# Patient Record
Sex: FEMALE | Race: WHITE | NOT HISPANIC OR LATINO | Employment: FULL TIME | URBAN - METROPOLITAN AREA
[De-identification: names, ages, dates, MRNs, and addresses within clinical notes are randomized per-mention and may not be internally consistent; named-entity substitution may affect disease eponyms.]

---

## 2017-01-09 DIAGNOSIS — M85.80 OSTEOPENIA: ICD-10-CM

## 2017-01-10 ENCOUNTER — OFFICE VISIT (OUTPATIENT)
Dept: ONCOLOGY | Facility: CLINIC | Age: 62
End: 2017-01-10

## 2017-01-10 ENCOUNTER — LAB (OUTPATIENT)
Dept: ONCOLOGY | Facility: HOSPITAL | Age: 62
End: 2017-01-10

## 2017-01-10 ENCOUNTER — INFUSION (OUTPATIENT)
Dept: ONCOLOGY | Facility: HOSPITAL | Age: 62
End: 2017-01-10

## 2017-01-10 VITALS
SYSTOLIC BLOOD PRESSURE: 138 MMHG | DIASTOLIC BLOOD PRESSURE: 79 MMHG | HEART RATE: 96 BPM | OXYGEN SATURATION: 97 % | TEMPERATURE: 97.4 F | HEIGHT: 67 IN | BODY MASS INDEX: 31.61 KG/M2 | RESPIRATION RATE: 16 BRPM | WEIGHT: 201.4 LBS

## 2017-01-10 DIAGNOSIS — C50.912 PRIMARY MALIGNANT NEOPLASM OF LEFT BREAST (HCC): Primary | ICD-10-CM

## 2017-01-10 DIAGNOSIS — D68.59 THROMBOPHILIA (HCC): ICD-10-CM

## 2017-01-10 DIAGNOSIS — D50.0 IRON DEFICIENCY ANEMIA DUE TO CHRONIC BLOOD LOSS: ICD-10-CM

## 2017-01-10 DIAGNOSIS — C79.51 BONE METASTASIS: ICD-10-CM

## 2017-01-10 DIAGNOSIS — M85.80 OSTEOPENIA: ICD-10-CM

## 2017-01-10 LAB
ALBUMIN SERPL-MCNC: 3.9 G/DL (ref 3.5–5.2)
ALBUMIN/GLOB SERPL: 1.3 G/DL
ALP SERPL-CCNC: 157 U/L (ref 39–117)
ALT SERPL W P-5'-P-CCNC: 18 U/L (ref 1–33)
ANION GAP SERPL CALCULATED.3IONS-SCNC: 10.8 MMOL/L
AST SERPL-CCNC: 23 U/L (ref 1–32)
BASOPHILS # BLD AUTO: 0.04 10*3/MM3 (ref 0–0.2)
BASOPHILS NFR BLD AUTO: 0.6 % (ref 0–1.5)
BILIRUB SERPL-MCNC: 0.2 MG/DL (ref 0.1–1.2)
BUN BLD-MCNC: 23 MG/DL (ref 8–23)
BUN/CREAT SERPL: 35.4 (ref 7–25)
CALCIUM SPEC-SCNC: 9.5 MG/DL (ref 8.6–10.5)
CHLORIDE SERPL-SCNC: 100 MMOL/L (ref 98–107)
CO2 SERPL-SCNC: 27.2 MMOL/L (ref 22–29)
CREAT BLD-MCNC: 0.65 MG/DL (ref 0.57–1)
DEPRECATED RDW RBC AUTO: 47 FL (ref 37–54)
EOSINOPHIL # BLD AUTO: 0.34 10*3/MM3 (ref 0–0.7)
EOSINOPHIL NFR BLD AUTO: 4.8 % (ref 0.3–6.2)
ERYTHROCYTE [DISTWIDTH] IN BLOOD BY AUTOMATED COUNT: 14.8 % (ref 11.7–13)
GFR SERPL CREATININE-BSD FRML MDRD: 93 ML/MIN/1.73
GLOBULIN UR ELPH-MCNC: 2.9 GM/DL
GLUCOSE BLD-MCNC: 126 MG/DL (ref 65–99)
HCT VFR BLD AUTO: 31.7 % (ref 35.6–45.5)
HGB BLD-MCNC: 10.9 G/DL (ref 11.9–15.5)
IMM GRANULOCYTES # BLD: 0.09 10*3/MM3 (ref 0–0.03)
IMM GRANULOCYTES NFR BLD: 1.3 % (ref 0–0.5)
LYMPHOCYTES # BLD AUTO: 1.2 10*3/MM3 (ref 0.9–4.8)
LYMPHOCYTES NFR BLD AUTO: 17.1 % (ref 19.6–45.3)
MCH RBC QN AUTO: 29.7 PG (ref 26.9–32)
MCHC RBC AUTO-ENTMCNC: 34.4 G/DL (ref 32.4–36.3)
MCV RBC AUTO: 86.4 FL (ref 80.5–98.2)
MONOCYTES # BLD AUTO: 0.62 10*3/MM3 (ref 0.2–1.2)
MONOCYTES NFR BLD AUTO: 8.8 % (ref 5–12)
NEUTROPHILS # BLD AUTO: 4.74 10*3/MM3 (ref 1.9–8.1)
NEUTROPHILS NFR BLD AUTO: 67.4 % (ref 42.7–76)
NRBC BLD MANUAL-RTO: 0 /100 WBC (ref 0–0)
PLATELET # BLD AUTO: 308 10*3/MM3 (ref 140–500)
PMV BLD AUTO: 10.7 FL (ref 6–12)
POTASSIUM BLD-SCNC: 4.2 MMOL/L (ref 3.5–5.2)
PROT SERPL-MCNC: 6.8 G/DL (ref 6–8.5)
RBC # BLD AUTO: 3.67 10*6/MM3 (ref 3.9–5.2)
SODIUM BLD-SCNC: 138 MMOL/L (ref 136–145)
WBC NRBC COR # BLD: 7.03 10*3/MM3 (ref 4.5–10.7)

## 2017-01-10 PROCEDURE — 85025 COMPLETE CBC W/AUTO DIFF WBC: CPT | Performed by: INTERNAL MEDICINE

## 2017-01-10 PROCEDURE — 99213 OFFICE O/P EST LOW 20 MIN: CPT | Performed by: INTERNAL MEDICINE

## 2017-01-10 PROCEDURE — 96375 TX/PRO/DX INJ NEW DRUG ADDON: CPT | Performed by: INTERNAL MEDICINE

## 2017-01-10 PROCEDURE — 25010000002 PACLITAXEL PROTEIN-BOUND PART PER 1 MG: Performed by: INTERNAL MEDICINE

## 2017-01-10 PROCEDURE — 36415 COLL VENOUS BLD VENIPUNCTURE: CPT

## 2017-01-10 PROCEDURE — 80053 COMPREHEN METABOLIC PANEL: CPT | Performed by: INTERNAL MEDICINE

## 2017-01-10 PROCEDURE — 25010000002 DEXAMETHASONE SODIUM PHOSPHATE 10 MG/ML SOLUTION 1 ML VIAL: Performed by: INTERNAL MEDICINE

## 2017-01-10 PROCEDURE — 96413 CHEMO IV INFUSION 1 HR: CPT | Performed by: INTERNAL MEDICINE

## 2017-01-10 RX ORDER — SODIUM CHLORIDE 9 MG/ML
250 INJECTION, SOLUTION INTRAVENOUS ONCE
Status: COMPLETED | OUTPATIENT
Start: 2017-01-10 | End: 2017-01-10

## 2017-01-10 RX ORDER — SODIUM CHLORIDE 9 MG/ML
250 INJECTION, SOLUTION INTRAVENOUS ONCE
Status: CANCELLED | OUTPATIENT
Start: 2017-01-10 | End: 2017-01-10

## 2017-01-10 RX ORDER — SODIUM CHLORIDE 9 MG/ML
250 INJECTION, SOLUTION INTRAVENOUS ONCE
Status: CANCELLED | OUTPATIENT
Start: 2017-01-24 | End: 2017-01-24

## 2017-01-10 RX ORDER — PACLITAXEL 100 MG/20ML
125 INJECTION, POWDER, LYOPHILIZED, FOR SUSPENSION INTRAVENOUS ONCE
Status: COMPLETED | OUTPATIENT
Start: 2017-01-10 | End: 2017-01-10

## 2017-01-10 RX ORDER — PACLITAXEL 100 MG/20ML
125 INJECTION, POWDER, LYOPHILIZED, FOR SUSPENSION INTRAVENOUS ONCE
Status: CANCELLED | OUTPATIENT
Start: 2017-01-24

## 2017-01-10 RX ORDER — PACLITAXEL 100 MG/20ML
125 INJECTION, POWDER, LYOPHILIZED, FOR SUSPENSION INTRAVENOUS ONCE
Status: CANCELLED | OUTPATIENT
Start: 2017-01-10

## 2017-01-10 RX ADMIN — SODIUM CHLORIDE 250 ML: 900 INJECTION, SOLUTION INTRAVENOUS at 08:48

## 2017-01-10 RX ADMIN — PACLITAXEL 250 MG: 100 INJECTION, POWDER, LYOPHILIZED, FOR SUSPENSION INTRAVENOUS at 09:18

## 2017-01-10 RX ADMIN — DEXAMETHASONE SODIUM PHOSPHATE 12 MG: 10 INJECTION, SOLUTION INTRAMUSCULAR; INTRAVENOUS at 09:02

## 2017-01-10 NOTE — PROGRESS NOTES
Subjective  REASONS FOR FOLLOWUP: 1. Metastatic breast cancer to multiple skeletal sites including cervical spine, sternum, lumbar spine and right femur, undergoing Abraxane every 4 weeks and Xgeva every month  2.Iron deficiency anemia du to GI blood loss, Xarelto stopped weeks ago, Iron initiated, Pepcid along with Aleve for gastric protection.           History of Present Illness    The patient returns today to the office for followup. She has had an excellent tolerance to the Abraxane that she is receiving every 4 weeks for the treatment of her metastatic breast cancer to the skeleton that has had recurrent progressive disease.  She also has continued her iron preparation away from calcium preparation for the treatment of iron deficiency anemia associated with gastrointestinal blood loss.  Her hemoglobin is improving already to 10.9 today.  So far, the patient has not experienced any obvious peripheral neuropathy.  Tolerance to the treatment is excellent and her ECOG performance status is 0.  She has minimal pain in the right knee area.  No limping.  No obvious bleeding at any level.  No cough, sputum production, shortness of breath.  Appetite and weight remain stable.  No fevers, no chills, no rash.           Past Medical History, Past Surgical History,    1. Hypertension.   2. Hyperlipidemia.   3. Reflux esophagitis.     GYN HISTORY: G0, P0. The patient went into menopause in 2000.     Review of Systems    General: no fever, chills, fatigue, weight changes, or lack of appetite.  Eyes: no epiphora, conjunctivitis, pain, glaucoma, blurred vision, blindness, secretion,  "photophobia.  Ears: no otorrhea, tinnitus, otorrhagia, deafness, pain, vertigo.  Nose: no rhinorrhea, epistaxis, alteration in perception of odors, sinuses pressure.  Mouth: no alteration in gums or teeth,  ulcers, no difficulty with mastication or deglut ion, no odynophagia.  Neck: no masses or pain, no thyroid alterations, no pain in muscles or arteries, no carotid odynia, no crepitation.  Lungs: no cough, sputum production, dyspnea, trepopnea, pleuritic pain, hemoptysis.  Heart: no syncope, irregularity, palpitations, angina, orthopnea, paroxysmal nocturnal dyspnea.  GI: no dysphagia, odynophagia, no regurgitation, no heartburn, indigestion, nausea, vomiting, hematemesis ,melena, jaundice, distention, obstipation, enterorrhagia, proctalgia, anal  Lesions, changes in bowel habits.  : no frequency, hesitancy, hematuria, discharge, pain.  Musculoskeletal: no muscle or tendon pain or inflammation, joint pain, edema, functional limitation, fasciculations, mass.  Neurologic: no headache, seizures, alterations on Craneal nerves, no motor or senssory deficit, normal coordination.  Skin: no rashes, pruritus or localized lesions.  Psychiatric: no anxiety, depression, agitation, delusions, proper insight.      Medications:  The current medication list was reviewed in the EMR    ALLERGIES:    Allergies   Allergen Reactions   • Codeine    • Docetaxel    • Paclitaxel        Objective      Vitals:    01/10/17 0812   BP: 138/79   Pulse: 96   Resp: 16   Temp: 97.4 °F (36.3 °C)   SpO2: 97%   Weight: 201 lb 6.4 oz (91.4 kg)   Height: 67.32\" (171 cm)   PainSc: 0-No pain     Current Status 1/10/2017   ECOG score 0   No cancer related pain    Physical Exam    GENERAL:  Well-developed, well-nourished  Patient  in no acute distress.   SKIN:  Warm, dry without rashes, purpura or petechiae.  HEENT:  Pupils were equal and reactive to light and accomodation, conjunctivas non injected, no pterigion, normal extraocular movements, normal " visual acuity.   Mouth mucosa was moist, no exudates in oropharynx, normal gum line, normal roof of the mouth and pillars, normal papillations of the tongue.  NECK:  Supple with good range of motion; no thyromegaly or masses, no JVD or bruits, no cervical adenopathies.  LYMPHATICS:  No cervical, supraclavicular, axillary or inguinal adenopathy.  CHEST:  Lungs clear to percussion and auscultation, normal breath sounds bilaterally, no wheezing, crackles or ronchi, no stridor.  CARDIAC:  Regular rate and rhythm with systolic g3/6 basal murmur, clean diastole.,no rubs or gallops.  ABDOMEN:  Soft, nontender with no organomegaly or masses, no ascites, no collateral circulation, no abdominal pain, no inguinal hernias, no abdominal bruits.  EXTREMITIES:  No clubbing, cyanosis or edema, no deformities or pain.  NEUROLOGICAL:  Patient was awake, alert, oriented to time, person and place    RECENT LABS:  Hematology WBC   Date Value Ref Range Status   01/10/2017 7.03 4.50 - 10.70 10*3/mm3 Final   02/23/2016 4.42 (L) 4.50 - 10.70 K/Cumm Final   02/23/2016 Comment /hpf Final     Comment:     None Seen  REFERENCE RANGE: None Seen,0-2       RBC   Date Value Ref Range Status   01/10/2017 3.67 (L) 3.90 - 5.20 10*6/mm3 Final   02/23/2016 3.85 (L) 3.90 - 5.20 Million Final     HEMOGLOBIN   Date Value Ref Range Status   01/10/2017 10.9 (L) 11.9 - 15.5 g/dL Final   02/23/2016 10.5 (L) 11.9 - 15.5 g/dL Final     HEMATOCRIT   Date Value Ref Range Status   01/10/2017 31.7 (L) 35.6 - 45.5 % Final   02/23/2016 33.1 (L) 35.6 - 45.5 % Final     PLATELETS   Date Value Ref Range Status   01/10/2017 308 140 - 500 10*3/mm3 Final   02/23/2016 268 140 - 500 K/Cumm Final      CBC Auto Differential   Order: [id number] - Part of Panel Order [id number]   Status:  Final result   Visible to patient:  No (Not Released) Dx:  Osteopenia      Ref Range & Units 7:58 AM     WBC 4.50 - 10.70 10*3/mm3 7.03   RBC 3.90 - 5.20 10*6/mm3 3.67 (L)   Hemoglobin 11.9 - 15.5  g/dL 10.9 (L)   Hematocrit 35.6 - 45.5 % 31.7 (L)   MCV 80.5 - 98.2 fL 86.4   MCH 26.9 - 32.0 pg 29.7   MCHC 32.4 - 36.3 g/dL 34.4   RDW 11.7 - 13.0 % 14.8 (H)   RDW-SD 37.0 - 54.0 fl 47.0   MPV 6.0 - 12.0 fL 10.7   Platelets 140 - 500 10*3/mm3 308   Neutrophil % 42.7 - 76.0 % 67.4   Lymphocyte % 19.6 - 45.3 % 17.1 (L)   Monocyte % 5.0 - 12.0 % 8.8   Eosinophil % 0.3 - 6.2 % 4.8   Basophil % 0.0 - 1.5 % 0.6   Immature Grans % 0.0 - 0.5 % 1.3 (H)   Neutrophils, Absolute 1.90 - 8.10 10*3/mm3 4.74   Lymphocytes, Absolute 0.90 - 4.80 10*3/mm3 1.20   Monocytes, Absolute 0.20 - 1.20 10*3/mm3 0.62   Eosinophils, Absolute 0.00 - 0.70 10*3/mm3 0.34   Basophils, Absolute 0.00 - 0.20 10*3/mm3 0.04   Immature Grans, Absolute 0.00 - 0.03 10*3/mm3 0.09 (H)   nRBC 0.0 - 0.0 /100 WBC 0.0   Resulting Agency  [facility]      Specimen Collected: 01/10/17  7:58 AM Last Resulted: 01/10/17  8:04 AM              Patient Information   Name MRN Description   Maggie Suero [id number] 61 y.o. Female   Interpretation Summary   · Left ventricular function is hyperdynamic (EF > 70%).  · Left ventricular diastolic dysfunction is noted (grade I) consistent with impaired relaxation  · Trace-to-mild aortic valve regurgitation is present  · Trace to mild tricuspid valve regurgitation is present.  · The calculated RVSP is 20 mm Hg (normal).  · There is no evidence of pericardial effusion.             Assessment/Plan    . Metastatic breast cancer to multiple skeletal sites including cervical spine, sternum, lumbar spine and right femur.  The patient is tolerating her treatment extremely well with no significant toxicity, including no leukopenia, no thrombocytopenia, no peripheral neuropathy and no allergic reactions.  She has no bone pain associated with her disease and she is not requiring any pain medicine at this time. From the point of view of her bone metastasis, she will remain on her Xgeva once every 3 months given new information from [facility] conference.  In regard to her iron deficiency anemia associated with gastrointestinal blood loss, the patient’s hemoglobin is now stable at 10.9.  The patient denies any GI symptomatology to explain the persistency of the anemia.  She is taking the iron correctly.  She is not taking any calcium that would modify the absorption , and I advised her to take also some vitamin C 500 mg p.o. along with the iron preparation to try to raise further absorption.  New hemoccults are negative.    Plan: 1.  Given the excellent benefit of Abraxane in regard to her cancer control I have advised the patient, as per today, to modify her Abraxane to receive the medicine only once a month. Therefore, the next treatment will be in 4 weeks and subsequent treatment in 8 weeks.   2.  Iron deficiency anemia will remain being treated with iron preparation by mouth. She will remain on this and again, we pointed out to her that she is now Hemoccult negative after stopping the Xarelto.   3.  She has a very significant loud murmur in the heart.   Echocardiogram has documented above showing minimal diastolic dysfunction associated with hypertension and trace of aortic regurgitation. This has no implications from the point of view of functionality and this will be watched in the absence of any other intervention. This was discussed with the patient in November 2016.              4.  In regard to Xgeva, the patient will remain on this medicine on a every 3 month basis; so far she has not had any difficulties. Recent information from [facility] conference clearly stated that this agents can be given every 3 months without affecting outcome.  5.  Will review her back in 7 weeks. We will measure her CA 15-3 then and we will continue monitoring her chemistry profile and paying attention to the alkaline phosphatase dropping. Bone scan in 7 weeks to compare with the once in June 16.                                                     1/10/2017      CC:

## 2017-01-29 DIAGNOSIS — C50.919: ICD-10-CM

## 2017-01-30 RX ORDER — FERROUS SULFATE 325(65) MG
TABLET ORAL
Qty: 30 TABLET | Refills: 2 | Status: SHIPPED | OUTPATIENT
Start: 2017-01-30 | End: 2017-04-24 | Stop reason: SDUPTHER

## 2017-02-03 NOTE — PROGRESS NOTES
Per Dr Saez dictation from 1/10/17 patient to receive Abraxane starting with Cycle 7 monthly instead of Day1 and Day 15.

## 2017-02-07 ENCOUNTER — INFUSION (OUTPATIENT)
Dept: ONCOLOGY | Facility: HOSPITAL | Age: 62
End: 2017-02-07

## 2017-02-07 ENCOUNTER — LAB (OUTPATIENT)
Dept: ONCOLOGY | Facility: HOSPITAL | Age: 62
End: 2017-02-07

## 2017-02-07 ENCOUNTER — OFFICE VISIT (OUTPATIENT)
Dept: ONCOLOGY | Facility: CLINIC | Age: 62
End: 2017-02-07

## 2017-02-07 VITALS
DIASTOLIC BLOOD PRESSURE: 82 MMHG | RESPIRATION RATE: 16 BRPM | HEIGHT: 67 IN | OXYGEN SATURATION: 97 % | SYSTOLIC BLOOD PRESSURE: 120 MMHG | BODY MASS INDEX: 31.23 KG/M2 | HEART RATE: 92 BPM | WEIGHT: 199 LBS | TEMPERATURE: 98 F

## 2017-02-07 DIAGNOSIS — D50.0 IRON DEFICIENCY ANEMIA DUE TO CHRONIC BLOOD LOSS: ICD-10-CM

## 2017-02-07 DIAGNOSIS — C50.912 PRIMARY MALIGNANT NEOPLASM OF LEFT BREAST (HCC): ICD-10-CM

## 2017-02-07 DIAGNOSIS — C79.51 BONE METASTASIS: ICD-10-CM

## 2017-02-07 DIAGNOSIS — C50.912 PRIMARY MALIGNANT NEOPLASM OF LEFT BREAST (HCC): Primary | ICD-10-CM

## 2017-02-07 DIAGNOSIS — M85.80 OSTEOPENIA: ICD-10-CM

## 2017-02-07 LAB
ALBUMIN SERPL-MCNC: 4.2 G/DL (ref 3.5–5.2)
ALBUMIN/GLOB SERPL: 1.4 G/DL
ALP SERPL-CCNC: 211 U/L (ref 39–117)
ALT SERPL W P-5'-P-CCNC: 43 U/L (ref 1–33)
ANION GAP SERPL CALCULATED.3IONS-SCNC: 12.7 MMOL/L
AST SERPL-CCNC: 28 U/L (ref 1–32)
BASOPHILS # BLD AUTO: 0.04 10*3/MM3 (ref 0–0.2)
BASOPHILS NFR BLD AUTO: 0.6 % (ref 0–1.5)
BILIRUB SERPL-MCNC: 0.3 MG/DL (ref 0.1–1.2)
BUN BLD-MCNC: 31 MG/DL (ref 8–23)
BUN/CREAT SERPL: 40.3 (ref 7–25)
CALCIUM SPEC-SCNC: 9 MG/DL (ref 8.6–10.5)
CHLORIDE SERPL-SCNC: 98 MMOL/L (ref 98–107)
CO2 SERPL-SCNC: 25.3 MMOL/L (ref 22–29)
CREAT BLD-MCNC: 0.77 MG/DL (ref 0.57–1)
DEPRECATED RDW RBC AUTO: 48.9 FL (ref 37–54)
EOSINOPHIL # BLD AUTO: 0.25 10*3/MM3 (ref 0–0.7)
EOSINOPHIL NFR BLD AUTO: 4 % (ref 0.3–6.2)
ERYTHROCYTE [DISTWIDTH] IN BLOOD BY AUTOMATED COUNT: 15.6 % (ref 11.7–13)
GFR SERPL CREATININE-BSD FRML MDRD: 76 ML/MIN/1.73
GLOBULIN UR ELPH-MCNC: 3 GM/DL
GLUCOSE BLD-MCNC: 125 MG/DL (ref 65–99)
HCT VFR BLD AUTO: 31.3 % (ref 35.6–45.5)
HGB BLD-MCNC: 10.9 G/DL (ref 11.9–15.5)
IMM GRANULOCYTES # BLD: 0.06 10*3/MM3 (ref 0–0.03)
IMM GRANULOCYTES NFR BLD: 1 % (ref 0–0.5)
LYMPHOCYTES # BLD AUTO: 1.24 10*3/MM3 (ref 0.9–4.8)
LYMPHOCYTES NFR BLD AUTO: 19.9 % (ref 19.6–45.3)
MCH RBC QN AUTO: 29.8 PG (ref 26.9–32)
MCHC RBC AUTO-ENTMCNC: 34.8 G/DL (ref 32.4–36.3)
MCV RBC AUTO: 85.5 FL (ref 80.5–98.2)
MONOCYTES # BLD AUTO: 0.56 10*3/MM3 (ref 0.2–1.2)
MONOCYTES NFR BLD AUTO: 9 % (ref 5–12)
NEUTROPHILS # BLD AUTO: 4.09 10*3/MM3 (ref 1.9–8.1)
NEUTROPHILS NFR BLD AUTO: 65.5 % (ref 42.7–76)
NRBC BLD MANUAL-RTO: 0 /100 WBC (ref 0–0)
PLATELET # BLD AUTO: 233 10*3/MM3 (ref 140–500)
PMV BLD AUTO: 10.9 FL (ref 6–12)
POTASSIUM BLD-SCNC: 3.8 MMOL/L (ref 3.5–5.2)
PROT SERPL-MCNC: 7.2 G/DL (ref 6–8.5)
RBC # BLD AUTO: 3.66 10*6/MM3 (ref 3.9–5.2)
SODIUM BLD-SCNC: 136 MMOL/L (ref 136–145)
WBC NRBC COR # BLD: 6.24 10*3/MM3 (ref 4.5–10.7)

## 2017-02-07 PROCEDURE — 85025 COMPLETE CBC W/AUTO DIFF WBC: CPT | Performed by: INTERNAL MEDICINE

## 2017-02-07 PROCEDURE — 36415 COLL VENOUS BLD VENIPUNCTURE: CPT

## 2017-02-07 PROCEDURE — 80053 COMPREHEN METABOLIC PANEL: CPT | Performed by: INTERNAL MEDICINE

## 2017-02-07 PROCEDURE — 25010000002 DEXAMETHASONE PER 1 MG: Performed by: NURSE PRACTITIONER

## 2017-02-07 PROCEDURE — 96375 TX/PRO/DX INJ NEW DRUG ADDON: CPT | Performed by: NURSE PRACTITIONER

## 2017-02-07 PROCEDURE — 99212-NC PR NO CHARGE CBC OFFICE OUTPATIENT VISIT 10 MINUTES: Performed by: NURSE PRACTITIONER

## 2017-02-07 PROCEDURE — 96413 CHEMO IV INFUSION 1 HR: CPT | Performed by: NURSE PRACTITIONER

## 2017-02-07 PROCEDURE — 25010000002 PACLITAXEL PROTEIN-BOUND PART PER 1 MG: Performed by: NURSE PRACTITIONER

## 2017-02-07 RX ORDER — SODIUM CHLORIDE 9 MG/ML
250 INJECTION, SOLUTION INTRAVENOUS ONCE
Status: COMPLETED | OUTPATIENT
Start: 2017-02-07 | End: 2017-02-07

## 2017-02-07 RX ORDER — SODIUM CHLORIDE 9 MG/ML
250 INJECTION, SOLUTION INTRAVENOUS ONCE
Status: CANCELLED | OUTPATIENT
Start: 2017-02-07 | End: 2017-02-07

## 2017-02-07 RX ORDER — PACLITAXEL 100 MG/20ML
125 INJECTION, POWDER, LYOPHILIZED, FOR SUSPENSION INTRAVENOUS ONCE
Status: CANCELLED | OUTPATIENT
Start: 2017-02-07

## 2017-02-07 RX ORDER — PACLITAXEL 100 MG/20ML
125 INJECTION, POWDER, LYOPHILIZED, FOR SUSPENSION INTRAVENOUS ONCE
Status: COMPLETED | OUTPATIENT
Start: 2017-02-07 | End: 2017-02-07

## 2017-02-07 RX ADMIN — SODIUM CHLORIDE 250 ML: 900 INJECTION, SOLUTION INTRAVENOUS at 08:45

## 2017-02-07 RX ADMIN — DEXAMETHASONE SODIUM PHOSPHATE 12 MG: 10 INJECTION INTRAMUSCULAR; INTRAVENOUS at 08:46

## 2017-02-07 RX ADMIN — PACLITAXEL 250 MG: 100 INJECTION, POWDER, LYOPHILIZED, FOR SUSPENSION INTRAVENOUS at 09:02

## 2017-02-07 NOTE — PROGRESS NOTES
Subjective      REASONS FOR FOLLOWUP: 1. Metastatic breast cancer to multiple skeletal sites including cervical spine, sternum, lumbar spine and right femur, undergoing Abraxane every 4 weeks and Xgeva every 3 months.    2.Iron deficiency anemia du to GI blood loss, Xarelto stopped months ago, Iron initiated, Pepcid along with Aleve for gastric protection.     History of Present Illness  Mrs. Suero is a 61 year old female with the above mentioned history here today for her Abraxane.  She reports she has been feeling more fatigued since last Thursday.  She has had some mild nonspecific symptoms like some congestion and decreased appetite.  She has had some mild nausea.  She denies fever or chills.  She denies vomiting.  She denies shortness of breath or cough.  She reports some occasional intermittent neuropathy in her fingertips, but it does not last, and does not interfere with activities.       Past Medical History, Past Surgical History,    1. Hypertension.   2. Hyperlipidemia.   3. Reflux esophagitis.     GYN HISTORY: G0, P0. The patient went into menopause in 2000.     Review of Systems   Constitutional: Positive for appetite change and fatigue. Negative for chills and fever.   HENT: Negative.  Negative for mouth sores, trouble swallowing and voice change.    Respiratory: Negative.  Negative for cough and shortness of breath.    Cardiovascular: Negative.  Negative for chest pain and leg swelling.   Gastrointestinal: Positive for nausea. Negative for abdominal pain, constipation, diarrhea and vomiting.   Genitourinary: Negative.  Negative for difficulty urinating.   Musculoskeletal:  "Negative.    Skin: Negative.  Negative for rash.   Neurological: Negative.  Negative for dizziness and light-headedness.   Hematological: Negative.  Negative for adenopathy. Does not bruise/bleed easily.   Psychiatric/Behavioral: Negative.  Negative for sleep disturbance.      Medications:  The current medication list was reviewed in the EMR    ALLERGIES:    Allergies   Allergen Reactions   • Codeine    • Docetaxel    • Paclitaxel        Objective      Vitals:    02/07/17 0810 02/07/17 0828   BP: 102/47 120/82   Pulse: 92    Resp: 16    Temp: 98 °F (36.7 °C)    SpO2: 97%    Weight: 199 lb (90.3 kg)    Height: 67.32\" (171 cm)    PainSc: 0-No pain      Current Status 2/7/2017   ECOG score 0   No cancer related pain    Physical Exam   Constitutional: She is oriented to person, place, and time. She appears well-developed and well-nourished.   HENT:   Head: Normocephalic and atraumatic.   Nose: Nose normal.   Mouth/Throat: Oropharynx is clear and moist and mucous membranes are normal. No oropharyngeal exudate.   Eyes: Pupils are equal, round, and reactive to light.   Neck: Normal range of motion. Neck supple.   Cardiovascular: Normal rate and regular rhythm.    Murmur heard.   Systolic murmur is present   Pulmonary/Chest: Effort normal and breath sounds normal.   Abdominal: Soft. Normal appearance and bowel sounds are normal. She exhibits no distension. There is no hepatosplenomegaly. There is no tenderness.   Musculoskeletal: Normal range of motion. She exhibits no edema.   Neurological: She is alert and oriented to person, place, and time.   Skin: Skin is warm and dry.   Psychiatric: She has a normal mood and affect. Her behavior is normal.   Nursing note and vitals reviewed.        RECENT LABS:  Hematology WBC   Date Value Ref Range Status   02/07/2017 6.24 4.50 - 10.70 10*3/mm3 Final   02/23/2016 4.42 (L) 4.50 - 10.70 K/Cumm Final   02/23/2016 Comment /hpf Final     Comment:     None Seen  REFERENCE RANGE: None " Seen,0-2       RBC   Date Value Ref Range Status   02/07/2017 3.66 (L) 3.90 - 5.20 10*6/mm3 Final   02/23/2016 3.85 (L) 3.90 - 5.20 Million Final     HEMOGLOBIN   Date Value Ref Range Status   02/07/2017 10.9 (L) 11.9 - 15.5 g/dL Final   02/23/2016 10.5 (L) 11.9 - 15.5 g/dL Final     HEMATOCRIT   Date Value Ref Range Status   02/07/2017 31.3 (L) 35.6 - 45.5 % Final   02/23/2016 33.1 (L) 35.6 - 45.5 % Final     PLATELETS   Date Value Ref Range Status   02/07/2017 233 140 - 500 10*3/mm3 Final   02/23/2016 268 140 - 500 K/Cumm Final      CBC Auto Differential   Order: [id number] - Part of Panel Order [id number]   Status:  Final result   Visible to patient:  No (Not Released) Dx:  Osteopenia      Ref Range & Units 7:58 AM     WBC 4.50 - 10.70 10*3/mm3 7.03   RBC 3.90 - 5.20 10*6/mm3 3.67 (L)   Hemoglobin 11.9 - 15.5 g/dL 10.9 (L)   Hematocrit 35.6 - 45.5 % 31.7 (L)   MCV 80.5 - 98.2 fL 86.4   MCH 26.9 - 32.0 pg 29.7   MCHC 32.4 - 36.3 g/dL 34.4   RDW 11.7 - 13.0 % 14.8 (H)   RDW-SD 37.0 - 54.0 fl 47.0   MPV 6.0 - 12.0 fL 10.7   Platelets 140 - 500 10*3/mm3 308   Neutrophil % 42.7 - 76.0 % 67.4   Lymphocyte % 19.6 - 45.3 % 17.1 (L)   Monocyte % 5.0 - 12.0 % 8.8   Eosinophil % 0.3 - 6.2 % 4.8   Basophil % 0.0 - 1.5 % 0.6   Immature Grans % 0.0 - 0.5 % 1.3 (H)   Neutrophils, Absolute 1.90 - 8.10 10*3/mm3 4.74   Lymphocytes, Absolute 0.90 - 4.80 10*3/mm3 1.20   Monocytes, Absolute 0.20 - 1.20 10*3/mm3 0.62   Eosinophils, Absolute 0.00 - 0.70 10*3/mm3 0.34   Basophils, Absolute 0.00 - 0.20 10*3/mm3 0.04   Immature Grans, Absolute 0.00 - 0.03 10*3/mm3 0.09 (H)   nRBC 0.0 - 0.0 /100 WBC 0.0   Resulting Agency  [facility]      Specimen Collected: 01/10/17  7:58 AM Last Resulted: 01/10/17  8:04 AM              Patient Information   Name MRN Description   Maggie Suero [id number] 61 y.o. Female   Interpretation Summary   · Left ventricular function is hyperdynamic (EF > 70%).  · Left ventricular diastolic dysfunction is noted  (grade I) consistent with impaired relaxation  · Trace-to-mild aortic valve regurgitation is present  · Trace to mild tricuspid valve regurgitation is present.  · The calculated RVSP is 20 mm Hg (normal).  · There is no evidence of pericardial effusion.             Assessment/Plan      1. Metastatic breast cancer to multiple skeletal sites including cervical spine, sternum, lumbar spine and right femur.  She is currently on Abraxane every 4 weeks, and Xgeva every every 3 months.  She is tolerating treatment well.      2. Iron deficiency anemia: associated with GI blood loss.  Her Hbg stable and is 10.9 today.  She had negative hemoccults October 2016.   She is currently taking ferrous sulfate 325 mg daily.  She is tolerating this well so far.      3. Heart murmur: Echocardiogram from November 2016 has showed minimal diastolic dysfunction associated with hypertension and trace of aortic regurgitation. This has no implications from the point of view of functionality and this will be watched in the absence of any other intervention. This was discussed with the patient in November 2016.          Plan:   1. Proceed with Abraxane today.  2. Patient will have bone scan February 28, 2017.    3. She will return on 3/7/2017 for follow up with Dr. Duong, and will be due for Abraxane and Xgeva that day as well.                                                       2/7/2017      CC:

## 2017-02-28 ENCOUNTER — HOSPITAL ENCOUNTER (OUTPATIENT)
Dept: NUCLEAR MEDICINE | Facility: HOSPITAL | Age: 62
Discharge: HOME | End: 2017-02-28
Attending: INTERNAL MEDICINE

## 2017-02-28 DIAGNOSIS — C79.51 BONE METASTASIS: ICD-10-CM

## 2017-02-28 DIAGNOSIS — M85.80 OSTEOPENIA: ICD-10-CM

## 2017-02-28 DIAGNOSIS — D50.0 IRON DEFICIENCY ANEMIA DUE TO CHRONIC BLOOD LOSS: ICD-10-CM

## 2017-02-28 DIAGNOSIS — C50.912 PRIMARY MALIGNANT NEOPLASM OF LEFT BREAST (HCC): ICD-10-CM

## 2017-02-28 PROCEDURE — 0 TECHNETIUM MEDRONATE KIT: Performed by: INTERNAL MEDICINE

## 2017-02-28 PROCEDURE — 78306 BONE IMAGING WHOLE BODY: CPT

## 2017-02-28 PROCEDURE — A9503 TC99M MEDRONATE: HCPCS | Performed by: INTERNAL MEDICINE

## 2017-02-28 RX ORDER — TC 99M MEDRONATE 20 MG/10ML
23 INJECTION, POWDER, LYOPHILIZED, FOR SOLUTION INTRAVENOUS
Status: COMPLETED | OUTPATIENT
Start: 2017-02-28 | End: 2017-02-28

## 2017-02-28 RX ADMIN — Medication 23 MILLICURIE: at 08:05

## 2017-03-07 ENCOUNTER — APPOINTMENT (OUTPATIENT)
Dept: ONCOLOGY | Facility: HOSPITAL | Age: 62
End: 2017-03-07

## 2017-03-07 ENCOUNTER — APPOINTMENT (OUTPATIENT)
Dept: ONCOLOGY | Facility: CLINIC | Age: 62
End: 2017-03-07

## 2017-03-07 ENCOUNTER — APPOINTMENT (OUTPATIENT)
Dept: OTHER | Facility: HOSPITAL | Age: 62
End: 2017-03-07

## 2017-03-07 DIAGNOSIS — I10 BENIGN ESSENTIAL HTN: Primary | ICD-10-CM

## 2017-03-07 DIAGNOSIS — C50.912 PRIMARY MALIGNANT NEOPLASM OF LEFT BREAST (HCC): ICD-10-CM

## 2017-03-07 DIAGNOSIS — E78.5 HYPERLIPIDEMIA, UNSPECIFIED HYPERLIPIDEMIA TYPE: ICD-10-CM

## 2017-03-07 LAB
ALBUMIN SERPL-MCNC: 4.3 G/DL (ref 3.5–5.2)
ALBUMIN/GLOB SERPL: 2 G/DL
ALP SERPL-CCNC: 191 U/L (ref 39–117)
ALT SERPL-CCNC: 22 U/L (ref 1–33)
AST SERPL-CCNC: 24 U/L (ref 1–32)
BILIRUB SERPL-MCNC: 0.4 MG/DL (ref 0.1–1.2)
BUN SERPL-MCNC: 21 MG/DL (ref 8–23)
BUN/CREAT SERPL: 26.6 (ref 7–25)
CALCIUM SERPL-MCNC: 9 MG/DL (ref 8.6–10.5)
CHLORIDE SERPL-SCNC: 97 MMOL/L (ref 98–107)
CHOLEST SERPL-MCNC: 174 MG/DL (ref 0–200)
CO2 SERPL-SCNC: 25.6 MMOL/L (ref 22–29)
CREAT SERPL-MCNC: 0.79 MG/DL (ref 0.57–1)
GLOBULIN SER CALC-MCNC: 2.2 GM/DL
GLUCOSE SERPL-MCNC: 108 MG/DL (ref 65–99)
HDLC SERPL-MCNC: 45 MG/DL (ref 40–60)
LDLC SERPL CALC-MCNC: 89 MG/DL (ref 0–100)
POTASSIUM SERPL-SCNC: 4.1 MMOL/L (ref 3.5–5.2)
PROT SERPL-MCNC: 6.5 G/DL (ref 6–8.5)
SODIUM SERPL-SCNC: 138 MMOL/L (ref 136–145)
TRIGL SERPL-MCNC: 201 MG/DL (ref 0–150)
VLDLC SERPL CALC-MCNC: 40.2 MG/DL (ref 5–40)

## 2017-03-13 PROBLEM — Z45.2 FITTING AND ADJUSTMENT OF VASCULAR CATHETER: Status: ACTIVE | Noted: 2017-03-13

## 2017-03-14 ENCOUNTER — OFFICE VISIT (OUTPATIENT)
Dept: INTERNAL MEDICINE | Facility: CLINIC | Age: 62
End: 2017-03-14

## 2017-03-14 VITALS
OXYGEN SATURATION: 98 % | WEIGHT: 200 LBS | HEIGHT: 67 IN | BODY MASS INDEX: 31.39 KG/M2 | HEART RATE: 102 BPM | DIASTOLIC BLOOD PRESSURE: 72 MMHG | SYSTOLIC BLOOD PRESSURE: 124 MMHG

## 2017-03-14 DIAGNOSIS — C79.51 BONE METASTASIS: ICD-10-CM

## 2017-03-14 DIAGNOSIS — I10 BENIGN ESSENTIAL HTN: Primary | ICD-10-CM

## 2017-03-14 DIAGNOSIS — Z12.31 ENCOUNTER FOR SCREENING MAMMOGRAM FOR BREAST CANCER: ICD-10-CM

## 2017-03-14 DIAGNOSIS — E78.5 HYPERLIPIDEMIA, UNSPECIFIED HYPERLIPIDEMIA TYPE: ICD-10-CM

## 2017-03-14 PROCEDURE — 99213 OFFICE O/P EST LOW 20 MIN: CPT | Performed by: INTERNAL MEDICINE

## 2017-03-14 NOTE — PROGRESS NOTES
Subjective     Maggie Suero is a 61 y.o. female who presents with   Chief Complaint   Patient presents with   • Hypertension   • Hyperlipidemia       History of Present Illness     HTN.  Control is good.    HLD.  Good control with atorvastatin. Reviewed labs.     Reviewed recent bone scan with the patient which did show increased activity with her metastatic breast cancer.      Review of Systems   Respiratory: Negative.    Cardiovascular: Negative.        The following portions of the patient's history were reviewed and updated as appropriate: allergies, current medications and problem list.    Patient Active Problem List    Diagnosis Date Noted   • Fitting and adjustment of vascular catheter 03/13/2017   • Heart murmur, systolic 10/25/2016   • Bone metastasis 05/20/2016   • Anemia 05/20/2016   • History of colon polyps 03/08/2016   • Benign essential HTN 02/18/2016   • Primary malignant neoplasm of left breast 02/18/2016     Note Last Updated: 3/8/2016     Left breast cancer in 2000  Mets to the hip in 2014 thought to be metastatic from the original.       • Thrombophilia 02/18/2016     Note Last Updated: 2/18/2016     Description: associated with femoral fracture     • Hyperlipidemia 02/18/2016   • Abnormal fasting glucose 02/18/2016   • Osteopenia 02/18/2016     Note Last Updated: 3/8/2016     Description: Fosamax stopped after 10 years of therapy in May of 2012.  On Zometa with metastatic breast cancer followed by Xgeva currently.           Current Outpatient Prescriptions on File Prior to Visit   Medication Sig Dispense Refill   • atorvastatin (LIPITOR) 20 MG tablet Take 1 tablet by mouth Daily. 90 tablet 1   • calcium carbonate-vitamin d 600-400 MG-UNIT per tablet Take 1 tablet by mouth daily.     • denosumab (XGEVA) 120 MG/1.7ML solution injection Inject  under the skin 1 (one) time.     • diclofenac (VOLTAREN) 1 % gel gel Place  on the skin.     • Docusate Calcium (STOOL SOFTENER PO) Take  by mouth as  "needed.     • ferrous sulfate 325 (65 FE) MG tablet TAKE ONE TABLET BY MOUTH ONCE DAILY WITH BREAKFAST 30 tablet 2   • lisinopril-hydrochlorothiazide (PRINZIDE,ZESTORETIC) 10-12.5 MG per tablet Take 1 tablet by mouth 2 (Two) Times a Day. 180 tablet 1   • Naproxen Sod-Diphenhydramine (ALEVE PM PO) Take  by mouth as needed.     • Naproxen Sodium (ALEVE PO) Take  by mouth.       No current facility-administered medications on file prior to visit.        Objective     Visit Vitals   • /72   • Pulse 102   • Ht 67.32\" (171 cm)   • Wt 200 lb (90.7 kg)   • SpO2 98%   • BMI 31.03 kg/m2       Physical Exam   Constitutional: She is oriented to person, place, and time. She appears well-developed and well-nourished.   HENT:   Head: Normocephalic and atraumatic.   Cardiovascular: Normal rate, regular rhythm and normal heart sounds.    Pulmonary/Chest: Effort normal and breath sounds normal.   Neurological: She is alert and oriented to person, place, and time.   Skin: Skin is warm and dry.   Psychiatric: She has a normal mood and affect. Her behavior is normal.       Assessment/Plan   Maggie was seen today for hypertension and hyperlipidemia.    Diagnoses and all orders for this visit:    Benign essential HTN    Hyperlipidemia, unspecified hyperlipidemia type    Bone metastasis    Encounter for screening mammogram for breast cancer  -     Mammo Screening Bilateral With CAD; Future        Discussion  HTN.  Continue current regimen.  HLD. Continue atorvastatin.    Metastatic breast cancer.  Due screening mammogram.  Reviewed recent treatments.      15 minutes spent with the patient with greater than 50% of time spent counseling the following topics:, diagnostic results, impressions         Future Appointments  Date Time Provider Department Center   3/15/2017 1:00 PM INFU MICKEY VELAZQUEZ CHAIR  [facility]   3/15/2017 1:40 PM MD RIGOBERTO Tello  [facility]   3/15/2017 2:30 PM CHAIR 08 MICKEY Maravilla MD  [facility] "   9/15/2017 9:30 AM [facility] None   9/22/2017 1:30 PM MD [facility] None

## 2017-03-15 ENCOUNTER — INFUSION (OUTPATIENT)
Dept: ONCOLOGY | Facility: HOSPITAL | Age: 62
End: 2017-03-15

## 2017-03-15 ENCOUNTER — OFFICE VISIT (OUTPATIENT)
Dept: ONCOLOGY | Facility: CLINIC | Age: 62
End: 2017-03-15

## 2017-03-15 VITALS
TEMPERATURE: 97.8 F | RESPIRATION RATE: 12 BRPM | HEIGHT: 67 IN | DIASTOLIC BLOOD PRESSURE: 82 MMHG | HEART RATE: 99 BPM | OXYGEN SATURATION: 100 % | SYSTOLIC BLOOD PRESSURE: 124 MMHG | BODY MASS INDEX: 31.14 KG/M2 | WEIGHT: 198.4 LBS

## 2017-03-15 DIAGNOSIS — M85.80 OSTEOPENIA: ICD-10-CM

## 2017-03-15 DIAGNOSIS — C50.912 PRIMARY MALIGNANT NEOPLASM OF LEFT BREAST (HCC): Primary | ICD-10-CM

## 2017-03-15 DIAGNOSIS — C79.51 BONE METASTASIS: Primary | ICD-10-CM

## 2017-03-15 DIAGNOSIS — C79.51 BONE METASTASIS: ICD-10-CM

## 2017-03-15 DIAGNOSIS — D50.0 IRON DEFICIENCY ANEMIA DUE TO CHRONIC BLOOD LOSS: ICD-10-CM

## 2017-03-15 DIAGNOSIS — Z45.2 FITTING AND ADJUSTMENT OF VASCULAR CATHETER: ICD-10-CM

## 2017-03-15 DIAGNOSIS — C50.912 PRIMARY MALIGNANT NEOPLASM OF LEFT BREAST (HCC): ICD-10-CM

## 2017-03-15 DIAGNOSIS — D68.59 THROMBOPHILIA (HCC): ICD-10-CM

## 2017-03-15 LAB
ALBUMIN SERPL-MCNC: 4.2 G/DL (ref 3.5–5.2)
ALBUMIN/GLOB SERPL: 1.4 G/DL (ref 1.1–2.4)
ALP SERPL-CCNC: 172 U/L (ref 38–116)
ALT SERPL W P-5'-P-CCNC: 19 U/L (ref 0–33)
ANION GAP SERPL CALCULATED.3IONS-SCNC: 13.5 MMOL/L
AST SERPL-CCNC: 20 U/L (ref 0–32)
BASOPHILS # BLD AUTO: 0.04 10*3/MM3 (ref 0–0.1)
BASOPHILS NFR BLD AUTO: 0.7 % (ref 0–1.1)
BILIRUB SERPL-MCNC: 0.2 MG/DL (ref 0.1–1.2)
BUN BLD-MCNC: 29 MG/DL (ref 6–20)
BUN/CREAT SERPL: 38.2 (ref 7.3–30)
CALCIUM SPEC-SCNC: 9 MG/DL (ref 8.5–10.2)
CANCER AG15-3 SERPL-ACNC: 46.9 U/ML
CHLORIDE SERPL-SCNC: 96 MMOL/L (ref 98–107)
CO2 SERPL-SCNC: 25.5 MMOL/L (ref 22–29)
CREAT BLD-MCNC: 0.76 MG/DL (ref 0.6–1.1)
DEPRECATED RDW RBC AUTO: 51.8 FL (ref 37–49)
EOSINOPHIL # BLD AUTO: 0.25 10*3/MM3 (ref 0–0.36)
EOSINOPHIL NFR BLD AUTO: 4.2 % (ref 1–5)
ERYTHROCYTE [DISTWIDTH] IN BLOOD BY AUTOMATED COUNT: 15.8 % (ref 11.7–14.5)
FERRITIN SERPL-MCNC: 334.6 NG/ML
GFR SERPL CREATININE-BSD FRML MDRD: 78 ML/MIN/1.73
GLOBULIN UR ELPH-MCNC: 3 GM/DL (ref 1.8–3.5)
GLUCOSE BLD-MCNC: 101 MG/DL (ref 74–124)
HCT VFR BLD AUTO: 30.6 % (ref 34–45)
HGB BLD-MCNC: 9.8 G/DL (ref 11.5–14.9)
HOLD SPECIMEN: NORMAL
IMM GRANULOCYTES # BLD: 0.07 10*3/MM3 (ref 0–0.03)
IMM GRANULOCYTES NFR BLD: 1.2 % (ref 0–0.5)
IRON 24H UR-MRATE: 79 MCG/DL (ref 37–145)
IRON SATN MFR SERPL: 21 % (ref 14–48)
LYMPHOCYTES # BLD AUTO: 1.46 10*3/MM3 (ref 1–3.5)
LYMPHOCYTES NFR BLD AUTO: 24.5 % (ref 20–49)
MAGNESIUM SERPL-MCNC: 2.1 MG/DL (ref 1.8–2.5)
MCH RBC QN AUTO: 28.5 PG (ref 27–33)
MCHC RBC AUTO-ENTMCNC: 32 G/DL (ref 32–35)
MCV RBC AUTO: 89 FL (ref 83–97)
MONOCYTES # BLD AUTO: 0.52 10*3/MM3 (ref 0.25–0.8)
MONOCYTES NFR BLD AUTO: 8.7 % (ref 4–12)
NEUTROPHILS # BLD AUTO: 3.62 10*3/MM3 (ref 1.5–7)
NEUTROPHILS NFR BLD AUTO: 60.7 % (ref 39–75)
NRBC BLD MANUAL-RTO: 0 /100 WBC (ref 0–0)
PHOSPHATE SERPL-MCNC: 3.1 MG/DL (ref 2.5–4.5)
PLATELET # BLD AUTO: 305 10*3/MM3 (ref 150–375)
PMV BLD AUTO: 9.8 FL (ref 8.9–12.1)
POTASSIUM BLD-SCNC: 4.3 MMOL/L (ref 3.5–4.7)
PROT SERPL-MCNC: 7.2 G/DL (ref 6.3–8)
RBC # BLD AUTO: 3.44 10*6/MM3 (ref 3.9–5)
SODIUM BLD-SCNC: 135 MMOL/L (ref 134–145)
TIBC SERPL-MCNC: 368 MCG/DL (ref 249–505)
TRANSFERRIN SERPL-MCNC: 263 MG/DL (ref 200–360)
WBC NRBC COR # BLD: 5.96 10*3/MM3 (ref 4–10)

## 2017-03-15 PROCEDURE — 25010000002 DENOSUMAB 120 MG/1.7ML SOLUTION: Performed by: INTERNAL MEDICINE

## 2017-03-15 PROCEDURE — 25010000002 ERIBULIN 1 MG/2ML SOLUTION: Performed by: INTERNAL MEDICINE

## 2017-03-15 PROCEDURE — 99214 OFFICE O/P EST MOD 30 MIN: CPT | Performed by: INTERNAL MEDICINE

## 2017-03-15 PROCEDURE — 25010000002 DEXAMETHASONE PER 1 MG: Performed by: INTERNAL MEDICINE

## 2017-03-15 PROCEDURE — 96409 CHEMO IV PUSH SNGL DRUG: CPT | Performed by: INTERNAL MEDICINE

## 2017-03-15 PROCEDURE — 83735 ASSAY OF MAGNESIUM: CPT | Performed by: INTERNAL MEDICINE

## 2017-03-15 PROCEDURE — 83540 ASSAY OF IRON: CPT | Performed by: INTERNAL MEDICINE

## 2017-03-15 PROCEDURE — 80053 COMPREHEN METABOLIC PANEL: CPT | Performed by: INTERNAL MEDICINE

## 2017-03-15 PROCEDURE — 85025 COMPLETE CBC W/AUTO DIFF WBC: CPT | Performed by: INTERNAL MEDICINE

## 2017-03-15 PROCEDURE — 96375 TX/PRO/DX INJ NEW DRUG ADDON: CPT | Performed by: INTERNAL MEDICINE

## 2017-03-15 PROCEDURE — 82728 ASSAY OF FERRITIN: CPT | Performed by: INTERNAL MEDICINE

## 2017-03-15 PROCEDURE — 84100 ASSAY OF PHOSPHORUS: CPT | Performed by: INTERNAL MEDICINE

## 2017-03-15 PROCEDURE — 84466 ASSAY OF TRANSFERRIN: CPT | Performed by: INTERNAL MEDICINE

## 2017-03-15 PROCEDURE — 86300 IMMUNOASSAY TUMOR CA 15-3: CPT | Performed by: INTERNAL MEDICINE

## 2017-03-15 PROCEDURE — 96372 THER/PROPH/DIAG INJ SC/IM: CPT | Performed by: INTERNAL MEDICINE

## 2017-03-15 RX ORDER — SODIUM CHLORIDE 9 MG/ML
250 INJECTION, SOLUTION INTRAVENOUS ONCE
Status: CANCELLED | OUTPATIENT
Start: 2017-03-15

## 2017-03-15 RX ORDER — SODIUM CHLORIDE 9 MG/ML
250 INJECTION, SOLUTION INTRAVENOUS ONCE
Status: CANCELLED | OUTPATIENT
Start: 2017-03-29

## 2017-03-15 RX ORDER — SODIUM CHLORIDE 9 MG/ML
250 INJECTION, SOLUTION INTRAVENOUS ONCE
Status: COMPLETED | OUTPATIENT
Start: 2017-03-15 | End: 2017-03-15

## 2017-03-15 RX ADMIN — SODIUM CHLORIDE 250 ML: 900 INJECTION, SOLUTION INTRAVENOUS at 15:10

## 2017-03-15 RX ADMIN — ERIBULIN MESYLATE 2.8 MG: 0.5 INJECTION INTRAVENOUS at 15:48

## 2017-03-15 RX ADMIN — DENOSUMAB 120 MG: 120 INJECTION SUBCUTANEOUS at 15:32

## 2017-03-15 RX ADMIN — DEXAMETHASONE SODIUM PHOSPHATE 12 MG: 10 INJECTION INTRAMUSCULAR; INTRAVENOUS at 15:31

## 2017-03-15 NOTE — PROGRESS NOTES
Subjective  REASONS FOR FOLLOWUP: 1. Metastatic breast cancer to multiple skeletal sites including cervical spine, sternum, lumbar spine and right femur, undergoing Abraxane every 4 weeks and Xgeva every  3 months, DOCUMENTED RADIOLOGICAL PROGRESSION BY BONE SCAN 3/15/17 MOVING AWAY FROM ABRAXANE AND SWITCHING TO HALAVEN EVERY 2 WEEKS FOR 2 MONTHS  2.Iron deficiency anemia du to GI blood loss, Xarelto stopped weeks ago, Iron initiated, Pepcid along with Aleve for gastric protection.           History of Present Illness    The patient returns today to the office for followup. She has had an excellent tolerance to the Abraxane that she is receiving every 4 weeks for the treatment of her metastatic breast cancer to the skeleton that has had recurrent progressive disease.  She also has continued her iron preparation away from calcium preparation for the treatment of iron deficiency anemia associated with gastrointestinal blood loss.  Her hemoglobin is 9.8  today.  So far, the patient has not experienced any obvious peripheral neuropathy.  On the other hand for the last 2 weeks the patient has been feeling achy, especially at night, throughout her body, believing that she was going to have the flu and actually this correlates with the bone scan that has been discussed with her today clearly stating that the patient has progressive disease at multiple sites of her skeleton. She has not had the requirement of pain medication yet, but given this fact we will need to discontinue the Abraxane at this time and we will need to move to Halaven to be given to her on day 1 and day 15 for a couple of  months, monitor tumor markers and alkaline phosphatase. I discussed with her the side effects of the Halaven and will get the approval by her insurance to give her this medication. Her Xgeva will remain ongoing on an every 3-month schedule. I find no need for a bone density test with her at this time.    Otherwise, the patient has no cough, sputum production, shortness of breath, nausea, vomiting, diarrhea, constipation or urinary symptomatology, rashes in the skin or neurological problems.  She remains fully functional with an ECOG performance status 0.             Past Medical History, Past Surgical History,    1. Hypertension.   2. Hyperlipidemia.   3. Reflux esophagitis.     GYN HISTORY: G0, P0. The patient went into menopause in 2000.     Review of Systems    General: no fever, chills, fatigue, weight changes, or lack of appetite.  Eyes: no epiphora, conjunctivitis, pain, glaucoma, blurred vision, blindness, secretion, photophobia.  Ears: no otorrhea, tinnitus, otorrhagia, deafness, pain, vertigo.  Nose: no rhinorrhea, epistaxis, alteration in perception of odors, sinuses pressure.  Mouth: no alteration in gums or teeth,  ulcers, no difficulty with mastication or deglut ion, no odynophagia.  Neck: no masses or pain, no thyroid alterations, no pain in muscles or arteries, no carotid odynia, no crepitation.  Lungs: no cough, sputum production, dyspnea, trepopnea, pleuritic pain, hemoptysis.  Heart: no syncope, irregularity, palpitations, angina, orthopnea, paroxysmal nocturnal dyspnea.  GI: no dysphagia, odynophagia, no regurgitation, no heartburn, indigestion, nausea, vomiting, hematemesis ,melena, jaundice, distention, obstipation, enterorrhagia, proctalgia, anal  Lesions, changes in bowel habits.  : no frequency, hesitancy, hematuria, discharge, pain.  Musculoskeletal: STATED muscle or tendon pain, joint pain, edema,NO functional limitation, fasciculations, mass.  Neurologic: no headache, seizures,  alterations on Craneal nerves, no motor or senssory deficit, normal coordination.  Skin: no rashes, pruritus or localized lesions.  Psychiatric: no anxiety, depression, agitation, delusions, proper insight.      Medications:  The current medication list was reviewed in the EMR    ALLERGIES:    Allergies   Allergen Reactions   • Codeine    • Docetaxel    • Paclitaxel        Objective      There were no vitals filed for this visit.  Current Status 2/7/2017   ECOG score 0   No cancer related pain    Physical Exam    GENERAL:  Well-developed, well-nourished  Patient  in no acute distress.   SKIN:  Warm, dry without rashes, purpura or petechiae.  HEENT:  Pupils were equal and reactive to light and accomodation, conjunctivas non injected, no pterigion, normal extraocular movements, normal visual acuity.   Mouth mucosa was moist, no exudates in oropharynx, normal gum line, normal roof of the mouth and pillars, normal papillations of the tongue.  NECK:  Supple with good range of motion; no thyromegaly or masses, no JVD or bruits, no cervical adenopathies.  LYMPHATICS:  No cervical, supraclavicular, axillary or inguinal adenopathy.  CHEST:  Lungs clear to percussion and auscultation, normal breath sounds bilaterally, no wheezing, crackles or ronchi, no stridor.  CARDIAC:  Regular rate and rhythm with systolic g3/6 basal murmur, clean diastole.,no rubs or gallops.  ABDOMEN:  Soft, nontender with no organomegaly or masses, no ascites, no collateral circulation, no abdominal pain, no inguinal hernias, no abdominal bruits.  EXTREMITIES:  No clubbing, cyanosis or edema, no deformities MILD DIFFUSE SKELETAL pain.  NEUROLOGICAL:  Patient was awake, alert, oriented to time, person and place    RECENT LABS:  Hematology WBC   Date Value Ref Range Status   02/07/2017 6.24 4.50 - 10.70 10*3/mm3 Final   02/23/2016 4.42 (L) 4.50 - 10.70 K/Cumm Final   02/23/2016 Comment /\[facility]\"" Final     Comment:     None Seen  REFERENCE RANGE: None  Seen,0-2       RBC   Date Value Ref Range Status   2017 3.66 (L) 3.90 - 5.20 10*6/mm3 Final   2016 3.85 (L) 3.90 - 5.20 Million Final     HEMOGLOBIN   Date Value Ref Range Status   2017 10.9 (L) 11.9 - 15.5 g/dL Final   2016 10.5 (L) 11.9 - 15.5 g/dL Final     HEMATOCRIT   Date Value Ref Range Status   2017 31.3 (L) 35.6 - 45.5 % Final   2016 33.1 (L) 35.6 - 45.5 % Final     PLATELETS   Date Value Ref Range Status   2017 233 140 - 500 10*3/mm3 Final   2016 268 140 - 500 K/Cumm Final      Bone scan:IMPRESSION:  Interval increase in abnormal skeletal activity, primarily  in the axial skeleton as described. There is also much more extensive  abnormal radiotracer uptake in the left proximal femur.      This report was finalized on 3/2/2017 8:36 AM by Dr. Jorge Luna MD.          Assessment/Plan    . Metastatic breast cancer to multiple skeletal sites including cervical spine, sternum, lumbar spine and right femur.  The patient is tolerating her treatment extremely well with no significant toxicity, including no leukopenia, no thrombocytopenia, no peripheral neuropathy and no allergic reactions.   I discussed with her the findings of the most recent bone scan that documents real progressive disease at multiple sites. Unfortunately, the picture by the PACS system is not available to me for review, but I have reviewed the report by the radiologist. I shared this with the patient.  Given this fact the patient has been advised the followin.  We will discontinue Abraxane at this time altogether.   2.  She will remain on Xgeva once every 3 months.  3.  We will switch her to Halaven chemotherapy-wise to receive day 1, day 15 with dosing as below and do this for 2 months and reassess her with tumor markers and alkaline phosphatase as well as clinical grounds. I doubt that a bone scan done again in 2 months will be realistic in regard to response of objectivity.    4.  She  can use some Aleve for pain control.  5.  She will remain on oral iron preparation for her anemia, waiting to review her ferritin, iron and TIBC today.  6.  Will continue monitoring alkaline phosphatase and tumor makers on a monthly basis.  7.  She will return for Halaven in 2 weeks and she will be seen by a nurse practitioner then.  I will review her back in 4 weeks.    8.  According to the schedule of Xgeva this will be delivered to her at the appropriate time.   9.  I find no need for this patient to have a bone density test.   10. It will be okay for the patient to receive Pneumovax as per question by her primary physician.                 HALAVEN DOSE: BSA 2M2= 2.8 MG IV      Breast cancer, metastatic: IV: Eribulin mesylate: 1.4 mg/m2 on days 1 and 15.      Adverse Reactions   >10%:  Cardiovascular: Peripheral edema (?5% to 12%)  Central nervous system: Fatigue (?62%), peripheral neuropathy (29% to 35%; grades 3/4: 3% to 8%), headache (18% to 19%)  Dermatologic: Alopecia (35% to 45%)  Endocrine & metabolic: Hypokalemia (?5% to 30%), hypocalcemia (28%), weight loss (21%), hypophosphatemia (20%)  Gastrointestinal: Nausea (35% to 41%), constipation (25% to 32%), abdominal pain (?5% to 29%), anorexia (20%), decreased appetite (19%), vomiting (18% to 19%), diarrhea (17% to 18%), stomatitis (?5% to 14%)  Genitourinary: Urinary tract infection (10% to 11%)  Hematologic & oncologic: Neutropenia (63% to 82%; grade 4: 29% grades 3/4: 12% to 57%; juno: 13 days; recovery: 8 days), anemia (58% to 70%; grades 3/4: 2% to 4%)  Hepatic: Increased serum ALT (18% to 43%), increased serum AST (36%)  Neuromuscular & skeletal: Weakness (?62%), arthralgia (?22%), myalgia (?22%), back pain (16%), ostealgia (12%), limb pain (11%)  Respiratory: Cough (14% to 18%), dyspnea (16%)  Miscellaneous: Fever (21% to 28%)                3/15/2017      CC:

## 2017-03-15 NOTE — PROGRESS NOTES
Reviewed educational material with pt on Halaven and gave material to pt.  Pt stated that MD also went over information with her.  Answered all questions from pt and reviewed side effects of drug and when to call office.  Pt verbalized understanding.  Consent obtained from pt and placed in basket to be scanned into Epic computer system.    Pt stated that she had nausea medication at home.

## 2017-03-15 NOTE — PROGRESS NOTES
Ernestina given IVP with free flowing Normal Saline.  Blood return checked before, during and after drug administration.  No change in normal saline drip between pushes.  Brisk blood return noted before, during and after chemo given.  No complaints of pain or redness at site.   No swelling at port site.  Pt tolerated well.

## 2017-03-28 ENCOUNTER — TELEPHONE (OUTPATIENT)
Dept: ONCOLOGY | Facility: CLINIC | Age: 62
End: 2017-03-28

## 2017-03-28 RX ORDER — ATORVASTATIN CALCIUM 20 MG/1
TABLET, FILM COATED ORAL
Qty: 90 TABLET | Refills: 0 | Status: SHIPPED | OUTPATIENT
Start: 2017-03-28 | End: 2017-08-07 | Stop reason: SDUPTHER

## 2017-03-28 NOTE — TELEPHONE ENCOUNTER
----- Message from Kiara Mena sent at 3/28/2017  9:19 AM EDT -----   Pt wants to talk to a nurse        [phone]

## 2017-03-28 NOTE — TELEPHONE ENCOUNTER
Very lengthy and confusing conversation with pt. Concerning a temp this morning of 100.3.  Poor historian when asking her questions.  Thinks she has a dry cough, but not sure????  Reports she thinks she may have a head cold.  Took zyrtec last night which she isn't for sure whether or not it helped.  Denies soa or chest discomfort.  Reports feeling tired and achy which isn't anything new.  Denies ha, dizziness, or feeling lightheaded.  Denies dysuria or freq.  Denies n/v or abd  Pain. Isn't sure if she should keep her appt for tomorrow with ly pt. Also on to get treatment.  All d/w snow garcia pt. Temp as i was speaking to her Was 97.6.  Pt. instructed to take tylenol 2 po q 6 hrs prn for fever.  Advised pt. To keep tomorrow's appt as sched.  If she spikes a high temp over night  Or has any other s/s of infection she is to go to the er.  V/u.  Pt. Is aware she can take otc decongestant such has mucinex d or other.  V/u.

## 2017-03-29 ENCOUNTER — APPOINTMENT (OUTPATIENT)
Dept: OTHER | Facility: HOSPITAL | Age: 62
End: 2017-03-29

## 2017-03-29 ENCOUNTER — INFUSION (OUTPATIENT)
Dept: ONCOLOGY | Facility: HOSPITAL | Age: 62
End: 2017-03-29

## 2017-03-29 ENCOUNTER — OFFICE VISIT (OUTPATIENT)
Dept: ONCOLOGY | Facility: CLINIC | Age: 62
End: 2017-03-29

## 2017-03-29 VITALS
SYSTOLIC BLOOD PRESSURE: 100 MMHG | DIASTOLIC BLOOD PRESSURE: 65 MMHG | RESPIRATION RATE: 18 BRPM | TEMPERATURE: 98.3 F | OXYGEN SATURATION: 98 % | BODY MASS INDEX: 31.39 KG/M2 | WEIGHT: 200 LBS | HEART RATE: 102 BPM | HEIGHT: 67 IN

## 2017-03-29 DIAGNOSIS — C79.51 BONE METASTASIS: ICD-10-CM

## 2017-03-29 DIAGNOSIS — R05.8 PRODUCTIVE COUGH: ICD-10-CM

## 2017-03-29 DIAGNOSIS — D50.0 IRON DEFICIENCY ANEMIA DUE TO CHRONIC BLOOD LOSS: ICD-10-CM

## 2017-03-29 DIAGNOSIS — R50.9 FEVER, UNSPECIFIED FEVER CAUSE: ICD-10-CM

## 2017-03-29 DIAGNOSIS — C50.912 PRIMARY MALIGNANT NEOPLASM OF LEFT BREAST (HCC): Primary | ICD-10-CM

## 2017-03-29 DIAGNOSIS — I10 BENIGN ESSENTIAL HTN: ICD-10-CM

## 2017-03-29 DIAGNOSIS — C50.912 PRIMARY MALIGNANT NEOPLASM OF LEFT BREAST (HCC): ICD-10-CM

## 2017-03-29 LAB
ALBUMIN SERPL-MCNC: 3.9 G/DL (ref 3.5–5.2)
ALBUMIN/GLOB SERPL: 1.5 G/DL
ALP SERPL-CCNC: 192 U/L (ref 39–117)
ALT SERPL W P-5'-P-CCNC: 35 U/L (ref 1–33)
ANION GAP SERPL CALCULATED.3IONS-SCNC: 16.4 MMOL/L
AST SERPL-CCNC: 27 U/L (ref 1–32)
BASOPHILS # BLD AUTO: 0.02 10*3/MM3 (ref 0–0.2)
BASOPHILS NFR BLD AUTO: 0.6 % (ref 0–1.5)
BILIRUB SERPL-MCNC: 0.3 MG/DL (ref 0.1–1.2)
BUN BLD-MCNC: 33 MG/DL (ref 8–23)
BUN/CREAT SERPL: 36.7 (ref 7–25)
CALCIUM SPEC-SCNC: 8.3 MG/DL (ref 8.6–10.5)
CHLORIDE SERPL-SCNC: 99 MMOL/L (ref 98–107)
CO2 SERPL-SCNC: 23.6 MMOL/L (ref 22–29)
CREAT BLD-MCNC: 0.9 MG/DL (ref 0.57–1)
DEPRECATED RDW RBC AUTO: 56.4 FL (ref 37–54)
EOSINOPHIL # BLD AUTO: 0.06 10*3/MM3 (ref 0–0.7)
EOSINOPHIL NFR BLD AUTO: 1.7 % (ref 0.3–6.2)
ERYTHROCYTE [DISTWIDTH] IN BLOOD BY AUTOMATED COUNT: 17.4 % (ref 11.7–13)
GFR SERPL CREATININE-BSD FRML MDRD: 64 ML/MIN/1.73
GLOBULIN UR ELPH-MCNC: 2.6 GM/DL
GLUCOSE BLD-MCNC: 142 MG/DL (ref 65–99)
HCT VFR BLD AUTO: 29.7 % (ref 35.6–45.5)
HGB BLD-MCNC: 9.8 G/DL (ref 11.9–15.5)
HOLD SPECIMEN: NORMAL
IMM GRANULOCYTES # BLD: 0.01 10*3/MM3 (ref 0–0.03)
IMM GRANULOCYTES NFR BLD: 0.3 % (ref 0–0.5)
LYMPHOCYTES # BLD AUTO: 0.93 10*3/MM3 (ref 0.9–4.8)
LYMPHOCYTES NFR BLD AUTO: 26.1 % (ref 19.6–45.3)
MAGNESIUM SERPL-MCNC: 2 MG/DL (ref 1.6–2.4)
MCH RBC QN AUTO: 29.3 PG (ref 26.9–32)
MCHC RBC AUTO-ENTMCNC: 33 G/DL (ref 32.4–36.3)
MCV RBC AUTO: 88.9 FL (ref 80.5–98.2)
MONOCYTES # BLD AUTO: 0.54 10*3/MM3 (ref 0.2–1.2)
MONOCYTES NFR BLD AUTO: 15.1 % (ref 5–12)
NEUTROPHILS # BLD AUTO: 2.01 10*3/MM3 (ref 1.9–8.1)
NEUTROPHILS NFR BLD AUTO: 56.2 % (ref 42.7–76)
NRBC BLD MANUAL-RTO: 0 /100 WBC (ref 0–0)
PLATELET # BLD AUTO: 180 10*3/MM3 (ref 140–500)
PMV BLD AUTO: 10.9 FL (ref 6–12)
POTASSIUM BLD-SCNC: 3.9 MMOL/L (ref 3.5–5.2)
PROT SERPL-MCNC: 6.5 G/DL (ref 6–8.5)
RBC # BLD AUTO: 3.34 10*6/MM3 (ref 3.9–5.2)
SODIUM BLD-SCNC: 139 MMOL/L (ref 136–145)
WBC NRBC COR # BLD: 3.57 10*3/MM3 (ref 4.5–10.7)

## 2017-03-29 PROCEDURE — 85025 COMPLETE CBC W/AUTO DIFF WBC: CPT | Performed by: INTERNAL MEDICINE

## 2017-03-29 PROCEDURE — 36415 COLL VENOUS BLD VENIPUNCTURE: CPT | Performed by: NURSE PRACTITIONER

## 2017-03-29 PROCEDURE — 25010000002 HEPARIN FLUSH (PORCINE) 100 UNIT/ML SOLUTION: Performed by: NURSE PRACTITIONER

## 2017-03-29 PROCEDURE — 99214 OFFICE O/P EST MOD 30 MIN: CPT | Performed by: NURSE PRACTITIONER

## 2017-03-29 PROCEDURE — 80053 COMPREHEN METABOLIC PANEL: CPT | Performed by: INTERNAL MEDICINE

## 2017-03-29 PROCEDURE — 83735 ASSAY OF MAGNESIUM: CPT | Performed by: INTERNAL MEDICINE

## 2017-03-29 RX ORDER — 0.9 % SODIUM CHLORIDE 0.9 %
10 VIAL (ML) INJECTION ONCE
Status: COMPLETED | OUTPATIENT
Start: 2017-03-29 | End: 2017-03-29

## 2017-03-29 RX ORDER — CETIRIZINE HYDROCHLORIDE 10 MG/1
10 TABLET ORAL AS NEEDED
COMMUNITY
End: 2021-01-12

## 2017-03-29 RX ADMIN — SODIUM CHLORIDE, PRESERVATIVE FREE 500 UNITS: 5 INJECTION INTRAVENOUS at 07:50

## 2017-03-29 RX ADMIN — SODIUM CHLORIDE 10 ML: 9 INJECTION INTRAMUSCULAR; INTRAVENOUS; SUBCUTANEOUS at 07:49

## 2017-03-29 NOTE — PROGRESS NOTES
Subjective  REASONS FOR FOLLOWUP: 1. Metastatic breast cancer to multiple skeletal sites including cervical spine, sternum, lumbar spine and right femur, undergoing Abraxane every 4 weeks and Xgeva every  3 months, DOCUMENTED RADIOLOGICAL PROGRESSION BY BONE SCAN 3/15/17 MOVING AWAY FROM ABRAXANE AND SWITCHING TO HALAVEN EVERY 2 WEEKS FOR 2 MONTHS  2.Iron deficiency anemia du to GI blood loss, Xarelto stopped weeks ago, Iron initiated, Pepcid along with Aleve for gastric protection.           History of Present Illness    The patient returns today for day 15 of Halaven therapy.  She had previously been on Abraxane, a bone scan performed on March 15, 2017 revealed progression of disease.  Abraxane was then discontinued and patient was  initiated Halaven on days 1 and 15 on March 15, 2017.  She continues on Xgeva at an every 3 month schedule.  She tolerated her first cycle of Halaven fairly well, however has developed upper respiratory symptoms, which precipitated last Friday.  Patient has been immensely fatigued with generalized body aches. Despite this, she has maintained adequate hydration and nutrition. Bowels remain regular and she denies nausea. She had to leave work early yesterday due to exhaustion. Of note, patient states this is the first time she has had interruption in her work schedule during 2 years of chemotherapy. She has been febrile for the last three days, intermittently, with temperatures reaching as high as 100.3.  She's been utilizing Aleve for fever reduction and treatment of arthralgias.  She has a productive cough with significant postnasal drip and rhinorrhea, with  "minimal sinus tenderness. She's been unable to sleep for the last 2 nights due to difficulty breathing secondary to congestion.  She has only utilized zyrtec for one day for symptom management.  She is unsure if she will be able to proceed with treatment today due to overall, generalized malaise.      Past Medical History, Past Surgical History,    1. Hypertension.   2. Hyperlipidemia.   3. Reflux esophagitis.     GYN HISTORY: G0, P0. The patient went into menopause in 2000.     Review of Systems   Constitutional: Positive for activity change, chills, fatigue and fever. Negative for appetite change.        See HPI   HENT: Positive for congestion, postnasal drip, rhinorrhea, sinus pressure and sore throat (secondary to post-nasal drip). Negative for ear pain, facial swelling, mouth sores and trouble swallowing.    Respiratory: Positive for cough. Negative for shortness of breath and wheezing.    Cardiovascular: Negative for chest pain, palpitations and leg swelling.   Gastrointestinal: Negative for abdominal distention, abdominal pain, constipation, diarrhea, nausea and vomiting.   Genitourinary: Negative for difficulty urinating and hematuria.   Musculoskeletal: Positive for arthralgias and myalgias.   Skin: Negative for color change.   Hematological: Negative for adenopathy. Does not bruise/bleed easily.   Psychiatric/Behavioral: Negative.              Medications:  The current medication list was reviewed in the EMR    ALLERGIES:    Allergies   Allergen Reactions   • Codeine    • Docetaxel    • Paclitaxel        Objective      Vitals:    03/29/17 0759   BP: 100/65   Pulse: 102   Resp: 18   Temp: 98.3 °F (36.8 °C)   SpO2: 98%   Weight: 200 lb (90.7 kg)   Height: 67.32\" (171 cm)   PainSc: 0-No pain     Current Status 3/29/2017   ECOG score 0   No cancer related pain    Physical Exam   Constitutional: She is oriented to person, place, and time. No distress.   Ill-appearing    HENT:   Head: Normocephalic and " atraumatic.   Mouth/Throat: Oropharyngeal exudate (post-nasal drip) present.   Eyes: EOM are normal. Pupils are equal, round, and reactive to light.   Neck: Normal range of motion. Neck supple.   Cardiovascular: Normal rate, regular rhythm and normal heart sounds.    No murmur heard.  Pulmonary/Chest: Effort normal. She has wheezes (minimal expiratory wheezes ) in the right upper field.   Abdominal: Soft. Bowel sounds are normal. She exhibits no distension.   Musculoskeletal: Normal range of motion.   Neurological: She is alert and oriented to person, place, and time.   Skin: Skin is warm and dry. No rash noted. There is pallor.   Psychiatric: She has a normal mood and affect. Her behavior is normal.           RECENT LABS:  Hematology WBC   Date Value Ref Range Status   03/29/2017 3.57 (L) 4.50 - 10.70 10*3/mm3 Final     RBC   Date Value Ref Range Status   03/29/2017 3.34 (L) 3.90 - 5.20 10*6/mm3 Final     Hemoglobin   Date Value Ref Range Status   03/29/2017 9.8 (L) 11.9 - 15.5 g/dL Final     Hematocrit   Date Value Ref Range Status   03/29/2017 29.7 (L) 35.6 - 45.5 % Final     Platelets   Date Value Ref Range Status   03/29/2017 180 140 - 500 10*3/mm3 Final                Assessment/Plan    . Metastatic breast cancer to multiple skeletal sites including cervical spine, sternum, lumbar spine and right femur.  She was initiated on Halaven days 1 and 15 on March 15, 2017 after discovery of disease progression on bone scan.  She returns to our office today for day 15 of cycle 1 with substantial upper respiratory symptoms and fever ×3 days.  She is only utilized Zyrtec over-the-counter for symptom management.  She notes that she is immensely fatigued and weak today.  I reviewed this with Dr. Saez and we will hold patient's chemotherapy today.  There is suspicion for influenza.  Unfortunately, our lab is unable to process this test, at this time.  Patient will proceed to the urgent care and will be evaluated for  possible flu diagnosis today.  I have recommended over-the-counter symptom management including supportive care with adequate hydration, antitussives, and decongestants.  She will return in one week for nurse practitioner evaluation and in 2 weeks for nurse practitioner evaluation and in anticipation of cycle #2 of Halaven.  She will then see Dr. Saez with day 15 of cycle #2 on April 25, 2017.      1.  We will hold day 15 of Halaven today.  2.  She will remain on Xgeva once every 3 months.  3.  She will return in one week for nurse practitioner evaluation and in 2 weeks for cycle #2 of Halaven with nurse practitioner visit. She will see Dr. Saez on April 25, 2017 with cycle 2 day #15.  4.  She may continue Aleve for pain control.  Also recommended several over-the-counter cough suppressant and decongestant medications for symptom management.  5.  She will continue on oral iron preparation for her anemia,   6.  Will continue monitoring alkaline phosphatase and tumor makers on a monthly basis.  7.  She will proceed with evaluation by an urgent care for possible diagnosis of influenza today.  I have asked her to call our office if symptoms persist or worsen over the next several days, despite the use of over-the-counter medications and adequate hydration support.  She verbalized understanding.                 HALAVEN DOSE: BSA 2M2= 2.8 MG IV      Breast cancer, metastatic: IV: Eribulin mesylate: 1.4 mg/m2 on days 1 and 15.      Adverse Reactions   >10%:  Cardiovascular: Peripheral edema (?5% to 12%)  Central nervous system: Fatigue (?62%), peripheral neuropathy (29% to 35%; grades 3/4: 3% to 8%), headache (18% to 19%)  Dermatologic: Alopecia (35% to 45%)  Endocrine & metabolic: Hypokalemia (?5% to 30%), hypocalcemia (28%), weight loss (21%), hypophosphatemia (20%)  Gastrointestinal: Nausea (35% to 41%), constipation (25% to 32%), abdominal pain (?5% to 29%), anorexia (20%), decreased appetite (19%), vomiting (18%  to 19%), diarrhea (17% to 18%), stomatitis (?5% to 14%)  Genitourinary: Urinary tract infection (10% to 11%)  Hematologic & oncologic: Neutropenia (63% to 82%; grade 4: 29% grades 3/4: 12% to 57%; juno: 13 days; recovery: 8 days), anemia (58% to 70%; grades 3/4: 2% to 4%)  Hepatic: Increased serum ALT (18% to 43%), increased serum AST (36%)  Neuromuscular & skeletal: Weakness (?62%), arthralgia (?22%), myalgia (?22%), back pain (16%), ostealgia (12%), limb pain (11%)  Respiratory: Cough (14% to 18%), dyspnea (16%)  Miscellaneous: Fever (21% to 28%)                3/29/2017      CC:

## 2017-04-05 ENCOUNTER — OFFICE VISIT (OUTPATIENT)
Dept: ONCOLOGY | Facility: CLINIC | Age: 62
End: 2017-04-05

## 2017-04-05 ENCOUNTER — INFUSION (OUTPATIENT)
Dept: ONCOLOGY | Facility: HOSPITAL | Age: 62
End: 2017-04-05

## 2017-04-05 VITALS
RESPIRATION RATE: 16 BRPM | TEMPERATURE: 97.8 F | HEIGHT: 64 IN | BODY MASS INDEX: 33.94 KG/M2 | DIASTOLIC BLOOD PRESSURE: 74 MMHG | OXYGEN SATURATION: 97 % | WEIGHT: 198.8 LBS | HEART RATE: 103 BPM | SYSTOLIC BLOOD PRESSURE: 118 MMHG

## 2017-04-05 DIAGNOSIS — C79.51 BONE METASTASIS: ICD-10-CM

## 2017-04-05 DIAGNOSIS — C79.51 BONE METASTASIS: Primary | ICD-10-CM

## 2017-04-05 DIAGNOSIS — C50.912 PRIMARY MALIGNANT NEOPLASM OF LEFT BREAST (HCC): ICD-10-CM

## 2017-04-05 DIAGNOSIS — D50.0 IRON DEFICIENCY ANEMIA DUE TO CHRONIC BLOOD LOSS: Primary | ICD-10-CM

## 2017-04-05 DIAGNOSIS — C50.911 PRIMARY BREAST MALIGNANCY, RIGHT (HCC): ICD-10-CM

## 2017-04-05 LAB
ALBUMIN SERPL-MCNC: 4.1 G/DL (ref 3.5–5.2)
ALBUMIN/GLOB SERPL: 1.6 G/DL
ALP SERPL-CCNC: 229 U/L (ref 39–117)
ALT SERPL W P-5'-P-CCNC: 24 U/L (ref 1–33)
ANION GAP SERPL CALCULATED.3IONS-SCNC: 14.5 MMOL/L
AST SERPL-CCNC: 39 U/L (ref 1–32)
BASOPHILS # BLD AUTO: 0.05 10*3/MM3 (ref 0–0.2)
BASOPHILS NFR BLD AUTO: 0.6 % (ref 0–1.5)
BILIRUB SERPL-MCNC: 0.5 MG/DL (ref 0.1–1.2)
BUN BLD-MCNC: 23 MG/DL (ref 8–23)
BUN/CREAT SERPL: 33.8 (ref 7–25)
CALCIUM SPEC-SCNC: 8.8 MG/DL (ref 8.6–10.5)
CHLORIDE SERPL-SCNC: 98 MMOL/L (ref 98–107)
CO2 SERPL-SCNC: 25.5 MMOL/L (ref 22–29)
CREAT BLD-MCNC: 0.68 MG/DL (ref 0.57–1)
DEPRECATED RDW RBC AUTO: 51.5 FL (ref 37–54)
EOSINOPHIL # BLD AUTO: 0.16 10*3/MM3 (ref 0–0.7)
EOSINOPHIL NFR BLD AUTO: 1.9 % (ref 0.3–6.2)
ERYTHROCYTE [DISTWIDTH] IN BLOOD BY AUTOMATED COUNT: 16.3 % (ref 11.7–13)
GFR SERPL CREATININE-BSD FRML MDRD: 88 ML/MIN/1.73
GLOBULIN UR ELPH-MCNC: 2.6 GM/DL
GLUCOSE BLD-MCNC: 130 MG/DL (ref 65–99)
HCT VFR BLD AUTO: 29.6 % (ref 35.6–45.5)
HGB BLD-MCNC: 9.8 G/DL (ref 11.9–15.5)
IMM GRANULOCYTES # BLD: 0.27 10*3/MM3 (ref 0–0.03)
IMM GRANULOCYTES NFR BLD: 3.3 % (ref 0–0.5)
LYMPHOCYTES # BLD AUTO: 1.3 10*3/MM3 (ref 0.9–4.8)
LYMPHOCYTES NFR BLD AUTO: 15.7 % (ref 19.6–45.3)
MCH RBC QN AUTO: 28.8 PG (ref 26.9–32)
MCHC RBC AUTO-ENTMCNC: 33.1 G/DL (ref 32.4–36.3)
MCV RBC AUTO: 87.1 FL (ref 80.5–98.2)
MONOCYTES # BLD AUTO: 0.89 10*3/MM3 (ref 0.2–1.2)
MONOCYTES NFR BLD AUTO: 10.8 % (ref 5–12)
NEUTROPHILS # BLD AUTO: 5.59 10*3/MM3 (ref 1.9–8.1)
NEUTROPHILS NFR BLD AUTO: 67.7 % (ref 42.7–76)
NRBC BLD MANUAL-RTO: 0 /100 WBC (ref 0–0)
PLATELET # BLD AUTO: 222 10*3/MM3 (ref 140–500)
PMV BLD AUTO: 10.6 FL (ref 6–12)
POTASSIUM BLD-SCNC: 3.7 MMOL/L (ref 3.5–5.2)
PROT SERPL-MCNC: 6.7 G/DL (ref 6–8.5)
RBC # BLD AUTO: 3.4 10*6/MM3 (ref 3.9–5.2)
SODIUM BLD-SCNC: 138 MMOL/L (ref 136–145)
WBC NRBC COR # BLD: 8.26 10*3/MM3 (ref 4.5–10.7)

## 2017-04-05 PROCEDURE — 96375 TX/PRO/DX INJ NEW DRUG ADDON: CPT | Performed by: NURSE PRACTITIONER

## 2017-04-05 PROCEDURE — 96409 CHEMO IV PUSH SNGL DRUG: CPT | Performed by: NURSE PRACTITIONER

## 2017-04-05 PROCEDURE — 99213 OFFICE O/P EST LOW 20 MIN: CPT | Performed by: NURSE PRACTITIONER

## 2017-04-05 PROCEDURE — 85025 COMPLETE CBC W/AUTO DIFF WBC: CPT | Performed by: INTERNAL MEDICINE

## 2017-04-05 PROCEDURE — 80053 COMPREHEN METABOLIC PANEL: CPT | Performed by: INTERNAL MEDICINE

## 2017-04-05 PROCEDURE — 36415 COLL VENOUS BLD VENIPUNCTURE: CPT

## 2017-04-05 PROCEDURE — 25010000002 DEXAMETHASONE SODIUM PHOSPHATE 10 MG/ML SOLUTION 1 ML VIAL: Performed by: NURSE PRACTITIONER

## 2017-04-05 PROCEDURE — 25010000002 ERIBULIN 1 MG/2ML SOLUTION: Performed by: NURSE PRACTITIONER

## 2017-04-05 RX ORDER — SODIUM CHLORIDE 9 MG/ML
250 INJECTION, SOLUTION INTRAVENOUS ONCE
Status: CANCELLED | OUTPATIENT
Start: 2017-04-05

## 2017-04-05 RX ORDER — SODIUM CHLORIDE 9 MG/ML
250 INJECTION, SOLUTION INTRAVENOUS ONCE
Status: COMPLETED | OUTPATIENT
Start: 2017-04-05 | End: 2017-04-05

## 2017-04-05 RX ADMIN — DEXAMETHASONE SODIUM PHOSPHATE 12 MG: 10 INJECTION, SOLUTION INTRAMUSCULAR; INTRAVENOUS at 09:17

## 2017-04-05 RX ADMIN — ERIBULIN MESYLATE 2.83 MG: 0.5 INJECTION INTRAVENOUS at 09:34

## 2017-04-05 RX ADMIN — SODIUM CHLORIDE 250 ML: 900 INJECTION, SOLUTION INTRAVENOUS at 09:17

## 2017-04-05 NOTE — PROGRESS NOTES
Subjective    REASONS FOR FOLLOWUP:   1. Metastatic breast cancer to multiple skeletal sites including cervical spine, sternum, lumbar spine and right femur.  Disease progression on Abraxane, therefore, initiation Halaven Day 1, 15 3/15/2017    2.Iron deficiency anemia due to GI blood loss, Xarelto stopped weeks ago, Iron initiated, Pepcid along with Aleve for gastric protection.    History of Present Illness     The patient is a pleasant 61-year-old female with the above-mentioned history, who returns today in anticipation of cycle 1 day 15 Halaven.  This is following a one-week delay.  Last week, the patient was evaluated by JESSICA Segura, ultimately diagnosed with influenza B.  She did complete Tamiflu as well as supportive care.  The patient notes today, she is feeling much better.  She denies any fevers or body aches.  She continues to have an occasional dry cough, though states this is significantly improved.  She is still fatigued, though again, this is improving.  She reports her tolerance to cycle 1 day 1 of Halaven was good, without nausea or vomiting, neuropathy.  She is willing to proceed with day 15 today.    Past Medical History:   Diagnosis Date   • Abnormal mammogram    • Abnormal menstrual cycle    • Bone metastasis    • Breast cancer     Left   • Hyperlipidemia    • Hypertension    • Multiple benign polyps of large intestine    • Reflux esophagitis      Past Surgical History:   Procedure Laterality Date   • BREAST SURGERY  2000    lumpectomy, mastectomy, revision   • COLONOSCOPY  2012   • FEMUR FRACTURE SURGERY      secondary to metastatic breast cancer 7/2014, with  "revision in 9/2015       GYN HISTORY: G0, P0. The patient went into menopause in 2000.     I have reviewed the patient's medical history in detail and updated the computerized patient record.    Review of Systems   Constitutional: Negative for activity change, appetite change, chills and fever.   HENT: Positive for postnasal drip. Negative for congestion, ear pain, facial swelling, mouth sores, rhinorrhea, sinus pressure, sore throat and trouble swallowing.    Respiratory: Positive for cough (improved). Negative for shortness of breath and wheezing.    Cardiovascular: Negative for chest pain, palpitations and leg swelling.   Gastrointestinal: Negative for abdominal distention, abdominal pain, constipation, diarrhea, nausea and vomiting.   Genitourinary: Negative for difficulty urinating and hematuria.   Musculoskeletal: Negative for arthralgias and myalgias.   Skin: Negative for color change.   Hematological: Negative for adenopathy. Does not bruise/bleed easily.   Psychiatric/Behavioral: Negative.        Medications:  The current medication list was reviewed in the EMR    ALLERGIES:    Allergies   Allergen Reactions   • Codeine    • Docetaxel    • Paclitaxel        Objective      Vitals:    04/05/17 0838   BP: 118/74   Pulse: 103   Resp: 16   Temp: 97.8 °F (36.6 °C)   TempSrc: Oral   SpO2: 97%   Weight: 198 lb 12.8 oz (90.2 kg)   Height: 64.17\" (163 cm)  Comment: new ht/without shoes   PainSc: 0-No pain     Current Status 4/5/2017   ECOG score 0   No cancer related pain    Physical Exam   Constitutional: She is oriented to person, place, and time. No distress.   Ill-appearing    HENT:   Head: Normocephalic and atraumatic.   Mouth/Throat: No oropharyngeal exudate.   Eyes: EOM are normal. Pupils are equal, round, and reactive to light.   Neck: Normal range of motion. Neck supple.   Cardiovascular: Normal rate, regular rhythm and normal heart sounds.    No murmur heard.  Pulmonary/Chest: Effort normal. She has no " wheezes.   Abdominal: Soft. Bowel sounds are normal. She exhibits no distension.   Musculoskeletal: Normal range of motion.   Neurological: She is alert and oriented to person, place, and time.   Skin: Skin is warm and dry. No rash noted. No pallor.   Psychiatric: She has a normal mood and affect. Her behavior is normal.       RECENT LABS:  Lab Results   Component Value Date    WBC 8.26 04/05/2017    HGB 9.8 (L) 04/05/2017    HCT 29.6 (L) 04/05/2017    MCV 87.1 04/05/2017     04/05/2017     Lab Results   Component Value Date    NEUTROABS 5.59 04/05/2017       Assessment/Plan      1. Metastatic breast cancer to multiple skeletal sites including cervical spine, sternum, lumbar spine and right femur. Most recently progressed on Abraxane. She was initiated on Halaven 1.4mg/m2 days 1 and 15 on March 15, 2017 after discovery of disease progression on bone scan.     We will continue to monitor monthly tumor marker and alkaline phosphatase for response.    2.  Bone metastasis.  Continuing on Xgeva every 3 months. Last given 3/15/2017.    3. Influenza B. The patient complete Tamiflu with resolution of her symptoms. She continues supportive care including cough suppressant.     PLANS:  1. Proceed with cycle 1 day 15 Halaven.  2. Return in 2 weeks for NP evaluation in anticipation of beginning cycle #2.  3. Continue Xgeva every 3 months.  4. Will continue monitoring alkaline phosphatase and tumor makers on a monthly basis.  5. MD follow up with Dr. Saez in 4 weeks at which time the patient will be due for cycle 2 day 15.    Mary Lou Layne, APRN  4/5/2017      CC:

## 2017-04-11 ENCOUNTER — APPOINTMENT (OUTPATIENT)
Dept: ONCOLOGY | Facility: CLINIC | Age: 62
End: 2017-04-11

## 2017-04-11 ENCOUNTER — APPOINTMENT (OUTPATIENT)
Dept: OTHER | Facility: HOSPITAL | Age: 62
End: 2017-04-11

## 2017-04-11 ENCOUNTER — APPOINTMENT (OUTPATIENT)
Dept: ONCOLOGY | Facility: HOSPITAL | Age: 62
End: 2017-04-11

## 2017-04-14 DIAGNOSIS — C79.51 BONE METASTASIS: ICD-10-CM

## 2017-04-14 DIAGNOSIS — C50.912 PRIMARY MALIGNANT NEOPLASM OF LEFT BREAST (HCC): ICD-10-CM

## 2017-04-14 RX ORDER — SODIUM CHLORIDE 0.9 % (FLUSH) 0.9 %
10 SYRINGE (ML) INJECTION AS NEEDED
Status: CANCELLED | OUTPATIENT
Start: 2017-04-19

## 2017-04-19 ENCOUNTER — INFUSION (OUTPATIENT)
Dept: ONCOLOGY | Facility: HOSPITAL | Age: 62
End: 2017-04-19

## 2017-04-19 ENCOUNTER — APPOINTMENT (OUTPATIENT)
Dept: OTHER | Facility: HOSPITAL | Age: 62
End: 2017-04-19

## 2017-04-19 ENCOUNTER — OFFICE VISIT (OUTPATIENT)
Dept: ONCOLOGY | Facility: CLINIC | Age: 62
End: 2017-04-19

## 2017-04-19 ENCOUNTER — HOSPITAL ENCOUNTER (OUTPATIENT)
Dept: MAMMOGRAPHY | Facility: HOSPITAL | Age: 62
Discharge: HOME | End: 2017-04-19
Admitting: INTERNAL MEDICINE

## 2017-04-19 VITALS
TEMPERATURE: 98.2 F | SYSTOLIC BLOOD PRESSURE: 132 MMHG | OXYGEN SATURATION: 100 % | RESPIRATION RATE: 16 BRPM | HEIGHT: 64 IN | BODY MASS INDEX: 34.66 KG/M2 | DIASTOLIC BLOOD PRESSURE: 75 MMHG | WEIGHT: 203 LBS | HEART RATE: 95 BPM

## 2017-04-19 DIAGNOSIS — C79.51 BONE METASTASIS: ICD-10-CM

## 2017-04-19 DIAGNOSIS — C50.912 PRIMARY MALIGNANT NEOPLASM OF LEFT BREAST (HCC): ICD-10-CM

## 2017-04-19 DIAGNOSIS — C79.51 BONE METASTASIS: Primary | ICD-10-CM

## 2017-04-19 DIAGNOSIS — Z12.31 ENCOUNTER FOR SCREENING MAMMOGRAM FOR BREAST CANCER: ICD-10-CM

## 2017-04-19 LAB
ALBUMIN SERPL-MCNC: 4.2 G/DL (ref 3.5–5.2)
ALBUMIN/GLOB SERPL: 1.8 G/DL
ALP SERPL-CCNC: 184 U/L (ref 39–117)
ALT SERPL W P-5'-P-CCNC: 30 U/L (ref 1–33)
ANION GAP SERPL CALCULATED.3IONS-SCNC: 12.3 MMOL/L
AST SERPL-CCNC: 24 U/L (ref 1–32)
BASOPHILS # BLD AUTO: 0.03 10*3/MM3 (ref 0–0.2)
BASOPHILS NFR BLD AUTO: 0.7 % (ref 0–1.5)
BILIRUB SERPL-MCNC: 0.4 MG/DL (ref 0.1–1.2)
BUN BLD-MCNC: 26 MG/DL (ref 8–23)
BUN/CREAT SERPL: 37.1 (ref 7–25)
CALCIUM SPEC-SCNC: 8.6 MG/DL (ref 8.6–10.5)
CHLORIDE SERPL-SCNC: 101 MMOL/L (ref 98–107)
CO2 SERPL-SCNC: 24.7 MMOL/L (ref 22–29)
CREAT BLD-MCNC: 0.7 MG/DL (ref 0.57–1)
DEPRECATED RDW RBC AUTO: 57.1 FL (ref 37–54)
EOSINOPHIL # BLD AUTO: 0.09 10*3/MM3 (ref 0–0.7)
EOSINOPHIL NFR BLD AUTO: 2 % (ref 0.3–6.2)
ERYTHROCYTE [DISTWIDTH] IN BLOOD BY AUTOMATED COUNT: 17.8 % (ref 11.7–13)
GFR SERPL CREATININE-BSD FRML MDRD: 85 ML/MIN/1.73
GLOBULIN UR ELPH-MCNC: 2.3 GM/DL
GLUCOSE BLD-MCNC: 144 MG/DL (ref 65–99)
HCT VFR BLD AUTO: 28.3 % (ref 35.6–45.5)
HGB BLD-MCNC: 9.3 G/DL (ref 11.9–15.5)
IMM GRANULOCYTES # BLD: 0.09 10*3/MM3 (ref 0–0.03)
IMM GRANULOCYTES NFR BLD: 2 % (ref 0–0.5)
LYMPHOCYTES # BLD AUTO: 0.98 10*3/MM3 (ref 0.9–4.8)
LYMPHOCYTES NFR BLD AUTO: 21.4 % (ref 19.6–45.3)
MAGNESIUM SERPL-MCNC: 1.8 MG/DL (ref 1.6–2.4)
MCH RBC QN AUTO: 29.4 PG (ref 26.9–32)
MCHC RBC AUTO-ENTMCNC: 32.9 G/DL (ref 32.4–36.3)
MCV RBC AUTO: 89.6 FL (ref 80.5–98.2)
MONOCYTES # BLD AUTO: 0.74 10*3/MM3 (ref 0.2–1.2)
MONOCYTES NFR BLD AUTO: 16.2 % (ref 5–12)
NEUTROPHILS # BLD AUTO: 2.65 10*3/MM3 (ref 1.9–8.1)
NEUTROPHILS NFR BLD AUTO: 57.7 % (ref 42.7–76)
NRBC BLD MANUAL-RTO: 0 /100 WBC (ref 0–0)
PLATELET # BLD AUTO: 219 10*3/MM3 (ref 140–500)
PMV BLD AUTO: 10.9 FL (ref 6–12)
POTASSIUM BLD-SCNC: 4.1 MMOL/L (ref 3.5–5.2)
PROT SERPL-MCNC: 6.5 G/DL (ref 6–8.5)
RBC # BLD AUTO: 3.16 10*6/MM3 (ref 3.9–5.2)
SODIUM BLD-SCNC: 138 MMOL/L (ref 136–145)
WBC NRBC COR # BLD: 4.58 10*3/MM3 (ref 4.5–10.7)

## 2017-04-19 PROCEDURE — 25010000002 ERIBULIN 1 MG/2ML SOLUTION: Performed by: NURSE PRACTITIONER

## 2017-04-19 PROCEDURE — 96409 CHEMO IV PUSH SNGL DRUG: CPT | Performed by: NURSE PRACTITIONER

## 2017-04-19 PROCEDURE — 99212-NC PR NO CHARGE CBC OFFICE OUTPATIENT VISIT 10 MINUTES: Performed by: NURSE PRACTITIONER

## 2017-04-19 PROCEDURE — 96375 TX/PRO/DX INJ NEW DRUG ADDON: CPT | Performed by: NURSE PRACTITIONER

## 2017-04-19 PROCEDURE — 36415 COLL VENOUS BLD VENIPUNCTURE: CPT

## 2017-04-19 PROCEDURE — G0202 SCR MAMMO BI INCL CAD: HCPCS

## 2017-04-19 PROCEDURE — 85025 COMPLETE CBC W/AUTO DIFF WBC: CPT

## 2017-04-19 PROCEDURE — 25010000002 DEXAMETHASONE SODIUM PHOSPHATE 10 MG/ML SOLUTION 1 ML VIAL: Performed by: NURSE PRACTITIONER

## 2017-04-19 PROCEDURE — 80053 COMPREHEN METABOLIC PANEL: CPT

## 2017-04-19 PROCEDURE — 83735 ASSAY OF MAGNESIUM: CPT

## 2017-04-19 RX ORDER — SODIUM CHLORIDE 9 MG/ML
250 INJECTION, SOLUTION INTRAVENOUS ONCE
Status: CANCELLED | OUTPATIENT
Start: 2017-04-19

## 2017-04-19 RX ORDER — SODIUM CHLORIDE 9 MG/ML
250 INJECTION, SOLUTION INTRAVENOUS ONCE
Status: COMPLETED | OUTPATIENT
Start: 2017-04-19 | End: 2017-04-19

## 2017-04-19 RX ORDER — SODIUM CHLORIDE 9 MG/ML
250 INJECTION, SOLUTION INTRAVENOUS ONCE
Status: CANCELLED | OUTPATIENT
Start: 2017-05-02

## 2017-04-19 RX ADMIN — ERIBULIN MESYLATE 2.83 MG: 0.5 INJECTION INTRAVENOUS at 12:00

## 2017-04-19 RX ADMIN — DEXAMETHASONE SODIUM PHOSPHATE 12 MG: 10 INJECTION, SOLUTION INTRAMUSCULAR; INTRAVENOUS at 10:55

## 2017-04-19 RX ADMIN — SODIUM CHLORIDE 250 ML: 900 INJECTION, SOLUTION INTRAVENOUS at 10:55

## 2017-04-19 NOTE — PROGRESS NOTES
Subjective    REASONS FOR FOLLOWUP:   1. Metastatic breast cancer to multiple skeletal sites including cervical spine, sternum, lumbar spine and right femur.  Disease progression on Abraxane, therefore, initiation Halaven Day 1, 15 3/15/2017    2.Iron deficiency anemia due to GI blood loss, Xarelto stopped weeks ago, Iron initiated, Pepcid along with Aleve for gastric protection.    History of Present Illness     The patient is a pleasant 61-year-old female with the above-mentioned history, who returns today for cycle 2 day 1 of her Haloven.  She was diagnosed approximately one week after her first treatment with the flu and treated with Tamiflu. She continues to recover from her symptoms but reports feeling stronger today each day. She denies fevers, chills, muscle aches, nausea, vomiting or diarrhea. Her appetite is good.    She does report shortness of breath with exertion that started with her flu diagnosis and is slowly improving. She denies cough or chest pain. She denies pain with urination or other infectious symptoms.    Past Medical History:   Diagnosis Date   • Abnormal mammogram    • Abnormal menstrual cycle    • Bone metastasis    • Breast cancer 2000    right breast   • Drug therapy 2001    breast cancer   • Drug therapy 2017    right femur/associated with breast cancer   • Hx of radiation therapy 2000    breast cancer   • Hx of radiation therapy 2015    bone cancer right femur/ associated with breast cancer   • Hyperlipidemia    • Hypertension    • Multiple benign polyps of large intestine    • Reflux esophagitis      Past Surgical History:   Procedure Laterality Date   •  "BREAST BIOPSY Right 2000    breast cancer   • BREAST SURGERY  2000    lumpectomy, mastectomy, revision   • COLONOSCOPY  2012   • FEMUR FRACTURE SURGERY      secondary to metastatic breast cancer 7/2014, with revision in 9/2015   • MASTECTOMY Left 2000    transflap in 2003       GYN HISTORY: G0, P0. The patient went into menopause in 2000.     I have reviewed the patient's medical history in detail and updated the computerized patient record.    Review of Systems   Constitutional: Negative for activity change, appetite change, chills and fever.        See HPI   HENT: Negative.  Negative for congestion, ear pain, facial swelling, mouth sores, rhinorrhea, sinus pressure, sore throat and trouble swallowing.    Respiratory: Negative for shortness of breath and wheezing. Cough: improved.         See HPI   Cardiovascular: Negative for chest pain, palpitations and leg swelling.   Gastrointestinal: Negative for abdominal distention, abdominal pain, constipation, diarrhea, nausea and vomiting.   Genitourinary: Negative for difficulty urinating and hematuria.   Musculoskeletal: Negative for arthralgias and myalgias.   Skin: Negative for color change.   Hematological: Negative for adenopathy. Does not bruise/bleed easily.   Psychiatric/Behavioral: Negative.        Medications:  The current medication list was reviewed in the EMR    ALLERGIES:    Allergies   Allergen Reactions   • Codeine    • Docetaxel    • Paclitaxel        Objective      Vitals:    04/19/17 1026   BP: 132/75   Pulse: 95   Resp: 16   Temp: 98.2 °F (36.8 °C)   SpO2: 100%   Weight: 203 lb (92.1 kg)   Height: 64.17\" (163 cm)   PainSc: 0-No pain     Current Status 4/19/2017   ECOG score 0   No cancer related pain    Physical Exam   Constitutional: She is oriented to person, place, and time. No distress.   HENT:   Head: Normocephalic and atraumatic.   Mouth/Throat: No oropharyngeal exudate.   Eyes: EOM are normal. Pupils are equal, round, and reactive to light. "   Neck: Normal range of motion. Neck supple.   Cardiovascular: Normal rate, regular rhythm and normal heart sounds.    No murmur heard.  Pulmonary/Chest: Effort normal. She has no wheezes.   Abdominal: Soft. Bowel sounds are normal. She exhibits no distension.   Musculoskeletal: Normal range of motion.   Neurological: She is alert and oriented to person, place, and time.   Skin: Skin is warm and dry. No rash noted. No pallor.   Psychiatric: She has a normal mood and affect. Her behavior is normal.       RECENT LABS:  Lab Results   Component Value Date    WBC 4.58 04/19/2017    HGB 9.3 (L) 04/19/2017    HCT 28.3 (L) 04/19/2017    MCV 89.6 04/19/2017     04/19/2017     Lab Results   Component Value Date    NEUTROABS 2.65 04/19/2017       Assessment/Plan      1. Metastatic breast cancer to multiple skeletal sites including cervical spine, sternum, lumbar spine and right femur. Most recently progressed on Abraxane. She was initiated on Halaven 1.4mg/m2 days 1 and 15 on March 15, 2017.With plans to monitor monthly tumor marker and alkaline phosphatase for response. She is here today for cycle #2 day #1.       2.  Bone metastasis.  Continuing on Xgeva every 3 months. Last given 3/15/2017.    3. Influenza B. The patient completed Tamiflu and continues to recover nicely.        PLANS:  1. Proceed with cycle 2 day 1 today. She will return as already scheduled on 5/2/2017 to see Dr. Saez in anticipation of day #15.  2. She will call the office in the interim should she experience new or worsening symptoms.   3. Continue Xgeva every 3 months.  4. Will continue monitoring alkaline phosphatase and tumor makers on a monthly basis.      Vicky Gunter, APRN  4/19/2017      CC:

## 2017-04-24 DIAGNOSIS — C50.919: ICD-10-CM

## 2017-04-24 RX ORDER — FERROUS SULFATE 325(65) MG
TABLET ORAL
Qty: 30 TABLET | Refills: 5 | Status: SHIPPED | OUTPATIENT
Start: 2017-04-24 | End: 2017-11-15 | Stop reason: SDUPTHER

## 2017-04-25 ENCOUNTER — APPOINTMENT (OUTPATIENT)
Dept: OTHER | Facility: HOSPITAL | Age: 62
End: 2017-04-25

## 2017-04-25 ENCOUNTER — APPOINTMENT (OUTPATIENT)
Dept: ONCOLOGY | Facility: HOSPITAL | Age: 62
End: 2017-04-25

## 2017-05-02 ENCOUNTER — INFUSION (OUTPATIENT)
Dept: ONCOLOGY | Facility: HOSPITAL | Age: 62
End: 2017-05-02

## 2017-05-02 ENCOUNTER — OFFICE VISIT (OUTPATIENT)
Dept: ONCOLOGY | Facility: CLINIC | Age: 62
End: 2017-05-02

## 2017-05-02 VITALS
TEMPERATURE: 97.8 F | DIASTOLIC BLOOD PRESSURE: 78 MMHG | WEIGHT: 201.3 LBS | SYSTOLIC BLOOD PRESSURE: 132 MMHG | HEIGHT: 64 IN | OXYGEN SATURATION: 97 % | BODY MASS INDEX: 34.37 KG/M2 | HEART RATE: 92 BPM | RESPIRATION RATE: 18 BRPM

## 2017-05-02 DIAGNOSIS — Z45.2 FITTING AND ADJUSTMENT OF VASCULAR CATHETER: ICD-10-CM

## 2017-05-02 DIAGNOSIS — C50.912 PRIMARY MALIGNANT NEOPLASM OF LEFT BREAST (HCC): ICD-10-CM

## 2017-05-02 DIAGNOSIS — M85.80 OSTEOPENIA: ICD-10-CM

## 2017-05-02 DIAGNOSIS — D68.59 THROMBOPHILIA (HCC): ICD-10-CM

## 2017-05-02 DIAGNOSIS — C79.51 BONE METASTASIS: ICD-10-CM

## 2017-05-02 DIAGNOSIS — D50.0 IRON DEFICIENCY ANEMIA DUE TO CHRONIC BLOOD LOSS: ICD-10-CM

## 2017-05-02 DIAGNOSIS — C79.51 BONE METASTASIS: Primary | ICD-10-CM

## 2017-05-02 DIAGNOSIS — C50.912 PRIMARY MALIGNANT NEOPLASM OF LEFT BREAST (HCC): Primary | ICD-10-CM

## 2017-05-02 LAB
ALBUMIN SERPL-MCNC: 4.2 G/DL (ref 3.5–5.2)
ALBUMIN/GLOB SERPL: 1.6 G/DL
ALP SERPL-CCNC: 183 U/L (ref 39–117)
ALT SERPL W P-5'-P-CCNC: 34 U/L (ref 1–33)
ANION GAP SERPL CALCULATED.3IONS-SCNC: 13.3 MMOL/L
ANION GAP SERPL CALCULATED.3IONS-SCNC: 13.6 MMOL/L
AST SERPL-CCNC: 25 U/L (ref 1–32)
BASOPHILS # BLD AUTO: 0.04 10*3/MM3 (ref 0–0.2)
BASOPHILS NFR BLD AUTO: 0.8 % (ref 0–1.5)
BILIRUB SERPL-MCNC: 0.2 MG/DL (ref 0.1–1.2)
BUN BLD-MCNC: 27 MG/DL (ref 8–23)
BUN BLD-MCNC: 27 MG/DL (ref 8–23)
BUN/CREAT SERPL: 36 (ref 7–25)
BUN/CREAT SERPL: 36.5 (ref 7–25)
CALCIUM SPEC-SCNC: 9.5 MG/DL (ref 8.6–10.5)
CALCIUM SPEC-SCNC: 9.5 MG/DL (ref 8.6–10.5)
CHLORIDE SERPL-SCNC: 100 MMOL/L (ref 98–107)
CHLORIDE SERPL-SCNC: 101 MMOL/L (ref 98–107)
CO2 SERPL-SCNC: 24.4 MMOL/L (ref 22–29)
CO2 SERPL-SCNC: 25.7 MMOL/L (ref 22–29)
CREAT BLD-MCNC: 0.74 MG/DL (ref 0.57–1)
CREAT BLD-MCNC: 0.75 MG/DL (ref 0.57–1)
DEPRECATED RDW RBC AUTO: 59.9 FL (ref 37–54)
EOSINOPHIL # BLD AUTO: 0.12 10*3/MM3 (ref 0–0.7)
EOSINOPHIL NFR BLD AUTO: 2.3 % (ref 0.3–6.2)
ERYTHROCYTE [DISTWIDTH] IN BLOOD BY AUTOMATED COUNT: 18.3 % (ref 11.7–13)
GFR SERPL CREATININE-BSD FRML MDRD: 79 ML/MIN/1.73
GFR SERPL CREATININE-BSD FRML MDRD: 80 ML/MIN/1.73
GLOBULIN UR ELPH-MCNC: 2.7 GM/DL
GLUCOSE BLD-MCNC: 121 MG/DL (ref 65–99)
GLUCOSE BLD-MCNC: 123 MG/DL (ref 65–99)
HCT VFR BLD AUTO: 29.4 % (ref 35.6–45.5)
HGB BLD-MCNC: 9.7 G/DL (ref 11.9–15.5)
IMM GRANULOCYTES # BLD: 0.08 10*3/MM3 (ref 0–0.03)
IMM GRANULOCYTES NFR BLD: 1.5 % (ref 0–0.5)
LYMPHOCYTES # BLD AUTO: 1.15 10*3/MM3 (ref 0.9–4.8)
LYMPHOCYTES NFR BLD AUTO: 21.6 % (ref 19.6–45.3)
MAGNESIUM SERPL-MCNC: 1.7 MG/DL (ref 1.6–2.4)
MCH RBC QN AUTO: 29.6 PG (ref 26.9–32)
MCHC RBC AUTO-ENTMCNC: 33 G/DL (ref 32.4–36.3)
MCV RBC AUTO: 89.6 FL (ref 80.5–98.2)
MONOCYTES # BLD AUTO: 0.76 10*3/MM3 (ref 0.2–1.2)
MONOCYTES NFR BLD AUTO: 14.3 % (ref 5–12)
NEUTROPHILS # BLD AUTO: 3.18 10*3/MM3 (ref 1.9–8.1)
NEUTROPHILS NFR BLD AUTO: 59.5 % (ref 42.7–76)
NRBC BLD MANUAL-RTO: 0 /100 WBC (ref 0–0)
PLATELET # BLD AUTO: 238 10*3/MM3 (ref 140–500)
PMV BLD AUTO: 11.1 FL (ref 6–12)
POTASSIUM BLD-SCNC: 4.1 MMOL/L (ref 3.5–5.2)
POTASSIUM BLD-SCNC: 4.1 MMOL/L (ref 3.5–5.2)
PROT SERPL-MCNC: 6.9 G/DL (ref 6–8.5)
RBC # BLD AUTO: 3.28 10*6/MM3 (ref 3.9–5.2)
SODIUM BLD-SCNC: 138 MMOL/L (ref 136–145)
SODIUM BLD-SCNC: 140 MMOL/L (ref 136–145)
WBC NRBC COR # BLD: 5.33 10*3/MM3 (ref 4.5–10.7)

## 2017-05-02 PROCEDURE — 96375 TX/PRO/DX INJ NEW DRUG ADDON: CPT | Performed by: INTERNAL MEDICINE

## 2017-05-02 PROCEDURE — 36415 COLL VENOUS BLD VENIPUNCTURE: CPT | Performed by: INTERNAL MEDICINE

## 2017-05-02 PROCEDURE — 80053 COMPREHEN METABOLIC PANEL: CPT | Performed by: NURSE PRACTITIONER

## 2017-05-02 PROCEDURE — 85025 COMPLETE CBC W/AUTO DIFF WBC: CPT | Performed by: NURSE PRACTITIONER

## 2017-05-02 PROCEDURE — 25010000002 DEXAMETHASONE SODIUM PHOSPHATE 10 MG/ML SOLUTION 1 ML VIAL: Performed by: NURSE PRACTITIONER

## 2017-05-02 PROCEDURE — 96409 CHEMO IV PUSH SNGL DRUG: CPT | Performed by: INTERNAL MEDICINE

## 2017-05-02 PROCEDURE — 86300 IMMUNOASSAY TUMOR CA 15-3: CPT | Performed by: INTERNAL MEDICINE

## 2017-05-02 PROCEDURE — 80048 BASIC METABOLIC PNL TOTAL CA: CPT | Performed by: NURSE PRACTITIONER

## 2017-05-02 PROCEDURE — 99213 OFFICE O/P EST LOW 20 MIN: CPT | Performed by: INTERNAL MEDICINE

## 2017-05-02 PROCEDURE — 25010000002 ERIBULIN 1 MG/2ML SOLUTION: Performed by: NURSE PRACTITIONER

## 2017-05-02 PROCEDURE — 83735 ASSAY OF MAGNESIUM: CPT | Performed by: NURSE PRACTITIONER

## 2017-05-02 RX ORDER — SODIUM CHLORIDE 9 MG/ML
250 INJECTION, SOLUTION INTRAVENOUS ONCE
Status: COMPLETED | OUTPATIENT
Start: 2017-05-02 | End: 2017-05-02

## 2017-05-02 RX ADMIN — ERIBULIN MESYLATE 2.83 MG: 0.5 INJECTION INTRAVENOUS at 11:08

## 2017-05-02 RX ADMIN — DEXAMETHASONE SODIUM PHOSPHATE 12 MG: 10 INJECTION, SOLUTION INTRAMUSCULAR; INTRAVENOUS at 10:52

## 2017-05-02 RX ADMIN — SODIUM CHLORIDE 250 ML: 900 INJECTION, SOLUTION INTRAVENOUS at 10:45

## 2017-05-04 LAB — CANCER AG15-3 SERPL-ACNC: 50.7 U/ML (ref 0–25)

## 2017-05-08 ENCOUNTER — TELEPHONE (OUTPATIENT)
Dept: ONCOLOGY | Facility: CLINIC | Age: 62
End: 2017-05-08

## 2017-05-16 DIAGNOSIS — C79.51 BONE METASTASIS: ICD-10-CM

## 2017-05-16 DIAGNOSIS — C50.912 PRIMARY MALIGNANT NEOPLASM OF LEFT BREAST (HCC): ICD-10-CM

## 2017-05-16 RX ORDER — SODIUM CHLORIDE 9 MG/ML
250 INJECTION, SOLUTION INTRAVENOUS ONCE
Status: CANCELLED | OUTPATIENT
Start: 2017-05-30

## 2017-05-16 RX ORDER — LISINOPRIL AND HYDROCHLOROTHIAZIDE 12.5; 1 MG/1; MG/1
TABLET ORAL
Qty: 180 TABLET | Refills: 0 | Status: SHIPPED | OUTPATIENT
Start: 2017-05-16 | End: 2017-08-07 | Stop reason: SDUPTHER

## 2017-05-16 RX ORDER — SODIUM CHLORIDE 9 MG/ML
250 INJECTION, SOLUTION INTRAVENOUS ONCE
Status: CANCELLED | OUTPATIENT
Start: 2017-05-17

## 2017-05-17 ENCOUNTER — INFUSION (OUTPATIENT)
Dept: ONCOLOGY | Facility: HOSPITAL | Age: 62
End: 2017-05-17

## 2017-05-17 VITALS
TEMPERATURE: 99 F | WEIGHT: 203.6 LBS | HEART RATE: 114 BPM | DIASTOLIC BLOOD PRESSURE: 73 MMHG | SYSTOLIC BLOOD PRESSURE: 122 MMHG | BODY MASS INDEX: 34.76 KG/M2

## 2017-05-17 DIAGNOSIS — C50.912 PRIMARY MALIGNANT NEOPLASM OF LEFT BREAST (HCC): ICD-10-CM

## 2017-05-17 DIAGNOSIS — C79.51 BONE METASTASIS: ICD-10-CM

## 2017-05-17 DIAGNOSIS — C79.51 BONE METASTASIS: Primary | ICD-10-CM

## 2017-05-17 LAB
ALBUMIN SERPL-MCNC: 4.1 G/DL (ref 3.5–5.2)
ALBUMIN/GLOB SERPL: 1.6 G/DL
ALP SERPL-CCNC: 161 U/L (ref 39–117)
ALT SERPL W P-5'-P-CCNC: 33 U/L (ref 1–33)
ANION GAP SERPL CALCULATED.3IONS-SCNC: 12.6 MMOL/L
AST SERPL-CCNC: 30 U/L (ref 1–32)
BASOPHILS # BLD AUTO: 0.03 10*3/MM3 (ref 0–0.2)
BASOPHILS NFR BLD AUTO: 0.5 % (ref 0–1.5)
BILIRUB SERPL-MCNC: 0.3 MG/DL (ref 0.1–1.2)
BUN BLD-MCNC: 25 MG/DL (ref 8–23)
BUN/CREAT SERPL: 32.5 (ref 7–25)
CALCIUM SPEC-SCNC: 9.7 MG/DL (ref 8.6–10.5)
CHLORIDE SERPL-SCNC: 99 MMOL/L (ref 98–107)
CO2 SERPL-SCNC: 25.4 MMOL/L (ref 22–29)
CREAT BLD-MCNC: 0.77 MG/DL (ref 0.57–1)
DEPRECATED RDW RBC AUTO: 61.4 FL (ref 37–54)
EOSINOPHIL # BLD AUTO: 0.09 10*3/MM3 (ref 0–0.7)
EOSINOPHIL NFR BLD AUTO: 1.5 % (ref 0.3–6.2)
ERYTHROCYTE [DISTWIDTH] IN BLOOD BY AUTOMATED COUNT: 18.7 % (ref 11.7–13)
GFR SERPL CREATININE-BSD FRML MDRD: 76 ML/MIN/1.73
GLOBULIN UR ELPH-MCNC: 2.6 GM/DL
GLUCOSE BLD-MCNC: 156 MG/DL (ref 65–99)
HCT VFR BLD AUTO: 28.1 % (ref 35.6–45.5)
HGB BLD-MCNC: 9.5 G/DL (ref 11.9–15.5)
IMM GRANULOCYTES # BLD: 0.07 10*3/MM3 (ref 0–0.03)
IMM GRANULOCYTES NFR BLD: 1.2 % (ref 0–0.5)
LYMPHOCYTES # BLD AUTO: 1.16 10*3/MM3 (ref 0.9–4.8)
LYMPHOCYTES NFR BLD AUTO: 19.6 % (ref 19.6–45.3)
MAGNESIUM SERPL-MCNC: 1.6 MG/DL (ref 1.6–2.4)
MCH RBC QN AUTO: 30.9 PG (ref 26.9–32)
MCHC RBC AUTO-ENTMCNC: 33.8 G/DL (ref 32.4–36.3)
MCV RBC AUTO: 91.5 FL (ref 80.5–98.2)
MONOCYTES # BLD AUTO: 0.71 10*3/MM3 (ref 0.2–1.2)
MONOCYTES NFR BLD AUTO: 12 % (ref 5–12)
NEUTROPHILS # BLD AUTO: 3.87 10*3/MM3 (ref 1.9–8.1)
NEUTROPHILS NFR BLD AUTO: 65.2 % (ref 42.7–76)
NRBC BLD MANUAL-RTO: 0 /100 WBC (ref 0–0)
PLATELET # BLD AUTO: 255 10*3/MM3 (ref 140–500)
PMV BLD AUTO: 10.9 FL (ref 6–12)
POTASSIUM BLD-SCNC: 4 MMOL/L (ref 3.5–5.2)
PROT SERPL-MCNC: 6.7 G/DL (ref 6–8.5)
RBC # BLD AUTO: 3.07 10*6/MM3 (ref 3.9–5.2)
SODIUM BLD-SCNC: 137 MMOL/L (ref 136–145)
WBC NRBC COR # BLD: 5.93 10*3/MM3 (ref 4.5–10.7)

## 2017-05-17 PROCEDURE — 96375 TX/PRO/DX INJ NEW DRUG ADDON: CPT | Performed by: NURSE PRACTITIONER

## 2017-05-17 PROCEDURE — 80053 COMPREHEN METABOLIC PANEL: CPT | Performed by: INTERNAL MEDICINE

## 2017-05-17 PROCEDURE — 96409 CHEMO IV PUSH SNGL DRUG: CPT | Performed by: NURSE PRACTITIONER

## 2017-05-17 PROCEDURE — 85025 COMPLETE CBC W/AUTO DIFF WBC: CPT | Performed by: INTERNAL MEDICINE

## 2017-05-17 PROCEDURE — 36415 COLL VENOUS BLD VENIPUNCTURE: CPT

## 2017-05-17 PROCEDURE — 25010000002 ERIBULIN 1 MG/2ML SOLUTION: Performed by: INTERNAL MEDICINE

## 2017-05-17 PROCEDURE — 83735 ASSAY OF MAGNESIUM: CPT | Performed by: INTERNAL MEDICINE

## 2017-05-17 PROCEDURE — 25010000002 DEXAMETHASONE PER 1 MG: Performed by: INTERNAL MEDICINE

## 2017-05-17 RX ORDER — SODIUM CHLORIDE 9 MG/ML
250 INJECTION, SOLUTION INTRAVENOUS ONCE
Status: COMPLETED | OUTPATIENT
Start: 2017-05-17 | End: 2017-05-17

## 2017-05-17 RX ADMIN — DEXAMETHASONE SODIUM PHOSPHATE 12 MG: 10 INJECTION INTRAMUSCULAR; INTRAVENOUS at 15:30

## 2017-05-17 RX ADMIN — SODIUM CHLORIDE 250 ML: 900 INJECTION, SOLUTION INTRAVENOUS at 15:20

## 2017-05-17 RX ADMIN — ERIBULIN MESYLATE 2.83 MG: 0.5 INJECTION INTRAVENOUS at 15:46

## 2017-05-30 ENCOUNTER — OFFICE VISIT (OUTPATIENT)
Dept: ONCOLOGY | Facility: CLINIC | Age: 62
End: 2017-05-30

## 2017-05-30 ENCOUNTER — INFUSION (OUTPATIENT)
Dept: ONCOLOGY | Facility: HOSPITAL | Age: 62
End: 2017-05-30

## 2017-05-30 ENCOUNTER — APPOINTMENT (OUTPATIENT)
Dept: ONCOLOGY | Facility: HOSPITAL | Age: 62
End: 2017-05-30

## 2017-05-30 VITALS
OXYGEN SATURATION: 98 % | TEMPERATURE: 98.1 F | WEIGHT: 201 LBS | DIASTOLIC BLOOD PRESSURE: 72 MMHG | BODY MASS INDEX: 34.31 KG/M2 | HEIGHT: 64 IN | RESPIRATION RATE: 18 BRPM | HEART RATE: 105 BPM | SYSTOLIC BLOOD PRESSURE: 130 MMHG

## 2017-05-30 DIAGNOSIS — C79.51 BONE METASTASIS: ICD-10-CM

## 2017-05-30 DIAGNOSIS — C50.912 PRIMARY MALIGNANT NEOPLASM OF LEFT BREAST (HCC): ICD-10-CM

## 2017-05-30 DIAGNOSIS — Z45.2 FITTING AND ADJUSTMENT OF VASCULAR CATHETER: ICD-10-CM

## 2017-05-30 DIAGNOSIS — C50.912 PRIMARY MALIGNANT NEOPLASM OF LEFT BREAST (HCC): Primary | ICD-10-CM

## 2017-05-30 DIAGNOSIS — D50.0 IRON DEFICIENCY ANEMIA DUE TO CHRONIC BLOOD LOSS: ICD-10-CM

## 2017-05-30 DIAGNOSIS — D68.59 THROMBOPHILIA (HCC): ICD-10-CM

## 2017-05-30 DIAGNOSIS — M85.80 OSTEOPENIA: ICD-10-CM

## 2017-05-30 DIAGNOSIS — C79.51 BONE METASTASIS: Primary | ICD-10-CM

## 2017-05-30 LAB
ALBUMIN SERPL-MCNC: 4.1 G/DL (ref 3.5–5.2)
ALBUMIN/GLOB SERPL: 1.6 G/DL
ALP SERPL-CCNC: 145 U/L (ref 39–117)
ALT SERPL W P-5'-P-CCNC: 41 U/L (ref 1–33)
ANION GAP SERPL CALCULATED.3IONS-SCNC: 15.4 MMOL/L
AST SERPL-CCNC: 33 U/L (ref 1–32)
BASOPHILS # BLD AUTO: 0.03 10*3/MM3 (ref 0–0.2)
BASOPHILS NFR BLD AUTO: 0.7 % (ref 0–1.5)
BILIRUB SERPL-MCNC: 0.3 MG/DL (ref 0.1–1.2)
BUN BLD-MCNC: 21 MG/DL (ref 8–23)
BUN/CREAT SERPL: 27.3 (ref 7–25)
CALCIUM SPEC-SCNC: 9.1 MG/DL (ref 8.6–10.5)
CHLORIDE SERPL-SCNC: 97 MMOL/L (ref 98–107)
CO2 SERPL-SCNC: 24.6 MMOL/L (ref 22–29)
CREAT BLD-MCNC: 0.77 MG/DL (ref 0.57–1)
DEPRECATED RDW RBC AUTO: 62.3 FL (ref 37–54)
EOSINOPHIL # BLD AUTO: 0.08 10*3/MM3 (ref 0–0.7)
EOSINOPHIL NFR BLD AUTO: 1.8 % (ref 0.3–6.2)
ERYTHROCYTE [DISTWIDTH] IN BLOOD BY AUTOMATED COUNT: 18.6 % (ref 11.7–13)
FERRITIN SERPL-MCNC: 265.5 NG/ML (ref 13–150)
GFR SERPL CREATININE-BSD FRML MDRD: 76 ML/MIN/1.73
GLOBULIN UR ELPH-MCNC: 2.5 GM/DL
GLUCOSE BLD-MCNC: 172 MG/DL (ref 65–99)
HCT VFR BLD AUTO: 29 % (ref 35.6–45.5)
HGB BLD-MCNC: 9.5 G/DL (ref 11.9–15.5)
IMM GRANULOCYTES # BLD: 0.03 10*3/MM3 (ref 0–0.03)
IMM GRANULOCYTES NFR BLD: 0.7 % (ref 0–0.5)
IRON 24H UR-MRATE: 90 MCG/DL (ref 37–145)
IRON SATN MFR SERPL: 20 % (ref 20–50)
LYMPHOCYTES # BLD AUTO: 1.08 10*3/MM3 (ref 0.9–4.8)
LYMPHOCYTES NFR BLD AUTO: 23.9 % (ref 19.6–45.3)
MAGNESIUM SERPL-MCNC: 1.8 MG/DL (ref 1.6–2.4)
MCH RBC QN AUTO: 30.3 PG (ref 26.9–32)
MCHC RBC AUTO-ENTMCNC: 32.8 G/DL (ref 32.4–36.3)
MCV RBC AUTO: 92.4 FL (ref 80.5–98.2)
MONOCYTES # BLD AUTO: 0.55 10*3/MM3 (ref 0.2–1.2)
MONOCYTES NFR BLD AUTO: 12.2 % (ref 5–12)
NEUTROPHILS # BLD AUTO: 2.75 10*3/MM3 (ref 1.9–8.1)
NEUTROPHILS NFR BLD AUTO: 60.7 % (ref 42.7–76)
NRBC BLD MANUAL-RTO: 0 /100 WBC (ref 0–0)
PLATELET # BLD AUTO: 225 10*3/MM3 (ref 140–500)
PMV BLD AUTO: 11.4 FL (ref 6–12)
POTASSIUM BLD-SCNC: 3.9 MMOL/L (ref 3.5–5.2)
PROT SERPL-MCNC: 6.6 G/DL (ref 6–8.5)
RBC # BLD AUTO: 3.14 10*6/MM3 (ref 3.9–5.2)
SODIUM BLD-SCNC: 137 MMOL/L (ref 136–145)
TIBC SERPL-MCNC: 448 MCG/DL (ref 298–536)
TRANSFERRIN SERPL-MCNC: 301 MG/DL (ref 200–360)
VIT B12 BLD-MCNC: 288 PG/ML (ref 211–946)
WBC NRBC COR # BLD: 4.52 10*3/MM3 (ref 4.5–10.7)

## 2017-05-30 PROCEDURE — 84466 ASSAY OF TRANSFERRIN: CPT | Performed by: INTERNAL MEDICINE

## 2017-05-30 PROCEDURE — 36415 COLL VENOUS BLD VENIPUNCTURE: CPT | Performed by: INTERNAL MEDICINE

## 2017-05-30 PROCEDURE — 96409 CHEMO IV PUSH SNGL DRUG: CPT | Performed by: INTERNAL MEDICINE

## 2017-05-30 PROCEDURE — 25010000002 DEXAMETHASONE SODIUM PHOSPHATE 10 MG/ML SOLUTION 1 ML VIAL: Performed by: INTERNAL MEDICINE

## 2017-05-30 PROCEDURE — 86300 IMMUNOASSAY TUMOR CA 15-3: CPT | Performed by: INTERNAL MEDICINE

## 2017-05-30 PROCEDURE — 82607 VITAMIN B-12: CPT | Performed by: INTERNAL MEDICINE

## 2017-05-30 PROCEDURE — 85025 COMPLETE CBC W/AUTO DIFF WBC: CPT | Performed by: INTERNAL MEDICINE

## 2017-05-30 PROCEDURE — 25010000002 ERIBULIN 1 MG/2ML SOLUTION: Performed by: INTERNAL MEDICINE

## 2017-05-30 PROCEDURE — 80053 COMPREHEN METABOLIC PANEL: CPT | Performed by: INTERNAL MEDICINE

## 2017-05-30 PROCEDURE — 96375 TX/PRO/DX INJ NEW DRUG ADDON: CPT | Performed by: INTERNAL MEDICINE

## 2017-05-30 PROCEDURE — 99214 OFFICE O/P EST MOD 30 MIN: CPT | Performed by: INTERNAL MEDICINE

## 2017-05-30 PROCEDURE — 83540 ASSAY OF IRON: CPT | Performed by: INTERNAL MEDICINE

## 2017-05-30 PROCEDURE — 83735 ASSAY OF MAGNESIUM: CPT | Performed by: INTERNAL MEDICINE

## 2017-05-30 PROCEDURE — 82728 ASSAY OF FERRITIN: CPT | Performed by: INTERNAL MEDICINE

## 2017-05-30 RX ORDER — SODIUM CHLORIDE 9 MG/ML
250 INJECTION, SOLUTION INTRAVENOUS ONCE
Status: COMPLETED | OUTPATIENT
Start: 2017-05-30 | End: 2017-05-30

## 2017-05-30 RX ADMIN — ERIBULIN MESYLATE 2.83 MG: 0.5 INJECTION INTRAVENOUS at 14:12

## 2017-05-30 RX ADMIN — SODIUM CHLORIDE 250 ML: 900 INJECTION, SOLUTION INTRAVENOUS at 13:38

## 2017-05-30 RX ADMIN — DEXAMETHASONE SODIUM PHOSPHATE 12 MG: 10 INJECTION, SOLUTION INTRAMUSCULAR; INTRAVENOUS at 13:38

## 2017-06-01 LAB — CANCER AG15-3 SERPL-ACNC: 53.9 U/ML (ref 0–25)

## 2017-06-07 ENCOUNTER — HOSPITAL ENCOUNTER (OUTPATIENT)
Dept: NUCLEAR MEDICINE | Facility: HOSPITAL | Age: 62
Discharge: HOME | End: 2017-06-07
Attending: INTERNAL MEDICINE

## 2017-06-07 DIAGNOSIS — C50.912 PRIMARY MALIGNANT NEOPLASM OF LEFT BREAST (HCC): ICD-10-CM

## 2017-06-07 DIAGNOSIS — C79.51 BONE METASTASIS: ICD-10-CM

## 2017-06-07 PROCEDURE — A9503 TC99M MEDRONATE: HCPCS | Performed by: INTERNAL MEDICINE

## 2017-06-07 PROCEDURE — 0 TECHNETIUM MEDRONATE KIT: Performed by: INTERNAL MEDICINE

## 2017-06-07 PROCEDURE — 78306 BONE IMAGING WHOLE BODY: CPT

## 2017-06-07 RX ORDER — TC 99M MEDRONATE 20 MG/10ML
22.4 INJECTION, POWDER, LYOPHILIZED, FOR SOLUTION INTRAVENOUS
Status: COMPLETED | OUTPATIENT
Start: 2017-06-07 | End: 2017-06-07

## 2017-06-07 RX ADMIN — Medication 22.4 MILLICURIE: at 06:30

## 2017-06-13 ENCOUNTER — OFFICE VISIT (OUTPATIENT)
Dept: ONCOLOGY | Facility: CLINIC | Age: 62
End: 2017-06-13

## 2017-06-13 ENCOUNTER — LAB (OUTPATIENT)
Dept: OTHER | Facility: HOSPITAL | Age: 62
End: 2017-06-13

## 2017-06-13 ENCOUNTER — INFUSION (OUTPATIENT)
Dept: ONCOLOGY | Facility: HOSPITAL | Age: 62
End: 2017-06-13

## 2017-06-13 ENCOUNTER — PROCEDURE VISIT (OUTPATIENT)
Dept: ONCOLOGY | Facility: HOSPITAL | Age: 62
End: 2017-06-13

## 2017-06-13 VITALS
BODY MASS INDEX: 34.79 KG/M2 | RESPIRATION RATE: 16 BRPM | DIASTOLIC BLOOD PRESSURE: 70 MMHG | OXYGEN SATURATION: 97 % | HEIGHT: 64 IN | WEIGHT: 203.8 LBS | SYSTOLIC BLOOD PRESSURE: 132 MMHG | TEMPERATURE: 98.4 F | HEART RATE: 99 BPM

## 2017-06-13 DIAGNOSIS — M85.80 OSTEOPENIA: Primary | ICD-10-CM

## 2017-06-13 DIAGNOSIS — C50.912 PRIMARY MALIGNANT NEOPLASM OF LEFT BREAST (HCC): ICD-10-CM

## 2017-06-13 DIAGNOSIS — C50.912 PRIMARY MALIGNANT NEOPLASM OF LEFT BREAST (HCC): Primary | ICD-10-CM

## 2017-06-13 DIAGNOSIS — D51.0 VITAMIN B12 DEFICIENCY ANEMIA DUE TO INTRINSIC FACTOR DEFICIENCY: ICD-10-CM

## 2017-06-13 DIAGNOSIS — Z45.2 FITTING AND ADJUSTMENT OF VASCULAR CATHETER: ICD-10-CM

## 2017-06-13 DIAGNOSIS — Z79.899 HIGH RISK MEDICATION USE: Primary | ICD-10-CM

## 2017-06-13 DIAGNOSIS — M85.80 OSTEOPENIA: ICD-10-CM

## 2017-06-13 DIAGNOSIS — R01.1 HEART MURMUR, SYSTOLIC: ICD-10-CM

## 2017-06-13 DIAGNOSIS — C79.51 BONE METASTASIS: ICD-10-CM

## 2017-06-13 DIAGNOSIS — D50.0 IRON DEFICIENCY ANEMIA DUE TO CHRONIC BLOOD LOSS: ICD-10-CM

## 2017-06-13 DIAGNOSIS — D68.59 THROMBOPHILIA (HCC): ICD-10-CM

## 2017-06-13 LAB
ALBUMIN SERPL-MCNC: 4.2 G/DL (ref 3.5–5.2)
ALBUMIN/GLOB SERPL: 1.6 G/DL
ALP SERPL-CCNC: 139 U/L (ref 39–117)
ALT SERPL W P-5'-P-CCNC: 36 U/L (ref 1–33)
ANION GAP SERPL CALCULATED.3IONS-SCNC: 15 MMOL/L
AST SERPL-CCNC: 28 U/L (ref 1–32)
BASOPHILS # BLD AUTO: 0.04 10*3/MM3 (ref 0–0.2)
BASOPHILS NFR BLD AUTO: 0.8 % (ref 0–1.5)
BILIRUB SERPL-MCNC: 0.3 MG/DL (ref 0.1–1.2)
BUN BLD-MCNC: 30 MG/DL (ref 8–23)
BUN/CREAT SERPL: 35.3 (ref 7–25)
CALCIUM SPEC-SCNC: 8.6 MG/DL (ref 8.6–10.5)
CHLORIDE SERPL-SCNC: 100 MMOL/L (ref 98–107)
CO2 SERPL-SCNC: 24 MMOL/L (ref 22–29)
CREAT BLD-MCNC: 0.85 MG/DL (ref 0.57–1)
DEPRECATED RDW RBC AUTO: 62.8 FL (ref 37–54)
EOSINOPHIL # BLD AUTO: 0.08 10*3/MM3 (ref 0–0.7)
EOSINOPHIL NFR BLD AUTO: 1.6 % (ref 0.3–6.2)
ERYTHROCYTE [DISTWIDTH] IN BLOOD BY AUTOMATED COUNT: 18.8 % (ref 11.7–13)
GFR SERPL CREATININE-BSD FRML MDRD: 68 ML/MIN/1.73
GLOBULIN UR ELPH-MCNC: 2.6 GM/DL
GLUCOSE BLD-MCNC: 131 MG/DL (ref 65–99)
HCT VFR BLD AUTO: 27.9 % (ref 35.6–45.5)
HGB BLD-MCNC: 9.2 G/DL (ref 11.9–15.5)
IMM GRANULOCYTES # BLD: 0.03 10*3/MM3 (ref 0–0.03)
IMM GRANULOCYTES NFR BLD: 0.6 % (ref 0–0.5)
LYMPHOCYTES # BLD AUTO: 1.11 10*3/MM3 (ref 0.9–4.8)
LYMPHOCYTES NFR BLD AUTO: 22.1 % (ref 19.6–45.3)
MAGNESIUM SERPL-MCNC: 2.1 MG/DL (ref 1.6–2.4)
MCH RBC QN AUTO: 30.5 PG (ref 26.9–32)
MCHC RBC AUTO-ENTMCNC: 33 G/DL (ref 32.4–36.3)
MCV RBC AUTO: 92.4 FL (ref 80.5–98.2)
MONOCYTES # BLD AUTO: 0.66 10*3/MM3 (ref 0.2–1.2)
MONOCYTES NFR BLD AUTO: 13.1 % (ref 5–12)
NEUTROPHILS # BLD AUTO: 3.11 10*3/MM3 (ref 1.9–8.1)
NEUTROPHILS NFR BLD AUTO: 61.8 % (ref 42.7–76)
NRBC BLD MANUAL-RTO: 0 /100 WBC (ref 0–0)
PHOSPHATE SERPL-MCNC: 3.4 MG/DL (ref 2.5–4.5)
PLATELET # BLD AUTO: 214 10*3/MM3 (ref 140–500)
PMV BLD AUTO: 11 FL (ref 6–12)
POTASSIUM BLD-SCNC: 3.8 MMOL/L (ref 3.5–5.2)
PROT SERPL-MCNC: 6.8 G/DL (ref 6–8.5)
RBC # BLD AUTO: 3.02 10*6/MM3 (ref 3.9–5.2)
SODIUM BLD-SCNC: 139 MMOL/L (ref 136–145)
WBC NRBC COR # BLD: 5.03 10*3/MM3 (ref 4.5–10.7)

## 2017-06-13 PROCEDURE — 25010000002 DENOSUMAB 120 MG/1.7ML SOLUTION: Performed by: INTERNAL MEDICINE

## 2017-06-13 PROCEDURE — 25010000002 CYANOCOBALAMIN PER 1000 MCG: Performed by: INTERNAL MEDICINE

## 2017-06-13 PROCEDURE — 80053 COMPREHEN METABOLIC PANEL: CPT | Performed by: INTERNAL MEDICINE

## 2017-06-13 PROCEDURE — 96523 IRRIG DRUG DELIVERY DEVICE: CPT | Performed by: NURSE PRACTITIONER

## 2017-06-13 PROCEDURE — 99212-NC PR NO CHARGE CBC OFFICE OUTPATIENT VISIT 10 MINUTES: Performed by: NURSE PRACTITIONER

## 2017-06-13 PROCEDURE — 84100 ASSAY OF PHOSPHORUS: CPT | Performed by: INTERNAL MEDICINE

## 2017-06-13 PROCEDURE — 36415 COLL VENOUS BLD VENIPUNCTURE: CPT

## 2017-06-13 PROCEDURE — 96372 THER/PROPH/DIAG INJ SC/IM: CPT | Performed by: NURSE PRACTITIONER

## 2017-06-13 PROCEDURE — 25010000002 HEPARIN FLUSH (PORCINE) 100 UNIT/ML SOLUTION: Performed by: INTERNAL MEDICINE

## 2017-06-13 PROCEDURE — 83735 ASSAY OF MAGNESIUM: CPT | Performed by: INTERNAL MEDICINE

## 2017-06-13 PROCEDURE — 99214 OFFICE O/P EST MOD 30 MIN: CPT | Performed by: INTERNAL MEDICINE

## 2017-06-13 PROCEDURE — 85025 COMPLETE CBC W/AUTO DIFF WBC: CPT | Performed by: INTERNAL MEDICINE

## 2017-06-13 PROCEDURE — 93005 ELECTROCARDIOGRAM TRACING: CPT | Performed by: NURSE PRACTITIONER

## 2017-06-13 PROCEDURE — 93005 ELECTROCARDIOGRAM TRACING: CPT

## 2017-06-13 RX ORDER — SODIUM CHLORIDE 0.9 % (FLUSH) 0.9 %
10 SYRINGE (ML) INJECTION AS NEEDED
Status: DISCONTINUED | OUTPATIENT
Start: 2017-06-13 | End: 2017-06-13 | Stop reason: HOSPADM

## 2017-06-13 RX ORDER — SODIUM CHLORIDE 0.9 % (FLUSH) 0.9 %
10 SYRINGE (ML) INJECTION AS NEEDED
Status: CANCELLED | OUTPATIENT
Start: 2017-06-13

## 2017-06-13 RX ORDER — CYANOCOBALAMIN 1000 UG/ML
1000 INJECTION, SOLUTION INTRAMUSCULAR; SUBCUTANEOUS
Status: DISCONTINUED | OUTPATIENT
Start: 2017-06-13 | End: 2017-06-13 | Stop reason: HOSPADM

## 2017-06-13 RX ADMIN — CYANOCOBALAMIN 1000 MCG: 1000 INJECTION, SOLUTION INTRAMUSCULAR; SUBCUTANEOUS at 15:36

## 2017-06-13 RX ADMIN — SODIUM CHLORIDE, PRESERVATIVE FREE 500 UNITS: 5 INJECTION INTRAVENOUS at 15:40

## 2017-06-13 RX ADMIN — DENOSUMAB 120 MG: 120 INJECTION SUBCUTANEOUS at 15:35

## 2017-06-13 RX ADMIN — Medication 10 ML: at 15:40

## 2017-06-13 NOTE — PROGRESS NOTES
Subjective     No primary care provider on file.    PATIENT NAME:  Maggie Suero  YOB: 1955  PATIENTS AGE:  62 y.o.  PATIENTS SEX:  female  DATE OF SERVICE:  06/13/2017  PROVIDER:  JESSICA Mayorga      __________ORAL CHEMOTHERAPY PATIENT EDUCATION__________    PATIENT EDUCATION:  Today I met with the patient to discuss ORAL chemotherapy/biotherapy recommended for treatment of her disease.  Also discussed were medication administration, adherence, and proper handling/disposal.  The patient received the Oral Chemotherapy/Biotherapy Plan Summary including diagnosis and specific treatment plan.    SIDE EFFECTS:  Common side effects were discussed with the patient and/or significant other.  Discussion included hair loss/discoloration, anemia/fatigue, infection/chills/fever, appetite, bleeding risk/precautions, constipation, diarrhea, mouth sores, taste alteration, loss of appetite,nausea/vomiting, peripheral neuropathy, skin/nail changes, rash, muscle aches/weakness, photosensitivity, weight gain/loss, hearing loss, dizziness, menopausal symptoms, menstrrual irregularity, reproductive risk, high blood pressure, heart damage, liver damage, lung damage, kidney damage, DVT/PE risk, fluid retention, pleural/pericardial effusion, somnolence, electrolyte/LFT imbalance.    Discussion also included side effects specific to drugs in the treatment plan, specifically Kisqali and Femara.    Reproductive risks were discussed, including appropriate use of birth control and protection during sexual relations.    A total of 20 minutes were spent with the patient, with 100% of time spent in education and counseling.      JESSICA Mayorga

## 2017-06-13 NOTE — PROGRESS NOTES
Subjective  REASONS FOR FOLLOWUP: 1. Metastatic breast cancer to multiple skeletal sites including cervical spine, sternum, lumbar spine and right femur, underwent Abraxane every 4 weeks and Xgeva every  3 months, DOCUMENTED RADIOLOGICAL PROGRESSION BY BONE SCAN 3/15/17 MOVING AWAY FROM ABRAXANE AND SWITCHING TO HALAVEN EVERY 2 WEEKS FOR 2 MONTHS  2.Iron deficiency anemia du to GI blood loss, Xarelto stopped months ago, Iron initiated, Pepcid along with Aleve for gastric protection.           History of Present Illness    This patient is here today to continue to review her metastatic breast cancer to the skeleton, undergoing therapy with Halaven. During the previous visit we became suspicious that the patient was having progressive disease given the achiness throughout her skeleton and the continued rising of her CA 15-3. For this reason we requested a bone scan and she is here today for review of this. Also the patient was pancytopenic and we requested new iron, ferritin that were normal and a B12 level that was borderline though. For this reason she will initiate replacement of B12 today as well. A bone scan has documented stability of her disease radiologically speaking but her tumor markers have raised and for this reason we have decided given her achiness throughout her body that was very unusual to discontinue Halaven and moving to a different therapy including Femara and Kisqali. She will have chemotherapy education and consent for this medicine to be initiated in the next few days. Her Xgeva treatment will remain every 3 months.     Physically, the patient feels achy throughout  her skeleton but the pain seems to be that it is changing location and intensity time to time. Sometimes it is in the neck, sometimes it is in the shoulders, sometimes in the lumbar spine, sometimes in the right thigh. The pain intensity is not above 3 out of 10 and she controls this with Aleve that she takes b.i.d. Otherwise, the patient has no cough, sputum production or shortness of breath. Normal stable appetite and weight. Normal bowel function. Normal urination. No passage of blood in the stools. Stable clinical status, able to function with no limitations.                        Past Medical History, Past Surgical History,    1. Hypertension.   2. Hyperlipidemia.   3. Reflux esophagitis.     GYN HISTORY: G0, P0. The patient went into menopause in 2000.     Review of Systems    General: no fever, chills, fatigue, weight changes, or lack of appetite.  Eyes: no epiphora, conjunctivitis, pain, glaucoma, blurred vision, blindness, secretion, photophobia.  Ears: no otorrhea, tinnitus, otorrhagia, deafness, pain, vertigo.  Nose: minimal rhinorrhea,no epistaxis, alteration in perception of odors, sinuses pressure.  Mouth: no alteration in gums or teeth,  ulcers, no difficulty with mastication or deglut ion, no odynophagia.  Neck: no masses or pain, no thyroid alterations, no pain in muscles or arteries, no carotid odynia, no crepitation.  Lungs: no cough, sputum production, dyspnea, trepopnea, pleuritic pain, hemoptysis.  Heart: no syncope, irregularity, palpitations, angina, orthopnea, paroxysmal nocturnal dyspnea.  GI: no dysphagia, odynophagia, no regurgitation, no heartburn, indigestion, nausea, vomiting, hematemesis ,melena, jaundice, distention, obstipation, enterorrhagia, proctalgia, anal  Lesions, changes in bowel habits.  : no frequency, hesitancy, hematuria, discharge, pain.  Musculoskeletal: STATED muscle or tendon pain, joint pain, edema,NO functional limitation, fasciculations, mass.  Neurologic: no  "headache, seizures, alterations on Craneal nerves, no motor or senssory deficit, normal coordination.  Skin: no rashes, pruritus or localized lesions.  Psychiatric: no anxiety, depression, agitation, delusions, proper insight.      Medications:  The current medication list was reviewed in the EMR    ALLERGIES:    Allergies   Allergen Reactions   • Codeine    • Docetaxel    • Paclitaxel        Objective      Vitals:    06/13/17 1419   BP: 132/70   Pulse: 99   Resp: 16   Temp: 98.4 °F (36.9 °C)   TempSrc: Oral   SpO2: 97%   Weight: 203 lb 12.8 oz (92.4 kg)   Height: 64.17\" (163 cm)   PainSc:   2   PainLoc: Knee     Current Status 6/13/2017   ECOG score 0   3/10 cancer related pain    Physical Exam    GENERAL:  Well-developed, well-nourished  Patient  in no acute distress.   SKIN:  Warm, dry without rashes, purpura or petechiae.  HEENT:  Pupils were equal and reactive to light and accomodation, conjunctivas non injected, no pterigion, normal extraocular movements, normal visual acuity.   Mouth mucosa was moist, no exudates in oropharynx, normal gum line, normal roof of the mouth and pillars, normal papillations of the tongue.  NECK:  Supple with good range of motion; no thyromegaly or masses, no JVD or bruits, no cervical adenopathies.  LYMPHATICS:  No cervical, supraclavicular, axillary or inguinal adenopathy.  CHEST:  Lungs clear to percussion and auscultation, normal breath sounds bilaterally, no wheezing, crackles or ronchi, no stridor.  CARDIAC:  Regular rate and rhythm with systolic g3/6 basal murmur, clean diastole.,no rubs or gallops.  ABDOMEN:  Soft, nontender with no organomegaly or masses, no ascites, no collateral circulation, no abdominal pain, no inguinal hernias, no abdominal bruits.  EXTREMITIES:  No clubbing, cyanosis or edema, no deformities MILD DIFFUSE SKELETAL pain.  NEUROLOGICAL:  Patient was awake, alert, oriented to time, person and place    RECENT LABS:  Hematology WBC   Date Value Ref Range " Status   06/13/2017 5.03 4.50 - 10.70 10*3/mm3 Final     RBC   Date Value Ref Range Status   06/13/2017 3.02 (L) 3.90 - 5.20 10*6/mm3 Final     Hemoglobin   Date Value Ref Range Status   06/13/2017 9.2 (L) 11.9 - 15.5 g/dL Final     Hematocrit   Date Value Ref Range Status   06/13/2017 27.9 (L) 35.6 - 45.5 % Final     Platelets   Date Value Ref Range Status   06/13/2017 214 140 - 500 10*3/mm3 Final        Component      Latest Ref Rng & Units 3/15/2017 5/2/2017 5/30/2017   CA 15-3      0.0 - 25.0 U/mL 46.9 50.7 (H) 53.9 (H)            NM BONE SCAN WHOLE BODY-      CLINICAL: Metastatic breast carcinoma follow-up      COMPARISON 02/28/2017      Dose: 22.4 mCi technetium 99m MDP      FINDINGS: There is abnormal activity again arising from the axial and  appendicular skeleton consistent with metastatic disease, identical  compared to the previous examination. No new focus of abnormal activity  has developed, no abnormal focus of activity has diminished within the  interim. The renal and soft tissue activity is satisfactory.      CONCLUSION: Widespread osseous metastasis similar to 02/28/2017.      This report was finalized on 6/7/2017 12:48 PM by Dr. Antwan Silva MD.        Assessment/Plan    . Metastatic breast cancer to multiple skeletal sites including cervical spine, sternum, lumbar spine and right femur.        The patient will be completing a total of 6 rounds of Halaven today and I think it is time to reevaluate the status of her disease with a chemistry profile paying attention to the alkaline phosphatase and also tumor marker CA 15-3 as well as with a bone scan. The persistency of the pain in the bones makes me wonder about if this patient is indeed responding to this regimen of medicines and besides the statement of the test above including the CA 15-3 tells me that a persistent rise in this number makes me believe that maybe she is not responding to this. This leads also to the possible conclusion that the  anemia is a result of myelophthisis and chemotherapy effect more than iron deficiency. I think it is very important to recheck anemia labs today that will include ferritin, iron, TIBC, B12, folic acid level and reticulated hemoglobin as well we will repeat her tumor markers.   I have had the chance to review the laboratory analysis in this patient including her alkaline phosphatase that is mildly dropping to 139, her tumor cancer marker, CA 15-3, that has raised and the stability of her bone scan in the PACs system at [facility], agreeing with the radiologists interpretation after personally reviewing the analysis. In my opinion this patient has some probably stable mild progression of her metastatic breast cancer to the skeleton and as I have mentioned before in many, many times dictations, it is extremely difficult to know what is the status of the disease in this patient. Given the fact that the patient is becoming more achy and given the fact that she also has progressive anemia, I wonder if part of the anemia is also myelophthisis. We also have documented that her ferritin level and iron level are appropriate and her B12 level was marginally low. Under these circumstances, I advised the patient the followin. We will discontinue Halaven at this time.   2. She will remain on Xgeva every 3 months.   3. For the treatment of her breast cancer, she will initiate a program of Femara 2.5 mg a day and Kisqali 600 mg a day for 3 weeks. In anticipation of this the patient will have a baseline EKG and she will have magnesium supplementation, magnesium chloride 1 tablet a day. This is to decrease the possibilities of prolonged QT interval and triggering cardiac irregularity. I explained to her side effects of the Kisqali besides the above cardiac situation, also the possibility of leukopenia and she will require checkup of blood counts on weekly basis. We plan to give her this regimen for 3 months and  continue monitoring on monthly basis her tumor markers and her chemistry profile. Eventually probably will repeat another bone scan.       In regard to her anemia, as stated the patient was found to have B12 deficiency. She will receive a B12 injection 1000 mcg IM today. She will remain on her oral iron preparation and I asked her to initiate a program of sublingual vitamin B12 supplementation at a dose of 1000 mcg a day and she can get this medicine over-the-counter.     Finally for the pain control, I advised her to remain on her Aleve that she takes b.i.d.     Overall, I think the patient is doing okay and I think she is relieve with the fact that we have discontinued the Halaven that by the end of the day was giving her no time out of chemotherapy with significant degree of fatigue and achiness.           6/13/2017      CC:

## 2017-06-14 ENCOUNTER — DOCUMENTATION (OUTPATIENT)
Dept: ONCOLOGY | Facility: CLINIC | Age: 62
End: 2017-06-14

## 2017-06-14 ENCOUNTER — APPOINTMENT (OUTPATIENT)
Dept: OTHER | Facility: HOSPITAL | Age: 62
End: 2017-06-14

## 2017-06-14 ENCOUNTER — APPOINTMENT (OUTPATIENT)
Dept: ONCOLOGY | Facility: CLINIC | Age: 62
End: 2017-06-14

## 2017-06-14 RX ORDER — LETROZOLE 2.5 MG/1
2.5 TABLET, FILM COATED ORAL DAILY
Qty: 30 TABLET | Refills: 6 | Status: SHIPPED | OUTPATIENT
Start: 2017-06-14 | End: 2018-01-17 | Stop reason: SDUPTHER

## 2017-06-14 NOTE — PROGRESS NOTES
Per Dr Saez-Pt will start Kisqali 600 mg daily for 21 days out of 28 and Femara 2.5 mg daily. I attempted to submit a PA to [employer] via [email or web address] and re a message stating the medication is not covered by the plan. I then called [employer] [phone] and spoke to Fior. She stated the medication is non-preferred. I was able to do a formulary exception over the phone with Fior and the Kisqali is approved from 6/14/17-6/14/18 with Case # [id number].    I also rec a call from Hamida Sabillon NP who educated pt yesterday. Per Ramandeep-pt would like the medication delivered to her work address. I contacted pt and let her know the medication is approved and pharmacy will contact her prior to delivery and she can confirm the address.    Also pt is eligible for the copay card as she is commercially insured. I will get pt set up for this and fax with Kisqali rx. Pts Femara rx was sent to her local pharmacy-[facility]

## 2017-06-14 NOTE — PROGRESS NOTES
Kisqali rx faxed to [facility] [phone]  Phone: [phone]-[place]ots. Line  [phone]-pt. Line

## 2017-06-21 ENCOUNTER — DOCUMENTATION (OUTPATIENT)
Dept: ONCOLOGY | Facility: CLINIC | Age: 62
End: 2017-06-21

## 2017-06-22 DIAGNOSIS — C50.912 PRIMARY MALIGNANT NEOPLASM OF LEFT BREAST (HCC): Primary | ICD-10-CM

## 2017-06-22 DIAGNOSIS — Z79.899 HIGH RISK MEDICATION USE: ICD-10-CM

## 2017-06-26 ENCOUNTER — TELEPHONE (OUTPATIENT)
Dept: ONCOLOGY | Facility: CLINIC | Age: 62
End: 2017-06-26

## 2017-06-26 NOTE — TELEPHONE ENCOUNTER
Patient calling to reschedule her appt for tomorrow to this Friday instead.  States she started her oral chemo last Friday and was told to come a week later for labs.  Message sent to appt desk.

## 2017-06-27 ENCOUNTER — APPOINTMENT (OUTPATIENT)
Dept: ONCOLOGY | Facility: CLINIC | Age: 62
End: 2017-06-27

## 2017-06-27 ENCOUNTER — APPOINTMENT (OUTPATIENT)
Dept: ONCOLOGY | Facility: HOSPITAL | Age: 62
End: 2017-06-27

## 2017-06-27 ENCOUNTER — APPOINTMENT (OUTPATIENT)
Dept: OTHER | Facility: HOSPITAL | Age: 62
End: 2017-06-27

## 2017-06-30 ENCOUNTER — LAB (OUTPATIENT)
Dept: OTHER | Facility: HOSPITAL | Age: 62
End: 2017-06-30

## 2017-06-30 ENCOUNTER — OFFICE VISIT (OUTPATIENT)
Dept: ONCOLOGY | Facility: CLINIC | Age: 62
End: 2017-06-30

## 2017-06-30 ENCOUNTER — PROCEDURE VISIT (OUTPATIENT)
Dept: ONCOLOGY | Facility: HOSPITAL | Age: 62
End: 2017-06-30

## 2017-06-30 VITALS
OXYGEN SATURATION: 97 % | SYSTOLIC BLOOD PRESSURE: 134 MMHG | HEART RATE: 90 BPM | DIASTOLIC BLOOD PRESSURE: 75 MMHG | TEMPERATURE: 98.1 F

## 2017-06-30 DIAGNOSIS — C50.912 PRIMARY MALIGNANT NEOPLASM OF LEFT BREAST (HCC): Primary | ICD-10-CM

## 2017-06-30 DIAGNOSIS — Z51.81 ENCOUNTER FOR MONITORING CARDIOTOXIC DRUG THERAPY: ICD-10-CM

## 2017-06-30 DIAGNOSIS — Z79.899 ENCOUNTER FOR MONITORING CARDIOTOXIC DRUG THERAPY: ICD-10-CM

## 2017-06-30 DIAGNOSIS — D68.59 THROMBOPHILIA (HCC): ICD-10-CM

## 2017-06-30 DIAGNOSIS — C50.912 PRIMARY MALIGNANT NEOPLASM OF LEFT BREAST (HCC): ICD-10-CM

## 2017-06-30 DIAGNOSIS — Z79.899 HIGH RISK MEDICATION USE: ICD-10-CM

## 2017-06-30 DIAGNOSIS — C79.51 BONE METASTASIS: ICD-10-CM

## 2017-06-30 DIAGNOSIS — D50.0 IRON DEFICIENCY ANEMIA DUE TO CHRONIC BLOOD LOSS: ICD-10-CM

## 2017-06-30 LAB
BASOPHILS # BLD AUTO: 0.02 10*3/MM3 (ref 0–0.2)
BASOPHILS NFR BLD AUTO: 0.4 % (ref 0–1.5)
DEPRECATED RDW RBC AUTO: 55.9 FL (ref 37–54)
EOSINOPHIL # BLD AUTO: 0.22 10*3/MM3 (ref 0–0.7)
EOSINOPHIL NFR BLD AUTO: 4.4 % (ref 0.3–6.2)
ERYTHROCYTE [DISTWIDTH] IN BLOOD BY AUTOMATED COUNT: 17 % (ref 11.7–13)
HCT VFR BLD AUTO: 28.9 % (ref 35.6–45.5)
HGB BLD-MCNC: 9.6 G/DL (ref 11.9–15.5)
IMM GRANULOCYTES # BLD: 0.02 10*3/MM3 (ref 0–0.03)
IMM GRANULOCYTES NFR BLD: 0.4 % (ref 0–0.5)
LYMPHOCYTES # BLD AUTO: 1.15 10*3/MM3 (ref 0.9–4.8)
LYMPHOCYTES NFR BLD AUTO: 23 % (ref 19.6–45.3)
MCH RBC QN AUTO: 30.1 PG (ref 26.9–32)
MCHC RBC AUTO-ENTMCNC: 33.2 G/DL (ref 32.4–36.3)
MCV RBC AUTO: 90.6 FL (ref 80.5–98.2)
MONOCYTES # BLD AUTO: 0.36 10*3/MM3 (ref 0.2–1.2)
MONOCYTES NFR BLD AUTO: 7.2 % (ref 5–12)
NEUTROPHILS # BLD AUTO: 3.24 10*3/MM3 (ref 1.9–8.1)
NEUTROPHILS NFR BLD AUTO: 64.6 % (ref 42.7–76)
NRBC BLD MANUAL-RTO: 0 /100 WBC (ref 0–0)
PLATELET # BLD AUTO: 268 10*3/MM3 (ref 140–500)
PMV BLD AUTO: 11.2 FL (ref 6–12)
RBC # BLD AUTO: 3.19 10*6/MM3 (ref 3.9–5.2)
WBC NRBC COR # BLD: 5.01 10*3/MM3 (ref 4.5–10.7)

## 2017-06-30 PROCEDURE — 93005 ELECTROCARDIOGRAM TRACING: CPT | Performed by: NURSE PRACTITIONER

## 2017-06-30 PROCEDURE — 99212-NC PR NO CHARGE CBC OFFICE OUTPATIENT VISIT 10 MINUTES: Performed by: NURSE PRACTITIONER

## 2017-06-30 PROCEDURE — 85025 COMPLETE CBC W/AUTO DIFF WBC: CPT | Performed by: INTERNAL MEDICINE

## 2017-06-30 PROCEDURE — 36415 COLL VENOUS BLD VENIPUNCTURE: CPT

## 2017-06-30 NOTE — PROGRESS NOTES
The patient returns for lab with nurse review, and EKG to evaluate QTc.  She did recently initiate Kisqali just 1 week ago.  Baseline EKG prior to initiation of this medication was slightly elevated at 459.  Given the risk of prolonged QT interval, the patient was brought in today to reevaluate EKG 1 week after the initiation of treatment.  Thankfully, QTc is within normal limits today at 432.  She denies any chest pain or palpitations.  She is tolerating this medication well without issues.  Blood counts remain stable.  She will return in 1 week for repeat CBC with nurse review.    Lab Results   Component Value Date    WBC 5.01 06/30/2017    HGB 9.6 (L) 06/30/2017    HCT 28.9 (L) 06/30/2017    MCV 90.6 06/30/2017     06/30/2017     Mary Lou Layne, APRN  06/30/2017

## 2017-07-05 ENCOUNTER — APPOINTMENT (OUTPATIENT)
Dept: ONCOLOGY | Facility: CLINIC | Age: 62
End: 2017-07-05

## 2017-07-05 ENCOUNTER — APPOINTMENT (OUTPATIENT)
Dept: OTHER | Facility: HOSPITAL | Age: 62
End: 2017-07-05

## 2017-07-07 ENCOUNTER — LAB (OUTPATIENT)
Dept: OTHER | Facility: HOSPITAL | Age: 62
End: 2017-07-07

## 2017-07-07 ENCOUNTER — INFUSION (OUTPATIENT)
Dept: ONCOLOGY | Facility: HOSPITAL | Age: 62
End: 2017-07-07

## 2017-07-07 ENCOUNTER — OFFICE VISIT (OUTPATIENT)
Dept: ONCOLOGY | Facility: CLINIC | Age: 62
End: 2017-07-07

## 2017-07-07 VITALS — TEMPERATURE: 97.8 F | SYSTOLIC BLOOD PRESSURE: 124 MMHG | DIASTOLIC BLOOD PRESSURE: 74 MMHG

## 2017-07-07 DIAGNOSIS — C79.51 BONE METASTASIS: ICD-10-CM

## 2017-07-07 DIAGNOSIS — D50.0 IRON DEFICIENCY ANEMIA DUE TO CHRONIC BLOOD LOSS: ICD-10-CM

## 2017-07-07 DIAGNOSIS — C50.912 PRIMARY MALIGNANT NEOPLASM OF LEFT BREAST (HCC): Primary | ICD-10-CM

## 2017-07-07 DIAGNOSIS — D68.59 THROMBOPHILIA (HCC): ICD-10-CM

## 2017-07-07 DIAGNOSIS — Z45.2 FITTING AND ADJUSTMENT OF VASCULAR CATHETER: Primary | ICD-10-CM

## 2017-07-07 DIAGNOSIS — M85.80 OSTEOPENIA: ICD-10-CM

## 2017-07-07 DIAGNOSIS — C50.912 PRIMARY MALIGNANT NEOPLASM OF LEFT BREAST (HCC): ICD-10-CM

## 2017-07-07 LAB
ALBUMIN SERPL-MCNC: 4.3 G/DL (ref 3.5–5.2)
ALBUMIN/GLOB SERPL: 1.8 G/DL
ALP SERPL-CCNC: 175 U/L (ref 39–117)
ALT SERPL W P-5'-P-CCNC: 19 U/L (ref 1–33)
ANION GAP SERPL CALCULATED.3IONS-SCNC: 10.4 MMOL/L
AST SERPL-CCNC: 18 U/L (ref 1–32)
BASOPHILS # BLD AUTO: 0.03 10*3/MM3 (ref 0–0.2)
BASOPHILS NFR BLD AUTO: 0.8 % (ref 0–1.5)
BILIRUB SERPL-MCNC: 0.2 MG/DL (ref 0.1–1.2)
BUN BLD-MCNC: 25 MG/DL (ref 8–23)
BUN/CREAT SERPL: 26.6 (ref 7–25)
CALCIUM SPEC-SCNC: 8.8 MG/DL (ref 8.6–10.5)
CHLORIDE SERPL-SCNC: 102 MMOL/L (ref 98–107)
CO2 SERPL-SCNC: 26.6 MMOL/L (ref 22–29)
CREAT BLD-MCNC: 0.94 MG/DL (ref 0.57–1)
DEPRECATED RDW RBC AUTO: 59.4 FL (ref 37–54)
EOSINOPHIL # BLD AUTO: 0.17 10*3/MM3 (ref 0–0.7)
EOSINOPHIL NFR BLD AUTO: 4.4 % (ref 0.3–6.2)
ERYTHROCYTE [DISTWIDTH] IN BLOOD BY AUTOMATED COUNT: 17.4 % (ref 11.7–13)
FOLATE SERPL-MCNC: 9.26 NG/ML (ref 4.78–24.2)
GFR SERPL CREATININE-BSD FRML MDRD: 61 ML/MIN/1.73
GLOBULIN UR ELPH-MCNC: 2.4 GM/DL
GLUCOSE BLD-MCNC: 92 MG/DL (ref 65–99)
HCT VFR BLD AUTO: 29.3 % (ref 35.6–45.5)
HGB BLD-MCNC: 9.7 G/DL (ref 11.9–15.5)
IMM GRANULOCYTES # BLD: 0.02 10*3/MM3 (ref 0–0.03)
IMM GRANULOCYTES NFR BLD: 0.5 % (ref 0–0.5)
LYMPHOCYTES # BLD AUTO: 0.95 10*3/MM3 (ref 0.9–4.8)
LYMPHOCYTES NFR BLD AUTO: 24.7 % (ref 19.6–45.3)
MCH RBC QN AUTO: 30.8 PG (ref 26.9–32)
MCHC RBC AUTO-ENTMCNC: 33.1 G/DL (ref 32.4–36.3)
MCV RBC AUTO: 93 FL (ref 80.5–98.2)
MONOCYTES # BLD AUTO: 0.41 10*3/MM3 (ref 0.2–1.2)
MONOCYTES NFR BLD AUTO: 10.6 % (ref 5–12)
NEUTROPHILS # BLD AUTO: 2.27 10*3/MM3 (ref 1.9–8.1)
NEUTROPHILS NFR BLD AUTO: 59 % (ref 42.7–76)
NRBC BLD MANUAL-RTO: 0 /100 WBC (ref 0–0)
PLATELET # BLD AUTO: 265 10*3/MM3 (ref 140–500)
PMV BLD AUTO: 10.7 FL (ref 6–12)
POTASSIUM BLD-SCNC: 4.4 MMOL/L (ref 3.5–5.2)
PROT SERPL-MCNC: 6.7 G/DL (ref 6–8.5)
RBC # BLD AUTO: 3.15 10*6/MM3 (ref 3.9–5.2)
SODIUM BLD-SCNC: 139 MMOL/L (ref 136–145)
WBC NRBC COR # BLD: 3.85 10*3/MM3 (ref 4.5–10.7)

## 2017-07-07 PROCEDURE — 80053 COMPREHEN METABOLIC PANEL: CPT | Performed by: INTERNAL MEDICINE

## 2017-07-07 PROCEDURE — 36415 COLL VENOUS BLD VENIPUNCTURE: CPT

## 2017-07-07 PROCEDURE — 85025 COMPLETE CBC W/AUTO DIFF WBC: CPT | Performed by: INTERNAL MEDICINE

## 2017-07-07 PROCEDURE — 25010000002 HEPARIN FLUSH (PORCINE) 100 UNIT/ML SOLUTION: Performed by: INTERNAL MEDICINE

## 2017-07-07 PROCEDURE — 99212-NC PR NO CHARGE CBC OFFICE OUTPATIENT VISIT 10 MINUTES: Performed by: NURSE PRACTITIONER

## 2017-07-07 PROCEDURE — 86300 IMMUNOASSAY TUMOR CA 15-3: CPT | Performed by: INTERNAL MEDICINE

## 2017-07-07 PROCEDURE — 96523 IRRIG DRUG DELIVERY DEVICE: CPT | Performed by: NURSE PRACTITIONER

## 2017-07-07 PROCEDURE — 82746 ASSAY OF FOLIC ACID SERUM: CPT | Performed by: INTERNAL MEDICINE

## 2017-07-07 RX ORDER — SODIUM CHLORIDE 0.9 % (FLUSH) 0.9 %
10 SYRINGE (ML) INJECTION AS NEEDED
Status: DISCONTINUED | OUTPATIENT
Start: 2017-07-07 | End: 2017-07-07 | Stop reason: HOSPADM

## 2017-07-07 RX ORDER — SODIUM CHLORIDE 0.9 % (FLUSH) 0.9 %
10 SYRINGE (ML) INJECTION AS NEEDED
Status: CANCELLED | OUTPATIENT
Start: 2017-07-07

## 2017-07-07 RX ADMIN — Medication 10 ML: at 10:15

## 2017-07-07 RX ADMIN — SODIUM CHLORIDE, PRESERVATIVE FREE 500 UNITS: 5 INJECTION INTRAVENOUS at 10:16

## 2017-07-07 NOTE — PROGRESS NOTES
The patient returns today for CBC, CMP with nurse review.  She is now 2 weeks into [facility] which she will take 21/28 days.  She reports her tolerance to treatment so far has been great.  She denies any nausea or vomiting.  She denies any changes in her bowels.  She reports more energy.  She denies any palpitations.  He began CMP within normal limits.  CA 15-3 pending.  She will return in 1 week for review by Dr. Saez, which time we will repeat EKG.  At that time, the patient will be completing her first cycle of treatment.    Lab Results   Component Value Date    WBC 3.85 (L) 07/07/2017    HGB 9.7 (L) 07/07/2017    HCT 29.3 (L) 07/07/2017    MCV 93.0 07/07/2017     07/07/2017     Lab Results   Component Value Date    GLUCOSE 92 07/07/2017    BUN 25 (H) 07/07/2017    CREATININE 0.94 07/07/2017    EGFRIFNONA 61 07/07/2017    EGFRIFAFRI 90 03/07/2017    BCR 26.6 (H) 07/07/2017    K 4.4 07/07/2017    CO2 26.6 07/07/2017    CALCIUM 8.8 07/07/2017    PROTENTOTREF 6.5 03/07/2017    ALBUMIN 4.30 07/07/2017    LABIL2 1.8 07/07/2017    AST 18 07/07/2017    ALT 19 07/07/2017       Mary Lou Layne, JESSICA  07/07/2017

## 2017-07-09 LAB — CANCER AG15-3 SERPL-ACNC: 44.9 U/ML (ref 0–25)

## 2017-07-14 ENCOUNTER — PROCEDURE VISIT (OUTPATIENT)
Dept: ONCOLOGY | Facility: HOSPITAL | Age: 62
End: 2017-07-14

## 2017-07-14 ENCOUNTER — LAB (OUTPATIENT)
Dept: LAB | Facility: HOSPITAL | Age: 62
End: 2017-07-14

## 2017-07-14 ENCOUNTER — OFFICE VISIT (OUTPATIENT)
Dept: ONCOLOGY | Facility: CLINIC | Age: 62
End: 2017-07-14

## 2017-07-14 VITALS
HEIGHT: 64 IN | TEMPERATURE: 97.9 F | SYSTOLIC BLOOD PRESSURE: 104 MMHG | HEART RATE: 88 BPM | BODY MASS INDEX: 34.21 KG/M2 | DIASTOLIC BLOOD PRESSURE: 60 MMHG | OXYGEN SATURATION: 96 % | RESPIRATION RATE: 16 BRPM | WEIGHT: 200.4 LBS

## 2017-07-14 DIAGNOSIS — R01.1 HEART MURMUR, SYSTOLIC: ICD-10-CM

## 2017-07-14 DIAGNOSIS — C50.912 PRIMARY MALIGNANT NEOPLASM OF LEFT BREAST (HCC): ICD-10-CM

## 2017-07-14 DIAGNOSIS — C50.912 PRIMARY MALIGNANT NEOPLASM OF LEFT BREAST (HCC): Primary | ICD-10-CM

## 2017-07-14 DIAGNOSIS — C79.51 BONE METASTASIS: ICD-10-CM

## 2017-07-14 DIAGNOSIS — D50.0 IRON DEFICIENCY ANEMIA DUE TO CHRONIC BLOOD LOSS: ICD-10-CM

## 2017-07-14 DIAGNOSIS — D51.0 VITAMIN B12 DEFICIENCY ANEMIA DUE TO INTRINSIC FACTOR DEFICIENCY: ICD-10-CM

## 2017-07-14 DIAGNOSIS — Z45.2 FITTING AND ADJUSTMENT OF VASCULAR CATHETER: ICD-10-CM

## 2017-07-14 DIAGNOSIS — Z79.899 HIGH RISK MEDICATION USE: Primary | ICD-10-CM

## 2017-07-14 DIAGNOSIS — D68.59 THROMBOPHILIA (HCC): ICD-10-CM

## 2017-07-14 DIAGNOSIS — M85.80 OSTEOPENIA: ICD-10-CM

## 2017-07-14 LAB
BASOPHILS # BLD AUTO: 0.04 10*3/MM3 (ref 0–0.1)
BASOPHILS NFR BLD AUTO: 1.2 % (ref 0–1.1)
DEPRECATED RDW RBC AUTO: 57.8 FL (ref 37–49)
EOSINOPHIL # BLD AUTO: 0.13 10*3/MM3 (ref 0–0.36)
EOSINOPHIL NFR BLD AUTO: 3.9 % (ref 1–5)
ERYTHROCYTE [DISTWIDTH] IN BLOOD BY AUTOMATED COUNT: 17.1 % (ref 11.7–14.5)
HCT VFR BLD AUTO: 30.5 % (ref 34–45)
HGB BLD-MCNC: 10 G/DL (ref 11.5–14.9)
IMM GRANULOCYTES # BLD: 0.01 10*3/MM3 (ref 0–0.03)
IMM GRANULOCYTES NFR BLD: 0.3 % (ref 0–0.5)
LYMPHOCYTES # BLD AUTO: 1.11 10*3/MM3 (ref 1–3.5)
LYMPHOCYTES NFR BLD AUTO: 33.2 % (ref 20–49)
MCH RBC QN AUTO: 30.2 PG (ref 27–33)
MCHC RBC AUTO-ENTMCNC: 32.8 G/DL (ref 32–35)
MCV RBC AUTO: 92.1 FL (ref 83–97)
MONOCYTES # BLD AUTO: 0.31 10*3/MM3 (ref 0.25–0.8)
MONOCYTES NFR BLD AUTO: 9.3 % (ref 4–12)
NEUTROPHILS # BLD AUTO: 1.74 10*3/MM3 (ref 1.5–7)
NEUTROPHILS NFR BLD AUTO: 52.1 % (ref 39–75)
NRBC BLD MANUAL-RTO: 0 /100 WBC (ref 0–0)
PLATELET # BLD AUTO: 236 10*3/MM3 (ref 150–375)
PMV BLD AUTO: 10.7 FL (ref 8.9–12.1)
RBC # BLD AUTO: 3.31 10*6/MM3 (ref 3.9–5)
WBC NRBC COR # BLD: 3.34 10*3/MM3 (ref 4–10)

## 2017-07-14 PROCEDURE — 93005 ELECTROCARDIOGRAM TRACING: CPT

## 2017-07-14 PROCEDURE — 99214 OFFICE O/P EST MOD 30 MIN: CPT | Performed by: INTERNAL MEDICINE

## 2017-07-14 PROCEDURE — 36416 COLLJ CAPILLARY BLOOD SPEC: CPT

## 2017-07-14 PROCEDURE — 85025 COMPLETE CBC W/AUTO DIFF WBC: CPT

## 2017-07-14 NOTE — PROGRESS NOTES
Subjective  REASONS FOR FOLLOWUP: 1. Metastatic breast cancer to multiple skeletal sites including cervical spine, sternum, lumbar spine and right femur, underwent Abraxane every 4 weeks and Xgeva every  3 months, DOCUMENTED RADIOLOGICAL PROGRESSION BY BONE SCAN 3/15/17 MOVING AWAY FROM ABRAXANE AND HALAVEN, PRESENTLY ON FEMARA AND KISQALI , XGEVA EVERY 3 MONTHS,  NEXT DOSE SEPT 2017  2.Iron deficiency anemia du to GI blood loss, Xarelto stopped months ago, Iron initiated, Pepcid along with Aleve for gastric protection.           History of Present Illness    is the previous visit the patient has had a modified her chemotherapy treatment after having formal chemotherapy teaching and education and consent through our nurse practitioners in order to entertain treatment with Femara and KISQALI.  Into the review of the medicines today with the patient and I found out that she is taking only one tablet every Scalia day in instead of 3 tablets a day ion despite to disease the patient is having a remarkable improvement in regard to get a skeletal pain to the point of complete resolution of aches and pains throughout her body including pain in the right femur.  A she has had an excellent tolerance to the medication combination with no issues no diarrhea no cardiac irregularities no rashes skin no nausea no vomiting no cardiorespiratory symptomatology.  She also will benefit to B12 injection and she remains on B12 supplementation sublingually.  Her appetite is good and energy level of energy has substantially improved.                   Past Medical History, Past Surgical History,     1. Hypertension.   2. Hyperlipidemia.   3. Reflux esophagitis.     GYN HISTORY: G0, P0. The patient went into menopause in 2000.     Review of Systems    General: no fever, chills, fatigue, weight changes, or lack of appetite.  Eyes: no epiphora, conjunctivitis, pain, glaucoma, blurred vision, blindness, secretion, photophobia.  Ears: no otorrhea, tinnitus, otorrhagia, deafness, pain, vertigo.  Nose: minimal rhinorrhea,no epistaxis, alteration in perception of odors, sinuses pressure.  Mouth: no alteration in gums or teeth,  ulcers, no difficulty with mastication or deglut ion, no odynophagia.  Neck: no masses or pain, no thyroid alterations, no pain in muscles or arteries, no carotid odynia, no crepitation.  Lungs: no cough, sputum production, dyspnea, trepopnea, pleuritic pain, hemoptysis.  Heart: no syncope, irregularity, palpitations, angina, orthopnea, paroxysmal nocturnal dyspnea.  GI: no dysphagia, odynophagia, no regurgitation, no heartburn, indigestion, nausea, vomiting, hematemesis ,melena, jaundice, distention, obstipation, enterorrhagia, proctalgia, anal  Lesions, changes in bowel habits.  : no frequency, hesitancy, hematuria, discharge, pain.  Musculoskeletal: STATED IMPROVEMENT IN muscle or tendon pain, joint pain, edema,NO functional limitation, fasciculations, mass.  Neurologic: no headache, seizures, alterations on Craneal nerves, no motor or senssory deficit, normal coordination.  Skin: no rashes, pruritus or localized lesions.  Psychiatric: no anxiety, depression, agitation, delusions, proper insight.      Medications:  The current medication list was reviewed in the EMR    ALLERGIES:    Allergies   Allergen Reactions   • Codeine    • Docetaxel    • Paclitaxel        Objective      There were no vitals filed for this visit.  Current Status 6/13/2017   ECOG score 0   2/10 cancer related pain    Physical Exam    GENERAL:  Well-developed, well-nourished  Patient  in no acute distress.   SKIN:   Warm, dry without rashes, purpura or petechiae.  HEENT:  Pupils were equal and reactive to light and accomodation, conjunctivas non injected, no pterigion, normal extraocular movements, normal visual acuity.   Mouth mucosa was moist, no exudates in oropharynx, normal gum line, normal roof of the mouth and pillars, normal papillations of the tongue.  NECK:  Supple with good range of motion; no thyromegaly or masses, no JVD or bruits, no cervical adenopathies.  LYMPHATICS:  No cervical, supraclavicular, axillary or inguinal adenopathy.  CHEST:  Lungs clear to percussion and auscultation, normal breath sounds bilaterally, no wheezing, crackles or ronchi, no stridor.  CARDIAC:  Regular rate and rhythm with systolic g3/6 basal murmur, clean diastole.,no rubs or gallops.  ABDOMEN:  Soft, nontender with no organomegaly or masses, no ascites, no collateral circulation, no abdominal pain, no inguinal hernias, no abdominal bruits.  EXTREMITIES:  No clubbing, cyanosis or edema, no deformities MILD DIFFUSE SKELETAL pain.  NEUROLOGICAL:  Patient was awake, alert, oriented to time, person and place    RECENT LABS:  Hematology WBC   Date Value Ref Range Status   07/14/2017 3.34 (L) 4.00 - 10.00 10*3/mm3 Final     RBC   Date Value Ref Range Status   07/14/2017 3.31 (L) 3.90 - 5.00 10*6/mm3 Final     Hemoglobin   Date Value Ref Range Status   07/14/2017 10.0 (L) 11.5 - 14.9 g/dL Final     Hematocrit   Date Value Ref Range Status   07/14/2017 30.5 (L) 34.0 - 45.0 % Final     Platelets   Date Value Ref Range Status   07/14/2017 236 150 - 375 10*3/mm3 Final        Component      Latest Ref Rng & Units 5/30/2017 7/7/2017   CA 15-3      0.0 - 25.0 U/mL 53.9 (H) 44.9 (H)     Assessment/Plan    . Metastatic  Left breast cancer to multiple skeletal sites including cervical spine, sternum, lumbar spine and right femur.   The previous visit the patient has been taking.  Treatment with Femara and she is kindly taking the mistake, taking only  one tablet of this last medication of day in instead of 3 tablets a according to the labile insert.  She has not experienced any side effects of this medicine she remains on magnesium supplementation with no diarrhea and she has not had any cardiac irregularity.    Review of an EKG that shows normal sinus rhythm, MOST IMPORTANTLY NORMAL QT INTERVAL .  This has been sent to reveal reported by cardiologist.    Recommendations    #1 the patient will remain on Femara continuously at a dose of 2.5 mg a DAY.    #2 the patient will take KISQALI 600 mg 21 days of the month.  START NEW BATCH IN ONE WEEK    #3 the patient will remain sublingual B12 supplementation 1000 MCG DAILY    #4 the patient will be due for a new Xgeva therapy in September 13.    #5 the patient will have a blood count every 2 weeks check by a nurse CMP and CA 15-3 and magnesium level in one month and doctor visit in 6 weeks.     #6 the patient will remain on magnesium supplementation on daily basis.      #7 with a attention to the final report to the QT interval BY  cardiologist    7/14/2017      CC:

## 2017-07-28 ENCOUNTER — OFFICE VISIT (OUTPATIENT)
Dept: ONCOLOGY | Facility: CLINIC | Age: 62
End: 2017-07-28

## 2017-07-28 ENCOUNTER — LAB (OUTPATIENT)
Dept: OTHER | Facility: HOSPITAL | Age: 62
End: 2017-07-28

## 2017-07-28 ENCOUNTER — APPOINTMENT (OUTPATIENT)
Dept: OTHER | Facility: HOSPITAL | Age: 62
End: 2017-07-28

## 2017-07-28 ENCOUNTER — APPOINTMENT (OUTPATIENT)
Dept: ONCOLOGY | Facility: CLINIC | Age: 62
End: 2017-07-28

## 2017-07-28 VITALS — SYSTOLIC BLOOD PRESSURE: 127 MMHG | DIASTOLIC BLOOD PRESSURE: 69 MMHG | TEMPERATURE: 97.9 F

## 2017-07-28 DIAGNOSIS — M85.80 OSTEOPENIA: ICD-10-CM

## 2017-07-28 DIAGNOSIS — C50.912 PRIMARY MALIGNANT NEOPLASM OF LEFT BREAST (HCC): Primary | ICD-10-CM

## 2017-07-28 DIAGNOSIS — C50.912 PRIMARY MALIGNANT NEOPLASM OF LEFT BREAST (HCC): ICD-10-CM

## 2017-07-28 DIAGNOSIS — C79.51 BONE METASTASIS: ICD-10-CM

## 2017-07-28 LAB
BASOPHILS # BLD AUTO: 0.04 10*3/MM3 (ref 0–0.2)
BASOPHILS NFR BLD AUTO: 1.3 % (ref 0–1.5)
DEPRECATED RDW RBC AUTO: 57.1 FL (ref 37–54)
EOSINOPHIL # BLD AUTO: 0.15 10*3/MM3 (ref 0–0.7)
EOSINOPHIL NFR BLD AUTO: 4.8 % (ref 0.3–6.2)
ERYTHROCYTE [DISTWIDTH] IN BLOOD BY AUTOMATED COUNT: 17.1 % (ref 11.7–13)
HCT VFR BLD AUTO: 30 % (ref 35.6–45.5)
HGB BLD-MCNC: 10.3 G/DL (ref 11.9–15.5)
IMM GRANULOCYTES # BLD: 0.03 10*3/MM3 (ref 0–0.03)
IMM GRANULOCYTES NFR BLD: 1 % (ref 0–0.5)
LYMPHOCYTES # BLD AUTO: 0.82 10*3/MM3 (ref 0.9–4.8)
LYMPHOCYTES NFR BLD AUTO: 26.4 % (ref 19.6–45.3)
MCH RBC QN AUTO: 31.4 PG (ref 26.9–32)
MCHC RBC AUTO-ENTMCNC: 34.3 G/DL (ref 32.4–36.3)
MCV RBC AUTO: 91.5 FL (ref 80.5–98.2)
MONOCYTES # BLD AUTO: 0.21 10*3/MM3 (ref 0.2–1.2)
MONOCYTES NFR BLD AUTO: 6.8 % (ref 5–12)
NEUTROPHILS # BLD AUTO: 1.86 10*3/MM3 (ref 1.9–8.1)
NEUTROPHILS NFR BLD AUTO: 59.7 % (ref 42.7–76)
NRBC BLD MANUAL-RTO: 0 /100 WBC (ref 0–0)
PLATELET # BLD AUTO: 256 10*3/MM3 (ref 140–500)
PMV BLD AUTO: 11.2 FL (ref 6–12)
RBC # BLD AUTO: 3.28 10*6/MM3 (ref 3.9–5.2)
WBC NRBC COR # BLD: 3.11 10*3/MM3 (ref 4.5–10.7)

## 2017-07-28 PROCEDURE — 85025 COMPLETE CBC W/AUTO DIFF WBC: CPT | Performed by: INTERNAL MEDICINE

## 2017-07-28 PROCEDURE — 36415 COLL VENOUS BLD VENIPUNCTURE: CPT

## 2017-07-28 PROCEDURE — 99212-NC PR NO CHARGE CBC OFFICE OUTPATIENT VISIT 10 MINUTES: Performed by: NURSE PRACTITIONER

## 2017-08-08 RX ORDER — LISINOPRIL AND HYDROCHLOROTHIAZIDE 12.5; 1 MG/1; MG/1
TABLET ORAL
Qty: 180 TABLET | Refills: 0 | Status: SHIPPED | OUTPATIENT
Start: 2017-08-08 | End: 2017-11-02 | Stop reason: SDUPTHER

## 2017-08-08 RX ORDER — ATORVASTATIN CALCIUM 20 MG/1
TABLET, FILM COATED ORAL
Qty: 90 TABLET | Refills: 0 | Status: SHIPPED | OUTPATIENT
Start: 2017-08-08 | End: 2017-11-02 | Stop reason: SDUPTHER

## 2017-08-11 ENCOUNTER — APPOINTMENT (OUTPATIENT)
Dept: ONCOLOGY | Facility: CLINIC | Age: 62
End: 2017-08-11

## 2017-08-11 ENCOUNTER — LAB (OUTPATIENT)
Dept: OTHER | Facility: HOSPITAL | Age: 62
End: 2017-08-11

## 2017-08-11 ENCOUNTER — APPOINTMENT (OUTPATIENT)
Dept: OTHER | Facility: HOSPITAL | Age: 62
End: 2017-08-11

## 2017-08-11 ENCOUNTER — OFFICE VISIT (OUTPATIENT)
Dept: ONCOLOGY | Facility: CLINIC | Age: 62
End: 2017-08-11

## 2017-08-11 VITALS — SYSTOLIC BLOOD PRESSURE: 130 MMHG | DIASTOLIC BLOOD PRESSURE: 61 MMHG | TEMPERATURE: 97.8 F

## 2017-08-11 DIAGNOSIS — C79.51 BONE METASTASIS: ICD-10-CM

## 2017-08-11 DIAGNOSIS — C50.912 PRIMARY MALIGNANT NEOPLASM OF LEFT BREAST (HCC): ICD-10-CM

## 2017-08-11 DIAGNOSIS — M85.80 OSTEOPENIA: ICD-10-CM

## 2017-08-11 DIAGNOSIS — C50.912 PRIMARY MALIGNANT NEOPLASM OF LEFT BREAST (HCC): Primary | ICD-10-CM

## 2017-08-11 LAB
ALBUMIN SERPL-MCNC: 4.4 G/DL (ref 3.5–5.2)
ALBUMIN/GLOB SERPL: 2 G/DL
ALP SERPL-CCNC: 200 U/L (ref 39–117)
ALT SERPL W P-5'-P-CCNC: 18 U/L (ref 1–33)
ANION GAP SERPL CALCULATED.3IONS-SCNC: 13.1 MMOL/L
AST SERPL-CCNC: 16 U/L (ref 1–32)
BASOPHILS # BLD AUTO: 0.02 10*3/MM3 (ref 0–0.2)
BASOPHILS NFR BLD AUTO: 0.9 % (ref 0–1.5)
BILIRUB SERPL-MCNC: 0.2 MG/DL (ref 0.1–1.2)
BUN BLD-MCNC: 29 MG/DL (ref 8–23)
BUN/CREAT SERPL: 27.4 (ref 7–25)
CALCIUM SPEC-SCNC: 9.2 MG/DL (ref 8.6–10.5)
CANCER AG15-3 SERPL-ACNC: 21.5 U/ML
CHLORIDE SERPL-SCNC: 103 MMOL/L (ref 98–107)
CO2 SERPL-SCNC: 22.9 MMOL/L (ref 22–29)
CREAT BLD-MCNC: 1.06 MG/DL (ref 0.57–1)
DEPRECATED RDW RBC AUTO: 64 FL (ref 37–54)
EOSINOPHIL # BLD AUTO: 0.03 10*3/MM3 (ref 0–0.7)
EOSINOPHIL NFR BLD AUTO: 1.4 % (ref 0.3–6.2)
ERYTHROCYTE [DISTWIDTH] IN BLOOD BY AUTOMATED COUNT: 18.5 % (ref 11.7–13)
GFR SERPL CREATININE-BSD FRML MDRD: 53 ML/MIN/1.73
GLOBULIN UR ELPH-MCNC: 2.2 GM/DL
GLUCOSE BLD-MCNC: 107 MG/DL (ref 65–99)
HCT VFR BLD AUTO: 28.6 % (ref 35.6–45.5)
HGB BLD-MCNC: 9.4 G/DL (ref 11.9–15.5)
IMM GRANULOCYTES # BLD: 0 10*3/MM3 (ref 0–0.03)
IMM GRANULOCYTES NFR BLD: 0 % (ref 0–0.5)
LYMPHOCYTES # BLD AUTO: 0.64 10*3/MM3 (ref 0.9–4.8)
LYMPHOCYTES NFR BLD AUTO: 29.6 % (ref 19.6–45.3)
MAGNESIUM SERPL-MCNC: 2.4 MG/DL (ref 1.6–2.4)
MCH RBC QN AUTO: 31.5 PG (ref 26.9–32)
MCHC RBC AUTO-ENTMCNC: 32.9 G/DL (ref 32.4–36.3)
MCV RBC AUTO: 96 FL (ref 80.5–98.2)
MONOCYTES # BLD AUTO: 0.2 10*3/MM3 (ref 0.2–1.2)
MONOCYTES NFR BLD AUTO: 9.3 % (ref 5–12)
NEUTROPHILS # BLD AUTO: 1.27 10*3/MM3 (ref 1.9–8.1)
NEUTROPHILS NFR BLD AUTO: 58.8 % (ref 42.7–76)
NRBC BLD MANUAL-RTO: 0 /100 WBC (ref 0–0)
PLATELET # BLD AUTO: 220 10*3/MM3 (ref 140–500)
PMV BLD AUTO: 10 FL (ref 6–12)
POTASSIUM BLD-SCNC: 4.5 MMOL/L (ref 3.5–5.2)
PROT SERPL-MCNC: 6.6 G/DL (ref 6–8.5)
RBC # BLD AUTO: 2.98 10*6/MM3 (ref 3.9–5.2)
SODIUM BLD-SCNC: 139 MMOL/L (ref 136–145)
WBC NRBC COR # BLD: 2.16 10*3/MM3 (ref 4.5–10.7)

## 2017-08-11 PROCEDURE — 86300 IMMUNOASSAY TUMOR CA 15-3: CPT | Performed by: INTERNAL MEDICINE

## 2017-08-11 PROCEDURE — 83735 ASSAY OF MAGNESIUM: CPT | Performed by: INTERNAL MEDICINE

## 2017-08-11 PROCEDURE — 36415 COLL VENOUS BLD VENIPUNCTURE: CPT

## 2017-08-11 PROCEDURE — 85025 COMPLETE CBC W/AUTO DIFF WBC: CPT | Performed by: INTERNAL MEDICINE

## 2017-08-11 PROCEDURE — 80053 COMPREHEN METABOLIC PANEL: CPT | Performed by: INTERNAL MEDICINE

## 2017-08-11 PROCEDURE — 99212-NC PR NO CHARGE CBC OFFICE OUTPATIENT VISIT 10 MINUTES: Performed by: NURSE PRACTITIONER

## 2017-08-11 NOTE — PROGRESS NOTES
Patient comes in office today for CBC and RN review.  She continues on Kisquali 600 mg 3 times a day, 21/28 days.  She completed her last dose of this cycle yesterday and is approaching her week off.  Has had a decline in her labs, particularly her ANC and total white blood cell count, though denies any infectious symptoms including fevers or chills.  She denies any excessive bleeding or bruising.  She maintains adequate nutrition and hydration and is feeling quite well, overall.  Her bowels and urination remains regular.  She has no other concerns today.  We will bring her back in 2 weeks for a lab and RN review and she will see Dr. Saez in one month.  I have asked her to call the office with any concerns prior to this next office visit.  She has verbalized understanding.    Vitals:    08/11/17 1342   BP: 130/61   Temp: 97.8 °F (36.6 °C)     Lab Results   Component Value Date    WBC 2.16 (L) 08/11/2017    HGB 9.4 (L) 08/11/2017    HCT 28.6 (L) 08/11/2017    MCV 96.0 08/11/2017     08/11/2017     JESSICA Segura

## 2017-08-24 ENCOUNTER — OFFICE VISIT (OUTPATIENT)
Dept: ONCOLOGY | Facility: CLINIC | Age: 62
End: 2017-08-24

## 2017-08-24 ENCOUNTER — LAB (OUTPATIENT)
Dept: OTHER | Facility: HOSPITAL | Age: 62
End: 2017-08-24

## 2017-08-24 DIAGNOSIS — C79.51 BONE METASTASIS: ICD-10-CM

## 2017-08-24 DIAGNOSIS — C50.912 PRIMARY MALIGNANT NEOPLASM OF LEFT BREAST (HCC): ICD-10-CM

## 2017-08-24 DIAGNOSIS — M85.80 OSTEOPENIA: ICD-10-CM

## 2017-08-24 DIAGNOSIS — C50.912 PRIMARY MALIGNANT NEOPLASM OF LEFT BREAST (HCC): Primary | ICD-10-CM

## 2017-08-24 LAB
BASOPHILS # BLD AUTO: 0.04 10*3/MM3 (ref 0–0.2)
BASOPHILS NFR BLD AUTO: 1.5 % (ref 0–1.5)
DEPRECATED RDW RBC AUTO: 63.3 FL (ref 37–54)
EOSINOPHIL # BLD AUTO: 0.07 10*3/MM3 (ref 0–0.7)
EOSINOPHIL NFR BLD AUTO: 2.6 % (ref 0.3–6.2)
ERYTHROCYTE [DISTWIDTH] IN BLOOD BY AUTOMATED COUNT: 18.6 % (ref 11.7–13)
HCT VFR BLD AUTO: 28.5 % (ref 35.6–45.5)
HGB BLD-MCNC: 9.9 G/DL (ref 11.9–15.5)
IMM GRANULOCYTES # BLD: 0.02 10*3/MM3 (ref 0–0.03)
IMM GRANULOCYTES NFR BLD: 0.7 % (ref 0–0.5)
LYMPHOCYTES # BLD AUTO: 0.74 10*3/MM3 (ref 0.9–4.8)
LYMPHOCYTES NFR BLD AUTO: 27.1 % (ref 19.6–45.3)
MCH RBC QN AUTO: 32.1 PG (ref 26.9–32)
MCHC RBC AUTO-ENTMCNC: 34.7 G/DL (ref 32.4–36.3)
MCV RBC AUTO: 92.5 FL (ref 80.5–98.2)
MONOCYTES # BLD AUTO: 0.25 10*3/MM3 (ref 0.2–1.2)
MONOCYTES NFR BLD AUTO: 9.2 % (ref 5–12)
NEUTROPHILS # BLD AUTO: 1.61 10*3/MM3 (ref 1.9–8.1)
NEUTROPHILS NFR BLD AUTO: 58.9 % (ref 42.7–76)
NRBC BLD MANUAL-RTO: 0 /100 WBC (ref 0–0)
PLATELET # BLD AUTO: 282 10*3/MM3 (ref 140–500)
PMV BLD AUTO: 10.9 FL (ref 6–12)
RBC # BLD AUTO: 3.08 10*6/MM3 (ref 3.9–5.2)
WBC NRBC COR # BLD: 2.73 10*3/MM3 (ref 4.5–10.7)

## 2017-08-24 PROCEDURE — 85025 COMPLETE CBC W/AUTO DIFF WBC: CPT | Performed by: INTERNAL MEDICINE

## 2017-08-24 PROCEDURE — 99212-NC PR NO CHARGE CBC OFFICE OUTPATIENT VISIT 10 MINUTES: Performed by: NURSE PRACTITIONER

## 2017-08-24 PROCEDURE — 36415 COLL VENOUS BLD VENIPUNCTURE: CPT

## 2017-08-24 NOTE — PROGRESS NOTES
I reviewed labs with Mrs Suero today. SHe reports feeling well with no questions or concerns. She is taking her Kasquali as prescribed and has not missed any doses. Her CBC is stable and WBC count improved.  She will return as already scheduled on 9/6/2017 to see Dr. Saez. I have reviewed signs and symptoms for which she needs to call the office before then is necessary.    Lab Results   Component Value Date    WBC 2.73 (L) 08/24/2017    HGB 9.9 (L) 08/24/2017    HCT 28.5 (L) 08/24/2017    MCV 92.5 08/24/2017     08/24/2017

## 2017-09-06 ENCOUNTER — APPOINTMENT (OUTPATIENT)
Dept: ONCOLOGY | Facility: HOSPITAL | Age: 62
End: 2017-09-06

## 2017-09-06 ENCOUNTER — INFUSION (OUTPATIENT)
Dept: ONCOLOGY | Facility: HOSPITAL | Age: 62
End: 2017-09-06

## 2017-09-06 ENCOUNTER — OFFICE VISIT (OUTPATIENT)
Dept: ONCOLOGY | Facility: CLINIC | Age: 62
End: 2017-09-06

## 2017-09-06 VITALS
WEIGHT: 204.2 LBS | BODY MASS INDEX: 34.86 KG/M2 | RESPIRATION RATE: 16 BRPM | HEIGHT: 64 IN | OXYGEN SATURATION: 100 % | TEMPERATURE: 97.8 F | SYSTOLIC BLOOD PRESSURE: 140 MMHG | HEART RATE: 78 BPM | DIASTOLIC BLOOD PRESSURE: 80 MMHG

## 2017-09-06 DIAGNOSIS — M85.80 OSTEOPENIA: ICD-10-CM

## 2017-09-06 DIAGNOSIS — C79.51 BONE METASTASIS: ICD-10-CM

## 2017-09-06 DIAGNOSIS — C50.912 PRIMARY MALIGNANT NEOPLASM OF LEFT BREAST (HCC): Primary | ICD-10-CM

## 2017-09-06 DIAGNOSIS — D51.0 VITAMIN B12 DEFICIENCY ANEMIA DUE TO INTRINSIC FACTOR DEFICIENCY: ICD-10-CM

## 2017-09-06 DIAGNOSIS — T45.1X5A LEUKOPENIA DUE TO ANTINEOPLASTIC CHEMOTHERAPY (HCC): ICD-10-CM

## 2017-09-06 DIAGNOSIS — D68.59 THROMBOPHILIA (HCC): ICD-10-CM

## 2017-09-06 DIAGNOSIS — D70.1 LEUKOPENIA DUE TO ANTINEOPLASTIC CHEMOTHERAPY (HCC): ICD-10-CM

## 2017-09-06 DIAGNOSIS — D50.0 IRON DEFICIENCY ANEMIA DUE TO CHRONIC BLOOD LOSS: ICD-10-CM

## 2017-09-06 DIAGNOSIS — Z45.2 FITTING AND ADJUSTMENT OF VASCULAR CATHETER: ICD-10-CM

## 2017-09-06 LAB
BASOPHILS # BLD AUTO: 0.01 10*3/MM3 (ref 0–0.1)
BASOPHILS NFR BLD AUTO: 0.5 % (ref 0–1.1)
DEPRECATED RDW RBC AUTO: 67.6 FL (ref 37–49)
EOSINOPHIL # BLD AUTO: 0.03 10*3/MM3 (ref 0–0.36)
EOSINOPHIL NFR BLD AUTO: 1.5 % (ref 1–5)
ERYTHROCYTE [DISTWIDTH] IN BLOOD BY AUTOMATED COUNT: 19 % (ref 11.7–14.5)
HCT VFR BLD AUTO: 29.3 % (ref 34–45)
HGB BLD-MCNC: 9.8 G/DL (ref 11.5–14.9)
HOLD SPECIMEN: NORMAL
IMM GRANULOCYTES # BLD: 0.01 10*3/MM3 (ref 0–0.03)
IMM GRANULOCYTES NFR BLD: 0.5 % (ref 0–0.5)
LYMPHOCYTES # BLD AUTO: 0.55 10*3/MM3 (ref 1–3.5)
LYMPHOCYTES NFR BLD AUTO: 27.2 % (ref 20–49)
MCH RBC QN AUTO: 32.9 PG (ref 27–33)
MCHC RBC AUTO-ENTMCNC: 33.4 G/DL (ref 32–35)
MCV RBC AUTO: 98.3 FL (ref 83–97)
MONOCYTES # BLD AUTO: 0.21 10*3/MM3 (ref 0.25–0.8)
MONOCYTES NFR BLD AUTO: 10.4 % (ref 4–12)
NEUTROPHILS # BLD AUTO: 1.21 10*3/MM3 (ref 1.5–7)
NEUTROPHILS NFR BLD AUTO: 59.9 % (ref 39–75)
NRBC BLD MANUAL-RTO: 0 /100 WBC (ref 0–0)
PLATELET # BLD AUTO: 235 10*3/MM3 (ref 150–375)
PMV BLD AUTO: 10.4 FL (ref 8.9–12.1)
RBC # BLD AUTO: 2.98 10*6/MM3 (ref 3.9–5)
WBC NRBC COR # BLD: 2.02 10*3/MM3 (ref 4–10)

## 2017-09-06 PROCEDURE — 85025 COMPLETE CBC W/AUTO DIFF WBC: CPT | Performed by: INTERNAL MEDICINE

## 2017-09-06 PROCEDURE — 25010000002 HEPARIN FLUSH (PORCINE) 100 UNIT/ML SOLUTION: Performed by: INTERNAL MEDICINE

## 2017-09-06 PROCEDURE — 99213 OFFICE O/P EST LOW 20 MIN: CPT | Performed by: INTERNAL MEDICINE

## 2017-09-06 PROCEDURE — 96523 IRRIG DRUG DELIVERY DEVICE: CPT | Performed by: INTERNAL MEDICINE

## 2017-09-06 RX ORDER — SODIUM CHLORIDE 0.9 % (FLUSH) 0.9 %
10 SYRINGE (ML) INJECTION AS NEEDED
Status: DISCONTINUED | OUTPATIENT
Start: 2017-09-06 | End: 2017-09-06 | Stop reason: HOSPADM

## 2017-09-06 RX ORDER — SODIUM CHLORIDE 0.9 % (FLUSH) 0.9 %
10 SYRINGE (ML) INJECTION AS NEEDED
Status: CANCELLED | OUTPATIENT
Start: 2017-09-06

## 2017-09-06 RX ADMIN — Medication 10 ML: at 07:52

## 2017-09-06 RX ADMIN — SODIUM CHLORIDE, PRESERVATIVE FREE 500 UNITS: 5 INJECTION INTRAVENOUS at 07:52

## 2017-09-06 NOTE — PROGRESS NOTES
Subjective  REASONS FOR FOLLOWUP: 1. Metastatic breast cancer to multiple skeletal sites including cervical spine, sternum, lumbar spine and right femur, underwent Abraxane every 4 weeks and Xgeva every  3 months, DOCUMENTED RADIOLOGICAL PROGRESSION BY BONE SCAN 3/15/17 MOVING AWAY FROM ABRAXANE AND HALAVEN, PRESENTLY ON FEMARA AND KISQALI , XGEVA EVERY 3 MONTHS,  NEXT DOSE SEPT 2017  2.Iron deficiency anemia du to GI blood loss, Xarelto stopped months ago, Iron initiated, Pepcid along with Aleve for gastric protection.           History of Present Illness    This patient is here today to continue the follow up of her metastatic breast cancer to the skeleton only with no visceral metastasis undergoing therapy with Femara and Kisqali. The patient has had a remarkable improvement on clinical grounds with near complete resolution of achiness throughout her skeleton and normal functionality. She still has minimal component of diarrhea induced by the Kisqali that does not require any significant intervention. She has not had any nausea or vomiting. As in the previous notation the patient was taking the Kisqali wrong and now she has been taking 3 pills a day. Her tumor marker CA 15-3 has dramatically dropped to 21 telling us that her disease has been controlled by the medicine explaining her clinical picture. Her appetite is great, her weight is stable, her urination is normal. She has no cardiorespiratory symptomatology, no cardiac arrhythmia. She continues on magnesium supplementation. She has ECOG performance status of 1. Minimal need for pain medication.             Past Medical History,  "Past Surgical History,    1. Hypertension.   2. Hyperlipidemia.   3. Reflux esophagitis.     GYN HISTORY: G0, P0. The patient went into menopause in 2000.     Review of Systems    General: no fever, chills, fatigue, weight changes, or lack of appetite.  Eyes: no epiphora, conjunctivitis, pain, glaucoma, blurred vision, blindness, secretion, photophobia.  Ears: no otorrhea, tinnitus, otorrhagia, deafness, pain, vertigo.  Nose: minimal rhinorrhea,no epistaxis, alteration in perception of odors, sinuses pressure.  Mouth: no alteration in gums or teeth,  ulcers, no difficulty with mastication or deglut ion, no odynophagia.  Neck: no masses or pain, no thyroid alterations, no pain in muscles or arteries, no carotid odynia, no crepitation.  Lungs: no cough, sputum production, dyspnea, trepopnea, pleuritic pain, hemoptysis.  Heart: no syncope, irregularity, palpitations, angina, orthopnea, paroxysmal nocturnal dyspnea.  GI: no dysphagia, odynophagia, no regurgitation, no heartburn, indigestion, nausea, vomiting, hematemesis ,melena, jaundice, distention, obstipation, enterorrhagia, proctalgia, anal  Lesions, changes in bowel habits.  : no frequency, hesitancy, hematuria, discharge, pain.  Musculoskeletal: STATED IMPROVEMENT IN muscle or tendon pain, joint pain, edema,NO functional limitation, fasciculations, mass.  Neurologic: no headache, seizures, alterations on Craneal nerves, no motor or senssory deficit, normal coordination.  Skin: no rashes, pruritus or localized lesions.  Psychiatric: no anxiety, depression, agitation, delusions, proper insight.      Medications:  The current medication list was reviewed in the EMR    ALLERGIES:    Allergies   Allergen Reactions   • Codeine    • Docetaxel    • Paclitaxel        Objective      Vitals:    09/06/17 0754   BP: 140/80   Pulse: 78   Resp: 16   Temp: 97.8 °F (36.6 °C)   SpO2: 100%   Weight: 204 lb 3.2 oz (92.6 kg)   Height: 64.17\" (163 cm)   PainSc: 0-No pain     Current " Status 9/6/2017   ECOG score 0   2/10 cancer related pain    Physical Exam    GENERAL:  Well-developed, well-nourished  Patient  in no acute distress.   SKIN:  Warm, dry without rashes, purpura or petechiae.  HEENT:  Pupils were equal and reactive to light and accomodation, conjunctivas non injected, no pterigion, normal extraocular movements, normal visual acuity.   Mouth mucosa was moist, no exudates in oropharynx, normal gum line, normal roof of the mouth and pillars, normal papillations of the tongue.  NECK:  Supple with good range of motion; no thyromegaly or masses, no JVD or bruits, no cervical adenopathies.  LYMPHATICS:  No cervical, supraclavicular, axillary or inguinal adenopathy.  CHEST:  Lungs clear to percussion and auscultation, normal breath sounds bilaterally, no wheezing, crackles or ronchi, no stridor.  CARDIAC:  Regular rate and rhythm with systolic g3/6 basal murmur, clean diastole.,no rubs or gallops.  ABDOMEN:  Soft, nontender with no organomegaly or masses, no ascites, no collateral circulation, no abdominal pain, no inguinal hernias, no abdominal bruits.  EXTREMITIES:  No clubbing, cyanosis or edema, no deformities RESOLVED MILD DIFFUSE SKELETAL pain.  NEUROLOGICAL:  Patient was awake, alert, oriented to time, person and place    RECENT LABS:  Hematology WBC   Date Value Ref Range Status   09/06/2017 2.02 (L) 4.00 - 10.00 10*3/mm3 Final     RBC   Date Value Ref Range Status   09/06/2017 2.98 (L) 3.90 - 5.00 10*6/mm3 Final     Hemoglobin   Date Value Ref Range Status   09/06/2017 9.8 (L) 11.5 - 14.9 g/dL Final     Hematocrit   Date Value Ref Range Status   09/06/2017 29.3 (L) 34.0 - 45.0 % Final     Platelets   Date Value Ref Range Status   09/06/2017 235 150 - 375 10*3/mm3 Final        Component      Latest Ref Rng & Units 5/30/2017 7/7/2017 8/11/2017          12:37 PM 10:06 AM  1:28 PM   CA 15-3      <=25.0 U/mL 53.9 (H) 44.9 (H) 21.5     Assessment/Plan    . Metastatic  Left breast cancer  to multiple skeletal sites including cervical spine, sternum, lumbar spine and right femur.Since the previous visit, the patient has been taking her Femara and Kisqali correctly at a dose of 600 mg a day for 21-days and she will be completing this cycle of treatment this Friday.  Today, the patient has leukopenia with no fever, anemia with no significant degree of fatigue and normal platelet count.  Most importantly, the tumor marker has dropped from 53.9 in 05/30 to 21.5 on 08/11/2017.  We still feel that her disease is being controlled by this regimen of medicines.     The patient has minimal need for pain medication, she remains fully functional, her hair has grown back, and she has had her port flushed today.  She has again, leukopenia with no fever, minimal diarrhea and she remains on magnesium supplementation to minimize any potentiality for prolonged QT interval.     RECOMMENDATIONS:   1.  The patient will remain and complete Kisqali dose this Friday and this again is at a dose of 600 mg a day.  Thereafter, she will have a week break and I advised her to take the next batch of medication, Kisqali 400 mg a day for 21-days.  Her Femara dose will remain 2.5 mg a day.    2.  We would like for her to have a repeat blood count in 2 weeks and we will review her back in 4 weeks.   3.  In regard to pain medicine, the patient will continue using anti-inflammatory medication that works well for her.  4.  In regard to magnesium supplementation, she remains on this for the time being.    5.  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  I find no need for transfusion of red cells.  6.  In regard to followup, she will be seen again in 1 month.  7.  Eventually, the patient also will undergo medication for her bone disease in the form of Xgeva that will be given to her in a few weeks.          9/6/2017      CC:

## 2017-09-15 ENCOUNTER — LAB (OUTPATIENT)
Dept: INTERNAL MEDICINE | Facility: CLINIC | Age: 62
End: 2017-09-15

## 2017-09-15 DIAGNOSIS — I10 BENIGN ESSENTIAL HTN: Primary | ICD-10-CM

## 2017-09-15 DIAGNOSIS — Z11.59 NEED FOR HEPATITIS C SCREENING TEST: ICD-10-CM

## 2017-09-15 DIAGNOSIS — Z00.00 HEALTHCARE MAINTENANCE: ICD-10-CM

## 2017-09-15 DIAGNOSIS — R73.01 ABNORMAL FASTING GLUCOSE: ICD-10-CM

## 2017-09-15 DIAGNOSIS — E78.5 HYPERLIPIDEMIA, UNSPECIFIED HYPERLIPIDEMIA TYPE: ICD-10-CM

## 2017-09-15 DIAGNOSIS — D51.0 VITAMIN B12 DEFICIENCY ANEMIA DUE TO INTRINSIC FACTOR DEFICIENCY: ICD-10-CM

## 2017-09-17 LAB
ALBUMIN SERPL-MCNC: 4.6 G/DL (ref 3.5–5.2)
ALBUMIN/GLOB SERPL: 2.6 G/DL
ALP SERPL-CCNC: 138 U/L (ref 39–117)
ALT SERPL-CCNC: 20 U/L (ref 1–33)
APPEARANCE UR: CLEAR
AST SERPL-CCNC: 17 U/L (ref 1–32)
BACTERIA #/AREA URNS HPF: NORMAL /HPF
BACTERIA UR CULT: NORMAL
BACTERIA UR CULT: NORMAL
BASOPHILS # BLD AUTO: 0.05 10*3/MM3 (ref 0–0.2)
BASOPHILS NFR BLD AUTO: 1.6 % (ref 0–1.5)
BILIRUB SERPL-MCNC: 0.3 MG/DL (ref 0.1–1.2)
BILIRUB UR QL STRIP: NEGATIVE
BUN SERPL-MCNC: 26 MG/DL (ref 8–23)
BUN/CREAT SERPL: 29.2 (ref 7–25)
CALCIUM SERPL-MCNC: 9.2 MG/DL (ref 8.6–10.5)
CHLORIDE SERPL-SCNC: 101 MMOL/L (ref 98–107)
CHOLEST SERPL-MCNC: 178 MG/DL (ref 0–200)
CO2 SERPL-SCNC: 24.4 MMOL/L (ref 22–29)
COLOR UR: YELLOW
CREAT SERPL-MCNC: 0.89 MG/DL (ref 0.57–1)
EOSINOPHIL # BLD AUTO: 0.06 10*3/MM3 (ref 0–0.7)
EOSINOPHIL NFR BLD AUTO: 1.9 % (ref 0.3–6.2)
EPI CELLS #/AREA URNS HPF: NORMAL /HPF
ERYTHROCYTE [DISTWIDTH] IN BLOOD BY AUTOMATED COUNT: 19.9 % (ref 11.7–13)
GLOBULIN SER CALC-MCNC: 1.8 GM/DL
GLUCOSE SERPL-MCNC: 104 MG/DL (ref 65–99)
GLUCOSE UR QL: NEGATIVE
HBA1C MFR BLD: 6.13 % (ref 4.8–5.6)
HCT VFR BLD AUTO: 31.7 % (ref 35.6–45.5)
HCV AB S/CO SERPL IA: <0.1 S/CO RATIO (ref 0–0.9)
HDLC SERPL-MCNC: 51 MG/DL (ref 40–60)
HGB BLD-MCNC: 10.3 G/DL (ref 11.9–15.5)
HGB UR QL STRIP: NEGATIVE
IMM GRANULOCYTES # BLD: 0 10*3/MM3 (ref 0–0.03)
IMM GRANULOCYTES NFR BLD: 0 % (ref 0–0.5)
KETONES UR QL STRIP: NEGATIVE
LDLC SERPL CALC-MCNC: 76 MG/DL (ref 0–100)
LEUKOCYTE ESTERASE UR QL STRIP: ABNORMAL
LYMPHOCYTES # BLD AUTO: 1.05 10*3/MM3 (ref 0.9–4.8)
LYMPHOCYTES NFR BLD AUTO: 33.4 % (ref 19.6–45.3)
MCH RBC QN AUTO: 32.4 PG (ref 26.9–32)
MCHC RBC AUTO-ENTMCNC: 32.5 G/DL (ref 32.4–36.3)
MCV RBC AUTO: 99.7 FL (ref 80.5–98.2)
MICRO URNS: ABNORMAL
MONOCYTES # BLD AUTO: 0.57 10*3/MM3 (ref 0.2–1.2)
MONOCYTES NFR BLD AUTO: 18.2 % (ref 5–12)
NEUTROPHILS # BLD AUTO: 1.41 10*3/MM3 (ref 1.9–8.1)
NEUTROPHILS NFR BLD AUTO: 44.9 % (ref 42.7–76)
NITRITE UR QL STRIP: NEGATIVE
PH UR STRIP: 7 [PH] (ref 5–7.5)
PLATELET # BLD AUTO: 282 10*3/MM3 (ref 140–500)
POTASSIUM SERPL-SCNC: 4.6 MMOL/L (ref 3.5–5.2)
PROT SERPL-MCNC: 6.4 G/DL (ref 6–8.5)
PROT UR QL STRIP: NEGATIVE
RBC # BLD AUTO: 3.18 10*6/MM3 (ref 3.9–5.2)
RBC #/AREA URNS HPF: NORMAL /HPF
SODIUM SERPL-SCNC: 137 MMOL/L (ref 136–145)
SP GR UR: 1.01 (ref 1–1.03)
TRIGL SERPL-MCNC: 257 MG/DL (ref 0–150)
TSH SERPL DL<=0.005 MIU/L-ACNC: 1.82 MIU/ML (ref 0.27–4.2)
URINALYSIS REFLEX: ABNORMAL
UROBILINOGEN UR STRIP-MCNC: 0.2 MG/DL (ref 0.2–1)
VLDLC SERPL CALC-MCNC: 51.4 MG/DL (ref 5–40)
WBC # BLD AUTO: 3.14 10*3/MM3 (ref 4.5–10.7)
WBC #/AREA URNS HPF: NORMAL /HPF

## 2017-09-20 ENCOUNTER — OFFICE VISIT (OUTPATIENT)
Dept: ONCOLOGY | Facility: CLINIC | Age: 62
End: 2017-09-20

## 2017-09-20 ENCOUNTER — LAB (OUTPATIENT)
Dept: OTHER | Facility: HOSPITAL | Age: 62
End: 2017-09-20

## 2017-09-20 DIAGNOSIS — D70.1 LEUKOPENIA DUE TO ANTINEOPLASTIC CHEMOTHERAPY (HCC): ICD-10-CM

## 2017-09-20 DIAGNOSIS — C50.912 PRIMARY MALIGNANT NEOPLASM OF LEFT BREAST (HCC): ICD-10-CM

## 2017-09-20 DIAGNOSIS — D50.0 IRON DEFICIENCY ANEMIA DUE TO CHRONIC BLOOD LOSS: ICD-10-CM

## 2017-09-20 DIAGNOSIS — D51.0 VITAMIN B12 DEFICIENCY ANEMIA DUE TO INTRINSIC FACTOR DEFICIENCY: ICD-10-CM

## 2017-09-20 DIAGNOSIS — T45.1X5A LEUKOPENIA DUE TO ANTINEOPLASTIC CHEMOTHERAPY (HCC): ICD-10-CM

## 2017-09-20 DIAGNOSIS — C79.51 BONE METASTASIS: ICD-10-CM

## 2017-09-20 DIAGNOSIS — C50.912 PRIMARY MALIGNANT NEOPLASM OF LEFT BREAST (HCC): Primary | ICD-10-CM

## 2017-09-20 LAB
ANISOCYTOSIS BLD QL: NORMAL
BASOPHILS # BLD AUTO: 0.05 10*3/MM3 (ref 0–0.2)
BASOPHILS NFR BLD AUTO: 1.5 % (ref 0–1.5)
DEPRECATED RDW RBC AUTO: 66.4 FL (ref 37–54)
EOSINOPHIL # BLD AUTO: 0.05 10*3/MM3 (ref 0–0.7)
EOSINOPHIL NFR BLD AUTO: 1.5 % (ref 0.3–6.2)
ERYTHROCYTE [DISTWIDTH] IN BLOOD BY AUTOMATED COUNT: 18.7 % (ref 11.7–13)
HCT VFR BLD AUTO: 28.6 % (ref 35.6–45.5)
HGB BLD-MCNC: 10 G/DL (ref 11.9–15.5)
IMM GRANULOCYTES # BLD: 0.01 10*3/MM3 (ref 0–0.03)
IMM GRANULOCYTES NFR BLD: 0.3 % (ref 0–0.5)
LYMPHOCYTES # BLD AUTO: 0.79 10*3/MM3 (ref 0.9–4.8)
LYMPHOCYTES NFR BLD AUTO: 24.5 % (ref 19.6–45.3)
MCH RBC QN AUTO: 33.7 PG (ref 26.9–32)
MCHC RBC AUTO-ENTMCNC: 35 G/DL (ref 32.4–36.3)
MCV RBC AUTO: 96.3 FL (ref 80.5–98.2)
MONOCYTES # BLD AUTO: 0.43 10*3/MM3 (ref 0.2–1.2)
MONOCYTES NFR BLD AUTO: 13.3 % (ref 5–12)
NEUTROPHILS # BLD AUTO: 1.9 10*3/MM3 (ref 1.9–8.1)
NEUTROPHILS NFR BLD AUTO: 58.9 % (ref 42.7–76)
NRBC BLD MANUAL-RTO: 0 /100 WBC (ref 0–0)
PLAT MORPH BLD: NORMAL
PLATELET # BLD AUTO: 246 10*3/MM3 (ref 140–500)
PMV BLD AUTO: 10.5 FL (ref 6–12)
RBC # BLD AUTO: 2.97 10*6/MM3 (ref 3.9–5.2)
WBC MORPH BLD: NORMAL
WBC NRBC COR # BLD: 3.23 10*3/MM3 (ref 4.5–10.7)

## 2017-09-20 PROCEDURE — 85007 BL SMEAR W/DIFF WBC COUNT: CPT | Performed by: INTERNAL MEDICINE

## 2017-09-20 PROCEDURE — 99212-NC PR NO CHARGE CBC OFFICE OUTPATIENT VISIT 10 MINUTES: Performed by: NURSE PRACTITIONER

## 2017-09-20 PROCEDURE — 36415 COLL VENOUS BLD VENIPUNCTURE: CPT

## 2017-09-20 PROCEDURE — 85025 COMPLETE CBC W/AUTO DIFF WBC: CPT | Performed by: INTERNAL MEDICINE

## 2017-09-22 ENCOUNTER — OFFICE VISIT (OUTPATIENT)
Dept: INTERNAL MEDICINE | Facility: CLINIC | Age: 62
End: 2017-09-22

## 2017-09-22 VITALS
BODY MASS INDEX: 34.83 KG/M2 | WEIGHT: 204 LBS | HEART RATE: 89 BPM | HEIGHT: 64 IN | RESPIRATION RATE: 18 BRPM | OXYGEN SATURATION: 98 % | DIASTOLIC BLOOD PRESSURE: 82 MMHG | SYSTOLIC BLOOD PRESSURE: 138 MMHG

## 2017-09-22 DIAGNOSIS — Z00.00 WELL ADULT EXAM: Primary | ICD-10-CM

## 2017-09-22 PROCEDURE — 99396 PREV VISIT EST AGE 40-64: CPT | Performed by: INTERNAL MEDICINE

## 2017-09-22 NOTE — PROGRESS NOTES
Subjective     Maggie Suero is a 62 y.o. female who presents for a complete physical exam.      History of Present Illness     HTN.  Control is good.   HLD. Excellent control with atorvastatin.     Review of Systems   Constitutional: Negative.    HENT: Negative.    Eyes: Negative.    Respiratory: Negative.    Cardiovascular: Negative.    Gastrointestinal: Negative.    Endocrine: Negative.    Genitourinary: Negative.    Musculoskeletal: Negative.    Skin: Negative.    Allergic/Immunologic: Negative.    Neurological: Negative.    Hematological: Negative.    Psychiatric/Behavioral: Negative.        The following portions of the patient's history were reviewed and updated as appropriate: allergies, current medications, past family history, past medical history, past social history, past surgical history and problem list.  Health maintenance tab was reviewed and updated with the patient.       Patient Active Problem List    Diagnosis Date Noted   • Leukopenia due to antineoplastic chemotherapy 09/06/2017   • Vitamin B12 deficiency anemia due to intrinsic factor deficiency 06/13/2017   • Fitting and adjustment of vascular catheter 03/13/2017   • Heart murmur, systolic 10/25/2016   • Bone metastasis 05/20/2016   • Anemia 05/20/2016   • History of colon polyps 03/08/2016   • Benign essential HTN 02/18/2016   • Primary malignant neoplasm of left breast 02/18/2016     Note Last Updated: 3/8/2016     Left breast cancer in 2000  Mets to the hip in 2014 thought to be metastatic from the original.       • Thrombophilia 02/18/2016     Note Last Updated: 2/18/2016     Description: associated with femoral fracture     • Hyperlipidemia 02/18/2016   • Abnormal fasting glucose 02/18/2016   • Osteopenia 02/18/2016     Note Last Updated: 3/8/2016     Description: Fosamax stopped after 10 years of therapy in May of 2012.  On Zometa with metastatic breast cancer followed by Xgeva currently.           Past Medical History:   Diagnosis Date    • Abnormal mammogram    • Abnormal menstrual cycle    • Bone metastasis    • Breast cancer 2000    right breast   • Drug therapy 2001    breast cancer   • Drug therapy 2017    right femur/associated with breast cancer   • Hx of radiation therapy 2000    breast cancer   • Hx of radiation therapy 2015    bone cancer right femur/ associated with breast cancer   • Hyperlipidemia    • Hypertension    • Multiple benign polyps of large intestine    • Reflux esophagitis        Past Surgical History:   Procedure Laterality Date   • BREAST BIOPSY Right 2000    breast cancer   • BREAST SURGERY  2000    lumpectomy, mastectomy, revision   • COLONOSCOPY  2012   • FEMUR FRACTURE SURGERY      secondary to metastatic breast cancer 7/2014, with revision in 9/2015   • MASTECTOMY Left 2000    transflap in 2003       Family History   Problem Relation Age of Onset   • Hypertension Mother    • Diabetes Mother    • Macular degeneration Mother    • Heart disease Father    • Glaucoma Brother    • Diabetes Brother    • Colon cancer Paternal Grandmother        Social History     Social History   • Marital status: Single     Spouse name: N/A   • Number of children: N/A   • Years of education: N/A     Occupational History   •  [employer]     Social History Main Topics   • Smoking status: Never Smoker   • Smokeless tobacco: Not on file   • Alcohol use No   • Drug use: No   • Sexual activity: Not on file     Other Topics Concern   • Not on file     Social History Narrative       Current Outpatient Prescriptions on File Prior to Visit   Medication Sig Dispense Refill   • atorvastatin (LIPITOR) 20 MG tablet TAKE ONE TABLET BY MOUTH ONCE DAILY 90 tablet 0   • calcium carbonate-vitamin d 600-400 MG-UNIT per tablet Take 1 tablet by mouth daily.     • cetirizine (zyrTEC) 10 MG tablet Take 10 mg by mouth As Needed for Allergies.     • Cyanocobalamin (B-12 PO) Take 1,000 mcg by mouth Daily.     • denosumab (XGEVA) 120 MG/1.7ML solution  "injection Inject  under the skin 1 (one) time.     • diclofenac (VOLTAREN) 1 % gel gel Place  on the skin.     • Docusate Calcium (STOOL SOFTENER PO) Take  by mouth as needed.     • ferrous sulfate 325 (65 FE) MG tablet TAKE ONE TABLET BY MOUTH ONCE DAILY WITH BREAKFAST 30 tablet 5   • letrozole (FEMARA) 2.5 MG tablet Take 1 tablet by mouth Daily. Start with Kisqali. (Patient taking differently: Take 2.5 mg by mouth 2 (Two) Times a Day. Start with Kisqali.) 30 tablet 6   • lisinopril-hydrochlorothiazide (PRINZIDE,ZESTORETIC) 10-12.5 MG per tablet TAKE ONE TABLET BY MOUTH TWICE DAILY 180 tablet 0   • magnesium chloride ER (MAG-DELAY) 535 (64 MG) MG CR tablet Take 1 tablet by mouth Daily. 30 tablet 5   • Naproxen Sod-Diphenhydramine (ALEVE PM PO) Take  by mouth as needed.     • Naproxen Sodium (ALEVE PO) Take  by mouth.     • Ribociclib Succinate 200 MG tablet Take 3 tablets by mouth Daily. For 21 days on then 7 days off. (Patient taking differently: Take 2 tablets by mouth Daily. For 21 days on then 7 days off.) 63 tablet 6   • [DISCONTINUED] Ribociclib-Letrozole (KISQALI FEMARA 600 DOSE PO) Take  by mouth. 21 consec days, off 7     • ondansetron ODT (ZOFRAN-ODT) 8 MG disintegrating tablet Dissolve one tablet on tongue every 6 hours for nausea and vomiting 12 tablet 0     No current facility-administered medications on file prior to visit.        Allergies   Allergen Reactions   • Codeine    • Docetaxel    • Paclitaxel        Immunization History   Administered Date(s) Administered   • Tdap 03/01/2012       Objective     Pulse 89  Resp 18  Ht 64\" (162.6 cm)  Wt 204 lb (92.5 kg)  LMP  (LMP Unknown)  SpO2 98%  BMI 35.02 kg/m2    Physical Exam   Constitutional: She is oriented to person, place, and time. She appears well-developed and well-nourished.   HENT:   Head: Normocephalic and atraumatic.   Right Ear: Hearing, tympanic membrane and external ear normal.   Left Ear: Hearing, tympanic membrane and external ear " normal.   Nose: Nose normal.   Mouth/Throat: Oropharynx is clear and moist.   Neck: Neck supple. No thyromegaly present.   Cardiovascular: Normal rate, regular rhythm and normal heart sounds.    No murmur heard.  Pulmonary/Chest: Effort normal and breath sounds normal. Right breast exhibits no mass. Left breast exhibits no mass.   Abdominal: Soft. She exhibits no distension. There is no hepatosplenomegaly. There is no tenderness.   Genitourinary: No breast tenderness.   Lymphadenopathy:     She has no cervical adenopathy.   Neurological: She is alert and oriented to person, place, and time.   Skin: Skin is warm and dry.   Psychiatric: She has a normal mood and affect. Her speech is normal and behavior is normal. Judgment and thought content normal. Cognition and memory are normal.       Assessment/Plan   Maggie was seen today for annual exam.    Diagnoses and all orders for this visit:    Well adult exam        Discussion    Patient presents today for a CPE.      Patient follows a healthy diet.   Mammogram is up to date.   Colon cancer screening is up to date.   Pap smears are no longer performed secondary to difficulty obtaining and very low risk (never sexually active).   Immunizations:  I recommend a flu shot.    HTN.  Continue current.  HLD.  Continue current.      Health Maintenance   Topic Date Due   • PAP SMEAR  02/18/2016   • ZOSTER VACCINE  06/07/2016   • DXA SCAN  09/06/2016   • COLONOSCOPY  10/29/2017   • LIPID PANEL  09/15/2018   • MAMMOGRAM  04/19/2019   • TDAP/TD VACCINES (2 - Td) 03/01/2022   • HEPATITIS C SCREENING  Completed   • INFLUENZA VACCINE  Excluded            Future Appointments  Date Time Provider Department Center   10/10/2017 3:00 PM CHAIR 06 [facility]   10/10/2017 3:40 PM MD RIGOBERTO Tello [facility]

## 2017-10-10 ENCOUNTER — OFFICE VISIT (OUTPATIENT)
Dept: ONCOLOGY | Facility: CLINIC | Age: 62
End: 2017-10-10

## 2017-10-10 ENCOUNTER — INFUSION (OUTPATIENT)
Dept: ONCOLOGY | Facility: HOSPITAL | Age: 62
End: 2017-10-10

## 2017-10-10 VITALS
WEIGHT: 203 LBS | RESPIRATION RATE: 16 BRPM | OXYGEN SATURATION: 97 % | HEART RATE: 99 BPM | HEIGHT: 64 IN | SYSTOLIC BLOOD PRESSURE: 122 MMHG | TEMPERATURE: 98.5 F | BODY MASS INDEX: 34.66 KG/M2 | DIASTOLIC BLOOD PRESSURE: 74 MMHG

## 2017-10-10 DIAGNOSIS — D50.0 IRON DEFICIENCY ANEMIA DUE TO CHRONIC BLOOD LOSS: ICD-10-CM

## 2017-10-10 DIAGNOSIS — D68.59 THROMBOPHILIA (HCC): ICD-10-CM

## 2017-10-10 DIAGNOSIS — C50.912 PRIMARY MALIGNANT NEOPLASM OF LEFT BREAST (HCC): Primary | ICD-10-CM

## 2017-10-10 DIAGNOSIS — D51.0 VITAMIN B12 DEFICIENCY ANEMIA DUE TO INTRINSIC FACTOR DEFICIENCY: ICD-10-CM

## 2017-10-10 DIAGNOSIS — Z45.2 FITTING AND ADJUSTMENT OF VASCULAR CATHETER: ICD-10-CM

## 2017-10-10 DIAGNOSIS — T45.1X5A LEUKOPENIA DUE TO ANTINEOPLASTIC CHEMOTHERAPY (HCC): ICD-10-CM

## 2017-10-10 DIAGNOSIS — D70.1 LEUKOPENIA DUE TO ANTINEOPLASTIC CHEMOTHERAPY (HCC): ICD-10-CM

## 2017-10-10 DIAGNOSIS — C79.51 BONE METASTASIS: ICD-10-CM

## 2017-10-10 DIAGNOSIS — M85.89 OSTEOPENIA OF MULTIPLE SITES: ICD-10-CM

## 2017-10-10 LAB
ALBUMIN SERPL-MCNC: 4.4 G/DL (ref 3.5–5.2)
ALBUMIN/GLOB SERPL: 1.8 G/DL
ALP SERPL-CCNC: 119 U/L (ref 39–117)
ALT SERPL W P-5'-P-CCNC: 21 U/L (ref 1–33)
ANION GAP SERPL CALCULATED.3IONS-SCNC: 17.1 MMOL/L
AST SERPL-CCNC: 19 U/L (ref 1–32)
BASOPHILS # BLD AUTO: 0.03 10*3/MM3 (ref 0–0.2)
BASOPHILS NFR BLD AUTO: 0.8 % (ref 0–1.5)
BILIRUB SERPL-MCNC: 0.2 MG/DL (ref 0.1–1.2)
BUN BLD-MCNC: 37 MG/DL (ref 8–23)
BUN/CREAT SERPL: 29.8 (ref 7–25)
CALCIUM SPEC-SCNC: 9.9 MG/DL (ref 8.6–10.5)
CANCER AG15-3 SERPL-ACNC: 15.8 U/ML
CHLORIDE SERPL-SCNC: 100 MMOL/L (ref 98–107)
CO2 SERPL-SCNC: 22.9 MMOL/L (ref 22–29)
CREAT BLD-MCNC: 1.24 MG/DL (ref 0.57–1)
DEPRECATED RDW RBC AUTO: 65.1 FL (ref 37–54)
EOSINOPHIL # BLD AUTO: 0.05 10*3/MM3 (ref 0–0.7)
EOSINOPHIL NFR BLD AUTO: 1.4 % (ref 0.3–6.2)
ERYTHROCYTE [DISTWIDTH] IN BLOOD BY AUTOMATED COUNT: 18.2 % (ref 11.7–13)
GFR SERPL CREATININE-BSD FRML MDRD: 44 ML/MIN/1.73
GLOBULIN UR ELPH-MCNC: 2.5 GM/DL
GLUCOSE BLD-MCNC: 110 MG/DL (ref 65–99)
HCT VFR BLD AUTO: 29.7 % (ref 35.6–45.5)
HGB BLD-MCNC: 10.2 G/DL (ref 11.9–15.5)
IMM GRANULOCYTES # BLD: 0 10*3/MM3 (ref 0–0.03)
IMM GRANULOCYTES NFR BLD: 0 % (ref 0–0.5)
LYMPHOCYTES # BLD AUTO: 1.12 10*3/MM3 (ref 0.9–4.8)
LYMPHOCYTES NFR BLD AUTO: 30.9 % (ref 19.6–45.3)
MAGNESIUM SERPL-MCNC: 1.9 MG/DL (ref 1.6–2.4)
MCH RBC QN AUTO: 33.7 PG (ref 26.9–32)
MCHC RBC AUTO-ENTMCNC: 34.3 G/DL (ref 32.4–36.3)
MCV RBC AUTO: 98 FL (ref 80.5–98.2)
MONOCYTES # BLD AUTO: 0.51 10*3/MM3 (ref 0.2–1.2)
MONOCYTES NFR BLD AUTO: 14 % (ref 5–12)
NEUTROPHILS # BLD AUTO: 1.92 10*3/MM3 (ref 1.9–8.1)
NEUTROPHILS NFR BLD AUTO: 52.9 % (ref 42.7–76)
NRBC BLD MANUAL-RTO: 0 /100 WBC (ref 0–0)
PLATELET # BLD AUTO: 240 10*3/MM3 (ref 140–500)
PMV BLD AUTO: 10.2 FL (ref 6–12)
POTASSIUM BLD-SCNC: 4.2 MMOL/L (ref 3.5–5.2)
PROT SERPL-MCNC: 6.9 G/DL (ref 6–8.5)
RBC # BLD AUTO: 3.03 10*6/MM3 (ref 3.9–5.2)
SODIUM BLD-SCNC: 140 MMOL/L (ref 136–145)
WBC NRBC COR # BLD: 3.63 10*3/MM3 (ref 4.5–10.7)

## 2017-10-10 PROCEDURE — 86300 IMMUNOASSAY TUMOR CA 15-3: CPT | Performed by: INTERNAL MEDICINE

## 2017-10-10 PROCEDURE — 83735 ASSAY OF MAGNESIUM: CPT | Performed by: INTERNAL MEDICINE

## 2017-10-10 PROCEDURE — 25010000002 HEPARIN FLUSH (PORCINE) 100 UNIT/ML SOLUTION: Performed by: INTERNAL MEDICINE

## 2017-10-10 PROCEDURE — 36415 COLL VENOUS BLD VENIPUNCTURE: CPT | Performed by: INTERNAL MEDICINE

## 2017-10-10 PROCEDURE — 85025 COMPLETE CBC W/AUTO DIFF WBC: CPT | Performed by: INTERNAL MEDICINE

## 2017-10-10 PROCEDURE — 80053 COMPREHEN METABOLIC PANEL: CPT | Performed by: INTERNAL MEDICINE

## 2017-10-10 PROCEDURE — 99213 OFFICE O/P EST LOW 20 MIN: CPT | Performed by: INTERNAL MEDICINE

## 2017-10-10 PROCEDURE — 96523 IRRIG DRUG DELIVERY DEVICE: CPT | Performed by: INTERNAL MEDICINE

## 2017-10-10 RX ORDER — SODIUM CHLORIDE 0.9 % (FLUSH) 0.9 %
10 SYRINGE (ML) INJECTION AS NEEDED
Status: CANCELLED | OUTPATIENT
Start: 2017-10-10

## 2017-10-10 RX ORDER — SODIUM CHLORIDE 0.9 % (FLUSH) 0.9 %
10 SYRINGE (ML) INJECTION AS NEEDED
Status: DISCONTINUED | OUTPATIENT
Start: 2017-10-10 | End: 2017-10-10 | Stop reason: HOSPADM

## 2017-10-10 RX ADMIN — SODIUM CHLORIDE, PRESERVATIVE FREE 500 UNITS: 5 INJECTION INTRAVENOUS at 15:12

## 2017-10-10 RX ADMIN — Medication 10 ML: at 15:12

## 2017-10-10 NOTE — PROGRESS NOTES
Subjective  REASONS FOR FOLLOWUP: 1. Metastatic breast cancer to multiple skeletal sites including cervical spine, sternum, lumbar spine and right femur, underwent Abraxane every 4 weeks and Xgeva every  3 months, DOCUMENTED RADIOLOGICAL PROGRESSION BY BONE SCAN 3/15/17 MOVING AWAY FROM ABRAXANE AND HALAVEN, PRESENTLY ON FEMARA AND KISQALI , XGEVA EVERY 3 MONTHS,    2.Iron deficiency anemia du to GI blood loss, Xarelto stopped months ago, Iron initiated, Pepcid along with Aleve for gastric protection.           History of Present Illness    This patient is here today to continue the follow up of her metastatic breast cancer to the skeleton only with no visceral metastasis undergoing therapy with Femara and Kisqali. The patient has had a remarkable improvement on clinical grounds with near complete resolution of achiness throughout her skeleton and normal functionality. She still has minimal component of diarrhea induced by the Kisqali that does not require any significant intervention. She has not had any nausea or vomiting. As in the previous notation the patient was taking the Kisqali wrong and now she has been taking 3 pills a day. Her tumor marker CA 15-3 has dramatically dropped to 21 telling us that her disease has been controlled by the medicine explaining her clinical picture. Her appetite is great, her weight is stable, her urination is normal. She has no cardiorespiratory symptomatology, no cardiac arrhythmia. She continues on magnesium supplementation. She has ECOG performance status of 1. Minimal need for pain medication.             Past Medical History, Past Surgical History,  "   1. Hypertension.   2. Hyperlipidemia.   3. Reflux esophagitis.     GYN HISTORY: G0, P0. The patient went into menopause in 2000.     Review of Systems    General: no fever, chills, fatigue, weight changes, or lack of appetite.  Eyes: no epiphora, conjunctivitis, pain, glaucoma, blurred vision, blindness, secretion, photophobia.  Ears: no otorrhea, tinnitus, otorrhagia, deafness, pain, vertigo.  Nose: minimal rhinorrhea,no epistaxis, alteration in perception of odors, sinuses pressure.  Mouth: no alteration in gums or teeth,  ulcers, no difficulty with mastication or deglut ion, no odynophagia.  Neck: no masses or pain, no thyroid alterations, no pain in muscles or arteries, no carotid odynia, no crepitation.  Lungs: no cough, sputum production, dyspnea, trepopnea, pleuritic pain, hemoptysis.  Heart: no syncope, irregularity, palpitations, angina, orthopnea, paroxysmal nocturnal dyspnea.  GI: no dysphagia, odynophagia, no regurgitation, no heartburn, indigestion, nausea, vomiting, hematemesis ,melena, jaundice, distention, obstipation, enterorrhagia, proctalgia, anal  Lesions, changes in bowel habits.  : no frequency, hesitancy, hematuria, discharge, pain.  Musculoskeletal: STATED IMPROVEMENT IN muscle or tendon pain, joint pain, edema,NO functional limitation, fasciculations, mass.  Neurologic: no headache, seizures, alterations on Craneal nerves, no motor or senssory deficit, normal coordination.  Skin: no rashes, pruritus or localized lesions.  Psychiatric: no anxiety, depression, agitation, delusions, proper insight.      Medications:  The current medication list was reviewed in the EMR    ALLERGIES:    Allergies   Allergen Reactions   • Codeine    • Docetaxel    • Paclitaxel        Objective      Vitals:    10/10/17 1525   BP: 122/74   Pulse: 99   Resp: 16   Temp: 98.5 °F (36.9 °C)   SpO2: 97%   Weight: 203 lb (92.1 kg)   Height: 64.17\" (163 cm)   PainSc:   2   PainLoc: Knee     Current Status 10/10/2017 "   ECOG score 0   2/10 cancer related pain    Physical Exam    GENERAL:  Well-developed, well-nourished  Patient  in no acute distress.   SKIN:  Warm, dry without rashes, purpura or petechiae.  HEENT:  Pupils were equal and reactive to light and accomodation, conjunctivas non injected, no pterigion, normal extraocular movements, normal visual acuity.   Mouth mucosa was moist, no exudates in oropharynx, normal gum line, normal roof of the mouth and pillars, normal papillations of the tongue.  NECK:  Supple with good range of motion; no thyromegaly or masses, no JVD or bruits, no cervical adenopathies.  LYMPHATICS:  No cervical, supraclavicular, axillary or inguinal adenopathy.  CHEST:  Lungs clear to percussion and auscultation, normal breath sounds bilaterally, no wheezing, crackles or ronchi, no stridor.  CARDIAC:  Regular rate and rhythm with systolic g3/6 basal murmur, clean diastole.,no rubs or gallops.  ABDOMEN:  Soft, nontender with no organomegaly or masses, no ascites, no collateral circulation, no abdominal pain, no inguinal hernias, no abdominal bruits.  EXTREMITIES:  No clubbing, cyanosis or edema, no deformities RESOLVED MILD DIFFUSE SKELETAL pain.  NEUROLOGICAL:  Patient was awake, alert, oriented to time, person and place    RECENT LABS:  Hematology WBC   Date Value Ref Range Status   10/10/2017 3.63 (L) 4.50 - 10.70 10*3/mm3 Final     RBC   Date Value Ref Range Status   10/10/2017 3.03 (L) 3.90 - 5.20 10*6/mm3 Final     Hemoglobin   Date Value Ref Range Status   10/10/2017 10.2 (L) 11.9 - 15.5 g/dL Final     Hematocrit   Date Value Ref Range Status   10/10/2017 29.7 (L) 35.6 - 45.5 % Final     Platelets   Date Value Ref Range Status   10/10/2017 240 140 - 500 10*3/mm3 Final        Assessment/Plan    . Metastatic  Left breast cancer to multiple skeletal sites including cervical spine, sternum, lumbar spine and right femur.Since the previous visit, the patient has been taking her Femara and Kisqali  correctly at a dose of 600 mg a day for 21-days and she will be completing this cycle of treatment this Friday.  Today, the patient has leukopenia with no fever, anemia with no significant degree of fatigue and normal platelet count.  Most importantly, the tumor marker has dropped from 53.9 in 05/30 to 21.5 on 08/11/2017.  We still feel that her disease is being controlled by this regimen of medicines.     The patient has minimal need for pain medication, she remains fully functional, her hair has grown back, and she has had her port flushed today.  She has again, leukopenia with no fever, minimal diarrhea and she remains on magnesium supplementation to minimize any potentiality for prolonged QT interval.     RECOMMENDATIONS:   1.  The patient will remain on Kisqali 400 mg a day for 21-days.  Her Femara dose will remain 2.5 mg a day.    2.  We would like for her to have a repeat blood count in 4 weeks and we will review her back in 4 weeks.   3.  In regard to pain medicine, the patient will continue using anti-inflammatory medication that works well for her.  4.  In regard to magnesium supplementation, she remains on this for the time being.    5.  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  I find no need for transfusion of red cells.  6.  In regard to followup, she will be seen again in 1 month.  7.  Eventually, the patient also will undergo medication for her bone disease in the form of Xgeva that will be given to her in a few weeks.          10/10/2017      CC:

## 2017-10-11 ENCOUNTER — TELEPHONE (OUTPATIENT)
Dept: ONCOLOGY | Facility: CLINIC | Age: 62
End: 2017-10-11

## 2017-11-01 ENCOUNTER — FLU SHOT (OUTPATIENT)
Dept: INTERNAL MEDICINE | Facility: CLINIC | Age: 62
End: 2017-11-01

## 2017-11-01 PROCEDURE — 90656 IIV3 VACC NO PRSV 0.5 ML IM: CPT | Performed by: INTERNAL MEDICINE

## 2017-11-01 PROCEDURE — 90471 IMMUNIZATION ADMIN: CPT | Performed by: INTERNAL MEDICINE

## 2017-11-03 RX ORDER — ATORVASTATIN CALCIUM 20 MG/1
TABLET, FILM COATED ORAL
Qty: 90 TABLET | Refills: 0 | Status: SHIPPED | OUTPATIENT
Start: 2017-11-03 | End: 2018-05-08 | Stop reason: SDUPTHER

## 2017-11-03 RX ORDER — LISINOPRIL AND HYDROCHLOROTHIAZIDE 12.5; 1 MG/1; MG/1
TABLET ORAL
Qty: 180 TABLET | Refills: 0 | Status: SHIPPED | OUTPATIENT
Start: 2017-11-03 | End: 2018-02-15 | Stop reason: SDUPTHER

## 2017-11-07 ENCOUNTER — INFUSION (OUTPATIENT)
Dept: ONCOLOGY | Facility: HOSPITAL | Age: 62
End: 2017-11-07

## 2017-11-07 ENCOUNTER — OFFICE VISIT (OUTPATIENT)
Dept: ONCOLOGY | Facility: CLINIC | Age: 62
End: 2017-11-07

## 2017-11-07 VITALS
WEIGHT: 207 LBS | BODY MASS INDEX: 35.34 KG/M2 | DIASTOLIC BLOOD PRESSURE: 77 MMHG | SYSTOLIC BLOOD PRESSURE: 143 MMHG | RESPIRATION RATE: 12 BRPM | TEMPERATURE: 97.6 F | OXYGEN SATURATION: 100 % | HEIGHT: 64 IN | HEART RATE: 111 BPM

## 2017-11-07 DIAGNOSIS — Z45.2 FITTING AND ADJUSTMENT OF VASCULAR CATHETER: ICD-10-CM

## 2017-11-07 DIAGNOSIS — D50.0 IRON DEFICIENCY ANEMIA DUE TO CHRONIC BLOOD LOSS: ICD-10-CM

## 2017-11-07 DIAGNOSIS — D68.59 THROMBOPHILIA (HCC): ICD-10-CM

## 2017-11-07 DIAGNOSIS — C50.912 PRIMARY MALIGNANT NEOPLASM OF LEFT BREAST (HCC): Primary | ICD-10-CM

## 2017-11-07 DIAGNOSIS — D51.0 VITAMIN B12 DEFICIENCY ANEMIA DUE TO INTRINSIC FACTOR DEFICIENCY: ICD-10-CM

## 2017-11-07 DIAGNOSIS — D70.1 LEUKOPENIA DUE TO ANTINEOPLASTIC CHEMOTHERAPY (HCC): ICD-10-CM

## 2017-11-07 DIAGNOSIS — M85.89 OSTEOPENIA OF MULTIPLE SITES: ICD-10-CM

## 2017-11-07 DIAGNOSIS — C79.51 BONE METASTASIS: ICD-10-CM

## 2017-11-07 DIAGNOSIS — T45.1X5A LEUKOPENIA DUE TO ANTINEOPLASTIC CHEMOTHERAPY (HCC): ICD-10-CM

## 2017-11-07 LAB
ALBUMIN SERPL-MCNC: 4.5 G/DL (ref 3.5–5.2)
ALBUMIN/GLOB SERPL: 2 G/DL
ALP SERPL-CCNC: 107 U/L (ref 39–117)
ALT SERPL W P-5'-P-CCNC: 19 U/L (ref 1–33)
ANION GAP SERPL CALCULATED.3IONS-SCNC: 14.5 MMOL/L
ANISOCYTOSIS BLD QL: NORMAL
AST SERPL-CCNC: 18 U/L (ref 1–32)
BASOPHILS # BLD AUTO: 0.04 10*3/MM3 (ref 0–0.2)
BASOPHILS NFR BLD AUTO: 1.2 % (ref 0–1.5)
BILIRUB SERPL-MCNC: 0.3 MG/DL (ref 0.1–1.2)
BUN BLD-MCNC: 27 MG/DL (ref 8–23)
BUN/CREAT SERPL: 33.3 (ref 7–25)
CALCIUM SPEC-SCNC: 9.1 MG/DL (ref 8.6–10.5)
CANCER AG15-3 SERPL-ACNC: 14.6 U/ML
CHLORIDE SERPL-SCNC: 102 MMOL/L (ref 98–107)
CO2 SERPL-SCNC: 23.5 MMOL/L (ref 22–29)
CREAT BLD-MCNC: 0.81 MG/DL (ref 0.57–1)
DACRYOCYTES BLD QL SMEAR: NORMAL
DEPRECATED RDW RBC AUTO: 58.4 FL (ref 37–54)
EOSINOPHIL # BLD AUTO: 0.09 10*3/MM3 (ref 0–0.7)
EOSINOPHIL NFR BLD AUTO: 2.7 % (ref 0.3–6.2)
ERYTHROCYTE [DISTWIDTH] IN BLOOD BY AUTOMATED COUNT: 15.9 % (ref 11.7–13)
GFR SERPL CREATININE-BSD FRML MDRD: 72 ML/MIN/1.73
GLOBULIN UR ELPH-MCNC: 2.3 GM/DL
GLUCOSE BLD-MCNC: 111 MG/DL (ref 65–99)
HCT VFR BLD AUTO: 29.2 % (ref 35.6–45.5)
HGB BLD-MCNC: 10.1 G/DL (ref 11.9–15.5)
IMM GRANULOCYTES # BLD: 0.02 10*3/MM3 (ref 0–0.03)
IMM GRANULOCYTES NFR BLD: 0.6 % (ref 0–0.5)
LYMPHOCYTES # BLD AUTO: 1.13 10*3/MM3 (ref 0.9–4.8)
LYMPHOCYTES NFR BLD AUTO: 34.1 % (ref 19.6–45.3)
MCH RBC QN AUTO: 34.6 PG (ref 26.9–32)
MCHC RBC AUTO-ENTMCNC: 34.6 G/DL (ref 32.4–36.3)
MCV RBC AUTO: 100 FL (ref 80.5–98.2)
MONOCYTES # BLD AUTO: 0.47 10*3/MM3 (ref 0.2–1.2)
MONOCYTES NFR BLD AUTO: 14.2 % (ref 5–12)
NEUTROPHILS # BLD AUTO: 1.56 10*3/MM3 (ref 1.9–8.1)
NEUTROPHILS NFR BLD AUTO: 47.2 % (ref 42.7–76)
NRBC BLD MANUAL-RTO: 0 /100 WBC (ref 0–0)
OVALOCYTES BLD QL SMEAR: NORMAL
PLAT MORPH BLD: NORMAL
PLATELET # BLD AUTO: 226 10*3/MM3 (ref 140–500)
PMV BLD AUTO: 10.3 FL (ref 6–12)
POTASSIUM BLD-SCNC: 3.9 MMOL/L (ref 3.5–5.2)
PROT SERPL-MCNC: 6.8 G/DL (ref 6–8.5)
RBC # BLD AUTO: 2.92 10*6/MM3 (ref 3.9–5.2)
SODIUM BLD-SCNC: 140 MMOL/L (ref 136–145)
WBC MORPH BLD: NORMAL
WBC NRBC COR # BLD: 3.31 10*3/MM3 (ref 4.5–10.7)

## 2017-11-07 PROCEDURE — 36415 COLL VENOUS BLD VENIPUNCTURE: CPT | Performed by: INTERNAL MEDICINE

## 2017-11-07 PROCEDURE — 85025 COMPLETE CBC W/AUTO DIFF WBC: CPT | Performed by: INTERNAL MEDICINE

## 2017-11-07 PROCEDURE — 96523 IRRIG DRUG DELIVERY DEVICE: CPT | Performed by: INTERNAL MEDICINE

## 2017-11-07 PROCEDURE — 86300 IMMUNOASSAY TUMOR CA 15-3: CPT | Performed by: INTERNAL MEDICINE

## 2017-11-07 PROCEDURE — 25010000002 HEPARIN FLUSH (PORCINE) 100 UNIT/ML SOLUTION: Performed by: INTERNAL MEDICINE

## 2017-11-07 PROCEDURE — 80053 COMPREHEN METABOLIC PANEL: CPT | Performed by: INTERNAL MEDICINE

## 2017-11-07 PROCEDURE — 99213 OFFICE O/P EST LOW 20 MIN: CPT | Performed by: INTERNAL MEDICINE

## 2017-11-07 PROCEDURE — 85007 BL SMEAR W/DIFF WBC COUNT: CPT | Performed by: INTERNAL MEDICINE

## 2017-11-07 RX ORDER — SODIUM CHLORIDE 0.9 % (FLUSH) 0.9 %
10 SYRINGE (ML) INJECTION AS NEEDED
Status: CANCELLED | OUTPATIENT
Start: 2017-11-07

## 2017-11-07 RX ORDER — SODIUM CHLORIDE 0.9 % (FLUSH) 0.9 %
10 SYRINGE (ML) INJECTION AS NEEDED
Status: DISCONTINUED | OUTPATIENT
Start: 2017-11-07 | End: 2017-11-07 | Stop reason: HOSPADM

## 2017-11-07 RX ADMIN — Medication 10 ML: at 15:35

## 2017-11-07 RX ADMIN — SODIUM CHLORIDE, PRESERVATIVE FREE 500 UNITS: 5 INJECTION INTRAVENOUS at 15:36

## 2017-11-07 NOTE — PROGRESS NOTES
Subjective  REASONS FOR FOLLOWUP: 1. Metastatic breast cancer to multiple skeletal sites including cervical spine, sternum, lumbar spine and right femur, underwent Abraxane every 4 weeks and Xgeva every  3 months, DOCUMENTED RADIOLOGICAL PROGRESSION BY BONE SCAN 3/15/17 MOVING AWAY FROM ABRAXANE AND HALAVEN, PRESENTLY ON FEMARA AND KISQALI , XGEVA EVERY 3 MONTHS,    2.Iron deficiency anemia du to GI blood loss, Xarelto stopped months ago, Iron initiated, Pepcid along with Aleve for gastric protection.           History of Present Illness    This patient is here today to continue the follow up of her metastatic breast cancer to the skeleton only with no visceral metastasis undergoing therapy with Femara and Kisqali. The patient has had a remarkable improvement on clinical grounds with near complete resolution of achiness throughout her skeleton and normal functionality. She still has minimal component of diarrhea induced by the Kisqali that does not require any significant intervention. She has not had any nausea or vomiting. As in the previous notation the patient was taking the Kisqali wrong and now she has been taking 3 pills a day. Her tumor marker CA 15-3 has dramatically dropped to 15.8 telling us that her disease has been controlled by the medicine explaining her clinical picture. Her appetite is great, her weight is stable, her urination is normal. She has no cardiorespiratory symptomatology, no cardiac arrhythmia. She continues on magnesium supplementation. She has ECOG performance status of 1. Minimal need for pain medication.             Past Medical History, Past Surgical  "History,    1. Hypertension.   2. Hyperlipidemia.   3. Reflux esophagitis.     GYN HISTORY: G0, P0. The patient went into menopause in 2000.     Review of Systems    General: no fever, chills, fatigue, weight changes, or lack of appetite.  Eyes: no epiphora, conjunctivitis, pain, glaucoma, blurred vision, blindness, secretion, photophobia.  Ears: no otorrhea, tinnitus, otorrhagia, deafness, pain, vertigo.  Nose: minimal rhinorrhea,no epistaxis, alteration in perception of odors, sinuses pressure.  Mouth: no alteration in gums or teeth,  ulcers, no difficulty with mastication or deglut ion, no odynophagia.  Neck: no masses or pain, no thyroid alterations, no pain in muscles or arteries, no carotid odynia, no crepitation.  Lungs: no cough, sputum production, dyspnea, trepopnea, pleuritic pain, hemoptysis.  Heart: no syncope, irregularity, palpitations, angina, orthopnea, paroxysmal nocturnal dyspnea.  GI: no dysphagia, odynophagia, no regurgitation, no heartburn, indigestion, nausea, vomiting, hematemesis ,melena, jaundice, distention, obstipation, enterorrhagia, proctalgia, anal  Lesions, changes in bowel habits.  : no frequency, hesitancy, hematuria, discharge, pain.  Musculoskeletal: STATED IMPROVEMENT IN muscle or tendon pain, joint pain, edema,NO functional limitation, fasciculations, mass.  Neurologic: no headache, seizures, alterations on Craneal nerves, no motor or senssory deficit, normal coordination.  Skin: no rashes, pruritus or localized lesions.  Psychiatric: no anxiety, depression, agitation, delusions, proper insight.      Medications:  The current medication list was reviewed in the EMR    ALLERGIES:    Allergies   Allergen Reactions   • Codeine    • Docetaxel    • Paclitaxel        Objective      Vitals:    11/07/17 1540   BP: 143/77   Pulse: 111   Resp: 12   Temp: 97.6 °F (36.4 °C)   TempSrc: Oral   SpO2: 100%   Weight: 207 lb (93.9 kg)   Height: 64.17\" (163 cm)   PainSc: 0-No pain     Current " Status 11/7/2017   ECOG score 0   2/10 cancer related pain    Physical Exam    GENERAL:  Well-developed, well-nourished  Patient  in no acute distress.   SKIN:  Warm, dry without rashes, purpura or petechiae.  HEENT:  Pupils were equal and reactive to light and accomodation, conjunctivas non injected, no pterigion, normal extraocular movements, normal visual acuity.   Mouth mucosa was moist, no exudates in oropharynx, normal gum line, normal roof of the mouth and pillars, normal papillations of the tongue.  NECK:  Supple with good range of motion; no thyromegaly or masses, no JVD or bruits, no cervical adenopathies.  LYMPHATICS:  No cervical, supraclavicular, axillary or inguinal adenopathy.  CHEST:  Lungs clear to percussion and auscultation, normal breath sounds bilaterally, no wheezing, crackles or ronchi, no stridor.  CARDIAC:  Regular rate and rhythm with systolic g3/6 basal murmur, clean diastole.,no rubs or gallops.  ABDOMEN:  Soft, nontender with no organomegaly or masses, no ascites, no collateral circulation, no abdominal pain, no inguinal hernias, no abdominal bruits.  EXTREMITIES:  No clubbing, cyanosis or edema, no deformities RESOLVED MILD DIFFUSE SKELETAL pain.  NEUROLOGICAL:  Patient was awake, alert, oriented to time, person and place    RECENT LABS:  Hematology WBC   Date Value Ref Range Status   11/07/2017 3.31 (L) 4.50 - 10.70 10*3/mm3 Preliminary     RBC   Date Value Ref Range Status   11/07/2017 2.92 (L) 3.90 - 5.20 10*6/mm3 Preliminary     Hemoglobin   Date Value Ref Range Status   11/07/2017 10.1 (L) 11.9 - 15.5 g/dL Preliminary     Hematocrit   Date Value Ref Range Status   11/07/2017 29.2 (L) 35.6 - 45.5 % Preliminary     Platelets   Date Value Ref Range Status   11/07/2017 226 140 - 500 10*3/mm3 Preliminary        Component      Latest Ref Rng & Units 5/2/2017 5/30/2017 7/7/2017 8/11/2017           9:20 AM 12:37 PM 10:06 AM  1:28 PM   CA 15-3      <=25.0 U/mL 50.7 (H) 53.9 (H) 44.9 (H)  21.5     Component      Latest Ref Rng & Units 10/10/2017           3:07 PM   CA 15-3      <=25.0 U/mL 15.8     Assessment/Plan    . Metastatic  Left breast cancer to multiple skeletal sites including cervical spine, sternum, lumbar spine and right femur.Since the previous visit, the patient has been taking her Femara and Kisqali correctly at a dose of 600 mg a day for 21-days and she will be completing this cycle of treatment.  Today, the patient has leukopenia with no fever, anemia with no significant degree of fatigue and normal platelet count.  Most importantly, the tumor marker has dropped from 53.9 in 05/30 to 15.8 on 10/10/2017.  We still feel that her disease is being controlled by this regimen of medicines.     The patient has minimal need for pain medication, she remains fully functional, her hair has grown back, and she has had her port flushed today.  She has again, leukopenia with no fever, minimal diarrhea and she remains on magnesium supplementation to minimize any potentiality for prolonged QT interval.     RECOMMENDATIONS:   1.  The patient will remain on Kisqali 400 mg a day for 21-days.  Her Femara dose will remain 2.5 mg a day.    2.  We would like for her to have a repeat blood count in 4 weeks and we will review her back in 4 weeks.   3.  In regard to pain medicine, the patient will continue using anti-inflammatory medication that works well for her.  4.  In regard to magnesium supplementation, she remains on this for the time being.    5.  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  I find no need for transfusion of red cells.  6.  In regard to followup, she will be seen again in 1 month.  7.  Eventually, the patient also will undergo medication for her bone disease in the form of Xgeva that will be given to her in 4 weeks.          11/7/2017      CC:

## 2017-11-08 ENCOUNTER — TELEPHONE (OUTPATIENT)
Dept: ONCOLOGY | Facility: CLINIC | Age: 62
End: 2017-11-08

## 2017-11-09 ENCOUNTER — DOCUMENTATION (OUTPATIENT)
Dept: ONCOLOGY | Facility: CLINIC | Age: 62
End: 2017-11-09

## 2017-11-09 NOTE — PROGRESS NOTES
[facility] requesting new rx for Kisqali as she is out of refills. Per telephone note 11/8/17 from Dr Saez-Pt will continue the same treatment. Per his office note from 11/7/17 the Kisqali is mentioned with 2 different doses. I have sent a staff message to clarify the dose. Once response is rec, I will escribe a new rx to [facility].

## 2017-11-10 ENCOUNTER — DOCUMENTATION (OUTPATIENT)
Dept: ONCOLOGY | Facility: CLINIC | Age: 62
End: 2017-11-10

## 2017-11-10 NOTE — PROGRESS NOTES
Rec Kisqali dose clarification from Dr Saez-Pt is to take 600 mg. See below.      MD Sheri Tello                     600 mg            Previous Messages       ----- Message -----      From: Sheri Mancuso      Sent: 11/9/2017  10:02 AM        To: Nader Saez MD   Subject: Kisqali dose                                     Dr Saez,     Per your note yesterday, Maggie is to remain on the same treatment. Per the 11/7/17 office note, the mentioning of Kisqali has 2 different doses. Is she to remain on 600 mg or is she taking 400 mg? Please clarify-she is out of refills and needs a new rx.     Thank you,   Sheri         I have escribed a new rx to [facility].

## 2017-11-15 DIAGNOSIS — C50.919: ICD-10-CM

## 2017-11-15 RX ORDER — FERROUS SULFATE 325(65) MG
TABLET ORAL
Qty: 30 TABLET | Refills: 5 | Status: SHIPPED | OUTPATIENT
Start: 2017-11-15 | End: 2018-06-18 | Stop reason: SDUPTHER

## 2017-11-16 ENCOUNTER — DOCUMENTATION (OUTPATIENT)
Dept: ONCOLOGY | Facility: CLINIC | Age: 62
End: 2017-11-16

## 2017-11-16 NOTE — PROGRESS NOTES
The patient called today to discuss her Kisqali medication schedule.  She is currently taking 400 mg 21 out of 28 days.  Upon review, the patient was dose reduced to September 2017 from 600 mg 21 out of 28 days to 400 mg 21out of 28 days.  I spoken with Sheri Mancuso, she will clarify with Dr. Saez patients dosing

## 2017-11-17 ENCOUNTER — DOCUMENTATION (OUTPATIENT)
Dept: ONCOLOGY | Facility: CLINIC | Age: 62
End: 2017-11-17

## 2017-11-17 NOTE — PROGRESS NOTES
CLARIFICATION FROM DR MIRANDA ON KISQALI DOSE-PT IS TO TAKE 400 MG DAILY FOR 21 DAYS ON THEN 7 DAYS OFF.

## 2017-12-05 ENCOUNTER — OFFICE VISIT (OUTPATIENT)
Dept: ONCOLOGY | Facility: CLINIC | Age: 62
End: 2017-12-05

## 2017-12-05 ENCOUNTER — INFUSION (OUTPATIENT)
Dept: ONCOLOGY | Facility: HOSPITAL | Age: 62
End: 2017-12-05

## 2017-12-05 VITALS
WEIGHT: 210.7 LBS | SYSTOLIC BLOOD PRESSURE: 155 MMHG | TEMPERATURE: 97.9 F | RESPIRATION RATE: 16 BRPM | DIASTOLIC BLOOD PRESSURE: 84 MMHG | OXYGEN SATURATION: 97 % | BODY MASS INDEX: 35.97 KG/M2 | HEIGHT: 64 IN | HEART RATE: 93 BPM

## 2017-12-05 DIAGNOSIS — D68.59 THROMBOPHILIA (HCC): ICD-10-CM

## 2017-12-05 DIAGNOSIS — C50.912 PRIMARY MALIGNANT NEOPLASM OF LEFT BREAST (HCC): Primary | ICD-10-CM

## 2017-12-05 DIAGNOSIS — Z45.2 FITTING AND ADJUSTMENT OF VASCULAR CATHETER: ICD-10-CM

## 2017-12-05 DIAGNOSIS — T45.1X5A LEUKOPENIA DUE TO ANTINEOPLASTIC CHEMOTHERAPY (HCC): ICD-10-CM

## 2017-12-05 DIAGNOSIS — C79.51 BONE METASTASIS: ICD-10-CM

## 2017-12-05 DIAGNOSIS — D70.1 LEUKOPENIA DUE TO ANTINEOPLASTIC CHEMOTHERAPY (HCC): ICD-10-CM

## 2017-12-05 DIAGNOSIS — M85.89 OSTEOPENIA OF MULTIPLE SITES: ICD-10-CM

## 2017-12-05 DIAGNOSIS — D50.0 IRON DEFICIENCY ANEMIA DUE TO CHRONIC BLOOD LOSS: ICD-10-CM

## 2017-12-05 LAB
ALBUMIN SERPL-MCNC: 4.5 G/DL (ref 3.5–5.2)
ALBUMIN/GLOB SERPL: 2 G/DL
ALP SERPL-CCNC: 97 U/L (ref 39–117)
ALT SERPL W P-5'-P-CCNC: 19 U/L (ref 1–33)
ANION GAP SERPL CALCULATED.3IONS-SCNC: 14.2 MMOL/L
AST SERPL-CCNC: 17 U/L (ref 1–32)
BASOPHILS # BLD AUTO: 0.05 10*3/MM3 (ref 0–0.2)
BASOPHILS NFR BLD AUTO: 1.4 % (ref 0–1.5)
BILIRUB SERPL-MCNC: 0.3 MG/DL (ref 0.1–1.2)
BUN BLD-MCNC: 29 MG/DL (ref 8–23)
BUN/CREAT SERPL: 33.3 (ref 7–25)
CALCIUM SPEC-SCNC: 9.7 MG/DL (ref 8.6–10.5)
CANCER AG15-3 SERPL-ACNC: 14.2 U/ML
CHLORIDE SERPL-SCNC: 101 MMOL/L (ref 98–107)
CO2 SERPL-SCNC: 24.8 MMOL/L (ref 22–29)
CREAT BLD-MCNC: 0.87 MG/DL (ref 0.57–1)
DEPRECATED RDW RBC AUTO: 55.1 FL (ref 37–54)
EOSINOPHIL # BLD AUTO: 0.09 10*3/MM3 (ref 0–0.7)
EOSINOPHIL NFR BLD AUTO: 2.5 % (ref 0.3–6.2)
ERYTHROCYTE [DISTWIDTH] IN BLOOD BY AUTOMATED COUNT: 14.7 % (ref 11.7–13)
GFR SERPL CREATININE-BSD FRML MDRD: 66 ML/MIN/1.73
GLOBULIN UR ELPH-MCNC: 2.3 GM/DL
GLUCOSE BLD-MCNC: 98 MG/DL (ref 65–99)
HCT VFR BLD AUTO: 29.3 % (ref 35.6–45.5)
HGB BLD-MCNC: 9.9 G/DL (ref 11.9–15.5)
IMM GRANULOCYTES # BLD: 0.01 10*3/MM3 (ref 0–0.03)
IMM GRANULOCYTES NFR BLD: 0.3 % (ref 0–0.5)
LYMPHOCYTES # BLD AUTO: 1.19 10*3/MM3 (ref 0.9–4.8)
LYMPHOCYTES NFR BLD AUTO: 33.4 % (ref 19.6–45.3)
MAGNESIUM SERPL-MCNC: 2 MG/DL (ref 1.6–2.4)
MCH RBC QN AUTO: 34.6 PG (ref 26.9–32)
MCHC RBC AUTO-ENTMCNC: 33.8 G/DL (ref 32.4–36.3)
MCV RBC AUTO: 102.4 FL (ref 80.5–98.2)
MONOCYTES # BLD AUTO: 0.46 10*3/MM3 (ref 0.2–1.2)
MONOCYTES NFR BLD AUTO: 12.9 % (ref 5–12)
NEUTROPHILS # BLD AUTO: 1.76 10*3/MM3 (ref 1.9–8.1)
NEUTROPHILS NFR BLD AUTO: 49.5 % (ref 42.7–76)
NRBC BLD MANUAL-RTO: 0 /100 WBC (ref 0–0)
PHOSPHATE SERPL-MCNC: 4.1 MG/DL (ref 2.5–4.5)
PLATELET # BLD AUTO: 224 10*3/MM3 (ref 140–500)
PMV BLD AUTO: 10.5 FL (ref 6–12)
POTASSIUM BLD-SCNC: 4.2 MMOL/L (ref 3.5–5.2)
PROT SERPL-MCNC: 6.8 G/DL (ref 6–8.5)
RBC # BLD AUTO: 2.86 10*6/MM3 (ref 3.9–5.2)
SODIUM BLD-SCNC: 140 MMOL/L (ref 136–145)
WBC NRBC COR # BLD: 3.56 10*3/MM3 (ref 4.5–10.7)

## 2017-12-05 PROCEDURE — 96523 IRRIG DRUG DELIVERY DEVICE: CPT | Performed by: INTERNAL MEDICINE

## 2017-12-05 PROCEDURE — 96372 THER/PROPH/DIAG INJ SC/IM: CPT | Performed by: INTERNAL MEDICINE

## 2017-12-05 PROCEDURE — 83735 ASSAY OF MAGNESIUM: CPT | Performed by: INTERNAL MEDICINE

## 2017-12-05 PROCEDURE — 85025 COMPLETE CBC W/AUTO DIFF WBC: CPT | Performed by: INTERNAL MEDICINE

## 2017-12-05 PROCEDURE — 25010000002 DENOSUMAB 120 MG/1.7ML SOLUTION: Performed by: INTERNAL MEDICINE

## 2017-12-05 PROCEDURE — 36415 COLL VENOUS BLD VENIPUNCTURE: CPT | Performed by: INTERNAL MEDICINE

## 2017-12-05 PROCEDURE — 99214 OFFICE O/P EST MOD 30 MIN: CPT | Performed by: INTERNAL MEDICINE

## 2017-12-05 PROCEDURE — 86300 IMMUNOASSAY TUMOR CA 15-3: CPT | Performed by: INTERNAL MEDICINE

## 2017-12-05 PROCEDURE — 84100 ASSAY OF PHOSPHORUS: CPT | Performed by: INTERNAL MEDICINE

## 2017-12-05 PROCEDURE — 25010000002 HEPARIN FLUSH (PORCINE) 100 UNIT/ML SOLUTION: Performed by: INTERNAL MEDICINE

## 2017-12-05 PROCEDURE — 80053 COMPREHEN METABOLIC PANEL: CPT | Performed by: INTERNAL MEDICINE

## 2017-12-05 RX ORDER — SODIUM CHLORIDE 0.9 % (FLUSH) 0.9 %
10 SYRINGE (ML) INJECTION AS NEEDED
Status: CANCELLED | OUTPATIENT
Start: 2017-12-05

## 2017-12-05 RX ORDER — SODIUM CHLORIDE 0.9 % (FLUSH) 0.9 %
10 SYRINGE (ML) INJECTION AS NEEDED
Status: DISCONTINUED | OUTPATIENT
Start: 2017-12-05 | End: 2017-12-05 | Stop reason: HOSPADM

## 2017-12-05 RX ADMIN — SODIUM CHLORIDE, PRESERVATIVE FREE 500 UNITS: 5 INJECTION INTRAVENOUS at 11:38

## 2017-12-05 RX ADMIN — Medication 10 ML: at 11:38

## 2017-12-05 RX ADMIN — DENOSUMAB 120 MG: 120 INJECTION SUBCUTANEOUS at 13:30

## 2017-12-05 NOTE — PROGRESS NOTES
Subjective  REASONS FOR FOLLOWUP: 1. Metastatic breast cancer to multiple skeletal sites including cervical spine, sternum, lumbar spine and right femur, underwent Abraxane every 4 weeks and Xgeva every  3 months, DOCUMENTED RADIOLOGICAL PROGRESSION BY BONE SCAN 3/15/17 MOVING AWAY FROM ABRAXANE AND HALAVEN, PRESENTLY ON FEMARA AND KISQALI , XGEVA EVERY 3 MONTHS,    2.Iron deficiency anemia du to GI blood loss, Xarelto stopped months ago, Iron initiated, Pepcid along with Aleve for gastric protection.           History of Present Illness    This patient is here today to continue the follow up of her metastatic breast cancer to the skeleton only with no visceral metastasis undergoing therapy with Femara and Kisqali. The patient has had a remarkable improvement on clinical grounds with near complete resolution of achiness throughout her skeleton and normal functionality. She still has minimal component of diarrhea induced by the Kisqali that does not require any significant intervention. . Her tumor marker CA 15-3 has dramatically dropped to 14.6 telling us that her disease has been controlled by the medicine explaining her clinical picture. Her appetite is great, her weight is stable, her urination is normal. She has no cardiorespiratory symptomatology, no cardiac arrhythmia. She continues on magnesium supplementation. She has ECOG performance status of 1. Minimal need for pain medication.             Past Medical History, Past Surgical History,    1. Hypertension.   2. Hyperlipidemia.   3. Reflux esophagitis.     GYN HISTORY: G0, P0. The patient went into menopause in 2000.     Review of  "Systems    General: no fever, chills, fatigue, weight changes, or lack of appetite.  Eyes: no epiphora, conjunctivitis, pain, glaucoma, blurred vision, blindness, secretion, photophobia.  Ears: no otorrhea, tinnitus, otorrhagia, deafness, pain, vertigo.  Nose: minimal rhinorrhea,no epistaxis, alteration in perception of odors, sinuses pressure.  Mouth: no alteration in gums or teeth,  ulcers, no difficulty with mastication or deglut ion, no odynophagia.  Neck: no masses or pain, no thyroid alterations, no pain in muscles or arteries, no carotid odynia, no crepitation.  Lungs: no cough, sputum production, dyspnea, trepopnea, pleuritic pain, hemoptysis.  Heart: no syncope, irregularity, palpitations, angina, orthopnea, paroxysmal nocturnal dyspnea.  GI: no dysphagia, odynophagia, no regurgitation, no heartburn, indigestion, nausea, vomiting, hematemesis ,melena, jaundice, distention, obstipation, enterorrhagia, proctalgia, anal  Lesions, changes in bowel habits.  : no frequency, hesitancy, hematuria, discharge, pain.  Musculoskeletal: STATED IMPROVEMENT IN muscle or tendon pain, joint pain, edema,NO functional limitation, fasciculations, mass.  Neurologic: no headache, seizures, alterations on Craneal nerves, no motor or senssory deficit, normal coordination.  Skin: no rashes, pruritus or localized lesions.  Psychiatric: no anxiety, depression, agitation, delusions, proper insight.      Medications:  The current medication list was reviewed in the EMR    ALLERGIES:    Allergies   Allergen Reactions   • Codeine    • Docetaxel    • Paclitaxel        Objective      Vitals:    12/05/17 1213   BP: 155/84   Pulse: 93   Resp: 16   Temp: 97.9 °F (36.6 °C)   TempSrc: Oral   SpO2: 97%   Weight: 95.6 kg (210 lb 11.2 oz)   Height: 163 cm (64.17\")   PainSc: 0-No pain     Current Status 11/7/2017   ECOG score 0   1/10 cancer related pain    Physical Exam    GENERAL:  Well-developed, well-nourished  Patient  in no acute distress. "   SKIN:  Warm, dry without rashes, purpura or petechiae.  HEENT:  Pupils were equal and reactive to light and accomodation, conjunctivas non injected, no pterigion, normal extraocular movements, normal visual acuity.   Mouth mucosa was moist, no exudates in oropharynx, normal gum line, normal roof of the mouth and pillars, normal papillations of the tongue.  NECK:  Supple with good range of motion; no thyromegaly or masses, no JVD or bruits, no cervical adenopathies.  LYMPHATICS:  No cervical, supraclavicular, axillary or inguinal adenopathy.  CHEST:  Lungs clear to percussion and auscultation, normal breath sounds bilaterally, no wheezing, crackles or ronchi, no stridor.  CARDIAC:  Regular rate and rhythm with systolic g3/6 basal murmur, clean diastole.,no rubs or gallops.  ABDOMEN:  Soft, nontender with no organomegaly or masses, no ascites, no collateral circulation, no abdominal pain, no inguinal hernias, no abdominal bruits.  EXTREMITIES:  No clubbing, cyanosis or edema, no deformities RESOLVED MILD DIFFUSE SKELETAL pain.  NEUROLOGICAL:  Patient was awake, alert, oriented to time, person and place    RECENT LABS:  Hematology WBC   Date Value Ref Range Status   12/05/2017 3.56 (L) 4.50 - 10.70 10*3/mm3 Final     RBC   Date Value Ref Range Status   12/05/2017 2.86 (L) 3.90 - 5.20 10*6/mm3 Final     Hemoglobin   Date Value Ref Range Status   12/05/2017 9.9 (L) 11.9 - 15.5 g/dL Final     Hematocrit   Date Value Ref Range Status   12/05/2017 29.3 (L) 35.6 - 45.5 % Final     Platelets   Date Value Ref Range Status   12/05/2017 224 140 - 500 10*3/mm3 Final        Component      Latest Ref Rng & Units 7/7/2017 8/11/2017 10/10/2017 11/7/2017          10:06 AM  1:28 PM  3:07 PM  3:26 PM   CA 15-3      <=25.0 U/mL 44.9 (H) 21.5 15.8 14.6       Assessment/Plan    . Metastatic  Left breast cancer to multiple skeletal sites including cervical spine, sternum, lumbar spine and right femur.Since the previous visit, the patient  has been taking her Femara and Kisqali correctly   Today, the patient has leukopenia with no fever, anemia with no significant degree of fatigue and normal platelet count.  Most importantly, the tumor marker has dropped from 53.9 in 05/30 to 14.6 on 11/7/2017.  We still feel that her disease is being controlled by this regimen of medicines.     The patient has minimal need for pain medication, she remains fully functional, her hair has grown back, and she has had her port flushed today.  She has again, leukopenia with no fever, minimal diarrhea and she remains on magnesium supplementation to minimize any potentiality for prolonged QT interval.     RECOMMENDATIONS:   1.  The patient will remain on Kisqali 400 mg a day for 21-days.  Her Femara dose will remain 2.5 mg a day.    2.  We would like for her to have a repeat blood count in 4 weeks and we will review her back in 8 weeks.   3.  In regard to pain medicine, the patient will continue using anti-inflammatory medication that works well for her.  4.  In regard to magnesium supplementation, she remains on this for the time being.    5.  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  I find no need for transfusion of red cells.  6.  In regard to followup, she will be seen again in 2 month.  7.   The patient also will undergo medication for her bone disease in the form of Xgeva that HAS BEEN GIVEN 12/5/17 12/5/2017      CC:

## 2017-12-06 ENCOUNTER — TELEPHONE (OUTPATIENT)
Dept: ONCOLOGY | Facility: CLINIC | Age: 62
End: 2017-12-06

## 2017-12-11 ENCOUNTER — OFFICE VISIT (OUTPATIENT)
Dept: RETAIL CLINIC | Facility: CLINIC | Age: 62
End: 2017-12-11

## 2017-12-11 VITALS
TEMPERATURE: 98.2 F | SYSTOLIC BLOOD PRESSURE: 120 MMHG | OXYGEN SATURATION: 98 % | DIASTOLIC BLOOD PRESSURE: 74 MMHG | RESPIRATION RATE: 16 BRPM | HEART RATE: 88 BPM

## 2017-12-11 DIAGNOSIS — R53.81 MALAISE: Primary | ICD-10-CM

## 2017-12-11 LAB
EXPIRATION DATE: NORMAL
FLUAV AG NPH QL: NORMAL
FLUBV AG NPH QL: NORMAL
INTERNAL CONTROL: NORMAL
Lab: NORMAL

## 2017-12-11 PROCEDURE — 99213 OFFICE O/P EST LOW 20 MIN: CPT | Performed by: NURSE PRACTITIONER

## 2017-12-11 PROCEDURE — 87804 INFLUENZA ASSAY W/OPTIC: CPT | Performed by: NURSE PRACTITIONER

## 2017-12-11 NOTE — PROGRESS NOTES
Subjective:     Maggie Suero is a 62 y.o.     HPI  Started just overall not feeling well yesterday and then she developed a low grade fever. She has taken aleve for her symptoms and this helped somewhat. She is concerned that she may have the flu.    The following portions of the patient's history were reviewed and updated as appropriate: allergies, current medications, past family history, past medical history, past social history, past surgical history and problem list.      Review of Systems   Constitutional: Positive for appetite change (decreased), fatigue and fever.   HENT: Negative for congestion and sore throat.    Eyes: Negative for discharge and redness.   Respiratory: Negative for cough, shortness of breath and wheezing.    Cardiovascular: Negative.    Gastrointestinal: Negative for diarrhea, nausea and vomiting.   Musculoskeletal: Positive for myalgias.   Skin: Negative for color change, pallor and rash.   Neurological: Negative for dizziness, numbness and headaches.         Objective:      Physical Exam   Constitutional: She is oriented to person, place, and time. She appears well-developed and well-nourished. She is cooperative.   HENT:   Head: Normocephalic and atraumatic.   Right Ear: Tympanic membrane and ear canal normal.   Left Ear: Tympanic membrane and ear canal normal.   Nose: Nose normal.   Mouth/Throat: No oropharyngeal exudate or posterior oropharyngeal erythema.   Eyes: EOM and lids are normal. Pupils are equal, round, and reactive to light.   Neck: Normal range of motion. Neck supple.   Cardiovascular: Normal rate, regular rhythm, S1 normal, S2 normal and normal heart sounds.    Pulmonary/Chest: Effort normal and breath sounds normal.   Abdominal: Soft. Normal appearance and bowel sounds are normal. There is no tenderness.   Musculoskeletal: Normal range of motion.   Lymphadenopathy:     She has cervical adenopathy (mild left sided).   Neurological: She is alert and oriented to person,  place, and time.   Skin: Skin is warm, dry and intact.   Psychiatric: She has a normal mood and affect. Her speech is normal and behavior is normal. Thought content normal.   Vitals reviewed.          Diagnoses and all orders for this visit:    Malaise  -     POC Influenza A / B    If symptoms worsen or persist follow up with primary care provider.

## 2018-01-10 ENCOUNTER — LAB (OUTPATIENT)
Dept: ONCOLOGY | Facility: HOSPITAL | Age: 63
End: 2018-01-10

## 2018-01-10 ENCOUNTER — OFFICE VISIT (OUTPATIENT)
Dept: ONCOLOGY | Facility: CLINIC | Age: 63
End: 2018-01-10

## 2018-01-10 DIAGNOSIS — Z45.2 FITTING AND ADJUSTMENT OF VASCULAR CATHETER: ICD-10-CM

## 2018-01-10 DIAGNOSIS — C50.912 PRIMARY MALIGNANT NEOPLASM OF LEFT BREAST (HCC): Primary | ICD-10-CM

## 2018-01-10 DIAGNOSIS — C79.51 BONE METASTASIS: ICD-10-CM

## 2018-01-10 DIAGNOSIS — D70.1 LEUKOPENIA DUE TO ANTINEOPLASTIC CHEMOTHERAPY (HCC): ICD-10-CM

## 2018-01-10 DIAGNOSIS — T45.1X5A LEUKOPENIA DUE TO ANTINEOPLASTIC CHEMOTHERAPY (HCC): ICD-10-CM

## 2018-01-10 DIAGNOSIS — D50.0 IRON DEFICIENCY ANEMIA DUE TO CHRONIC BLOOD LOSS: ICD-10-CM

## 2018-01-10 LAB
ALBUMIN SERPL-MCNC: 4.2 G/DL (ref 3.5–5.2)
ALBUMIN/GLOB SERPL: 1.6 G/DL
ALP SERPL-CCNC: 94 U/L (ref 39–117)
ALT SERPL W P-5'-P-CCNC: 18 U/L (ref 1–33)
ANION GAP SERPL CALCULATED.3IONS-SCNC: 11.7 MMOL/L
AST SERPL-CCNC: 16 U/L (ref 1–32)
BASOPHILS # BLD AUTO: 0.05 10*3/MM3 (ref 0–0.2)
BASOPHILS NFR BLD AUTO: 1.1 % (ref 0–1.5)
BILIRUB SERPL-MCNC: 0.2 MG/DL (ref 0.1–1.2)
BUN BLD-MCNC: 24 MG/DL (ref 8–23)
BUN/CREAT SERPL: 24.2 (ref 7–25)
CALCIUM SPEC-SCNC: 9.9 MG/DL (ref 8.6–10.5)
CANCER AG15-3 SERPL-ACNC: 13.2 U/ML
CHLORIDE SERPL-SCNC: 100 MMOL/L (ref 98–107)
CO2 SERPL-SCNC: 27.3 MMOL/L (ref 22–29)
CREAT BLD-MCNC: 0.99 MG/DL (ref 0.57–1)
DEPRECATED RDW RBC AUTO: 54.5 FL (ref 37–54)
EOSINOPHIL # BLD AUTO: 0.1 10*3/MM3 (ref 0–0.7)
EOSINOPHIL NFR BLD AUTO: 2.1 % (ref 0.3–6.2)
ERYTHROCYTE [DISTWIDTH] IN BLOOD BY AUTOMATED COUNT: 14.6 % (ref 11.7–13)
GFR SERPL CREATININE-BSD FRML MDRD: 57 ML/MIN/1.73
GLOBULIN UR ELPH-MCNC: 2.7 GM/DL
GLUCOSE BLD-MCNC: 106 MG/DL (ref 65–99)
HCT VFR BLD AUTO: 30.3 % (ref 35.6–45.5)
HGB BLD-MCNC: 10.1 G/DL (ref 11.9–15.5)
IMM GRANULOCYTES # BLD: 0.05 10*3/MM3 (ref 0–0.03)
IMM GRANULOCYTES NFR BLD: 1.1 % (ref 0–0.5)
LYMPHOCYTES # BLD AUTO: 1.22 10*3/MM3 (ref 0.9–4.8)
LYMPHOCYTES NFR BLD AUTO: 25.6 % (ref 19.6–45.3)
MAGNESIUM SERPL-MCNC: 1.9 MG/DL (ref 1.6–2.4)
MCH RBC QN AUTO: 33.6 PG (ref 26.9–32)
MCHC RBC AUTO-ENTMCNC: 33.3 G/DL (ref 32.4–36.3)
MCV RBC AUTO: 100.7 FL (ref 80.5–98.2)
MONOCYTES # BLD AUTO: 0.48 10*3/MM3 (ref 0.2–1.2)
MONOCYTES NFR BLD AUTO: 10.1 % (ref 5–12)
NEUTROPHILS # BLD AUTO: 2.86 10*3/MM3 (ref 1.9–8.1)
NEUTROPHILS NFR BLD AUTO: 60 % (ref 42.7–76)
NRBC BLD MANUAL-RTO: 0 /100 WBC (ref 0–0)
PLATELET # BLD AUTO: 267 10*3/MM3 (ref 140–500)
PMV BLD AUTO: 10.1 FL (ref 6–12)
POTASSIUM BLD-SCNC: 4.6 MMOL/L (ref 3.5–5.2)
PROT SERPL-MCNC: 6.9 G/DL (ref 6–8.5)
RBC # BLD AUTO: 3.01 10*6/MM3 (ref 3.9–5.2)
SODIUM BLD-SCNC: 139 MMOL/L (ref 136–145)
WBC NRBC COR # BLD: 4.76 10*3/MM3 (ref 4.5–10.7)

## 2018-01-10 PROCEDURE — 99212-NC PR NO CHARGE CBC OFFICE OUTPATIENT VISIT 10 MINUTES: Performed by: NURSE PRACTITIONER

## 2018-01-10 PROCEDURE — 96523 IRRIG DRUG DELIVERY DEVICE: CPT | Performed by: NURSE PRACTITIONER

## 2018-01-10 PROCEDURE — 83735 ASSAY OF MAGNESIUM: CPT | Performed by: INTERNAL MEDICINE

## 2018-01-10 PROCEDURE — 25010000002 HEPARIN FLUSH (PORCINE) 100 UNIT/ML SOLUTION: Performed by: INTERNAL MEDICINE

## 2018-01-10 PROCEDURE — 80053 COMPREHEN METABOLIC PANEL: CPT | Performed by: INTERNAL MEDICINE

## 2018-01-10 PROCEDURE — 85025 COMPLETE CBC W/AUTO DIFF WBC: CPT | Performed by: INTERNAL MEDICINE

## 2018-01-10 PROCEDURE — 86300 IMMUNOASSAY TUMOR CA 15-3: CPT | Performed by: INTERNAL MEDICINE

## 2018-01-10 RX ORDER — SODIUM CHLORIDE 0.9 % (FLUSH) 0.9 %
10 SYRINGE (ML) INJECTION AS NEEDED
Status: CANCELLED | OUTPATIENT
Start: 2018-01-10

## 2018-01-10 RX ORDER — SODIUM CHLORIDE 0.9 % (FLUSH) 0.9 %
10 SYRINGE (ML) INJECTION AS NEEDED
Status: DISCONTINUED | OUTPATIENT
Start: 2018-01-10 | End: 2018-01-10 | Stop reason: HOSPADM

## 2018-01-10 RX ADMIN — SODIUM CHLORIDE, PRESERVATIVE FREE 500 UNITS: 5 INJECTION INTRAVENOUS at 09:53

## 2018-01-10 RX ADMIN — Medication 10 ML: at 09:53

## 2018-01-10 NOTE — PROGRESS NOTES
I reviewed labs with Mrs Suero today. Her CBC is stable. She continues to take her Kisqali 400mg a day for 21 days. She restarted her cycle this past weekend. She has no new questions or concerns. She will follow up as already scheduled 2/13/2018 to see Dr Saez. I have instructed her to call the office if she experiences and new or worsening symptoms before this time.    Lab Results   Component Value Date    WBC 4.76 01/10/2018    HGB 10.1 (L) 01/10/2018    HCT 30.3 (L) 01/10/2018    .7 (H) 01/10/2018     01/10/2018

## 2018-01-18 RX ORDER — LETROZOLE 2.5 MG/1
TABLET, FILM COATED ORAL
Qty: 30 TABLET | Refills: 6 | Status: SHIPPED | OUTPATIENT
Start: 2018-01-18 | End: 2018-08-21 | Stop reason: SDUPTHER

## 2018-02-13 ENCOUNTER — OFFICE VISIT (OUTPATIENT)
Dept: ONCOLOGY | Facility: CLINIC | Age: 63
End: 2018-02-13

## 2018-02-13 ENCOUNTER — LAB (OUTPATIENT)
Dept: ONCOLOGY | Facility: HOSPITAL | Age: 63
End: 2018-02-13

## 2018-02-13 VITALS
OXYGEN SATURATION: 95 % | TEMPERATURE: 98.3 F | HEIGHT: 64 IN | WEIGHT: 207.6 LBS | HEART RATE: 84 BPM | SYSTOLIC BLOOD PRESSURE: 156 MMHG | RESPIRATION RATE: 16 BRPM | DIASTOLIC BLOOD PRESSURE: 82 MMHG | BODY MASS INDEX: 35.44 KG/M2

## 2018-02-13 DIAGNOSIS — C50.912 PRIMARY MALIGNANT NEOPLASM OF LEFT BREAST (HCC): Primary | ICD-10-CM

## 2018-02-13 DIAGNOSIS — T45.1X5A LEUKOPENIA DUE TO ANTINEOPLASTIC CHEMOTHERAPY (HCC): ICD-10-CM

## 2018-02-13 DIAGNOSIS — C79.51 BONE METASTASIS: ICD-10-CM

## 2018-02-13 DIAGNOSIS — M85.89 OSTEOPENIA OF MULTIPLE SITES: ICD-10-CM

## 2018-02-13 DIAGNOSIS — Z45.2 FITTING AND ADJUSTMENT OF VASCULAR CATHETER: ICD-10-CM

## 2018-02-13 DIAGNOSIS — D50.0 IRON DEFICIENCY ANEMIA DUE TO CHRONIC BLOOD LOSS: ICD-10-CM

## 2018-02-13 DIAGNOSIS — D68.59 THROMBOPHILIA (HCC): ICD-10-CM

## 2018-02-13 DIAGNOSIS — D70.1 LEUKOPENIA DUE TO ANTINEOPLASTIC CHEMOTHERAPY (HCC): ICD-10-CM

## 2018-02-13 DIAGNOSIS — D51.0 VITAMIN B12 DEFICIENCY ANEMIA DUE TO INTRINSIC FACTOR DEFICIENCY: ICD-10-CM

## 2018-02-13 LAB
ALBUMIN SERPL-MCNC: 4.3 G/DL (ref 3.5–5.2)
ALBUMIN/GLOB SERPL: 1.8 G/DL
ALP SERPL-CCNC: 87 U/L (ref 39–117)
ALT SERPL W P-5'-P-CCNC: 21 U/L (ref 1–33)
ANION GAP SERPL CALCULATED.3IONS-SCNC: 11.4 MMOL/L
AST SERPL-CCNC: 17 U/L (ref 1–32)
BASOPHILS # BLD AUTO: 0.03 10*3/MM3 (ref 0–0.2)
BASOPHILS NFR BLD AUTO: 0.9 % (ref 0–1.5)
BILIRUB SERPL-MCNC: 0.4 MG/DL (ref 0.1–1.2)
BUN BLD-MCNC: 26 MG/DL (ref 8–23)
BUN/CREAT SERPL: 26 (ref 7–25)
CALCIUM SPEC-SCNC: 9.3 MG/DL (ref 8.6–10.5)
CANCER AG15-3 SERPL-ACNC: 12 U/ML
CHLORIDE SERPL-SCNC: 101 MMOL/L (ref 98–107)
CO2 SERPL-SCNC: 27.6 MMOL/L (ref 22–29)
CREAT BLD-MCNC: 1 MG/DL (ref 0.57–1)
DEPRECATED RDW RBC AUTO: 56.3 FL (ref 37–54)
EOSINOPHIL # BLD AUTO: 0.05 10*3/MM3 (ref 0–0.7)
EOSINOPHIL NFR BLD AUTO: 1.4 % (ref 0.3–6.2)
ERYTHROCYTE [DISTWIDTH] IN BLOOD BY AUTOMATED COUNT: 15.2 % (ref 11.7–13)
GFR SERPL CREATININE-BSD FRML MDRD: 56 ML/MIN/1.73
GLOBULIN UR ELPH-MCNC: 2.4 GM/DL
GLUCOSE BLD-MCNC: 103 MG/DL (ref 65–99)
HCT VFR BLD AUTO: 29.8 % (ref 35.6–45.5)
HGB BLD-MCNC: 10 G/DL (ref 11.9–15.5)
IMM GRANULOCYTES # BLD: 0.02 10*3/MM3 (ref 0–0.03)
IMM GRANULOCYTES NFR BLD: 0.6 % (ref 0–0.5)
LYMPHOCYTES # BLD AUTO: 0.94 10*3/MM3 (ref 0.9–4.8)
LYMPHOCYTES NFR BLD AUTO: 27 % (ref 19.6–45.3)
MAGNESIUM SERPL-MCNC: 1.9 MG/DL (ref 1.6–2.4)
MCH RBC QN AUTO: 33.6 PG (ref 26.9–32)
MCHC RBC AUTO-ENTMCNC: 33.6 G/DL (ref 32.4–36.3)
MCV RBC AUTO: 100 FL (ref 80.5–98.2)
MONOCYTES # BLD AUTO: 0.26 10*3/MM3 (ref 0.2–1.2)
MONOCYTES NFR BLD AUTO: 7.5 % (ref 5–12)
NEUTROPHILS # BLD AUTO: 2.18 10*3/MM3 (ref 1.9–8.1)
NEUTROPHILS NFR BLD AUTO: 62.6 % (ref 42.7–76)
NRBC BLD MANUAL-RTO: 0 /100 WBC (ref 0–0)
PLATELET # BLD AUTO: 255 10*3/MM3 (ref 140–500)
PMV BLD AUTO: 10.7 FL (ref 6–12)
POTASSIUM BLD-SCNC: 4.3 MMOL/L (ref 3.5–5.2)
PROT SERPL-MCNC: 6.7 G/DL (ref 6–8.5)
RBC # BLD AUTO: 2.98 10*6/MM3 (ref 3.9–5.2)
SODIUM BLD-SCNC: 140 MMOL/L (ref 136–145)
WBC NRBC COR # BLD: 3.48 10*3/MM3 (ref 4.5–10.7)

## 2018-02-13 PROCEDURE — 99213 OFFICE O/P EST LOW 20 MIN: CPT | Performed by: INTERNAL MEDICINE

## 2018-02-13 PROCEDURE — 96523 IRRIG DRUG DELIVERY DEVICE: CPT | Performed by: INTERNAL MEDICINE

## 2018-02-13 PROCEDURE — 80053 COMPREHEN METABOLIC PANEL: CPT | Performed by: INTERNAL MEDICINE

## 2018-02-13 PROCEDURE — 86300 IMMUNOASSAY TUMOR CA 15-3: CPT | Performed by: INTERNAL MEDICINE

## 2018-02-13 PROCEDURE — 36415 COLL VENOUS BLD VENIPUNCTURE: CPT

## 2018-02-13 PROCEDURE — 83735 ASSAY OF MAGNESIUM: CPT | Performed by: INTERNAL MEDICINE

## 2018-02-13 PROCEDURE — 25010000002 HEPARIN FLUSH (PORCINE) 100 UNIT/ML SOLUTION: Performed by: INTERNAL MEDICINE

## 2018-02-13 PROCEDURE — 85025 COMPLETE CBC W/AUTO DIFF WBC: CPT | Performed by: INTERNAL MEDICINE

## 2018-02-13 RX ORDER — SODIUM CHLORIDE 0.9 % (FLUSH) 0.9 %
10 SYRINGE (ML) INJECTION AS NEEDED
Status: CANCELLED | OUTPATIENT
Start: 2018-02-13

## 2018-02-13 RX ORDER — SODIUM CHLORIDE 0.9 % (FLUSH) 0.9 %
10 SYRINGE (ML) INJECTION AS NEEDED
Status: DISCONTINUED | OUTPATIENT
Start: 2018-02-13 | End: 2018-02-13 | Stop reason: HOSPADM

## 2018-02-13 RX ADMIN — Medication 10 ML: at 11:33

## 2018-02-13 RX ADMIN — SODIUM CHLORIDE, PRESERVATIVE FREE 500 UNITS: 5 INJECTION INTRAVENOUS at 11:33

## 2018-02-13 NOTE — PROGRESS NOTES
Subjective  REASONS FOR FOLLOWUP: 1. Metastatic breast cancer to multiple skeletal sites including cervical spine, sternum, lumbar spine and right femur, underwent Abraxane every 4 weeks and Xgeva every  3 months, DOCUMENTED RADIOLOGICAL PROGRESSION BY BONE SCAN 3/15/17 MOVING AWAY FROM ABRAXANE AND HALAVEN, PRESENTLY ON FEMARA AND KISQALI , XGEVA EVERY 3 MONTHS,    2.Iron deficiency anemia du to GI blood loss, Xarelto stopped months ago, Iron initiated, Pepcid along with Aleve for gastric protection.           History of Present Illness    This patient is here today to continue the follow up of her metastatic breast cancer to the skeleton only with no visceral metastasis undergoing therapy with Femara and Kisqali. The patient has had a remarkable improvement on clinical grounds with near complete resolution of achiness throughout her skeleton and normal functionality. She still has minimal component of diarrhea induced by the Kisqali that does not require any significant intervention. . Her tumor marker CA 15-3 has dramatically dropped telling us that her disease has been controlled by the medicine explaining her clinical picture. Her appetite is great, her weight is stable, her urination is normal. She has no cardiorespiratory symptomatology, no cardiac arrhythmia. She continues on magnesium supplementation. She has ECOG performance status of 1. Minimal need for pain medication.             Past Medical History, Past Surgical History,    1. Hypertension.   2. Hyperlipidemia.   3. Reflux esophagitis.     GYN HISTORY: G0, P0. The patient went into menopause in 2000.     Review of Systems    General: no fever, chills, fatigue, weight changes, or lack of appetite.  Eyes: no epiphora, xerophthalmia,conjunctivitis, pain, glaucoma, blurred vision, blindness, secretion, photophobia, proptosis, diplopia.  Ears: no otorrhea, tinnitus, otorrhagia, deafness, pain, vertigo.  Nose: no rhinorrhea, epistaxis, alteration in perception of odors, sinuses pressure.  Mouth: no alteration in gums or teeth,  ulcers, no difficulty with mastication or deglut ion, no odynophagia.  Neck: no masses or pain, no thyroid alterations, no pain in muscles or arteries, no carotid odynia, no crepitation.  Respiratory: no cough, sputum production, dyspnea, trepopnea, pleuritic pain, hemoptysis.  Heart: no syncope, irregularity, palpitations, angina, orthopnea, paroxysmal nocturnal dyspnea.  Vascular Venous: no tenderness,edema, palpable cords, postphlebitic syndrome, skin changes or ulcerations.  Vascular Arterial: no distal ischemia, claudication, gangrene, neuropathic ischemic pain, skin ulcers, paleness or cyanosis.  GI: no dysphagia, odynophagia, no regurgitation, no heartburn,no indigestion,no nausea,no vomiting,no hematemesis ,no melena,no jaundice,no distention, no obstipation,no enterorrhagia,no proctalgia,no anal  lesions, nochanges in bowel habits.  : no frequency, hesitancy, hematuria, discharge, pain.  Musculoskeletal: no muscle or tendon pain or inflammation, joint pain, edema, functional limitation, fasciculations, mass.  Neurologic: no headache, seizures, alterations on Craneal nerves, no motor or senssory deficit, normal coordination, no alteration in memory, orientation, calculation,writting, verbal or written language.  Skin: no rashes, pruritus or localized lesions.  Psychiatric: no anxiety, depression, agitation, delusions, proper insight.         Medications:  The current medication list was reviewed in the EMR    ALLERGIES:    Allergies   Allergen Reactions   • Codeine    • Docetaxel    • Paclitaxel   "      Objective      Vitals:    02/13/18 1145   BP: 156/82   Pulse: 84   Resp: 16   Temp: 98.3 °F (36.8 °C)   TempSrc: Oral   SpO2: 95%   Weight: 94.2 kg (207 lb 9.6 oz)   Height: 163 cm (64.17\")   PainSc: 0-No pain     Current Status 2/13/2018   ECOG score 0   1/10 cancer related pain    Physical Exam    GENERAL:  Well-developed, well-nourished  Patient  in no acute distress.   SKIN:  Warm, dry without rashes, purpura or petechiae.  HEENT:  Pupils were equal and reactive to light and accomodation, conjunctivas non injected, no pterigion, normal extraocular movements, normal visual acuity.   Mouth mucosa was moist, no exudates in oropharynx, normal gum line, normal roof of the mouth and pillars, normal papillations of the tongue  NECK:  Supple with good range of motion; no thyromegaly or masses, no JVD or bruits, no cervical adenopathies.No carotid arteries pain, no carotid abnormal pulsation or arterial dance.  LYMPHATICS:  No cervical, supraclavicular, axillary, epitrochlear or inguinal adenopathy.  CHEST:  Normal excursion of both stacy thoraces, normal voice fremitus, no subcutaneous emphysema, normal axillas, no rashes or acanthosis nigricans. Lungs clear to percussion and auscultation, normal breath sounds bilaterally, no wheezing, crackles or ronchi, no stridor, no rubs.  CARDIAC AND VASCULAR:  normal rate and regular rhythm, without murmurs, rubs or S3 or S4 right or left sided gallops. Normal femoral, popliteal, pedis, brachial and carotid pulses.  ABDOMEN:  Soft, nontender with no organomegaly or masses, no ascites, no collateral circulation,no distention,no Randall sign, no abdominal pain, no inguinal hernias,no umbilical hernias, no abdominal bruits. Normal bowel sounds.  GENITAL: Not  Performed.  EXTREMITIES  AND SPINE:  No clubbing, cyanosis or edema, no deformities or pain .No kyphosis, scoliosis, deformities or pain in spine, ribs or pelvic bone.  NEUROLOGICAL:  Patient was awake, alert, oriented to " time, person and place,normal gait and coordination.    RECENT LABS:  Hematology WBC   Date Value Ref Range Status   02/13/2018 3.48 (L) 4.50 - 10.70 10*3/mm3 Final     RBC   Date Value Ref Range Status   02/13/2018 2.98 (L) 3.90 - 5.20 10*6/mm3 Final     Hemoglobin   Date Value Ref Range Status   02/13/2018 10.0 (L) 11.9 - 15.5 g/dL Final     Hematocrit   Date Value Ref Range Status   02/13/2018 29.8 (L) 35.6 - 45.5 % Final     Platelets   Date Value Ref Range Status   02/13/2018 255 140 - 500 10*3/mm3 Final        Component      Latest Ref Rng & Units 5/30/2017 7/7/2017 8/11/2017 10/10/2017          12:37 PM 10:06 AM  1:28 PM  3:07 PM   CA 15-3      <=25.0 U/mL 53.9 (H) 44.9 (H) 21.5 15.8     Component      Latest Ref Rng & Units 11/7/2017 12/5/2017 1/10/2018           3:26 PM 11:31 AM  9:32 AM   CA 15-3      <=25.0 U/mL 14.6 14.2 13.2     Assessment/Plan    . Metastatic  Left breast cancer to multiple skeletal sites including cervical spine, sternum, lumbar spine and right femur.Since the previous visit, the patient has been taking her Femara and Kisqali correctly   Today, the patient has leukopenia with no fever, anemia with no significant degree of fatigue and normal platelet count.  Most importantly, the tumor marker has dropped from 53.9 in 05/30 to 13.2 on 1/10/18.  We still feel that her disease is being controlled by this regimen of medicines.     The patient has minimal need for pain medication, she remains fully functional, her hair has grown back, and she has had her port flushed today.  She has again, leukopenia with no fever, and she remains on magnesium supplementation to minimize any potentiality for prolonged QT interval.     RECOMMENDATIONS:   1.  The patient will remain on Kisqali 400 mg a day for 21-days.  Her Femara dose will remain 2.5 mg a day.    2.  We would like for her to have a repeat blood count in 5 weeks and we will review her back in 5 weeks.   3.  In regard to pain medicine, the  patient will continue using anti-inflammatory medication that works well for her.  4.  In regard to magnesium supplementation, she remains on this for the time being.    5.  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  I find no need for transfusion of red cells.  6.   The patient also will undergo medication for her bone disease in the form of Xgeva that HAS BEEN GIVEN 12/5/17 , next dose with next visit in 5 weeks         2/13/2018      CC:

## 2018-02-15 RX ORDER — LISINOPRIL AND HYDROCHLOROTHIAZIDE 12.5; 1 MG/1; MG/1
TABLET ORAL
Qty: 180 TABLET | Refills: 0 | Status: SHIPPED | OUTPATIENT
Start: 2018-02-15 | End: 2018-05-08 | Stop reason: SDUPTHER

## 2018-02-20 ENCOUNTER — TELEPHONE (OUTPATIENT)
Dept: ONCOLOGY | Facility: HOSPITAL | Age: 63
End: 2018-02-20

## 2018-02-20 NOTE — TELEPHONE ENCOUNTER
Received call from Dentist that patient is there for a cleaning.  Patient is on Kisqali.  Reviewed counts from last week with Vicky DAVISON.  Ok for cleaning if necessary.  Called dentist back, they decided not to do it today.  Understands patient next appt with us in on March 20th and if counts stable, she can get a cleaning.

## 2018-03-15 DIAGNOSIS — I10 ESSENTIAL HYPERTENSION: ICD-10-CM

## 2018-03-15 DIAGNOSIS — E78.5 HYPERLIPIDEMIA, UNSPECIFIED HYPERLIPIDEMIA TYPE: Primary | ICD-10-CM

## 2018-03-20 ENCOUNTER — APPOINTMENT (OUTPATIENT)
Dept: ONCOLOGY | Facility: CLINIC | Age: 63
End: 2018-03-20

## 2018-03-20 ENCOUNTER — APPOINTMENT (OUTPATIENT)
Dept: ONCOLOGY | Facility: HOSPITAL | Age: 63
End: 2018-03-20

## 2018-03-20 ENCOUNTER — TELEPHONE (OUTPATIENT)
Dept: ONCOLOGY | Facility: HOSPITAL | Age: 63
End: 2018-03-20

## 2018-03-20 NOTE — TELEPHONE ENCOUNTER
Patient calling to let us know that she did not make her appt due to her father passing.   Reassured patient that info would be passed on to Dr Saez.

## 2018-04-13 ENCOUNTER — INFUSION (OUTPATIENT)
Dept: ONCOLOGY | Facility: HOSPITAL | Age: 63
End: 2018-04-13

## 2018-04-13 ENCOUNTER — OFFICE VISIT (OUTPATIENT)
Dept: ONCOLOGY | Facility: CLINIC | Age: 63
End: 2018-04-13

## 2018-04-13 VITALS
DIASTOLIC BLOOD PRESSURE: 86 MMHG | HEART RATE: 87 BPM | SYSTOLIC BLOOD PRESSURE: 146 MMHG | TEMPERATURE: 98.4 F | RESPIRATION RATE: 16 BRPM | OXYGEN SATURATION: 96 % | WEIGHT: 210.4 LBS | HEIGHT: 64 IN | BODY MASS INDEX: 35.92 KG/M2

## 2018-04-13 DIAGNOSIS — T45.1X5A LEUKOPENIA DUE TO ANTINEOPLASTIC CHEMOTHERAPY (HCC): ICD-10-CM

## 2018-04-13 DIAGNOSIS — D70.1 LEUKOPENIA DUE TO ANTINEOPLASTIC CHEMOTHERAPY (HCC): ICD-10-CM

## 2018-04-13 DIAGNOSIS — D50.0 IRON DEFICIENCY ANEMIA DUE TO CHRONIC BLOOD LOSS: Primary | ICD-10-CM

## 2018-04-13 DIAGNOSIS — D51.0 VITAMIN B12 DEFICIENCY ANEMIA DUE TO INTRINSIC FACTOR DEFICIENCY: ICD-10-CM

## 2018-04-13 DIAGNOSIS — Z45.2 FITTING AND ADJUSTMENT OF VASCULAR CATHETER: ICD-10-CM

## 2018-04-13 DIAGNOSIS — C79.51 BONE METASTASIS: ICD-10-CM

## 2018-04-13 DIAGNOSIS — C50.912 PRIMARY MALIGNANT NEOPLASM OF LEFT BREAST (HCC): ICD-10-CM

## 2018-04-13 DIAGNOSIS — C50.912 PRIMARY MALIGNANT NEOPLASM OF LEFT BREAST (HCC): Primary | ICD-10-CM

## 2018-04-13 DIAGNOSIS — M85.89 OSTEOPENIA OF MULTIPLE SITES: Primary | ICD-10-CM

## 2018-04-13 LAB
ALBUMIN SERPL-MCNC: 4.1 G/DL (ref 3.5–5.2)
ALBUMIN/GLOB SERPL: 1.5 G/DL (ref 1.1–2.4)
ALP SERPL-CCNC: 87 U/L (ref 38–116)
ALT SERPL W P-5'-P-CCNC: 18 U/L (ref 0–33)
ANION GAP SERPL CALCULATED.3IONS-SCNC: 12.7 MMOL/L
AST SERPL-CCNC: 16 U/L (ref 0–32)
BASOPHILS # BLD AUTO: 0.03 10*3/MM3 (ref 0–0.1)
BASOPHILS NFR BLD AUTO: 1 % (ref 0–1.1)
BILIRUB SERPL-MCNC: 0.4 MG/DL (ref 0.1–1.2)
BUN BLD-MCNC: 30 MG/DL (ref 6–20)
BUN/CREAT SERPL: 31.3 (ref 7.3–30)
CALCIUM SPEC-SCNC: 9.9 MG/DL (ref 8.5–10.2)
CHLORIDE SERPL-SCNC: 101 MMOL/L (ref 98–107)
CO2 SERPL-SCNC: 27.3 MMOL/L (ref 22–29)
CREAT BLD-MCNC: 0.96 MG/DL (ref 0.6–1.1)
DEPRECATED RDW RBC AUTO: 55.9 FL (ref 37–49)
EOSINOPHIL # BLD AUTO: 0.09 10*3/MM3 (ref 0–0.36)
EOSINOPHIL NFR BLD AUTO: 3.1 % (ref 1–5)
ERYTHROCYTE [DISTWIDTH] IN BLOOD BY AUTOMATED COUNT: 15 % (ref 11.7–14.5)
GFR SERPL CREATININE-BSD FRML MDRD: 59 ML/MIN/1.73
GLOBULIN UR ELPH-MCNC: 2.7 GM/DL (ref 1.8–3.5)
GLUCOSE BLD-MCNC: 113 MG/DL (ref 74–124)
HCT VFR BLD AUTO: 31.2 % (ref 34–45)
HGB BLD-MCNC: 10.6 G/DL (ref 11.5–14.9)
IMM GRANULOCYTES # BLD: 0.01 10*3/MM3 (ref 0–0.03)
IMM GRANULOCYTES NFR BLD: 0.3 % (ref 0–0.5)
LYMPHOCYTES # BLD AUTO: 0.81 10*3/MM3 (ref 1–3.5)
LYMPHOCYTES NFR BLD AUTO: 27.6 % (ref 20–49)
MAGNESIUM SERPL-MCNC: 1.8 MG/DL (ref 1.8–2.5)
MCH RBC QN AUTO: 34.3 PG (ref 27–33)
MCHC RBC AUTO-ENTMCNC: 34 G/DL (ref 32–35)
MCV RBC AUTO: 101 FL (ref 83–97)
MONOCYTES # BLD AUTO: 0.19 10*3/MM3 (ref 0.25–0.8)
MONOCYTES NFR BLD AUTO: 6.5 % (ref 4–12)
NEUTROPHILS # BLD AUTO: 1.8 10*3/MM3 (ref 1.5–7)
NEUTROPHILS NFR BLD AUTO: 61.5 % (ref 39–75)
NRBC BLD MANUAL-RTO: 0 /100 WBC (ref 0–0)
PHOSPHATE SERPL-MCNC: 3.9 MG/DL (ref 2.5–4.5)
PLATELET # BLD AUTO: 258 10*3/MM3 (ref 150–375)
PMV BLD AUTO: 10.7 FL (ref 8.9–12.1)
POTASSIUM BLD-SCNC: 4.1 MMOL/L (ref 3.5–4.7)
PROT SERPL-MCNC: 6.8 G/DL (ref 6.3–8)
RBC # BLD AUTO: 3.09 10*6/MM3 (ref 3.9–5)
SODIUM BLD-SCNC: 141 MMOL/L (ref 134–145)
WBC NRBC COR # BLD: 2.93 10*3/MM3 (ref 4–10)

## 2018-04-13 PROCEDURE — 25010000002 HEPARIN FLUSH (PORCINE) 100 UNIT/ML SOLUTION: Performed by: INTERNAL MEDICINE

## 2018-04-13 PROCEDURE — 25010000002 DENOSUMAB 120 MG/1.7ML SOLUTION: Performed by: NURSE PRACTITIONER

## 2018-04-13 PROCEDURE — 96372 THER/PROPH/DIAG INJ SC/IM: CPT | Performed by: NURSE PRACTITIONER

## 2018-04-13 PROCEDURE — 85025 COMPLETE CBC W/AUTO DIFF WBC: CPT | Performed by: NURSE PRACTITIONER

## 2018-04-13 PROCEDURE — 83735 ASSAY OF MAGNESIUM: CPT | Performed by: NURSE PRACTITIONER

## 2018-04-13 PROCEDURE — 96523 IRRIG DRUG DELIVERY DEVICE: CPT | Performed by: NURSE PRACTITIONER

## 2018-04-13 PROCEDURE — 80053 COMPREHEN METABOLIC PANEL: CPT | Performed by: NURSE PRACTITIONER

## 2018-04-13 PROCEDURE — 84100 ASSAY OF PHOSPHORUS: CPT | Performed by: NURSE PRACTITIONER

## 2018-04-13 PROCEDURE — 99214 OFFICE O/P EST MOD 30 MIN: CPT | Performed by: NURSE PRACTITIONER

## 2018-04-13 RX ORDER — SODIUM CHLORIDE 0.9 % (FLUSH) 0.9 %
10 SYRINGE (ML) INJECTION AS NEEDED
Status: CANCELLED | OUTPATIENT
Start: 2018-04-13

## 2018-04-13 RX ORDER — SODIUM CHLORIDE 0.9 % (FLUSH) 0.9 %
10 SYRINGE (ML) INJECTION AS NEEDED
Status: DISCONTINUED | OUTPATIENT
Start: 2018-04-13 | End: 2018-04-13 | Stop reason: HOSPADM

## 2018-04-13 RX ADMIN — Medication 10 ML: at 08:55

## 2018-04-13 RX ADMIN — SODIUM CHLORIDE, PRESERVATIVE FREE 500 UNITS: 5 INJECTION INTRAVENOUS at 08:55

## 2018-04-13 RX ADMIN — DENOSUMAB 120 MG: 120 INJECTION SUBCUTANEOUS at 09:23

## 2018-04-13 NOTE — PROGRESS NOTES
Subjective  REASONS FOR FOLLOWUP: 1. Metastatic breast cancer to multiple skeletal sites including cervical spine, sternum, lumbar spine and right femur, underwent Abraxane every 4 weeks and Xgeva every  3 months, DOCUMENTED RADIOLOGICAL PROGRESSION BY BONE SCAN 3/15/17 [facility], PRESENTLY ON FEMARA AND KISQALI , XGEVA EVERY 3 MONTHS,    2.Iron deficiency anemia du to GI blood loss, Xarelto stopped months ago, Iron initiated, Pepcid along with Aleve for gastric protection.           History of Present Illness  Ms. Suero is a 62 year old female with the above mentioned history, here today for reevaluation and due for Xgeva.  She continues on Kisqali and Femara.  She is tolerating both drugs well.  She is currently on day 14 of this cycle of Kisqali.  She denies any significant myalgias or arthralgias.  She will occasionally have hot flashes at night.  She denies mood swings.  She is not having any issues with nausea, vomiting, diarrhea, or constipation.  She does report fatigue.  She only has shortness of breath with exertion, denies swelling. Denies fever or chills.    Ms. Suero continues on B12 supplementation as well as oral iron.     She is due in about a week for her next mammogram.  She says she will call to schedule it.     Past Medical History, Past Surgical History,    1. Hypertension.   2. Hyperlipidemia.   3. Reflux esophagitis.     GYN HISTORY: G0, P0. The patient went into menopause in 2000.     Review of Systems   Constitutional: Positive for diaphoresis (occasionally at night) and fatigue. Negative for activity change, appetite change, chills and  "fever.   HENT: Negative.  Negative for mouth sores, nosebleeds and trouble swallowing.    Respiratory: Negative.  Negative for cough and shortness of breath.    Cardiovascular: Negative.  Negative for chest pain and leg swelling.   Gastrointestinal: Negative.  Negative for abdominal pain, constipation, diarrhea, nausea and vomiting.   Genitourinary: Negative.  Negative for difficulty urinating.   Musculoskeletal: Negative.    Skin: Negative.  Negative for rash.   Neurological: Negative.  Negative for dizziness, weakness and numbness.   Hematological: Negative.  Negative for adenopathy. Does not bruise/bleed easily.   Psychiatric/Behavioral: Negative.  Negative for sleep disturbance.      Medications:  The current medication list was reviewed in the EMR    ALLERGIES:    Allergies   Allergen Reactions   • Codeine    • Docetaxel    • Paclitaxel        Objective      Vitals:    04/13/18 0903   BP: 146/86   Pulse: 87   Resp: 16   Temp: 98.4 °F (36.9 °C)   TempSrc: Oral   SpO2: 96%   Weight: 95.4 kg (210 lb 6.4 oz)   Height: 163 cm (64.17\")   PainSc: 1  Comment: heartburn     Current Status 4/13/2018   ECOG score 0   1/10 cancer related pain    Physical Exam   Constitutional: She is oriented to person, place, and time. She appears well-developed and well-nourished.   HENT:   Head: Normocephalic and atraumatic.   Nose: Nose normal.   Mouth/Throat: Oropharynx is clear and moist and mucous membranes are normal. No oropharyngeal exudate.   Eyes: Pupils are equal, round, and reactive to light.   Neck: Normal range of motion. Neck supple.   Cardiovascular: Normal rate, regular rhythm and normal heart sounds.    Pulmonary/Chest: Effort normal and breath sounds normal. No respiratory distress. She has no wheezes. She has no rhonchi. She has no rales.   Abdominal: Soft. Normal appearance and bowel sounds are normal. She exhibits no distension. There is no hepatosplenomegaly. There is no tenderness.   Musculoskeletal: Normal range " of motion. She exhibits no edema.   Lymphadenopathy:     She has no cervical adenopathy.        Right: No supraclavicular adenopathy present.        Left: No supraclavicular adenopathy present.   Neurological: She is alert and oriented to person, place, and time.   Skin: Skin is warm and dry.   Psychiatric: She has a normal mood and affect. Her behavior is normal.   Nursing note and vitals reviewed.        Hematology WBC   Date Value Ref Range Status   04/13/2018 2.93 (L) 4.00 - 10.00 10*3/mm3 Final     RBC   Date Value Ref Range Status   04/13/2018 3.09 (L) 3.90 - 5.00 10*6/mm3 Final     Hemoglobin   Date Value Ref Range Status   04/13/2018 10.6 (L) 11.5 - 14.9 g/dL Final     Hematocrit   Date Value Ref Range Status   04/13/2018 31.2 (L) 34.0 - 45.0 % Final     Platelets   Date Value Ref Range Status   04/13/2018 258 150 - 375 10*3/mm3 Final        Component      Latest Ref Rng & Units 5/30/2017 7/7/2017 8/11/2017 10/10/2017          12:37 PM 10:06 AM  1:28 PM  3:07 PM   CA 15-3      <=25.0 U/mL 53.9 (H) 44.9 (H) 21.5 15.8     Component      Latest Ref Rng & Units 11/7/2017 12/5/2017 1/10/2018           3:26 PM 11:31 AM  9:32 AM   CA 15-3      <=25.0 U/mL 14.6 14.2 13.2     Assessment/Plan    . Metastatic  Left breast cancer to multiple skeletal sites including cervical spine, sternum, lumbar spine and right femur.Since the previous visit, the patient has been taking her Femara and Kisqali correctly   Today, the patient has leukopenia with no fever, anemia with no significant degree of fatigue and normal platelet count.  Most importantly, the tumor marker has dropped from 53.9 in 05/30 to 13.2 on 1/10/18.  We still feel that her disease is being controlled by this regimen of medicines.     The patient has minimal need for pain medication, she remains fully functional, her hair has grown back, and she has had her port flushed today.  She has again, leukopenia with no fever, and she remains on magnesium  supplementation to minimize any potentiality for prolonged QT interval.     Patient returns 4/13/2018.  She is tolerating treatment quite well.  She will continue on Kisqali and Femara, and we will procced with Xgeva today.  Patient will schedule her mammogram.    RECOMMENDATIONS:   1.  Proceed with Xgeva today.  2.  Continue Kisqali 400 mg 21 out of 28 days.  3.  Continue Femara 2.5 mg daily.  4.  Continue B12 and iron supplementation.  We will recheck iron panel, B12, ferritin in 6 weeks.   5.  Return for port flush and labs in 6 weeks.  6.  Return for follow up with Dr. Saez, with labs and Xgeva in 3 months.          4/13/2018

## 2018-05-08 RX ORDER — LISINOPRIL AND HYDROCHLOROTHIAZIDE 12.5; 1 MG/1; MG/1
TABLET ORAL
Qty: 180 TABLET | Refills: 0 | Status: SHIPPED | OUTPATIENT
Start: 2018-05-08 | End: 2018-08-21 | Stop reason: SDUPTHER

## 2018-05-08 RX ORDER — ATORVASTATIN CALCIUM 20 MG/1
TABLET, FILM COATED ORAL
Qty: 90 TABLET | Refills: 0 | Status: SHIPPED | OUTPATIENT
Start: 2018-05-08 | End: 2018-08-21 | Stop reason: SDUPTHER

## 2018-05-09 ENCOUNTER — DOCUMENTATION (OUTPATIENT)
Dept: ONCOLOGY | Facility: CLINIC | Age: 63
End: 2018-05-09

## 2018-05-09 NOTE — PROGRESS NOTES
[facility] requesting new Kisqali rx. Pt is out of refills. Per 4/13/18 office note from Hamida Sabillon NP and 2/13/18 office note from Dr Saez-Pt will continue Kisqali 400 mg daily. I have escribed a new rx to [facility].    Phone: [phone]-Phys. Line  [phone]-Pt. Line

## 2018-05-21 ENCOUNTER — DOCUMENTATION (OUTPATIENT)
Dept: ONCOLOGY | Facility: CLINIC | Age: 63
End: 2018-05-21

## 2018-05-22 RX ORDER — SODIUM CHLORIDE 0.9 % (FLUSH) 0.9 %
10 SYRINGE (ML) INJECTION AS NEEDED
Status: CANCELLED | OUTPATIENT
Start: 2018-05-25

## 2018-05-25 ENCOUNTER — INFUSION (OUTPATIENT)
Dept: ONCOLOGY | Facility: HOSPITAL | Age: 63
End: 2018-05-25

## 2018-05-25 ENCOUNTER — TRANSCRIBE ORDERS (OUTPATIENT)
Dept: ADMINISTRATIVE | Facility: HOSPITAL | Age: 63
End: 2018-05-25

## 2018-05-25 DIAGNOSIS — Z45.2 FITTING AND ADJUSTMENT OF VASCULAR CATHETER: ICD-10-CM

## 2018-05-25 DIAGNOSIS — D50.0 IRON DEFICIENCY ANEMIA DUE TO CHRONIC BLOOD LOSS: Primary | ICD-10-CM

## 2018-05-25 DIAGNOSIS — Z12.39 SCREENING BREAST EXAMINATION: Primary | ICD-10-CM

## 2018-05-25 DIAGNOSIS — C79.51 BONE METASTASIS: ICD-10-CM

## 2018-05-25 DIAGNOSIS — D51.0 VITAMIN B12 DEFICIENCY ANEMIA DUE TO INTRINSIC FACTOR DEFICIENCY: ICD-10-CM

## 2018-05-25 LAB
ALBUMIN SERPL-MCNC: 4.5 G/DL (ref 3.5–5.2)
ALBUMIN/GLOB SERPL: 1.5 G/DL
ALP SERPL-CCNC: 93 U/L (ref 39–117)
ALT SERPL W P-5'-P-CCNC: 21 U/L (ref 1–33)
ANION GAP SERPL CALCULATED.3IONS-SCNC: 13.3 MMOL/L
AST SERPL-CCNC: 19 U/L (ref 1–32)
BASOPHILS # BLD AUTO: 0.05 10*3/MM3 (ref 0–0.2)
BASOPHILS NFR BLD AUTO: 1.6 % (ref 0–1.5)
BILIRUB SERPL-MCNC: 0.4 MG/DL (ref 0.1–1.2)
BUN BLD-MCNC: 27 MG/DL (ref 8–23)
BUN/CREAT SERPL: 35.1 (ref 7–25)
CALCIUM SPEC-SCNC: 9.8 MG/DL (ref 8.6–10.5)
CHLORIDE SERPL-SCNC: 100 MMOL/L (ref 98–107)
CO2 SERPL-SCNC: 25.7 MMOL/L (ref 22–29)
CREAT BLD-MCNC: 0.77 MG/DL (ref 0.57–1)
DEPRECATED RDW RBC AUTO: 55.1 FL (ref 37–54)
EOSINOPHIL # BLD AUTO: 0.07 10*3/MM3 (ref 0–0.7)
EOSINOPHIL NFR BLD AUTO: 2.3 % (ref 0.3–6.2)
ERYTHROCYTE [DISTWIDTH] IN BLOOD BY AUTOMATED COUNT: 15.1 % (ref 11.7–13)
FERRITIN SERPL-MCNC: 180.9 NG/ML (ref 13–150)
GFR SERPL CREATININE-BSD FRML MDRD: 76 ML/MIN/1.73
GLOBULIN UR ELPH-MCNC: 3 GM/DL
GLUCOSE BLD-MCNC: 97 MG/DL (ref 65–99)
HCT VFR BLD AUTO: 31.7 % (ref 35.6–45.5)
HGB BLD-MCNC: 10.9 G/DL (ref 11.9–15.5)
IMM GRANULOCYTES # BLD: 0.01 10*3/MM3 (ref 0–0.03)
IMM GRANULOCYTES NFR BLD: 0.3 % (ref 0–0.5)
IRON 24H UR-MRATE: 97 MCG/DL (ref 37–145)
IRON SATN MFR SERPL: 21 % (ref 20–50)
LYMPHOCYTES # BLD AUTO: 0.85 10*3/MM3 (ref 0.9–4.8)
LYMPHOCYTES NFR BLD AUTO: 27.9 % (ref 19.6–45.3)
MCH RBC QN AUTO: 34.3 PG (ref 26.9–32)
MCHC RBC AUTO-ENTMCNC: 34.4 G/DL (ref 32.4–36.3)
MCV RBC AUTO: 99.7 FL (ref 80.5–98.2)
MONOCYTES # BLD AUTO: 0.51 10*3/MM3 (ref 0.2–1.2)
MONOCYTES NFR BLD AUTO: 16.7 % (ref 5–12)
NEUTROPHILS # BLD AUTO: 1.56 10*3/MM3 (ref 1.9–8.1)
NEUTROPHILS NFR BLD AUTO: 51.2 % (ref 42.7–76)
NRBC BLD MANUAL-RTO: 0 /100 WBC (ref 0–0)
PLATELET # BLD AUTO: 234 10*3/MM3 (ref 140–500)
PMV BLD AUTO: 10.3 FL (ref 6–12)
POTASSIUM BLD-SCNC: 3.6 MMOL/L (ref 3.5–5.2)
PROT SERPL-MCNC: 7.5 G/DL (ref 6–8.5)
RBC # BLD AUTO: 3.18 10*6/MM3 (ref 3.9–5.2)
SODIUM BLD-SCNC: 139 MMOL/L (ref 136–145)
TIBC SERPL-MCNC: 466 MCG/DL (ref 298–536)
TRANSFERRIN SERPL-MCNC: 313 MG/DL (ref 200–360)
VIT B12 BLD-MCNC: 1985 PG/ML (ref 211–946)
WBC NRBC COR # BLD: 3.05 10*3/MM3 (ref 4.5–10.7)

## 2018-05-25 PROCEDURE — 82728 ASSAY OF FERRITIN: CPT | Performed by: NURSE PRACTITIONER

## 2018-05-25 PROCEDURE — 83540 ASSAY OF IRON: CPT | Performed by: NURSE PRACTITIONER

## 2018-05-25 PROCEDURE — 84466 ASSAY OF TRANSFERRIN: CPT | Performed by: NURSE PRACTITIONER

## 2018-05-25 PROCEDURE — 96523 IRRIG DRUG DELIVERY DEVICE: CPT | Performed by: NURSE PRACTITIONER

## 2018-05-25 PROCEDURE — 80053 COMPREHEN METABOLIC PANEL: CPT | Performed by: NURSE PRACTITIONER

## 2018-05-25 PROCEDURE — 85025 COMPLETE CBC W/AUTO DIFF WBC: CPT | Performed by: NURSE PRACTITIONER

## 2018-05-25 PROCEDURE — 25010000002 HEPARIN FLUSH (PORCINE) 100 UNIT/ML SOLUTION: Performed by: INTERNAL MEDICINE

## 2018-05-25 PROCEDURE — 82607 VITAMIN B-12: CPT | Performed by: NURSE PRACTITIONER

## 2018-05-25 RX ORDER — SODIUM CHLORIDE 0.9 % (FLUSH) 0.9 %
10 SYRINGE (ML) INJECTION AS NEEDED
Status: CANCELLED | OUTPATIENT
Start: 2018-05-25

## 2018-05-25 RX ORDER — SODIUM CHLORIDE 0.9 % (FLUSH) 0.9 %
10 SYRINGE (ML) INJECTION AS NEEDED
Status: DISCONTINUED | OUTPATIENT
Start: 2018-05-25 | End: 2018-05-25 | Stop reason: HOSPADM

## 2018-05-25 RX ADMIN — SODIUM CHLORIDE, PRESERVATIVE FREE 500 UNITS: 5 INJECTION INTRAVENOUS at 14:18

## 2018-05-25 RX ADMIN — Medication 10 ML: at 14:18

## 2018-06-14 RX ORDER — MAGNESIUM 64 MG (MAGNESIUM CHLORIDE) TABLET,DELAYED RELEASE
Qty: 30 TABLET | Refills: 5 | Status: SHIPPED | OUTPATIENT
Start: 2018-06-14 | End: 2018-12-22 | Stop reason: SDUPTHER

## 2018-06-18 ENCOUNTER — HOSPITAL ENCOUNTER (OUTPATIENT)
Dept: MAMMOGRAPHY | Facility: HOSPITAL | Age: 63
Discharge: HOME | End: 2018-06-18
Admitting: INTERNAL MEDICINE

## 2018-06-18 DIAGNOSIS — Z12.39 SCREENING BREAST EXAMINATION: ICD-10-CM

## 2018-06-18 DIAGNOSIS — C50.919: ICD-10-CM

## 2018-06-18 PROCEDURE — 77063 BREAST TOMOSYNTHESIS BI: CPT

## 2018-06-18 PROCEDURE — 77067 SCR MAMMO BI INCL CAD: CPT

## 2018-06-18 RX ORDER — FERROUS SULFATE 325(65) MG
TABLET ORAL
Qty: 30 TABLET | Refills: 1 | Status: SHIPPED | OUTPATIENT
Start: 2018-06-18 | End: 2018-08-25 | Stop reason: SDUPTHER

## 2018-07-06 ENCOUNTER — OFFICE VISIT (OUTPATIENT)
Dept: ONCOLOGY | Facility: CLINIC | Age: 63
End: 2018-07-06

## 2018-07-06 ENCOUNTER — INFUSION (OUTPATIENT)
Dept: ONCOLOGY | Facility: HOSPITAL | Age: 63
End: 2018-07-06

## 2018-07-06 VITALS
DIASTOLIC BLOOD PRESSURE: 70 MMHG | SYSTOLIC BLOOD PRESSURE: 120 MMHG | TEMPERATURE: 99.4 F | HEART RATE: 86 BPM | HEIGHT: 64 IN | BODY MASS INDEX: 36.33 KG/M2 | WEIGHT: 212.8 LBS | OXYGEN SATURATION: 98 % | RESPIRATION RATE: 16 BRPM

## 2018-07-06 DIAGNOSIS — C79.51 BONE METASTASIS: ICD-10-CM

## 2018-07-06 DIAGNOSIS — D68.59 THROMBOPHILIA (HCC): ICD-10-CM

## 2018-07-06 DIAGNOSIS — D51.0 VITAMIN B12 DEFICIENCY ANEMIA DUE TO INTRINSIC FACTOR DEFICIENCY: ICD-10-CM

## 2018-07-06 DIAGNOSIS — D70.1 LEUKOPENIA DUE TO ANTINEOPLASTIC CHEMOTHERAPY (HCC): ICD-10-CM

## 2018-07-06 DIAGNOSIS — C50.912 PRIMARY MALIGNANT NEOPLASM OF LEFT BREAST (HCC): Primary | ICD-10-CM

## 2018-07-06 DIAGNOSIS — D50.0 IRON DEFICIENCY ANEMIA DUE TO CHRONIC BLOOD LOSS: ICD-10-CM

## 2018-07-06 DIAGNOSIS — T45.1X5A LEUKOPENIA DUE TO ANTINEOPLASTIC CHEMOTHERAPY (HCC): ICD-10-CM

## 2018-07-06 DIAGNOSIS — Z45.2 FITTING AND ADJUSTMENT OF VASCULAR CATHETER: ICD-10-CM

## 2018-07-06 DIAGNOSIS — M85.89 OSTEOPENIA OF MULTIPLE SITES: Primary | ICD-10-CM

## 2018-07-06 LAB
ALBUMIN SERPL-MCNC: 4.2 G/DL (ref 3.5–5.2)
ALBUMIN SERPL-MCNC: 4.8 G/DL (ref 3.5–5.2)
ALBUMIN/GLOB SERPL: 1.4 G/DL (ref 1.1–2.4)
ALBUMIN/GLOB SERPL: 2.3 G/DL
ALP SERPL-CCNC: 104 U/L (ref 38–116)
ALP SERPL-CCNC: 104 U/L (ref 39–117)
ALT SERPL W P-5'-P-CCNC: 18 U/L (ref 0–33)
ALT SERPL-CCNC: 19 U/L (ref 1–33)
ANION GAP SERPL CALCULATED.3IONS-SCNC: 11.6 MMOL/L
AST SERPL-CCNC: 13 U/L (ref 1–32)
AST SERPL-CCNC: 16 U/L (ref 0–32)
BASOPHILS # BLD AUTO: 0.04 10*3/MM3 (ref 0–0.1)
BASOPHILS NFR BLD AUTO: 1.1 % (ref 0–1.1)
BILIRUB SERPL-MCNC: 0.3 MG/DL (ref 0.1–1.2)
BILIRUB SERPL-MCNC: 0.3 MG/DL (ref 0.1–1.2)
BUN BLD-MCNC: 29 MG/DL (ref 6–20)
BUN SERPL-MCNC: 30 MG/DL (ref 8–23)
BUN/CREAT SERPL: 27.3 (ref 7–25)
BUN/CREAT SERPL: 27.4 (ref 7.3–30)
CALCIUM SERPL-MCNC: 9.3 MG/DL (ref 8.6–10.5)
CALCIUM SPEC-SCNC: 9.3 MG/DL (ref 8.5–10.2)
CANCER AG15-3 SERPL-ACNC: 13.7 U/ML
CHLORIDE SERPL-SCNC: 100 MMOL/L (ref 98–107)
CHLORIDE SERPL-SCNC: 100 MMOL/L (ref 98–107)
CHOLEST SERPL-MCNC: 166 MG/DL (ref 0–200)
CO2 SERPL-SCNC: 24.4 MMOL/L (ref 22–29)
CO2 SERPL-SCNC: 26.6 MMOL/L (ref 22–29)
CREAT BLD-MCNC: 1.06 MG/DL (ref 0.6–1.1)
CREAT SERPL-MCNC: 1.1 MG/DL (ref 0.57–1)
DEPRECATED RDW RBC AUTO: 52.7 FL (ref 37–49)
EOSINOPHIL # BLD AUTO: 0.13 10*3/MM3 (ref 0–0.36)
EOSINOPHIL NFR BLD AUTO: 3.6 % (ref 1–5)
ERYTHROCYTE [DISTWIDTH] IN BLOOD BY AUTOMATED COUNT: 14.2 % (ref 11.7–14.5)
GFR SERPL CREATININE-BSD FRML MDRD: 53 ML/MIN/1.73
GLOBULIN SER CALC-MCNC: 2.1 GM/DL
GLOBULIN UR ELPH-MCNC: 2.9 GM/DL (ref 1.8–3.5)
GLUCOSE BLD-MCNC: 95 MG/DL (ref 74–124)
GLUCOSE SERPL-MCNC: 115 MG/DL (ref 65–99)
HCT VFR BLD AUTO: 31.8 % (ref 34–45)
HDLC SERPL-MCNC: 52 MG/DL (ref 40–60)
HGB BLD-MCNC: 10.6 G/DL (ref 11.5–14.9)
IMM GRANULOCYTES # BLD: 0.01 10*3/MM3 (ref 0–0.03)
IMM GRANULOCYTES NFR BLD: 0.3 % (ref 0–0.5)
LDLC SERPL CALC-MCNC: 44 MG/DL (ref 0–100)
LYMPHOCYTES # BLD AUTO: 1.06 10*3/MM3 (ref 1–3.5)
LYMPHOCYTES NFR BLD AUTO: 29 % (ref 20–49)
MAGNESIUM SERPL-MCNC: 1.9 MG/DL (ref 1.8–2.5)
MCH RBC QN AUTO: 33.5 PG (ref 27–33)
MCHC RBC AUTO-ENTMCNC: 33.3 G/DL (ref 32–35)
MCV RBC AUTO: 100.6 FL (ref 83–97)
MONOCYTES # BLD AUTO: 0.32 10*3/MM3 (ref 0.25–0.8)
MONOCYTES NFR BLD AUTO: 8.8 % (ref 4–12)
NEUTROPHILS # BLD AUTO: 2.09 10*3/MM3 (ref 1.5–7)
NEUTROPHILS NFR BLD AUTO: 57.2 % (ref 39–75)
NRBC BLD MANUAL-RTO: 0 /100 WBC (ref 0–0)
PHOSPHATE SERPL-MCNC: 3.7 MG/DL (ref 2.5–4.5)
PLATELET # BLD AUTO: 269 10*3/MM3 (ref 150–375)
PMV BLD AUTO: 10.3 FL (ref 8.9–12.1)
POTASSIUM BLD-SCNC: 4.4 MMOL/L (ref 3.5–4.7)
POTASSIUM SERPL-SCNC: 4.4 MMOL/L (ref 3.5–5.2)
PROT SERPL-MCNC: 6.9 G/DL (ref 6–8.5)
PROT SERPL-MCNC: 7.1 G/DL (ref 6.3–8)
RBC # BLD AUTO: 3.16 10*6/MM3 (ref 3.9–5)
SODIUM BLD-SCNC: 136 MMOL/L (ref 134–145)
SODIUM SERPL-SCNC: 141 MMOL/L (ref 136–145)
TRIGL SERPL-MCNC: 351 MG/DL (ref 0–150)
VLDLC SERPL CALC-MCNC: 70.2 MG/DL (ref 5–40)
WBC NRBC COR # BLD: 3.65 10*3/MM3 (ref 4–10)

## 2018-07-06 PROCEDURE — 96372 THER/PROPH/DIAG INJ SC/IM: CPT | Performed by: INTERNAL MEDICINE

## 2018-07-06 PROCEDURE — 25010000002 DENOSUMAB 120 MG/1.7ML SOLUTION: Performed by: INTERNAL MEDICINE

## 2018-07-06 PROCEDURE — 86300 IMMUNOASSAY TUMOR CA 15-3: CPT | Performed by: INTERNAL MEDICINE

## 2018-07-06 PROCEDURE — 25010000002 HEPARIN FLUSH (PORCINE) 100 UNIT/ML SOLUTION: Performed by: INTERNAL MEDICINE

## 2018-07-06 PROCEDURE — 80053 COMPREHEN METABOLIC PANEL: CPT | Performed by: INTERNAL MEDICINE

## 2018-07-06 PROCEDURE — 99214 OFFICE O/P EST MOD 30 MIN: CPT | Performed by: INTERNAL MEDICINE

## 2018-07-06 PROCEDURE — 96523 IRRIG DRUG DELIVERY DEVICE: CPT | Performed by: INTERNAL MEDICINE

## 2018-07-06 PROCEDURE — 84100 ASSAY OF PHOSPHORUS: CPT | Performed by: INTERNAL MEDICINE

## 2018-07-06 PROCEDURE — 83735 ASSAY OF MAGNESIUM: CPT | Performed by: INTERNAL MEDICINE

## 2018-07-06 PROCEDURE — 85025 COMPLETE CBC W/AUTO DIFF WBC: CPT | Performed by: INTERNAL MEDICINE

## 2018-07-06 RX ORDER — SODIUM CHLORIDE 0.9 % (FLUSH) 0.9 %
10 SYRINGE (ML) INJECTION AS NEEDED
Status: CANCELLED | OUTPATIENT
Start: 2018-07-06

## 2018-07-06 RX ORDER — SODIUM CHLORIDE 0.9 % (FLUSH) 0.9 %
10 SYRINGE (ML) INJECTION AS NEEDED
Status: DISCONTINUED | OUTPATIENT
Start: 2018-07-06 | End: 2018-07-06 | Stop reason: HOSPADM

## 2018-07-06 RX ADMIN — SODIUM CHLORIDE, PRESERVATIVE FREE 500 UNITS: 5 INJECTION INTRAVENOUS at 14:09

## 2018-07-06 RX ADMIN — DENOSUMAB 120 MG: 120 INJECTION SUBCUTANEOUS at 15:15

## 2018-07-06 RX ADMIN — Medication 10 ML: at 14:09

## 2018-07-06 NOTE — PROGRESS NOTES
Subjective  REASONS FOR FOLLOWUP: 1. Metastatic breast cancer to multiple skeletal sites including cervical spine, sternum, lumbar spine and right femur, underwent Abraxane every 4 weeks and Xgeva every  3 months, DOCUMENTED RADIOLOGICAL PROGRESSION BY BONE SCAN 3/15/17 MOVING AWAY FROM ABRAXANE AND [facility], PRESENTLY ON FEMARA AND KISQALI , XGEVA EVERY 3 MONTHS,    2.Iron deficiency anemia du to GI blood loss, Xarelto stopped months ago, Iron initiated, Pepcid along with Aleve for gastric protection.           History of Present Illness  This patient returns today to the office for followup in regard to history of metastatic breast cancer to the skeleton and undergoing therapy with Femara and Kisqali as well as Xgeva. She is due to start a new dose of her Femara/Kisqali next Saturday and she is due for an Xgeva injection today. Physically the only problem that she has is some fatigue and still minor skeletal pain, especially in the right knee area when she had a pathological fracture before. She has not encountered any other sites of skeletal pain. Tolerance to Femara/Kisqali is excellent with no significant side effects of the medication. Her appetite remains good. Her weight is stable. Her bowel function and urination remain normal. No cardiovascular or respiratory issues. She has even proceeded with a mammogram that disclosed no abnormalities.     The patient continues working and has an ECOG performance status of 0.        Past Medical History, Past Surgical History,    1. Hypertension.   2. Hyperlipidemia.   3. Reflux esophagitis.     GYN HISTORY: G0, P0. The patient went into  menopause in 2000.     Review of Systems   General: no fever, chills, stated fatigue, no weight changes, or lack of appetite.  Eyes: no epiphora, xerophthalmia,conjunctivitis, pain, glaucoma, blurred vision, blindness, secretion, photophobia, proptosis, diplopia.  Ears: no otorrhea, tinnitus, otorrhagia, deafness, pain, vertigo.  Nose: no rhinorrhea, epistaxis, alteration in perception of odors, sinuses pressure.  Mouth: no alteration in gums or teeth,  ulcers, no difficulty with mastication or deglut ion, no odynophagia.  Neck: no masses or pain, no thyroid alterations, no pain in muscles or arteries, no carotid odynia, no crepitation.  Respiratory: no cough, sputum production, dyspnea, trepopnea, pleuritic pain, hemoptysis.  Heart: no syncope, irregularity, palpitations, angina, orthopnea, paroxysmal nocturnal dyspnea.  Vascular Venous: no tenderness,edema, palpable cords, postphlebitic syndrome, skin changes or ulcerations.  Vascular Arterial: no distal ischemia, claudication, gangrene, neuropathic ischemic pain, skin ulcers, paleness or cyanosis.  GI: no dysphagia, odynophagia, no regurgitation, no heartburn,no indigestion,no nausea,no vomiting,no hematemesis ,no melena,no jaundice,no distention, no obstipation,no enterorrhagia,no proctalgia,no anal  lesions, nochanges in bowel habits.  : no frequency, hesitancy, hematuria, discharge, pain.  Musculoskeletal: stated muscle or tendon pain or inflammation, joint pain, edema, functional limitation,no fasciculations, mass.  Neurologic: no headache, seizures, alterations on Craneal nerves, no motor or senssory deficit, normal coordination, no alteration in memory, orientation, calculation,writting, verbal or written language.  Skin: no rashes, pruritus or localized lesions.  Psychiatric: no anxiety, depression, agitation, delusions, proper insight.  Medications:  The current medication list was reviewed in the EMR    ALLERGIES:    Allergies   Allergen Reactions   •  "Codeine    • Docetaxel    • Paclitaxel        Objective      Vitals:    07/06/18 1427   BP: 120/70   Pulse: 86   Resp: 16   Temp: 99.4 °F (37.4 °C)   SpO2: 98%  Comment: at rest   Weight: 96.5 kg (212 lb 12.8 oz)   Height: 163 cm (64.17\")   PainSc: 0-No pain     Current Status 7/6/2018   ECOG score 0   1/10 cancer related pain    Physical Exam    GENERAL:  Well-developed, well-nourished  Patient  in no acute distress.   SKIN:  Warm, dry without rashes, purpura or petechiae.  HEENT:  Pupils were equal and reactive to light and accomodation, conjunctivas non injected, no pterigion, normal extraocular movements, normal visual acuity.   Mouth mucosa was moist, no exudates in oropharynx, normal gum line, normal roof of the mouth and pillars, normal papillations of the tongue.  NECK:  Supple with good range of motion; no thyromegaly or masses, no JVD or bruits, no cervical adenopathies.No carotid arteries pain, no carotid abnormal pulsation or arterial dance.  LYMPHATICS:  No cervical, supraclavicular, axillary, epitrochlear or inguinal adenopathy.  CHEST:  Normal excursion of both stacy thoraces, normal voice fremitus, no subcutaneous emphysema, normal axillas, no rashes or acanthosis nigricans. Lungs clear to percussion and auscultation, normal breath sounds bilaterally, no wheezing, crackles or ronchi, no stridor, no rubs.  CARDIAC AND VASCULAR: normal rate and regular rhythm, without murmurs, rubs or S3 or S4 right or left sided gallops. Normal femoral, popliteal, pedis, brachial and carotid pulses.  INSPECTION of both breasts documented symmetry of the tissue per se and location and size of the nipples,no retractions or inversion of the nipples, normal skin without lesions, no erythema or nodules,no paud'orange, no prominence of superficial veins or chest wall collateral circulation.PALPATION of the breasts documented normal skin turgor, no induration, alteration in local temperature, or pain, no palpable masses or " nodules, normal mobility of the tissues,no fixation of the tissue or parenchyma to the chest wall, no alteration at the tail of the breasts or axillas, no adenopathies. Surgical site was well healed.No lymphedema in either extremity.  ABDOMEN:  Soft, nontender with no organomegaly or masses, no ascites, no collateral circulation,no distention,no Randall sign, no abdominal pain, no inguinal hernias,no umbilical hernias, no abdominal bruits. Normal bowel sounds.  GENITAL: Not  Performed.  EXTREMITIES  AND SPINE:  No clubbing, cyanosis or edema, no deformities r knee pain .No kyphosis, scoliosis, deformities or pain in spine, ribs or pelvic bone.  NEUROLOGICAL:  Patient was awake, alert, oriented to time, person and place,normal gait and coordination      Hematology WBC   Date Value Ref Range Status   07/06/2018 3.65 (L) 4.00 - 10.00 10*3/mm3 Final     RBC   Date Value Ref Range Status   07/06/2018 3.16 (L) 3.90 - 5.00 10*6/mm3 Final     Hemoglobin   Date Value Ref Range Status   07/06/2018 10.6 (L) 11.5 - 14.9 g/dL Final     Hematocrit   Date Value Ref Range Status   07/06/2018 31.8 (L) 34.0 - 45.0 % Final     Platelets   Date Value Ref Range Status   07/06/2018 269 150 - 375 10*3/mm3 Final            Assessment/Plan    . Metastatic  Left breast cancer to multiple skeletal sites including cervical spine, sternum, lumbar spine and right femur.Since the previous visit, the patient has been taking her Femara and Kisqali correctly   Today, the patient has leukopenia with no fever, anemia with no significant degree of fatigue and normal platelet count.    We still feel that her disease is being controlled by this regimen of medicines.     The patient has minimal need for pain medication, she remains fully functional, her hair has grown back, and she has had her port flushed today.  She has again, leukopenia with no fever, and she remains on magnesium supplementation to minimize any potentiality for prolonged QT interval.      RECOMMENDATIONS:   1.  Proceed with Xgeva today.  2.  Continue Kisqali 400 mg 21 out of 28 days.  3.  Continue Femara 2.5 mg daily.  4.  Continue B12 and iron supplementation.   5.  Return for port flush and labs in 8 weeks.  6.  Return for follow up with Dr. Saez, with labs in 2 months.          7/6/2018

## 2018-07-09 ENCOUNTER — TELEPHONE (OUTPATIENT)
Dept: ONCOLOGY | Facility: HOSPITAL | Age: 63
End: 2018-07-09

## 2018-07-09 NOTE — TELEPHONE ENCOUNTER
----- Message from Nader Saez MD sent at 7/9/2018  8:24 AM EDT -----  CALL HER TUMOR MARKERS ARE EXCELLENT TREATMENT THE SAME

## 2018-07-09 NOTE — TELEPHONE ENCOUNTER
Called pt to inform her that tumor markers are excellent and treatment to remain the same.  Pt v/u.  No complaints at this time.

## 2018-07-10 ENCOUNTER — OFFICE VISIT (OUTPATIENT)
Dept: INTERNAL MEDICINE | Facility: CLINIC | Age: 63
End: 2018-07-10

## 2018-07-10 VITALS
OXYGEN SATURATION: 97 % | RESPIRATION RATE: 18 BRPM | HEART RATE: 110 BPM | BODY MASS INDEX: 36.19 KG/M2 | HEIGHT: 64 IN | DIASTOLIC BLOOD PRESSURE: 64 MMHG | SYSTOLIC BLOOD PRESSURE: 124 MMHG | WEIGHT: 212 LBS

## 2018-07-10 DIAGNOSIS — I10 BENIGN ESSENTIAL HTN: Primary | ICD-10-CM

## 2018-07-10 DIAGNOSIS — E78.5 HYPERLIPIDEMIA, UNSPECIFIED HYPERLIPIDEMIA TYPE: ICD-10-CM

## 2018-07-10 DIAGNOSIS — R73.01 IFG (IMPAIRED FASTING GLUCOSE): ICD-10-CM

## 2018-07-10 DIAGNOSIS — B35.1 ONYCHOMYCOSIS: ICD-10-CM

## 2018-07-10 PROCEDURE — 99214 OFFICE O/P EST MOD 30 MIN: CPT | Performed by: INTERNAL MEDICINE

## 2018-07-10 NOTE — PROGRESS NOTES
Subjective     Maggie Suero is a 63 y.o. female who presents with   Chief Complaint   Patient presents with   • Hyperlipidemia   • Hypertension   • Follow-up     6 month   • Hyperglycemia       History of Present Illness     HTN.  Control is good.    HLD.  Cholesterol is good but triglycerides are high.   IFG.  BS has gone up to 114.  She has been eating more sugar recently.     She is bothered by toenail fungus.     Review of Systems   Respiratory: Negative.    Cardiovascular: Negative.        The following portions of the patient's history were reviewed and updated as appropriate: allergies, current medications and problem list.    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       • Thrombophilia (CMS/HCC) 02/18/2016     Note Last Updated: 2/18/2016     Description: associated with femoral fracture     • Hyperlipidemia 02/18/2016   • IFG (impaired fasting glucose) 02/18/2016   • Osteopenia 02/18/2016     Note Last Updated: 3/8/2016     Description: Fosamax stopped after 10 years of therapy in May of 2012.  On Zometa with metastatic breast cancer followed by Xgeva currently.           Current Outpatient Prescriptions on File Prior to Visit   Medication Sig Dispense Refill   • atorvastatin (LIPITOR) 20 MG tablet TAKE ONE TABLET BY MOUTH ONCE DAILY 90 tablet 0   • calcium carbonate-vitamin d 600-400 MG-UNIT per tablet Take 1 tablet by  "mouth daily.     • cetirizine (zyrTEC) 10 MG tablet Take 10 mg by mouth As Needed for Allergies.     • Cyanocobalamin (B-12 PO) Take 1,000 mcg by mouth Daily.     • denosumab (XGEVA) 120 MG/1.7ML solution injection Inject  under the skin 1 (one) time.     • diclofenac (VOLTAREN) 1 % gel gel Place  on the skin.     • ferrous sulfate 325 (65 FE) MG tablet TAKE 1 TABLET BY MOUTH ONCE DAILY WITH BREAKFAST 30 tablet 1   • letrozole (FEMARA) 2.5 MG tablet TAKE ONE TABLET BY MOUTH ONCE DAILY START  WITH  KISQALI 30 tablet 6   • lisinopril-hydrochlorothiazide (PRINZIDE,ZESTORETIC) 10-12.5 MG per tablet TAKE 1 TABLET BY MOUTH TWICE DAILY 180 tablet 0   • MAG64 64 MG DR tablet TAKE ONE TABLET BY MOUTH ONCE DAILY 30 tablet 5   • Naproxen Sod-Diphenhydramine (ALEVE PM PO) Take  by mouth as needed.     • Naproxen Sodium (ALEVE PO) Take  by mouth.     • ondansetron ODT (ZOFRAN-ODT) 8 MG disintegrating tablet Dissolve one tablet on tongue every 6 hours for nausea and vomiting 12 tablet 0   • ribociclib succinate 200 MG tablet tablet Take 2 tablets by mouth Daily. For 21 days on then 7 days off. 42 tablet 6     No current facility-administered medications on file prior to visit.        Objective     /64   Pulse 110   Resp 18   Ht 163 cm (64.17\")   Wt 96.2 kg (212 lb)   LMP  (LMP Unknown)   SpO2 97%   BMI 36.19 kg/m²     Physical Exam   Constitutional: She is oriented to person, place, and time. She appears well-developed and well-nourished.   HENT:   Head: Normocephalic and atraumatic.   Cardiovascular: Normal rate, regular rhythm and normal heart sounds.    Pulmonary/Chest: Effort normal and breath sounds normal.   Neurological: She is alert and oriented to person, place, and time.   Skin: Skin is warm and dry.   Bilateral great toe onychomycosis   Psychiatric: She has a normal mood and affect. Her behavior is normal.       Assessment/Plan   Maggie was seen today for hyperlipidemia, hypertension, follow-up and " hyperglycemia.    Diagnoses and all orders for this visit:    Benign essential HTN    Hyperlipidemia, unspecified hyperlipidemia type    Onychomycosis  -     Ambulatory Referral to Podiatry    IFG (impaired fasting glucose)    Other orders  -     ciclopirox (PENLAC) 8 % solution; Apply  topically Every Night.        Discussion  HTN.  Continue current.  HLD.  Continue atorvastatin.  Add OTC fish oil.    IFG.  We discussed diet.  I recommend a diet high in fruits, vegetables, whole grains, lean meats, nuts and beans.  I recommend limiting red meat, full fat dairy, eggs and processed white carbohydrates.  I recommend aerobic exercise at least 3 days per week.   Onychomycosis.  Trial of penlac.  I would consider laser therapy and placed a referral.   Future Appointments  Date Time Provider Department Center   8/31/2018 2:45 PM [facility]KEY [facility] PORT [facility]IR  [facility]   8/31/2018 3:20 PM MD RIGOBERTO Tello  [facility]   1/14/2019 8:20 AM [facility] None   1/21/2019 11:15 AM MD DEVON DicksonK [facility] None

## 2018-08-21 RX ORDER — LETROZOLE 2.5 MG/1
TABLET, FILM COATED ORAL
Qty: 30 TABLET | Refills: 6 | Status: SHIPPED | OUTPATIENT
Start: 2018-08-21 | End: 2019-03-10 | Stop reason: SDUPTHER

## 2018-08-21 RX ORDER — ATORVASTATIN CALCIUM 20 MG/1
TABLET, FILM COATED ORAL
Qty: 90 TABLET | Refills: 0 | Status: SHIPPED | OUTPATIENT
Start: 2018-08-21 | End: 2018-11-11 | Stop reason: SDUPTHER

## 2018-08-21 RX ORDER — LISINOPRIL AND HYDROCHLOROTHIAZIDE 12.5; 1 MG/1; MG/1
TABLET ORAL
Qty: 180 TABLET | Refills: 0 | Status: SHIPPED | OUTPATIENT
Start: 2018-08-21 | End: 2018-11-11 | Stop reason: SDUPTHER

## 2018-08-25 DIAGNOSIS — C50.919: ICD-10-CM

## 2018-08-27 RX ORDER — PNV NO.95/FERROUS FUM/FOLIC AC 28MG-0.8MG
TABLET ORAL
Qty: 30 TABLET | Refills: 5 | Status: SHIPPED | OUTPATIENT
Start: 2018-08-27 | End: 2019-03-02 | Stop reason: SDUPTHER

## 2018-08-31 ENCOUNTER — INFUSION (OUTPATIENT)
Dept: ONCOLOGY | Facility: HOSPITAL | Age: 63
End: 2018-08-31

## 2018-08-31 ENCOUNTER — OFFICE VISIT (OUTPATIENT)
Dept: ONCOLOGY | Facility: CLINIC | Age: 63
End: 2018-08-31

## 2018-08-31 VITALS
TEMPERATURE: 98.1 F | HEIGHT: 64 IN | HEART RATE: 78 BPM | WEIGHT: 212.6 LBS | RESPIRATION RATE: 12 BRPM | BODY MASS INDEX: 36.29 KG/M2 | SYSTOLIC BLOOD PRESSURE: 126 MMHG | OXYGEN SATURATION: 98 % | DIASTOLIC BLOOD PRESSURE: 76 MMHG

## 2018-08-31 DIAGNOSIS — Z45.2 FITTING AND ADJUSTMENT OF VASCULAR CATHETER: ICD-10-CM

## 2018-08-31 DIAGNOSIS — M85.89 OSTEOPENIA OF MULTIPLE SITES: ICD-10-CM

## 2018-08-31 DIAGNOSIS — D51.0 VITAMIN B12 DEFICIENCY ANEMIA DUE TO INTRINSIC FACTOR DEFICIENCY: ICD-10-CM

## 2018-08-31 DIAGNOSIS — D70.1 LEUKOPENIA DUE TO ANTINEOPLASTIC CHEMOTHERAPY (HCC): ICD-10-CM

## 2018-08-31 DIAGNOSIS — T45.1X5A LEUKOPENIA DUE TO ANTINEOPLASTIC CHEMOTHERAPY (HCC): ICD-10-CM

## 2018-08-31 DIAGNOSIS — D50.0 IRON DEFICIENCY ANEMIA DUE TO CHRONIC BLOOD LOSS: ICD-10-CM

## 2018-08-31 DIAGNOSIS — C79.51 BONE METASTASIS: ICD-10-CM

## 2018-08-31 DIAGNOSIS — C50.912 PRIMARY MALIGNANT NEOPLASM OF LEFT BREAST (HCC): Primary | ICD-10-CM

## 2018-08-31 DIAGNOSIS — D68.59 THROMBOPHILIA (HCC): ICD-10-CM

## 2018-08-31 PROCEDURE — 25010000002 HEPARIN FLUSH (PORCINE) 100 UNIT/ML SOLUTION: Performed by: INTERNAL MEDICINE

## 2018-08-31 PROCEDURE — 85025 COMPLETE CBC W/AUTO DIFF WBC: CPT | Performed by: INTERNAL MEDICINE

## 2018-08-31 PROCEDURE — 99214 OFFICE O/P EST MOD 30 MIN: CPT | Performed by: INTERNAL MEDICINE

## 2018-08-31 PROCEDURE — 96523 IRRIG DRUG DELIVERY DEVICE: CPT | Performed by: INTERNAL MEDICINE

## 2018-08-31 PROCEDURE — 86300 IMMUNOASSAY TUMOR CA 15-3: CPT | Performed by: INTERNAL MEDICINE

## 2018-08-31 PROCEDURE — 80053 COMPREHEN METABOLIC PANEL: CPT | Performed by: INTERNAL MEDICINE

## 2018-08-31 RX ORDER — SODIUM CHLORIDE 0.9 % (FLUSH) 0.9 %
10 SYRINGE (ML) INJECTION AS NEEDED
Status: DISCONTINUED | OUTPATIENT
Start: 2018-08-31 | End: 2018-08-31 | Stop reason: HOSPADM

## 2018-08-31 RX ORDER — SODIUM CHLORIDE 0.9 % (FLUSH) 0.9 %
10 SYRINGE (ML) INJECTION AS NEEDED
Status: CANCELLED | OUTPATIENT
Start: 2018-08-31

## 2018-08-31 RX ADMIN — Medication 10 ML: at 14:30

## 2018-08-31 RX ADMIN — SODIUM CHLORIDE, PRESERVATIVE FREE 500 UNITS: 5 INJECTION INTRAVENOUS at 14:30

## 2018-09-04 ENCOUNTER — TELEPHONE (OUTPATIENT)
Dept: ONCOLOGY | Facility: CLINIC | Age: 63
End: 2018-09-04

## 2018-09-04 NOTE — TELEPHONE ENCOUNTER
Called and left message for pt regarding labs, encouraged to call back if she had additional questions      ----- Message from Nader Saez MD sent at 9/4/2018  8:41 AM EDT -----   Call her tumor marker is excellent treatment the same

## 2018-10-29 ENCOUNTER — INFUSION (OUTPATIENT)
Dept: ONCOLOGY | Facility: HOSPITAL | Age: 63
End: 2018-10-29

## 2018-10-29 ENCOUNTER — OFFICE VISIT (OUTPATIENT)
Dept: ONCOLOGY | Facility: CLINIC | Age: 63
End: 2018-10-29

## 2018-10-29 VITALS
HEIGHT: 64 IN | BODY MASS INDEX: 35.99 KG/M2 | TEMPERATURE: 98.6 F | RESPIRATION RATE: 12 BRPM | SYSTOLIC BLOOD PRESSURE: 132 MMHG | WEIGHT: 210.8 LBS | DIASTOLIC BLOOD PRESSURE: 84 MMHG | HEART RATE: 73 BPM | OXYGEN SATURATION: 98 %

## 2018-10-29 DIAGNOSIS — D50.0 IRON DEFICIENCY ANEMIA DUE TO CHRONIC BLOOD LOSS: ICD-10-CM

## 2018-10-29 DIAGNOSIS — Z45.2 FITTING AND ADJUSTMENT OF VASCULAR CATHETER: ICD-10-CM

## 2018-10-29 DIAGNOSIS — D68.59 THROMBOPHILIA (HCC): ICD-10-CM

## 2018-10-29 DIAGNOSIS — D51.0 VITAMIN B12 DEFICIENCY ANEMIA DUE TO INTRINSIC FACTOR DEFICIENCY: ICD-10-CM

## 2018-10-29 DIAGNOSIS — D70.1 LEUKOPENIA DUE TO ANTINEOPLASTIC CHEMOTHERAPY (HCC): ICD-10-CM

## 2018-10-29 DIAGNOSIS — C79.51 BONE METASTASIS: ICD-10-CM

## 2018-10-29 DIAGNOSIS — C50.912 PRIMARY MALIGNANT NEOPLASM OF LEFT BREAST (HCC): Primary | ICD-10-CM

## 2018-10-29 DIAGNOSIS — M85.89 OSTEOPENIA OF MULTIPLE SITES: ICD-10-CM

## 2018-10-29 DIAGNOSIS — R01.1 HEART MURMUR, SYSTOLIC: ICD-10-CM

## 2018-10-29 DIAGNOSIS — C79.51 BONE METASTASIS: Primary | ICD-10-CM

## 2018-10-29 DIAGNOSIS — T45.1X5A LEUKOPENIA DUE TO ANTINEOPLASTIC CHEMOTHERAPY (HCC): ICD-10-CM

## 2018-10-29 LAB
ALBUMIN SERPL-MCNC: 4.1 G/DL (ref 3.5–5.2)
ALBUMIN/GLOB SERPL: 1.7 G/DL (ref 1.1–2.4)
ALP SERPL-CCNC: 85 U/L (ref 38–116)
ALT SERPL W P-5'-P-CCNC: 18 U/L (ref 0–33)
ANION GAP SERPL CALCULATED.3IONS-SCNC: 13.4 MMOL/L
AST SERPL-CCNC: 16 U/L (ref 0–32)
BASOPHILS # BLD AUTO: 0.05 10*3/MM3 (ref 0–0.1)
BASOPHILS NFR BLD AUTO: 1.5 % (ref 0–1.1)
BILIRUB SERPL-MCNC: 0.3 MG/DL (ref 0.1–1.2)
BUN BLD-MCNC: 31 MG/DL (ref 6–20)
BUN/CREAT SERPL: 30.7 (ref 7.3–30)
CALCIUM SPEC-SCNC: 9.6 MG/DL (ref 8.5–10.2)
CANCER AG15-3 SERPL-ACNC: 12.5 U/ML
CHLORIDE SERPL-SCNC: 101 MMOL/L (ref 98–107)
CO2 SERPL-SCNC: 24.6 MMOL/L (ref 22–29)
CREAT BLD-MCNC: 1.01 MG/DL (ref 0.6–1.1)
DEPRECATED RDW RBC AUTO: 54.1 FL (ref 37–49)
EOSINOPHIL # BLD AUTO: 0.09 10*3/MM3 (ref 0–0.36)
EOSINOPHIL NFR BLD AUTO: 2.7 % (ref 1–5)
ERYTHROCYTE [DISTWIDTH] IN BLOOD BY AUTOMATED COUNT: 14.4 % (ref 11.7–14.5)
GFR SERPL CREATININE-BSD FRML MDRD: 56 ML/MIN/1.73
GLOBULIN UR ELPH-MCNC: 2.4 GM/DL (ref 1.8–3.5)
GLUCOSE BLD-MCNC: 115 MG/DL (ref 74–124)
HCT VFR BLD AUTO: 30.8 % (ref 34–45)
HGB BLD-MCNC: 10.3 G/DL (ref 11.5–14.9)
IMM GRANULOCYTES # BLD: 0.01 10*3/MM3 (ref 0–0.03)
IMM GRANULOCYTES NFR BLD: 0.3 % (ref 0–0.5)
LYMPHOCYTES # BLD AUTO: 0.9 10*3/MM3 (ref 1–3.5)
LYMPHOCYTES NFR BLD AUTO: 27 % (ref 20–49)
MAGNESIUM SERPL-MCNC: 1.7 MG/DL (ref 1.8–2.5)
MCH RBC QN AUTO: 34.1 PG (ref 27–33)
MCHC RBC AUTO-ENTMCNC: 33.4 G/DL (ref 32–35)
MCV RBC AUTO: 102 FL (ref 83–97)
MONOCYTES # BLD AUTO: 0.28 10*3/MM3 (ref 0.25–0.8)
MONOCYTES NFR BLD AUTO: 8.4 % (ref 4–12)
NEUTROPHILS # BLD AUTO: 2 10*3/MM3 (ref 1.5–7)
NEUTROPHILS NFR BLD AUTO: 60.1 % (ref 39–75)
NRBC BLD MANUAL-RTO: 0 /100 WBC (ref 0–0)
PHOSPHATE SERPL-MCNC: 4.2 MG/DL (ref 2.5–4.5)
PLATELET # BLD AUTO: 249 10*3/MM3 (ref 150–375)
PMV BLD AUTO: 10.5 FL (ref 8.9–12.1)
POTASSIUM BLD-SCNC: 4.4 MMOL/L (ref 3.5–4.7)
PROT SERPL-MCNC: 6.5 G/DL (ref 6.3–8)
RBC # BLD AUTO: 3.02 10*6/MM3 (ref 3.9–5)
SODIUM BLD-SCNC: 139 MMOL/L (ref 134–145)
WBC NRBC COR # BLD: 3.33 10*3/MM3 (ref 4–10)

## 2018-10-29 PROCEDURE — 96372 THER/PROPH/DIAG INJ SC/IM: CPT | Performed by: INTERNAL MEDICINE

## 2018-10-29 PROCEDURE — 96523 IRRIG DRUG DELIVERY DEVICE: CPT | Performed by: INTERNAL MEDICINE

## 2018-10-29 PROCEDURE — 83735 ASSAY OF MAGNESIUM: CPT | Performed by: INTERNAL MEDICINE

## 2018-10-29 PROCEDURE — 25010000002 DENOSUMAB 120 MG/1.7ML SOLUTION: Performed by: INTERNAL MEDICINE

## 2018-10-29 PROCEDURE — 85025 COMPLETE CBC W/AUTO DIFF WBC: CPT | Performed by: INTERNAL MEDICINE

## 2018-10-29 PROCEDURE — 86300 IMMUNOASSAY TUMOR CA 15-3: CPT | Performed by: INTERNAL MEDICINE

## 2018-10-29 PROCEDURE — 80053 COMPREHEN METABOLIC PANEL: CPT | Performed by: INTERNAL MEDICINE

## 2018-10-29 PROCEDURE — 99215 OFFICE O/P EST HI 40 MIN: CPT | Performed by: INTERNAL MEDICINE

## 2018-10-29 PROCEDURE — 84100 ASSAY OF PHOSPHORUS: CPT | Performed by: INTERNAL MEDICINE

## 2018-10-29 RX ORDER — SODIUM CHLORIDE 0.9 % (FLUSH) 0.9 %
10 SYRINGE (ML) INJECTION AS NEEDED
Status: CANCELLED | OUTPATIENT
Start: 2018-10-29

## 2018-10-29 RX ORDER — SODIUM CHLORIDE 0.9 % (FLUSH) 0.9 %
10 SYRINGE (ML) INJECTION AS NEEDED
Status: DISCONTINUED | OUTPATIENT
Start: 2018-10-29 | End: 2018-10-29 | Stop reason: HOSPADM

## 2018-10-29 RX ADMIN — Medication 10 ML: at 08:13

## 2018-10-29 RX ADMIN — DENOSUMAB 120 MG: 120 INJECTION SUBCUTANEOUS at 09:28

## 2018-10-29 RX ADMIN — SODIUM CHLORIDE, PRESERVATIVE FREE 500 UNITS: 5 INJECTION INTRAVENOUS at 08:13

## 2018-10-29 NOTE — PROGRESS NOTES
Subjective  REASONS FOR FOLLOWUP: 1. Metastatic breast cancer to multiple skeletal sites including cervical spine, sternum, lumbar spine and right femur, underwent Abraxane every 4 weeks and Xgeva every  3 months, DOCUMENTED RADIOLOGICAL PROGRESSION BY BONE SCAN 3/15/17 [facility], PRESENTLY ON FEMARA AND KISQALI , XGEVA EVERY 3 MONTHS,    2.Iron deficiency anemia du to GI blood loss, Xarelto stopped months ago, Iron initiated, Pepcid along with Aleve for gastric protection.           History of Present Illness  This patient returns today to the office for followup in regard to history of metastatic breast cancer to the skeleton and undergoing therapy with Femara and Kisqali as well as Xgeva.    Physically the only problem that she has is some fatigue and still minor skeletal pain, especially in the right knee area when she had a pathological fracture before. She has not encountered any other sites of skeletal pain. Tolerance to Femara/Kisqali is excellent with no significant side effects of the medication. Her appetite remains good. Her weight is stable. Her bowel function and urination remain normal. No cardiovascular or respiratory issues.     The patient continues working and has an ECOG performance status of 0.        Past Medical History, Past Surgical History,    1. Hypertension.   2. Hyperlipidemia.   3. Reflux esophagitis.     GYN HISTORY: G0, P0. The patient went into menopause in 2000.     Review of Systems   General: no fever, no chills, no fatigue,no weight changes, no lack of appetite.  Eyes: no epiphora, xerophthalmia,conjunctivitis, pain,  glaucoma, blurred vision, blindness, secretion, photophobia, proptosis, diplopia.  Ears: no otorrhea, tinnitus, otorrhagia, deafness, pain, vertigo.  Nose: no rhinorrhea, no epistaxis, no alteration in perception of odors, no sinuses pressure.  Mouth: no alteration in gums or teeth,  No ulcers, no difficulty with mastication or deglut ion, no odynophagia.  Neck: no masses or pain, no thyroid alterations, no pain in muscles or arteries, no carotid odynia, no crepitation.  Respiratory: no cough, no sputum production,no dyspnea,no trepopnea, no pleuritic pain,no hemoptysis.  Heart: no syncope, no irregularity, no palpitations, no angina,no orthopnea,no paroxysmal nocturnal dyspnea.  Vascular Venous: no tenderness,no edema,no palpable cords,no postphlebitic syndrome, no skin changes no ulcerations.  Vascular Arterial: no distal ischemia, noclaudication, no gangrene, no neuropathic ischemic pain, no skin ulcers, no paleness no cyanosis.  GI: no dysphagia, no odynophagia, no regurgitation, no heartburn,no indigestion,no nausea,no vomiting,no hematemesis ,no melena,no jaundice,no distention, no obstipation,no enterorrhagia,no proctalgia,no anal  lesions, no changes in bowel habits.  : no frequency, no hesitancy, no hematuria, no discharge,no  pain.  Musculoskeletal: stated muscle or tendon pain or inflammation,no  joint pain, no edema, no functional limitation,no fasciculations, no mass.  Neurologic: no headache, no seizures, noalterations on Craneal nerves, no motor deficit, no sensory deficit, normal coordination, no alteration in memory,normal orientation, calculation,normal writting, verbal and written language.  Skin: no rashes,no pruritus no localized lesions.  Psychiatric: no anxiety, no depression,no agitation, no delusions, proper insight.    Medications:  The current medication list was reviewed in the EMR    ALLERGIES:    Allergies   Allergen Reactions   • Codeine    • Docetaxel    • Paclitaxel   "      Objective      Vitals:    10/29/18 0834   BP: 132/84   Pulse: 73   Resp: 12   Temp: 98.6 °F (37 °C)   TempSrc: Oral   SpO2: 98%   Weight: 95.6 kg (210 lb 12.8 oz)   Height: 163 cm (64.17\")   PainSc: 1  Comment: knee pain     Current Status 10/29/2018   ECOG score 0   1/10 cancer related pain    Physical Exam        GENERAL:  Well-developed, well-nourished  Patient  in no acute distress.   SKIN:  Warm, dry ,NO rashes,NO purpura ,NO petechiae.  HEENT:  Pupils were equal and reactive to light and accomodation, conjunctivas non injected, no pterigion, normal extraocular movements, normal visual acuity.   Mouth mucosa was moist, no exudates in oropharynx, normal gum line, normal roof of the mouth and pillars, normal papillations of the tongue.  NECK:  Supple with good range of motion; no thyromegaly or masses, no JVD or bruits, no cervical adenopathies.No carotid arteries pain, no carotid abnormal pulsation , NO arterial dance.  LYMPHATICS:  No cervical, NO supraclavicular, NO axillary,NO epitrochlear , NO inguinal adenopathy.  CHEST:  Normal excursion of both stacy thoraces, normal voice fremitus, no subcutaneous emphysema, normal axillas, no rashes or acanthosis nigricans. Lungs clear to percussion and auscultation, normal breath sounds bilaterally, no wheezing,NO crackles NO ronchi, NO stridor, NO rubs.  CARDIAC AND VASCULAR:  normal rate and regular rhythm, with systolic g3/6 basal aortic murmurs,NO rubs NO S3 NO S4 right or left . Normal femoral, popliteal, pedis, brachial and carotid pulses.  ABDOMEN:  Soft, nontender with no organomegaly or masses, no ascites, no collateral circulation,no distention,no Bantry sign, no abdominal pain, no inguinal hernias,no umbilical hernia, no abdominal bruits. Normal bowel sounds.  GENITAL: Not  Performed.  EXTREMITIES  AND SPINE:  No clubbing, cyanosis or edema, no deformities r knee pain  No fluid accumulation.No kyphosis, scoliosis, deformities or pain in spine, ribs or " pelvic bone.  NEUROLOGICAL:  Patient was awake, alert, oriented to time, person and place.            Hematology WBC   Date Value Ref Range Status   10/29/2018 3.33 (L) 4.00 - 10.00 10*3/mm3 Final     RBC   Date Value Ref Range Status   10/29/2018 3.02 (L) 3.90 - 5.00 10*6/mm3 Final     Hemoglobin   Date Value Ref Range Status   10/29/2018 10.3 (L) 11.5 - 14.9 g/dL Final     Hematocrit   Date Value Ref Range Status   10/29/2018 30.8 (L) 34.0 - 45.0 % Final     Platelets   Date Value Ref Range Status   10/29/2018 249 150 - 375 10*3/mm3 Final        Component      Latest Ref Rng & Units 7/7/2017 8/11/2017 10/10/2017 11/7/2017          10:06 AM  1:28 PM  3:07 PM  3:26 PM   CA 15-3      <=25.0 U/mL 44.9 (H) 21.5 15.8 14.6     Component      Latest Ref Rng & Units 12/5/2017 1/10/2018 2/13/2018 7/6/2018          11:31 AM  9:32 AM 11:17 AM  1:56 PM   CA 15-3      <=25.0 U/mL 14.2 13.2 12.0 13.7     Component      Latest Ref Rng & Units 8/31/2018           2:18 PM   CA 15-3      <=25.0 U/mL 12.0         Assessment/Plan     1.  Patient has metastatic breast cancer to multiple sites in her skeleton and has been undergoing therapy with Kisqali and Femara with no significant side effects.  Today, she has a little bit of achiness in the joints that lead me to believe that maybe Femara has something to do with this and I have advised her to do some physical activity that could be very relieving in regard to this issue. Other than that, the patient’s tumor marker remains stable at 12 obtained 2 months ago and another 1 that is pending today that is supposed to be the same or in the same range. This is good news for her disease process that has had tumor markers very significantly elevated before.  2.  Leukopenia.  This is associated with her Kisqali therapy per se, has no implications and will be watched in absence of any other intervention.  3.  Anemia. This is multifactorial associated Kisqali therapy and iron deficiency. She  has received iron therapy in the past and so far actually the hemoglobin today is better than what it was during the previous visit. This will be watched in absence of any other intervention.    4.  Joint pain.  This is very likely related to Femara.  We advised her to do physical activity for this in the long run could have a positive effect on her. Besides this she can take 2 Aleve in the morning and 1 Aleve in the evening that minimizes her pain greatly.  5.  Need for gastric protection given the chronic use of anti-inflammatory medication.  The patient has been advised to continue taking her medication on a daily basis in the mornings.  6.  Bone health.  The patient will proceed with Xgeva today that she will be receiving and continue receiving every 3 months. We have a calcium, magnesium and phosphorus level pending today and I am sure they will be okay.  She remains on vitamin D.  7.  Heart murmur in base, systolic, aortic documented through echocardiogram before with no clinical implications at this time.     RECOMMENDATIONS:  As stated above.  The patient will return to see me in a couple of months, her port will be flushed and she will continue Xgeva with next dose in 3 months from now.     I advised her to continue paying attention in regard to side effects of anti-inflammatory medication.  She is no longer using Voltaren because the medicine is out of the range of her health program and she cannot afford the medication.  I asked her to use Biofreeze instead that could have a positive impact.  Most importantly I think physical activity is what she needs.             10/29/2018

## 2018-10-29 NOTE — PROGRESS NOTES
Subjective  REASONS FOR FOLLOWUP: 1. Metastatic breast cancer to multiple skeletal sites including cervical spine, sternum, lumbar spine and right femur, underwent Abraxane every 4 weeks and Xgeva every  3 months, DOCUMENTED RADIOLOGICAL PROGRESSION BY BONE SCAN 3/15/17 MOVING AWAY FROM [facility] AND [facility], PRESENTLY ON FEMARA AND KISQALI , XGEVA EVERY 3 MONTHS,    2.Iron deficiency anemia du to GI blood loss, Xarelto stopped months ago, Iron initiated, Pepcid along with Aleve for gastric protection.           History of Present Illness  This patient returns today to the office for followup in regard to history of metastatic breast cancer to the skeleton and undergoing therapy with Femara and Kisqali as well as Xgeva. She is due to start a new dose of her Femara/Kisqali next Saturday . Physically the only problem that she has is some fatigue and still minor skeletal pain, especially in the right knee area when she had a pathological fracture before. She has not encountered any other sites of skeletal pain. Tolerance to Femara/Kisqali is excellent with no significant side effects of the medication. Her appetite remains good. Her weight is stable. Her bowel function and urination remain normal. No cardiovascular or respiratory issues.     The patient continues working and has an ECOG performance status of 0.        Past Medical History, Past Surgical History,    1. Hypertension.   2. Hyperlipidemia.   3. Reflux esophagitis.     GYN HISTORY: G0, P0. The patient went into menopause in 2000.     Review of Systems     General: no fever, no chills, no fatigue,no weight changes, no lack of  appetite.  Eyes: no epiphora, xerophthalmia,conjunctivitis, pain, glaucoma, blurred vision, blindness, secretion, photophobia, proptosis, diplopia.  Ears: no otorrhea, tinnitus, otorrhagia, deafness, pain, vertigo.  Nose: no rhinorrhea, no epistaxis, no alteration in perception of odors, no sinuses pressure.  Mouth: no alteration in gums or teeth,  No ulcers, no difficulty with mastication or deglut ion, no odynophagia.  Neck: no masses or pain, no thyroid alterations, no pain in muscles or arteries, no carotid odynia, no crepitation.  Respiratory: no cough, no sputum production,no dyspnea,no trepopnea, no pleuritic pain,no hemoptysis.  Heart: no syncope, no irregularity, no palpitations, no angina,no orthopnea,no paroxysmal nocturnal dyspnea.  Vascular Venous: no tenderness,no edema,no palpable cords,no postphlebitic syndrome, no skin changes no ulcerations.  Vascular Arterial: no distal ischemia, noclaudication, no gangrene, no neuropathic ischemic pain, no skin ulcers, no paleness no cyanosis.  GI: no dysphagia, no odynophagia, no regurgitation, no heartburn,no indigestion,no nausea,no vomiting,no hematemesis ,no melena,no jaundice,no distention, no obstipation,no enterorrhagia,no proctalgia,no anal  lesions, no changes in bowel habits.  : no frequency, no hesitancy, no hematuria, no discharge,no  pain.  Musculoskeletal: no muscle or tendon pain or inflammation,no  joint pain, no edema, no functional limitation,no fasciculations, no mass.  Neurologic: no headache, no seizures, noalterations on Craneal nerves, no motor deficit, no sensory deficit, normal coordination, no alteration in memory,normal orientation, calculation,normal writting, verbal and written language.  Skin: no rashes,no pruritus no localized lesions.  Psychiatric: no anxiety, no depression,no agitation, no delusions, proper insight.    Medications:  The current medication list was reviewed in the EMR    ALLERGIES:    Allergies   Allergen  "Reactions   • Codeine    • Docetaxel    • Paclitaxel        Objective      Vitals:    10/29/18 0834   BP: 132/84   Pulse: 73   Resp: 12   Temp: 98.6 °F (37 °C)   TempSrc: Oral   SpO2: 98%   Weight: 95.6 kg (210 lb 12.8 oz)   Height: 163 cm (64.17\")   PainSc: 1  Comment: knee pain     Current Status 10/29/2018   ECOG score 0   1/10 cancer related pain    Physical Exam    GENERAL:  Well-developed, well-nourished  Patient  in no acute distress.   SKIN:  Warm, dry ,NO rashes,NO purpura ,NO petechiae.  HEENT:  Pupils were equal and reactive to light and accomodation, conjunctivas non injected, no pterigion, normal extraocular movements, normal visual acuity.   Mouth mucosa was moist, no exudates in oropharynx, normal gum line, normal roof of the mouth and pillars, normal papillations of the tongue.  NECK:  Supple with good range of motion; no thyromegaly or masses, no JVD or bruits, no cervical adenopathies.No carotid arteries pain, no carotid abnormal pulsation , NO arterial dance.  LYMPHATICS:  No cervical, NO supraclavicular, NO axillary,NO epitrochlear , NO inguinal adenopathy.  CHEST:  Normal excursion of both stacy thoraces, normal voice fremitus, no subcutaneous emphysema, normal axillas, no rashes or acanthosis nigricans. Lungs clear to percussion and auscultation, normal breath sounds bilaterally, no wheezing,NO crackles NO ronchi, NO stridor, NO rubs.  CARDIAC AND VASCULAR:  normal rate and regular rhythm, without murmurs,NO rubs NO S3 NO S4 right or left . Normal femoral, popliteal, pedis, brachial and carotid pulses.  ABDOMEN:  Soft, nontender with no organomegaly or masses, no ascites, no collateral circulation,no distention,no Randall sign, no abdominal pain, no inguinal hernias,no umbilical hernia, no abdominal bruits. Normal bowel sounds.  GENITAL: Not  Performed.  EXTREMITIES  AND SPINE:  No clubbing, cyanosis or edema, no deformities or pain .No kyphosis, scoliosis, deformities or pain in spine, ribs or " pelvic bone.  NEUROLOGICAL:  Patient was awake, alert, oriented to time, person and place.          Hematology WBC   Date Value Ref Range Status   10/29/2018 3.33 (L) 4.00 - 10.00 10*3/mm3 Final     RBC   Date Value Ref Range Status   10/29/2018 3.02 (L) 3.90 - 5.00 10*6/mm3 Final     Hemoglobin   Date Value Ref Range Status   10/29/2018 10.3 (L) 11.5 - 14.9 g/dL Final     Hematocrit   Date Value Ref Range Status   10/29/2018 30.8 (L) 34.0 - 45.0 % Final     Platelets   Date Value Ref Range Status   10/29/2018 249 150 - 375 10*3/mm3 Final        Component      Latest Ref Rng & Units 5/30/2017 7/7/2017 8/11/2017 10/10/2017          12:37 PM 10:06 AM  1:28 PM  3:07 PM   CA 15-3      <=25.0 U/mL 53.9 (H) 44.9 (H) 21.5 15.8     Component      Latest Ref Rng & Units 11/7/2017 12/5/2017 1/10/2018 2/13/2018           3:26 PM 11:31 AM  9:32 AM 11:17 AM   CA 15-3      <=25.0 U/mL 14.6 14.2 13.2 12.0     Component      Latest Ref Rng & Units 7/6/2018           1:56 PM   CA 15-3      <=25.0 U/mL 13.7       Assessment/Plan    . Metastatic  Left breast cancer to multiple skeletal sites including cervical spine, sternum, lumbar spine and right femur.Since the previous visit, the patient has been taking her Femara and Kisqali correctly   Today, the patient has leukopenia with no fever, anemia with no significant degree of fatigue and normal platelet count.    We still feel that her disease is being controlled by this regimen of medicines.     The patient has minimal need for pain medication, she remains fully functional, her hair has grown back, and she has had her port flushed today.  She has again, leukopenia with no fever, and she remains on magnesium supplementation to minimize any potentiality for prolonged QT interval.     The patient in my opinion looks extremely well. I discussed with her the excellent report of her tumor marker in a phone conversation with her 2 months ago. Her CA 15-3 has reached a level of normality at  this point. We have another one pending today.     Overall, I think the patient is doing fantastic and I advised her to remain on the same combination of medicines, returning to see us in a couple of months, have her port flushed at that time and receive Xgeva as well at that time. The patient will remain on her Kisqali/Femara combination with no modification in the present dose. She will continue taking the medicine 21 out of 28 days of the month.     Other than that, for her anemia I have nothing else to add at this point to her care, neither for her leukopenia. Her platelet count is fine.             10/29/2018

## 2018-11-01 ENCOUNTER — TELEPHONE (OUTPATIENT)
Dept: ONCOLOGY | Facility: HOSPITAL | Age: 63
End: 2018-11-01

## 2018-11-01 NOTE — TELEPHONE ENCOUNTER
----- Message from Lin Schofield sent at 11/1/2018 10:01 AM EDT -----  [phone]  Needs to talk to nurse about getting surgeons script ordered by  Dr. Saez.  Wants us to refill her Voltaren topical gel. Orthopedic surgeon wont unless she makes an appointment. Ok with Dr Saez to order. E scribed to pharmacy. Patient verbalized understanding.

## 2018-11-12 RX ORDER — LISINOPRIL AND HYDROCHLOROTHIAZIDE 12.5; 1 MG/1; MG/1
TABLET ORAL
Qty: 180 TABLET | Refills: 0 | Status: SHIPPED | OUTPATIENT
Start: 2018-11-12 | End: 2018-12-27 | Stop reason: SDUPTHER

## 2018-11-12 RX ORDER — ATORVASTATIN CALCIUM 20 MG/1
TABLET, FILM COATED ORAL
Qty: 90 TABLET | Refills: 0 | Status: SHIPPED | OUTPATIENT
Start: 2018-11-12 | End: 2018-12-27 | Stop reason: SDUPTHER

## 2018-12-27 RX ORDER — MAGNESIUM 64 MG (MAGNESIUM CHLORIDE) TABLET,DELAYED RELEASE
Qty: 30 TABLET | Refills: 5 | Status: SHIPPED | OUTPATIENT
Start: 2018-12-27 | End: 2019-06-15 | Stop reason: SDUPTHER

## 2018-12-27 RX ORDER — ATORVASTATIN CALCIUM 20 MG/1
TABLET, FILM COATED ORAL
Qty: 90 TABLET | Refills: 0 | Status: SHIPPED | OUTPATIENT
Start: 2018-12-27 | End: 2019-05-16 | Stop reason: SDUPTHER

## 2018-12-27 RX ORDER — LISINOPRIL AND HYDROCHLOROTHIAZIDE 12.5; 1 MG/1; MG/1
TABLET ORAL
Qty: 180 TABLET | Refills: 0 | Status: SHIPPED | OUTPATIENT
Start: 2018-12-27 | End: 2019-05-16 | Stop reason: SDUPTHER

## 2018-12-28 ENCOUNTER — INFUSION (OUTPATIENT)
Dept: ONCOLOGY | Facility: HOSPITAL | Age: 63
End: 2018-12-28

## 2018-12-28 ENCOUNTER — OFFICE VISIT (OUTPATIENT)
Dept: ONCOLOGY | Facility: CLINIC | Age: 63
End: 2018-12-28

## 2018-12-28 VITALS
HEIGHT: 64 IN | TEMPERATURE: 98.5 F | RESPIRATION RATE: 16 BRPM | SYSTOLIC BLOOD PRESSURE: 145 MMHG | DIASTOLIC BLOOD PRESSURE: 74 MMHG | WEIGHT: 210.9 LBS | HEART RATE: 92 BPM | OXYGEN SATURATION: 96 % | BODY MASS INDEX: 36 KG/M2

## 2018-12-28 DIAGNOSIS — C79.51 BONE METASTASIS: ICD-10-CM

## 2018-12-28 DIAGNOSIS — C50.912 PRIMARY MALIGNANT NEOPLASM OF LEFT BREAST (HCC): Primary | ICD-10-CM

## 2018-12-28 DIAGNOSIS — T45.1X5A LEUKOPENIA DUE TO ANTINEOPLASTIC CHEMOTHERAPY (HCC): ICD-10-CM

## 2018-12-28 DIAGNOSIS — D51.0 VITAMIN B12 DEFICIENCY ANEMIA DUE TO INTRINSIC FACTOR DEFICIENCY: ICD-10-CM

## 2018-12-28 DIAGNOSIS — D68.59 THROMBOPHILIA (HCC): ICD-10-CM

## 2018-12-28 DIAGNOSIS — R01.1 HEART MURMUR, SYSTOLIC: ICD-10-CM

## 2018-12-28 DIAGNOSIS — D50.0 IRON DEFICIENCY ANEMIA DUE TO CHRONIC BLOOD LOSS: ICD-10-CM

## 2018-12-28 DIAGNOSIS — Z45.2 FITTING AND ADJUSTMENT OF VASCULAR CATHETER: ICD-10-CM

## 2018-12-28 DIAGNOSIS — D70.1 LEUKOPENIA DUE TO ANTINEOPLASTIC CHEMOTHERAPY (HCC): ICD-10-CM

## 2018-12-28 DIAGNOSIS — M85.89 OSTEOPENIA OF MULTIPLE SITES: ICD-10-CM

## 2018-12-28 LAB
ALBUMIN SERPL-MCNC: 4.2 G/DL (ref 3.5–5.2)
ALBUMIN/GLOB SERPL: 1.7 G/DL (ref 1.1–2.4)
ALP SERPL-CCNC: 92 U/L (ref 38–116)
ALT SERPL W P-5'-P-CCNC: 19 U/L (ref 0–33)
ANION GAP SERPL CALCULATED.3IONS-SCNC: 12.3 MMOL/L
AST SERPL-CCNC: 19 U/L (ref 0–32)
BASOPHILS # BLD AUTO: 0.04 10*3/MM3 (ref 0–0.1)
BASOPHILS NFR BLD AUTO: 1 % (ref 0–1.1)
BILIRUB SERPL-MCNC: 0.4 MG/DL (ref 0.1–1.2)
BUN BLD-MCNC: 27 MG/DL (ref 6–20)
BUN/CREAT SERPL: 25 (ref 7.3–30)
CALCIUM SPEC-SCNC: 9.6 MG/DL (ref 8.5–10.2)
CANCER AG15-3 SERPL-ACNC: 15.1 U/ML
CHLORIDE SERPL-SCNC: 100 MMOL/L (ref 98–107)
CO2 SERPL-SCNC: 26.7 MMOL/L (ref 22–29)
CREAT BLD-MCNC: 1.08 MG/DL (ref 0.6–1.1)
DEPRECATED RDW RBC AUTO: 55 FL (ref 37–49)
EOSINOPHIL # BLD AUTO: 0.1 10*3/MM3 (ref 0–0.36)
EOSINOPHIL NFR BLD AUTO: 2.4 % (ref 1–5)
ERYTHROCYTE [DISTWIDTH] IN BLOOD BY AUTOMATED COUNT: 14.8 % (ref 11.7–14.5)
GFR SERPL CREATININE-BSD FRML MDRD: 51 ML/MIN/1.73
GLOBULIN UR ELPH-MCNC: 2.5 GM/DL (ref 1.8–3.5)
GLUCOSE BLD-MCNC: 104 MG/DL (ref 74–124)
HCT VFR BLD AUTO: 29.8 % (ref 34–45)
HGB BLD-MCNC: 9.8 G/DL (ref 11.5–14.9)
IMM GRANULOCYTES # BLD AUTO: 0.01 10*3/MM3 (ref 0–0.03)
IMM GRANULOCYTES NFR BLD AUTO: 0.2 % (ref 0–0.5)
LYMPHOCYTES # BLD AUTO: 1.34 10*3/MM3 (ref 1–3.5)
LYMPHOCYTES NFR BLD AUTO: 32.2 % (ref 20–49)
MCH RBC QN AUTO: 33.6 PG (ref 27–33)
MCHC RBC AUTO-ENTMCNC: 32.9 G/DL (ref 32–35)
MCV RBC AUTO: 102.1 FL (ref 83–97)
MONOCYTES # BLD AUTO: 0.36 10*3/MM3 (ref 0.25–0.8)
MONOCYTES NFR BLD AUTO: 8.7 % (ref 4–12)
NEUTROPHILS # BLD AUTO: 2.31 10*3/MM3 (ref 1.5–7)
NEUTROPHILS NFR BLD AUTO: 55.5 % (ref 39–75)
NRBC BLD AUTO-RTO: 0 /100 WBC (ref 0–0)
PLATELET # BLD AUTO: 220 10*3/MM3 (ref 150–375)
PMV BLD AUTO: 10.3 FL (ref 8.9–12.1)
POTASSIUM BLD-SCNC: 4.3 MMOL/L (ref 3.5–4.7)
PROT SERPL-MCNC: 6.7 G/DL (ref 6.3–8)
RBC # BLD AUTO: 2.92 10*6/MM3 (ref 3.9–5)
SODIUM BLD-SCNC: 139 MMOL/L (ref 134–145)
WBC NRBC COR # BLD: 4.16 10*3/MM3 (ref 4–10)

## 2018-12-28 PROCEDURE — 96523 IRRIG DRUG DELIVERY DEVICE: CPT | Performed by: INTERNAL MEDICINE

## 2018-12-28 PROCEDURE — 99215 OFFICE O/P EST HI 40 MIN: CPT | Performed by: INTERNAL MEDICINE

## 2018-12-28 PROCEDURE — 86300 IMMUNOASSAY TUMOR CA 15-3: CPT | Performed by: INTERNAL MEDICINE

## 2018-12-28 PROCEDURE — 80053 COMPREHEN METABOLIC PANEL: CPT | Performed by: INTERNAL MEDICINE

## 2018-12-28 PROCEDURE — 85025 COMPLETE CBC W/AUTO DIFF WBC: CPT | Performed by: INTERNAL MEDICINE

## 2018-12-28 RX ORDER — SODIUM CHLORIDE 0.9 % (FLUSH) 0.9 %
10 SYRINGE (ML) INJECTION AS NEEDED
Status: DISCONTINUED | OUTPATIENT
Start: 2018-12-28 | End: 2018-12-28 | Stop reason: HOSPADM

## 2018-12-28 RX ORDER — SODIUM CHLORIDE 0.9 % (FLUSH) 0.9 %
10 SYRINGE (ML) INJECTION AS NEEDED
Status: CANCELLED | OUTPATIENT
Start: 2018-12-28

## 2018-12-28 RX ADMIN — SODIUM CHLORIDE, PRESERVATIVE FREE 300 UNITS: 5 INJECTION INTRAVENOUS at 11:43

## 2018-12-28 RX ADMIN — Medication 10 ML: at 11:44

## 2018-12-28 NOTE — PROGRESS NOTES
Subjective  REASONS FOR FOLLOWUP: 1. Metastatic breast cancer to multiple skeletal sites including cervical spine, sternum, lumbar spine and right femur, underwent Abraxane every 4 weeks and Xgeva every  3 months, DOCUMENTED RADIOLOGICAL PROGRESSION BY BONE SCAN 3/15/17 MOVING AWAY FROM ABRAXANE AND HALAVEN, PRESENTLY ON FEMARA AND KISQALI , XGEVA EVERY 3 MONTHS,    2.Iron deficiency anemia du to GI blood loss, Xarelto stopped months ago, Iron initiated, Pepcid along with Aleve for gastric protection.           History of Present Illness       The patient continues working and has an ECOG performance status of 0.        Past Medical History, Past Surgical History,    1. Hypertension.   2. Hyperlipidemia.   3. Reflux esophagitis.     GYN HISTORY: G0, P0. The patient went into menopause in 2000.     Review of Systems     General: no fever, no chills, no fatigue,no weight changes, no lack of appetite.  Eyes: no epiphora, xerophthalmia,conjunctivitis, pain, glaucoma, blurred vision, blindness, secretion, photophobia, proptosis, diplopia.  Ears: no otorrhea, tinnitus, otorrhagia, deafness, pain, vertigo.  Nose: no rhinorrhea, no epistaxis, no alteration in perception of odors, no sinuses pressure.  Mouth: no alteration in gums or teeth,  No ulcers, no difficulty with mastication or deglut ion, no odynophagia.  Neck: no masses or pain, no thyroid alterations, no pain in muscles or arteries, no carotid odynia, no crepitation.  Respiratory: no cough, no sputum production,no dyspnea,no trepopnea, no pleuritic pain,no hemoptysis.  Heart: no syncope, no irregularity, no palpitations, no angina,no  orthopnea,no paroxysmal nocturnal dyspnea.  Vascular Venous: no tenderness,no edema,no palpable cords,no postphlebitic syndrome, no skin changes no ulcerations.  Vascular Arterial: no distal ischemia, noclaudication, no gangrene, no neuropathic ischemic pain, no skin ulcers, no paleness no cyanosis.  GI: no dysphagia, no odynophagia, no regurgitation, no heartburn,no indigestion,no nausea,no vomiting,no hematemesis ,no melena,no jaundice,no distention, no obstipation,no enterorrhagia,no proctalgia,no anal  lesions, no changes in bowel habits.  : no frequency, no hesitancy, no hematuria, no discharge,no  pain.  Musculoskeletal: no muscle or tendon pain or inflammation,no  joint pain, no edema, no functional limitation,no fasciculations, no mass.  Neurologic: no headache, no seizures, noalterations on Craneal nerves, no motor deficit, no sensory deficit, normal coordination, no alteration in memory,normal orientation, calculation,normal writting, verbal and written language.  Skin: no rashes,no pruritus no localized lesions.  Psychiatric: no anxiety, no depression,no agitation, no delusions, proper insight.      Medications:  The current medication list was reviewed in the EMR    ALLERGIES:    Allergies   Allergen Reactions   • Codeine    • Docetaxel    • Paclitaxel        Objective      There were no vitals filed for this visit.  Current Status 10/29/2018   ECOG score 0   1/10 cancer related pain    Physical Exam                  Hematology WBC   Date Value Ref Range Status   10/29/2018 3.33 (L) 4.00 - 10.00 10*3/mm3 Final     RBC   Date Value Ref Range Status   10/29/2018 3.02 (L) 3.90 - 5.00 10*6/mm3 Final     Hemoglobin   Date Value Ref Range Status   10/29/2018 10.3 (L) 11.5 - 14.9 g/dL Final     Hematocrit   Date Value Ref Range Status   10/29/2018 30.8 (L) 34.0 - 45.0 % Final     Platelets   Date Value Ref Range Status   10/29/2018 249 150 - 375 10*3/mm3 Final        Component      Latest Ref Rng & Units  7/7/2017 8/11/2017 10/10/2017 11/7/2017          10:06 AM  1:28 PM  3:07 PM  3:26 PM   CA 15-3      <=25.0 U/mL 44.9 (H) 21.5 15.8 14.6     Component      Latest Ref Rng & Units 12/5/2017 1/10/2018 2/13/2018 7/6/2018          11:31 AM  9:32 AM 11:17 AM  1:56 PM   CA 15-3      <=25.0 U/mL 14.2 13.2 12.0 13.7     Component      Latest Ref Rng & Units 8/31/2018           2:18 PM   CA 15-3      <=25.0 U/mL 12.0         Assessment/Plan     1.  Patient has metastatic breast cancer to multiple sites in her skeleton and has been undergoing therapy with Kisqali and Femara with no significant side effects.  Today, she has a little bit of achiness in the joints that lead me to believe that maybe Femara has something to do with this and I have advised her to do some physical activity that could be very relieving in regard to this issue. Other than that, the patient’s tumor marker remains stable at 12 obtained 2 months ago and another 1 that is pending today that is supposed to be the same or in the same range. This is good news for her disease process that has had tumor markers very significantly elevated before.  2.  Leukopenia.  This is associated with her Kisqali therapy per se, has no implications and will be watched in absence of any other intervention.  3.  Anemia. This is multifactorial associated Kisqali therapy and iron deficiency. She has received iron therapy in the past and so far actually the hemoglobin today is better than what it was during the previous visit. This will be watched in absence of any other intervention.    4.  Joint pain.  This is very likely related to Femara.  We advised her to do physical activity for this in the long run could have a positive effect on her. Besides this she can take 2 Aleve in the morning and 1 Aleve in the evening that minimizes her pain greatly.  5.  Need for gastric protection given the chronic use of anti-inflammatory medication.  The patient has been advised to continue  taking her medication on a daily basis in the mornings.  6.  Bone health.  The patient will proceed with Xgeva today that she will be receiving and continue receiving every 3 months. We have a calcium, magnesium and phosphorus level pending today and I am sure they will be okay.  She remains on vitamin D.  7.  Heart murmur in base, systolic, aortic documented through echocardiogram before with no clinical implications at this time.     RECOMMENDATIONS:  As stated above.  The patient will return to see me in a couple of months, her port will be flushed and she will continue Xgeva with next dose in 3 months from now.     I advised her to continue paying attention in regard to side effects of anti-inflammatory medication.  She is no longer using Voltaren because the medicine is out of the range of her health program and she cannot afford the medication.  I asked her to use Biofreeze instead that could have a positive impact.  Most importantly I think physical activity is what she needs.             12/28/2018

## 2018-12-28 NOTE — PROGRESS NOTES
Subjective  REASONS FOR FOLLOWUP: 1. Metastatic breast cancer to multiple skeletal sites including cervical spine, sternum, lumbar spine and right femur, underwent Abraxane every 4 weeks and Xgeva every  3 months, DOCUMENTED RADIOLOGICAL PROGRESSION BY BONE SCAN 3/15/[facility], PRESENTLY ON FEMARA AND KISQALI , XGEVA EVERY 3 MONTHS,    2.Iron deficiency anemia du to GI blood loss, Xarelto stopped months ago, Iron initiated, Pepcid along with Aleve for gastric protection.           History of Present Illness  This patient returns today to the office for followup in regard to history of metastatic breast cancer to the skeleton and undergoing therapy with Femara and Kisqali as well as Xgeva.    Physically the only problem that she has is some fatigue and still minor skeletal pain, especially in the right knee area when she had a pathological fracture before. She has not encountered any other sites of skeletal pain. Tolerance to Femara/Kisqali is excellent with no significant side effects of the medication. Her appetite remains good. Her weight is stable. Her bowel function and urination remain normal. No cardiovascular or respiratory issues.     The patient continues working and has an ECOG performance status of 0.        Past Medical History, Past Surgical History,    1. Hypertension.   2. Hyperlipidemia.   3. Reflux esophagitis.     GYN HISTORY: G0, P0. The patient went into menopause in 2000.     Review of Systems       General: no fever, no chills, SOME fatigue,no weight changes, no lack of appetite.  Eyes: no epiphora, xerophthalmia,conjunctivitis, pain,  glaucoma, blurred vision, blindness, secretion, photophobia, proptosis, diplopia.  Ears: no otorrhea, tinnitus, otorrhagia, deafness, pain, vertigo.  Nose: no rhinorrhea, no epistaxis, no alteration in perception of odors, no sinuses pressure.  Mouth: no alteration in gums or teeth,  No ulcers, no difficulty with mastication or deglut ion, no odynophagia.  Neck: no masses or pain, no thyroid alterations, no pain in muscles or arteries, no carotid odynia, no crepitation.  Respiratory: no cough, no sputum production,no dyspnea,no trepopnea, no pleuritic pain,no hemoptysis.  Heart: no syncope, no irregularity, no palpitations, no angina,no orthopnea,no paroxysmal nocturnal dyspnea.  Vascular Venous: no tenderness,no edema,no palpable cords,no postphlebitic syndrome, no skin changes no ulcerations.  Vascular Arterial: no distal ischemia, noclaudication, no gangrene, no neuropathic ischemic pain, no skin ulcers, no paleness no cyanosis.  GI: no dysphagia, no odynophagia, no regurgitation, no heartburn,no indigestion,no nausea,no vomiting,no hematemesis ,no melena,no jaundice,no distention, no obstipation,no enterorrhagia,no proctalgia,no anal  lesions, no changes in bowel habits.  : no frequency, no hesitancy, no hematuria, no discharge,no  pain.  Musculoskeletal: STATED muscle or tendon pain or inflammation,no  joint pain, no edema, no functional limitation,no fasciculations, no mass.  Neurologic: no headache, no seizures, noalterations on Craneal nerves, no motor deficit, no sensory deficit, normal coordination, no alteration in memory,normal orientation, calculation,normal writting, verbal and written language.  Skin: no rashes,no pruritus no localized lesions.  Psychiatric: no anxiety, no depression,no agitation, no delusions, proper insight.    Medications:  The current medication list was reviewed in the EMR    ALLERGIES:    Allergies   Allergen Reactions   • Codeine    • Docetaxel    • Paclitaxel         Objective      There were no vitals filed for this visit.  Current Status 10/29/2018   ECOG score 0   1/10 cancer related pain    Physical Exam            GENERAL:  Well-developed, well-nourished  Patient  in no acute distress.   SKIN:  Warm, dry ,NO rashes,NO purpura ,NO petechiae.  HEENT:  Pupils were equal and reactive to light and accomodation, conjunctivas non injected, no pterigion, normal extraocular movements, normal visual acuity.   Mouth mucosa was moist, no exudates in oropharynx, normal gum line, normal roof of the mouth and pillars, normal papillations of the tongue.  NECK:  Supple with good range of motion; no thyromegaly or masses, no JVD or bruits, no cervical adenopathies.No carotid arteries pain, no carotid abnormal pulsation , NO arterial dance.  LYMPHATICS:  No cervical, NO supraclavicular, NO axillary,NO epitrochlear , NO inguinal adenopathy.  CHEST:  Normal excursion of both stacy thoraces, normal voice fremitus, no subcutaneous emphysema, normal axillas, no rashes or acanthosis nigricans. Lungs clear to percussion and auscultation, normal breath sounds bilaterally, no wheezing,NO crackles NO ronchi, NO stridor, NO rubs.  CARDIAC AND VASCULAR:  normal rate and regular rhythm, without murmurs,NO rubs NO S3 NO S4 right or left . Normal femoral, popliteal, pedis, brachial and carotid pulses.  ABDOMEN:  Soft, nontender with no organomegaly or masses, no ascites, no collateral circulation,no distention,no Randall sign, no abdominal pain, no inguinal hernias,no umbilical hernia, no abdominal bruits. Normal bowel sounds.  GENITAL: Not  Performed.  EXTREMITIES  AND SPINE:  No clubbing, cyanosis or edema, no deformities R KNEE pain .No kyphosis, scoliosis, deformities or pain in spine, ribs or pelvic bone.  NEUROLOGICAL:  Patient was awake, alert, oriented to time, person and place.              Hematology WBC   Date Value Ref Range Status   10/29/2018 3.33 (L) 4.00 - 10.00 10*3/mm3 Final     RBC    Date Value Ref Range Status   10/29/2018 3.02 (L) 3.90 - 5.00 10*6/mm3 Final     Hemoglobin   Date Value Ref Range Status   10/29/2018 10.3 (L) 11.5 - 14.9 g/dL Final     Hematocrit   Date Value Ref Range Status   10/29/2018 30.8 (L) 34.0 - 45.0 % Final     Platelets   Date Value Ref Range Status   10/29/2018 249 150 - 375 10*3/mm3 Final        Component      Latest Ref Rng & Units 7/7/2017 8/11/2017 10/10/2017 11/7/2017   CA 15-3      <=25.0 U/mL 44.9 (H) 21.5 15.8 14.6     Component      Latest Ref Rng & Units 12/5/2017 1/10/2018 2/13/2018 7/6/2018   CA 15-3      <=25.0 U/mL 14.2 13.2 12.0 13.7     Component      Latest Ref Rng & Units 8/31/2018 10/29/2018   CA 15-3      <=25.0 U/mL 12.0 12.5         Assessment/Plan     1.  Patient has metastatic breast cancer to multiple sites in her skeleton and has been undergoing therapy with Kisqali and Femara with no significant side effects.  Today, she has a little bit of achiness in the joints that lead me to believe that maybe Femara has something to do with this and I have advised her to do some physical activity that could be very relieving in regard to this issue. Other than that, the patient’s tumor marker remains stable at 12 obtained 2 months ago and another 1 that is pending today that is supposed to be the same or in the same range. This is good news for her disease process that has had tumor markers very significantly elevated before.  2.  Leukopenia.  This is associated with her Kisqali therapy per se, has no implications and will be watched in absence of any other intervention.  3.  Anemia. This is multifactorial associated Kisqali therapy and iron deficiency. She has received iron therapy in the past and so far actually the hemoglobin today is better than what it was during the previous visit. This will be watched in absence of any other intervention.    4.  Joint pain.  This is very likely related to Femara.  We advised her to do physical activity for this  in the long run could have a positive effect on her. Besides this she can take 2 Aleve in the morning and 1 Aleve in the evening that minimizes her pain greatly.  5.  Need for gastric protection given the chronic use of anti-inflammatory medication.  The patient has been advised to continue taking her medication on a daily basis in the mornings.  6.  Bone health.  The patient will proceed with Xgeva NEXT MONTH that she will be receiving and continue receiving every 3 months.     She remains on vitamin D.  7.  Heart murmur in base, systolic, aortic documented through echocardiogram before with no clinical implications at this time.     RECOMMENDATIONS:   1. As stated above. The patient will remain on her Femara/Kisqali medications at the same dosing and sequencing for the time being. The patient will be brought to the office for blood counts once a month CBC and she will have Xgeva injection in 1 month and 4 months from now. She will be seen by physician in 4 months. We will continue monitoring her tumor marker CA 15-3 in 2 months and 4 months.     So far I find no need for the patient to take any other pain medication neither to have any other radiological analysis at this time. As long as her tumor markers remain as good as they are I find no need for new bone scans or MRI's.     I pointed out to the patient that if she has any respiratory infections that are more than 3-4 days lasting it will be important to give us a call because maybe she will be a candidate to receive antibiotic therapy. Her white count is marginal related to her Kisqali and it will be better off to cover her over the potentiality of infection and problems of this nature.     2. The patient's magnesium remains being supplemented. So far the patient has not had any cardiac irregularity associated with the Kisqali therapy. Magnesium will remain ongoing supplement for the time being.     3. I asked her to take a prenatal vitamin that could be  beneficial in regard helping her anemia.     I will review her back in 4 months.    Finally the patient has an appointment to see her dentist next month. I reminded her to tell the dentist that she is receiving Xgeva.              12/28/2018

## 2019-01-14 DIAGNOSIS — E78.5 HYPERLIPIDEMIA, UNSPECIFIED HYPERLIPIDEMIA TYPE: ICD-10-CM

## 2019-01-14 DIAGNOSIS — E53.8 B12 DEFICIENCY: ICD-10-CM

## 2019-01-14 DIAGNOSIS — I10 HYPERTENSION, UNSPECIFIED TYPE: Primary | ICD-10-CM

## 2019-01-17 LAB
ALBUMIN SERPL-MCNC: 4.1 G/DL (ref 3.5–5.2)
ALBUMIN/GLOB SERPL: 1.6 G/DL
ALP SERPL-CCNC: 88 U/L (ref 39–117)
ALT SERPL-CCNC: 18 U/L (ref 1–33)
APPEARANCE UR: CLEAR
AST SERPL-CCNC: 19 U/L (ref 1–32)
BACTERIA #/AREA URNS HPF: ABNORMAL /HPF
BACTERIA UR CULT: ABNORMAL
BACTERIA UR CULT: ABNORMAL
BASOPHILS # BLD AUTO: 0.04 10*3/MM3 (ref 0–0.2)
BASOPHILS NFR BLD AUTO: 1.1 % (ref 0–1.5)
BILIRUB SERPL-MCNC: 0.3 MG/DL (ref 0.1–1.2)
BILIRUB UR QL STRIP: NEGATIVE
BUN SERPL-MCNC: 30 MG/DL (ref 8–23)
BUN/CREAT SERPL: 27.3 (ref 7–25)
CALCIUM SERPL-MCNC: 9.5 MG/DL (ref 8.6–10.5)
CHLORIDE SERPL-SCNC: 100 MMOL/L (ref 98–107)
CHOLEST SERPL-MCNC: 165 MG/DL (ref 0–200)
CO2 SERPL-SCNC: 25.3 MMOL/L (ref 22–29)
COLOR UR: YELLOW
CREAT SERPL-MCNC: 1.1 MG/DL (ref 0.57–1)
EOSINOPHIL # BLD AUTO: 0.09 10*3/MM3 (ref 0–0.7)
EOSINOPHIL NFR BLD AUTO: 2.4 % (ref 0.3–6.2)
EPI CELLS #/AREA URNS HPF: ABNORMAL /HPF
ERYTHROCYTE [DISTWIDTH] IN BLOOD BY AUTOMATED COUNT: 15.3 % (ref 11.7–13)
GLOBULIN SER CALC-MCNC: 2.5 GM/DL
GLUCOSE SERPL-MCNC: 110 MG/DL (ref 65–99)
GLUCOSE UR QL: NEGATIVE
HCT VFR BLD AUTO: 31.6 % (ref 35.6–45.5)
HDLC SERPL-MCNC: 52 MG/DL (ref 40–60)
HGB BLD-MCNC: 10.5 G/DL (ref 11.9–15.5)
HGB UR QL STRIP: NEGATIVE
IMM GRANULOCYTES # BLD AUTO: 0.01 10*3/MM3 (ref 0–0.03)
IMM GRANULOCYTES NFR BLD AUTO: 0.3 % (ref 0–0.5)
KETONES UR QL STRIP: NEGATIVE
LDLC SERPL CALC-MCNC: 67 MG/DL (ref 0–100)
LEUKOCYTE ESTERASE UR QL STRIP: ABNORMAL
LYMPHOCYTES # BLD AUTO: 1.06 10*3/MM3 (ref 0.9–4.8)
LYMPHOCYTES NFR BLD AUTO: 28.7 % (ref 19.6–45.3)
MCH RBC QN AUTO: 34.1 PG (ref 26.9–32)
MCHC RBC AUTO-ENTMCNC: 33.2 G/DL (ref 32.4–36.3)
MCV RBC AUTO: 102.6 FL (ref 80.5–98.2)
MICRO URNS: ABNORMAL
MONOCYTES # BLD AUTO: 0.24 10*3/MM3 (ref 0.2–1.2)
MONOCYTES NFR BLD AUTO: 6.5 % (ref 5–12)
MUCOUS THREADS URNS QL MICRO: PRESENT /HPF
NEUTROPHILS # BLD AUTO: 2.26 10*3/MM3 (ref 1.9–8.1)
NEUTROPHILS NFR BLD AUTO: 61.3 % (ref 42.7–76)
NITRITE UR QL STRIP: POSITIVE
OTHER ANTIBIOTIC SUSC ISLT: ABNORMAL
PH UR STRIP: 5.5 [PH] (ref 5–7.5)
PLATELET # BLD AUTO: 266 10*3/MM3 (ref 140–500)
POTASSIUM SERPL-SCNC: 4.4 MMOL/L (ref 3.5–5.2)
PROT SERPL-MCNC: 6.6 G/DL (ref 6–8.5)
PROT UR QL STRIP: NEGATIVE
RBC # BLD AUTO: 3.08 10*6/MM3 (ref 3.9–5.2)
RBC #/AREA URNS HPF: ABNORMAL /HPF
SODIUM SERPL-SCNC: 139 MMOL/L (ref 136–145)
SP GR UR: 1.02 (ref 1–1.03)
TRIGL SERPL-MCNC: 229 MG/DL (ref 0–150)
TSH SERPL DL<=0.005 MIU/L-ACNC: 2.33 MIU/ML (ref 0.27–4.2)
URINALYSIS REFLEX: ABNORMAL
UROBILINOGEN UR STRIP-MCNC: 0.2 MG/DL (ref 0.2–1)
VIT B12 SERPL-MCNC: 585 PG/ML (ref 211–946)
VLDLC SERPL CALC-MCNC: 45.8 MG/DL (ref 5–40)
WBC # BLD AUTO: 3.69 10*3/MM3 (ref 4.5–10.7)
WBC #/AREA URNS HPF: ABNORMAL /HPF

## 2019-01-21 ENCOUNTER — OFFICE VISIT (OUTPATIENT)
Dept: INTERNAL MEDICINE | Facility: CLINIC | Age: 64
End: 2019-01-21

## 2019-01-21 VITALS
SYSTOLIC BLOOD PRESSURE: 128 MMHG | DIASTOLIC BLOOD PRESSURE: 74 MMHG | HEART RATE: 103 BPM | HEIGHT: 64 IN | BODY MASS INDEX: 35.85 KG/M2 | OXYGEN SATURATION: 96 % | WEIGHT: 210 LBS

## 2019-01-21 DIAGNOSIS — R73.01 IFG (IMPAIRED FASTING GLUCOSE): ICD-10-CM

## 2019-01-21 DIAGNOSIS — Z86.010 HISTORY OF COLON POLYPS: ICD-10-CM

## 2019-01-21 DIAGNOSIS — Z00.00 WELL ADULT EXAM: Primary | ICD-10-CM

## 2019-01-21 DIAGNOSIS — I10 BENIGN ESSENTIAL HTN: ICD-10-CM

## 2019-01-21 DIAGNOSIS — E78.5 HYPERLIPIDEMIA, UNSPECIFIED HYPERLIPIDEMIA TYPE: ICD-10-CM

## 2019-01-21 DIAGNOSIS — Z12.11 COLON CANCER SCREENING: ICD-10-CM

## 2019-01-21 PROCEDURE — 90472 IMMUNIZATION ADMIN EACH ADD: CPT | Performed by: INTERNAL MEDICINE

## 2019-01-21 PROCEDURE — 90471 IMMUNIZATION ADMIN: CPT | Performed by: INTERNAL MEDICINE

## 2019-01-21 PROCEDURE — 90674 CCIIV4 VAC NO PRSV 0.5 ML IM: CPT | Performed by: INTERNAL MEDICINE

## 2019-01-21 PROCEDURE — 90732 PPSV23 VACC 2 YRS+ SUBQ/IM: CPT | Performed by: INTERNAL MEDICINE

## 2019-01-21 PROCEDURE — 99396 PREV VISIT EST AGE 40-64: CPT | Performed by: INTERNAL MEDICINE

## 2019-01-21 NOTE — PROGRESS NOTES
Subjective     Maggie Suero is a 63 y.o. female who presents for a complete physical exam.      History of Present Illness     HTN.  Control is good.    HLD.  Cholesterol is good but triglycerides are high.   IFG.  BS has gone up to 110. Discussed diet, exercise and weight.     Review of Systems   Constitutional: Negative.    HENT: Negative.    Eyes: Negative.    Respiratory: Negative.    Cardiovascular: Negative.    Gastrointestinal: Negative.    Endocrine: Negative.    Genitourinary: Negative.    Musculoskeletal: Negative.    Skin: Negative.    Allergic/Immunologic: Negative.    Neurological: Negative.    Hematological: Negative.    Psychiatric/Behavioral: Negative.        The following portions of the patient's history were reviewed and updated as appropriate: allergies, current medications, past family history, past medical history, past social history, past surgical history and problem list.  Health maintenance tab was reviewed and updated with the patient.       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       • Thrombophilia (CMS/HCC) 02/18/2016     Note Last Updated: 2/18/2016     Description: associated with femoral fracture     • Hyperlipidemia 02/18/2016   • IFG (impaired fasting glucose) 02/18/2016   • Osteopenia 02/18/2016     Note Last Updated: 3/8/2016     Description: Fosamax stopped after 10 years of therapy in May  of 2012.  On Zometa with metastatic breast cancer followed by Xgeva currently.           Past Medical History:   Diagnosis Date   • Abnormal mammogram    • Abnormal menstrual cycle    • Arthritis    • Bone metastasis (CMS/HCC)    • Breast cancer (CMS/HCC) 2000    right breast   • Drug therapy 2001    breast cancer   • Drug therapy 2017    right femur/associated with breast cancer   • History of colon polyps    • Hx of radiation therapy 2000    breast cancer   • Hx of radiation therapy 2015    bone cancer right femur/ associated with breast cancer   • Hypercholesterolemia    • Hyperlipidemia    • Hypertension    • Multiple benign polyps of large intestine    • Reflux esophagitis        Past Surgical History:   Procedure Laterality Date   • BREAST BIOPSY Right 2000    breast cancer   • BREAST SURGERY  2000    lumpectomy, mastectomy, revision   • COLONOSCOPY  2012   • FEMUR FRACTURE SURGERY      secondary to metastatic breast cancer 7/2014, with revision in 9/2015   • MASTECTOMY Left 2000    transflap in 2003       Family History   Problem Relation Age of Onset   • Hypertension Mother    • Diabetes Mother    • Macular degeneration Mother    • Heart disease Father    • Glaucoma Brother    • Diabetes Brother    • Colon cancer Paternal Grandmother    • Thyroid disease Other    • Kidney disease Other    • Glaucoma Other    • Cancer Other    • Diabetes Other        Social History     Socioeconomic History   • Marital status: Single     Spouse name: Not on file   • Number of children: Not on file   • Years of education: Not on file   • Highest education level: Not on file   Social Needs   • Financial resource strain: Not on file   • Food insecurity - worry: Not on file   • Food insecurity - inability: Not on file   • Transportation needs - medical: Not on file   • Transportation needs - non-medical: Not on file   Occupational History   • Occupation:      Employer: [employer]   Tobacco Use   •  Smoking status: Never Smoker   Substance and Sexual Activity   • Alcohol use: No   • Drug use: No   • Sexual activity: Not on file   Other Topics Concern   • Not on file   Social History Narrative   • Not on file       Current Outpatient Medications on File Prior to Visit   Medication Sig Dispense Refill   • Ascorbic Acid (VITAMIN C PO) Take  by mouth Daily.     • atorvastatin (LIPITOR) 20 MG tablet TAKE 1 TABLET BY MOUTH ONCE DAILY 90 tablet 0   • calcium carbonate-vitamin d 600-400 MG-UNIT per tablet Take 1 tablet by mouth daily.     • denosumab (XGEVA) 120 MG/1.7ML solution injection Inject  under the skin 1 (one) time.     • diclofenac (VOLTAREN) 1 % gel gel Apply  topically to the appropriate area as directed 2 (Two) Times a Day. 1 tube 6   • Ferrous Sulfate (IRON) 325 (65 Fe) MG tablet TAKE 1 TABLET BY MOUTH ONCE DAILY WITH BREAKFAST 30 tablet 5   • letrozole (FEMARA) 2.5 MG tablet TAKE ONE TABLET BY MOUTH ONCE DAILY START  WITH  KISQALI 30 tablet 6   • lisinopril-hydrochlorothiazide (PRINZIDE,ZESTORETIC) 10-12.5 MG per tablet TAKE 1 TABLET BY MOUTH TWICE DAILY 180 tablet 0   • MAG64 64 MG DR tablet TAKE 1 TABLET BY MOUTH ONCE DAILY 30 tablet 5   • Multiple Vitamins-Minerals (HAIR SKIN AND NAILS FORMULA PO) Take  by mouth.     • Naproxen Sod-Diphenhydramine (ALEVE PM PO) Take  by mouth as needed.     • Naproxen Sodium (ALEVE PO) Take  by mouth.     • Omega-3 Fatty Acids (FISH OIL) 645 MG capsule Take  by mouth.     • OMEPRAZOLE PO Take  by mouth Daily.     • ribociclib succinate 200 MG tablet tablet Take 2 tablets by mouth Daily. For 21 days on then 7 days off. 42 tablet 6   • cetirizine (zyrTEC) 10 MG tablet Take 10 mg by mouth As Needed for Allergies.     • ciclopirox (PENLAC) 8 % solution Apply  topically Every Night. 6 mL 0   • Cyanocobalamin (B-12 PO) Take 1,000 mcg by mouth Daily.     • ondansetron ODT (ZOFRAN-ODT) 8 MG disintegrating tablet Dissolve one tablet on tongue every 6 hours for nausea and  "vomiting 12 tablet 0     No current facility-administered medications on file prior to visit.        Allergies   Allergen Reactions   • Codeine    • Docetaxel    • Paclitaxel        Immunization History   Administered Date(s) Administered   • Flu Vaccine Quad PF >18YRS 11/01/2017   • Pneumococcal Polysaccharide (PPSV23) 01/21/2019   • Tdap 03/01/2012   • flucelvax quad pfs =>4 YRS 01/21/2019       Objective     /74   Pulse 103   Ht 163 cm (64.17\")   Wt 95.3 kg (210 lb)   LMP  (LMP Unknown)   SpO2 96%   BMI 35.85 kg/m²     Physical Exam   Constitutional: She is oriented to person, place, and time. She appears well-developed and well-nourished.   HENT:   Head: Normocephalic and atraumatic.   Right Ear: Hearing, tympanic membrane and external ear normal.   Left Ear: Hearing, tympanic membrane and external ear normal.   Nose: Nose normal.   Mouth/Throat: Oropharynx is clear and moist.   Neck: Neck supple. No thyromegaly present.   Cardiovascular: Normal rate, regular rhythm and normal heart sounds.   No murmur heard.  Pulmonary/Chest: Effort normal and breath sounds normal. Right breast exhibits no mass. Left breast exhibits no mass.   Abdominal: Soft. She exhibits no distension. There is no hepatosplenomegaly. There is no tenderness.   Genitourinary: No breast tenderness.   Lymphadenopathy:     She has no cervical adenopathy.   Neurological: She is alert and oriented to person, place, and time.   Skin: Skin is warm and dry.   Psychiatric: She has a normal mood and affect. Her speech is normal and behavior is normal. Judgment and thought content normal. Cognition and memory are normal.       Assessment/Plan   Maggie was seen today for annual exam.    Diagnoses and all orders for this visit:    Well adult exam    History of colon polyps  -     Ambulatory Referral For Screening Colonoscopy    Colon cancer screening  -     Ambulatory Referral For Screening Colonoscopy    Benign essential HTN    Hyperlipidemia, " unspecified hyperlipidemia type    IFG (impaired fasting glucose)    Other orders  -     Flucelvax Quad=>4Years (PFS)  -     Pneumococcal Polysaccharide Vaccine 23-Valent Greater Than or Equal To 1yo Subcutaneous / IM        Discussion    Patient presents today for a CPE.    HTN.  Continue current.  HLD.  Continue atorvastatin.   IFG.  We discussed diet.  I recommend a diet high in fruits, vegetables, whole grains, lean meats, nuts and beans.  I recommend limiting red meat, full fat dairy, eggs and processed white carbohydrates.  I recommend aerobic exercise at least 3 days per week.  Patient follows a healthy diet.   Patient follows an inadequate exercise regimen.   Mammogram is up to date.   Patient has been referred for colon cancer screening.   Pap smears are no longer performed secondary to age and low risk (never sexually active).   Immunizations are as per orders.            Health Maintenance   Topic Date Due   • DXA SCAN  09/06/2016   • COLONOSCOPY  10/29/2017   • INFLUENZA VACCINE  08/01/2018   • ANNUAL PHYSICAL  09/23/2018   • LIPID PANEL  01/14/2020   • MAMMOGRAM  06/18/2020   • TDAP/TD VACCINES (2 - Td) 03/01/2022   • HEPATITIS C SCREENING  Completed   • PAP SMEAR  Discontinued   • ZOSTER VACCINE  Discontinued            Future Appointments   Date Time Provider Department Center   1/25/2019 11:15 AM [facility]   1/25/2019 11:45 AM INJECTION CHAIR [facility]   2/22/2019 11:15 AM [facility]  [facility]   3/22/2019 11:15 AM [facility]  [facility]   4/19/2019 11:15 AM [facility]  [facility]   4/19/2019 11:40 AM Nader Saez MD [facility]   4/19/2019 12:00 PM INJECTION CHAIR [facility]   7/19/2019  9:50 AM [facility] None   7/19/2019 10:00 AM [facility] None   7/26/2019  1:00 PM Chetna Whalen MD [facility] None

## 2019-01-25 ENCOUNTER — INFUSION (OUTPATIENT)
Dept: ONCOLOGY | Facility: HOSPITAL | Age: 64
End: 2019-01-25

## 2019-01-25 VITALS
HEART RATE: 93 BPM | DIASTOLIC BLOOD PRESSURE: 66 MMHG | WEIGHT: 207.6 LBS | OXYGEN SATURATION: 96 % | BODY MASS INDEX: 35.44 KG/M2 | TEMPERATURE: 98.1 F | SYSTOLIC BLOOD PRESSURE: 111 MMHG

## 2019-01-25 DIAGNOSIS — D51.0 VITAMIN B12 DEFICIENCY ANEMIA DUE TO INTRINSIC FACTOR DEFICIENCY: ICD-10-CM

## 2019-01-25 DIAGNOSIS — M85.89 OSTEOPENIA OF MULTIPLE SITES: ICD-10-CM

## 2019-01-25 DIAGNOSIS — C50.912 PRIMARY MALIGNANT NEOPLASM OF LEFT BREAST (HCC): Primary | ICD-10-CM

## 2019-01-25 DIAGNOSIS — R01.1 HEART MURMUR, SYSTOLIC: ICD-10-CM

## 2019-01-25 DIAGNOSIS — C79.51 BONE METASTASIS: ICD-10-CM

## 2019-01-25 DIAGNOSIS — Z45.2 FITTING AND ADJUSTMENT OF VASCULAR CATHETER: ICD-10-CM

## 2019-01-25 DIAGNOSIS — D68.59 THROMBOPHILIA (HCC): ICD-10-CM

## 2019-01-25 DIAGNOSIS — M85.89 OSTEOPENIA OF MULTIPLE SITES: Primary | ICD-10-CM

## 2019-01-25 LAB
ALBUMIN SERPL-MCNC: 4.4 G/DL (ref 3.5–5.2)
ALBUMIN/GLOB SERPL: 1.6 G/DL
ALP SERPL-CCNC: 101 U/L (ref 39–117)
ALT SERPL W P-5'-P-CCNC: 19 U/L (ref 1–33)
ANION GAP SERPL CALCULATED.3IONS-SCNC: 12.7 MMOL/L
AST SERPL-CCNC: 17 U/L (ref 1–32)
BASOPHILS # BLD AUTO: 0.02 10*3/MM3 (ref 0–0.2)
BASOPHILS NFR BLD AUTO: 0.6 % (ref 0–1.5)
BILIRUB SERPL-MCNC: 0.4 MG/DL (ref 0.1–1.2)
BUN BLD-MCNC: 30 MG/DL (ref 8–23)
BUN/CREAT SERPL: 25.6 (ref 7–25)
CALCIUM SPEC-SCNC: 9.6 MG/DL (ref 8.6–10.5)
CHLORIDE SERPL-SCNC: 102 MMOL/L (ref 98–107)
CO2 SERPL-SCNC: 26.3 MMOL/L (ref 22–29)
CREAT BLD-MCNC: 1.17 MG/DL (ref 0.57–1)
DEPRECATED RDW RBC AUTO: 55.4 FL (ref 37–54)
EOSINOPHIL # BLD AUTO: 0.1 10*3/MM3 (ref 0–0.7)
EOSINOPHIL NFR BLD AUTO: 2.9 % (ref 0.3–6.2)
ERYTHROCYTE [DISTWIDTH] IN BLOOD BY AUTOMATED COUNT: 15.2 % (ref 11.7–13)
GFR SERPL CREATININE-BSD FRML MDRD: 47 ML/MIN/1.73
GLOBULIN UR ELPH-MCNC: 2.7 GM/DL
GLUCOSE BLD-MCNC: 109 MG/DL (ref 65–99)
HCT VFR BLD AUTO: 30.1 % (ref 35.6–45.5)
HGB BLD-MCNC: 10.3 G/DL (ref 11.9–15.5)
IMM GRANULOCYTES # BLD AUTO: 0.02 10*3/MM3 (ref 0–0.03)
IMM GRANULOCYTES NFR BLD AUTO: 0.6 % (ref 0–0.5)
LYMPHOCYTES # BLD AUTO: 0.86 10*3/MM3 (ref 0.9–4.8)
LYMPHOCYTES NFR BLD AUTO: 24.9 % (ref 19.6–45.3)
MAGNESIUM SERPL-MCNC: 1.8 MG/DL (ref 1.6–2.4)
MCH RBC QN AUTO: 34.1 PG (ref 26.9–32)
MCHC RBC AUTO-ENTMCNC: 34.2 G/DL (ref 32.4–36.3)
MCV RBC AUTO: 99.7 FL (ref 80.5–98.2)
MONOCYTES # BLD AUTO: 0.37 10*3/MM3 (ref 0.2–1.2)
MONOCYTES NFR BLD AUTO: 10.7 % (ref 5–12)
NEUTROPHILS # BLD AUTO: 2.08 10*3/MM3 (ref 1.9–8.1)
NEUTROPHILS NFR BLD AUTO: 60.3 % (ref 42.7–76)
NRBC BLD AUTO-RTO: 0 /100 WBC (ref 0–0)
PHOSPHATE SERPL-MCNC: 3.6 MG/DL (ref 2.5–4.5)
PLATELET # BLD AUTO: 219 10*3/MM3 (ref 140–500)
PMV BLD AUTO: 10.1 FL (ref 6–12)
POTASSIUM BLD-SCNC: 3.9 MMOL/L (ref 3.5–5.2)
PROT SERPL-MCNC: 7.1 G/DL (ref 6–8.5)
RBC # BLD AUTO: 3.02 10*6/MM3 (ref 3.9–5.2)
SODIUM BLD-SCNC: 141 MMOL/L (ref 136–145)
WBC NRBC COR # BLD: 3.45 10*3/MM3 (ref 4.5–10.7)

## 2019-01-25 PROCEDURE — 84100 ASSAY OF PHOSPHORUS: CPT | Performed by: INTERNAL MEDICINE

## 2019-01-25 PROCEDURE — 85025 COMPLETE CBC W/AUTO DIFF WBC: CPT | Performed by: INTERNAL MEDICINE

## 2019-01-25 PROCEDURE — 96372 THER/PROPH/DIAG INJ SC/IM: CPT | Performed by: INTERNAL MEDICINE

## 2019-01-25 PROCEDURE — 80053 COMPREHEN METABOLIC PANEL: CPT | Performed by: INTERNAL MEDICINE

## 2019-01-25 PROCEDURE — 25010000002 DENOSUMAB 120 MG/1.7ML SOLUTION: Performed by: INTERNAL MEDICINE

## 2019-01-25 PROCEDURE — 83735 ASSAY OF MAGNESIUM: CPT | Performed by: INTERNAL MEDICINE

## 2019-01-25 RX ORDER — SODIUM CHLORIDE 0.9 % (FLUSH) 0.9 %
10 SYRINGE (ML) INJECTION AS NEEDED
Status: DISCONTINUED | OUTPATIENT
Start: 2019-01-25 | End: 2019-01-25 | Stop reason: HOSPADM

## 2019-01-25 RX ORDER — SODIUM CHLORIDE 0.9 % (FLUSH) 0.9 %
10 SYRINGE (ML) INJECTION AS NEEDED
Status: CANCELLED | OUTPATIENT
Start: 2019-01-25

## 2019-01-25 RX ADMIN — SODIUM CHLORIDE, PRESERVATIVE FREE 10 ML: 5 INJECTION INTRAVENOUS at 11:57

## 2019-01-25 RX ADMIN — Medication 500 UNITS: at 11:57

## 2019-01-25 RX ADMIN — DENOSUMAB 120 MG: 120 INJECTION SUBCUTANEOUS at 12:37

## 2019-02-01 ENCOUNTER — DOCUMENTATION (OUTPATIENT)
Dept: ONCOLOGY | Facility: CLINIC | Age: 64
End: 2019-02-01

## 2019-02-05 ENCOUNTER — TELEPHONE (OUTPATIENT)
Dept: ONCOLOGY | Facility: HOSPITAL | Age: 64
End: 2019-02-05

## 2019-02-05 NOTE — TELEPHONE ENCOUNTER
Pt feels like she has the flu, is going to go to [facility] shortly. Will let us know if she needs anything.     ----- Message from Kiara Mena sent at 2/5/2019  2:10 PM EST -----  Contact: [phone]  Pt thinks she may have the flu and is going to the [facility]

## 2019-02-06 ENCOUNTER — OFFICE VISIT (OUTPATIENT)
Dept: RETAIL CLINIC | Facility: CLINIC | Age: 64
End: 2019-02-06

## 2019-02-06 DIAGNOSIS — K52.9 GASTROENTERITIS: Primary | ICD-10-CM

## 2019-02-06 LAB
EXPIRATION DATE: NORMAL
FLUAV AG NPH QL: NEGATIVE
FLUBV AG NPH QL: NEGATIVE
INTERNAL CONTROL: NORMAL
Lab: NORMAL

## 2019-02-06 PROCEDURE — 87804 INFLUENZA ASSAY W/OPTIC: CPT | Performed by: NURSE PRACTITIONER

## 2019-02-06 PROCEDURE — 99213 OFFICE O/P EST LOW 20 MIN: CPT | Performed by: NURSE PRACTITIONER

## 2019-02-06 NOTE — PROGRESS NOTES
Subjective   Maggie Suero is a 63 y.o. female.     Flu Symptoms   This is a new problem. The current episode started yesterday. Associated symptoms include chills, fatigue, a fever, headaches and myalgias. Pertinent negatives include no nausea, sore throat or vomiting. Associated symptoms comments: Diarrhea  . Treatments tried: aleve, immodium. The treatment provided no relief.        The following portions of the patient's history were reviewed and updated as appropriate: allergies, current medications, past family history, past medical history, past social history, past surgical history and problem list.    Review of Systems   Constitutional: Positive for appetite change (poor appetite), chills, fatigue and fever.   HENT: Negative for sore throat.    Gastrointestinal: Positive for diarrhea (once yesterday). Negative for nausea and vomiting.   Musculoskeletal: Positive for myalgias.   Neurological: Positive for headache.       Objective   Physical Exam   Constitutional: She is cooperative. No distress.   HENT:   Head: Normocephalic.   Right Ear: Hearing, external ear and ear canal normal. Tympanic membrane is erythematous.   Left Ear: Hearing, external ear and ear canal normal. Tympanic membrane is erythematous.   Nose: Nose normal.   Mouth/Throat: Posterior oropharyngeal edema and posterior oropharyngeal erythema present.   Eyes: Conjunctivae, EOM and lids are normal. Pupils are equal, round, and reactive to light.   Neck: Trachea normal and full passive range of motion without pain.   Cardiovascular: Normal rate, regular rhythm and normal pulses.   Pulmonary/Chest: Effort normal and breath sounds normal.   Abdominal: Bowel sounds are increased. There is no tenderness.   Neurological: She is alert.   Skin: Skin is warm. Capillary refill takes less than 2 seconds.   Psychiatric: She has a normal mood and affect. Her speech is normal and behavior is normal.   Vitals reviewed.        Assessment/Plan   Maggie was seen  today for flu symptoms.    Diagnoses and all orders for this visit:    Gastroenteritis  -     POC Influenza A / B        Drink plenty of fluids, rest.

## 2019-02-06 NOTE — PATIENT INSTRUCTIONS
Viral Gastroenteritis, Adult  Viral gastroenteritis is also known as the stomach flu. This condition is caused by certain germs (viruses). These germs can be passed from person to person very easily (are very contagious). This condition can cause sudden watery poop (diarrhea), fever, and throwing up (vomiting).  Having watery poop and throwing up can make you feel weak and cause you to get dehydrated. Dehydration can make you tired and thirsty, make you have a dry mouth, and make it so you pee (urinate) less often. Older adults and people with other diseases or a weak defense system (immune system) are at higher risk for dehydration. It is important to replace the fluids that you lose from having watery poop and throwing up.  Follow these instructions at home:  Follow instructions from your doctor about how to care for yourself at home.  Eating and drinking    Follow these instructions as told by your doctor:  · Take an oral rehydration solution (ORS). This is a drink that is sold at pharmacies and stores.  · Drink clear fluids in small amounts as you are able, such as:  ? Water.  ? Ice chips.  ? Diluted fruit juice.  ? Low-calorie sports drinks.  · Eat bland, easy-to-digest foods in small amounts as you are able, such as:  ? Bananas.  ? Applesauce.  ? Rice.  ? Low-fat (lean) meats.  ? Toast.  ? Crackers.  · Avoid fluids that have a lot of sugar or caffeine in them.  · Avoid alcohol.  · Avoid spicy or fatty foods.    General instructions  · Drink enough fluid to keep your pee (urine) clear or pale yellow.  · Wash your hands often. If you cannot use soap and water, use hand .  · Make sure that all people in your home wash their hands well and often.  · Rest at home while you get better.  · Take over-the-counter and prescription medicines only as told by your doctor.  · Watch your condition for any changes.  · Take a warm bath to help with any burning or pain from having watery poop.  · Keep all follow-up  visits as told by your doctor. This is important.  Contact a doctor if:  · You cannot keep fluids down.  · Your symptoms get worse.  · You have new symptoms.  · You feel light-headed or dizzy.  · You have muscle cramps.  Get help right away if:  · You have chest pain.  · You feel very weak or you pass out (faint).  · You see blood in your throw-up.  · Your throw-up looks like coffee grounds.  · You have bloody or black poop (stools) or poop that look like tar.  · You have a very bad headache, a stiff neck, or both.  · You have a rash.  · You have very bad pain, cramping, or bloating in your belly (abdomen).  · You have trouble breathing.  · You are breathing very quickly.  · Your heart is beating very quickly.  · Your skin feels cold and clammy.  · You feel confused.  · You have pain when you pee.  · You have signs of dehydration, such as:  ? Dark pee, hardly any pee, or no pee.  ? Cracked lips.  ? Dry mouth.  ? Sunken eyes.  ? Sleepiness.  ? Weakness.  This information is not intended to replace advice given to you by your health care provider. Make sure you discuss any questions you have with your health care provider.  Document Released: 06/05/2009 Document Revised: 07/07/2017 Document Reviewed: 08/23/2016  [employer] Patient Education © 2017 [employer]

## 2019-02-22 ENCOUNTER — INFUSION (OUTPATIENT)
Dept: ONCOLOGY | Facility: HOSPITAL | Age: 64
End: 2019-02-22

## 2019-02-22 DIAGNOSIS — C79.51 BONE METASTASIS: ICD-10-CM

## 2019-02-22 DIAGNOSIS — D68.59 THROMBOPHILIA (HCC): ICD-10-CM

## 2019-02-22 DIAGNOSIS — M85.89 OSTEOPENIA OF MULTIPLE SITES: ICD-10-CM

## 2019-02-22 DIAGNOSIS — D51.0 VITAMIN B12 DEFICIENCY ANEMIA DUE TO INTRINSIC FACTOR DEFICIENCY: ICD-10-CM

## 2019-02-22 DIAGNOSIS — Z45.2 FITTING AND ADJUSTMENT OF VASCULAR CATHETER: ICD-10-CM

## 2019-02-22 DIAGNOSIS — C50.912 PRIMARY MALIGNANT NEOPLASM OF LEFT BREAST (HCC): Primary | ICD-10-CM

## 2019-02-22 DIAGNOSIS — R01.1 HEART MURMUR, SYSTOLIC: ICD-10-CM

## 2019-02-22 LAB
ALBUMIN SERPL-MCNC: 4.3 G/DL (ref 3.5–5.2)
ALBUMIN/GLOB SERPL: 1.7 G/DL
ALP SERPL-CCNC: 87 U/L (ref 39–117)
ALT SERPL W P-5'-P-CCNC: 25 U/L (ref 1–33)
ANION GAP SERPL CALCULATED.3IONS-SCNC: 13.1 MMOL/L
AST SERPL-CCNC: 20 U/L (ref 1–32)
BASOPHILS # BLD AUTO: 0.06 10*3/MM3 (ref 0–0.2)
BASOPHILS NFR BLD AUTO: 1.7 % (ref 0–1.5)
BILIRUB SERPL-MCNC: 0.2 MG/DL (ref 0.1–1.2)
BUN BLD-MCNC: 27 MG/DL (ref 8–23)
BUN/CREAT SERPL: 27.6 (ref 7–25)
CALCIUM SPEC-SCNC: 8.9 MG/DL (ref 8.6–10.5)
CANCER AG15-3 SERPL-ACNC: 14.2 U/ML
CHLORIDE SERPL-SCNC: 102 MMOL/L (ref 98–107)
CO2 SERPL-SCNC: 24.9 MMOL/L (ref 22–29)
CREAT BLD-MCNC: 0.98 MG/DL (ref 0.57–1)
DEPRECATED RDW RBC AUTO: 54.7 FL (ref 37–54)
EOSINOPHIL # BLD AUTO: 0.12 10*3/MM3 (ref 0–0.4)
EOSINOPHIL NFR BLD AUTO: 3.4 % (ref 0.3–6.2)
ERYTHROCYTE [DISTWIDTH] IN BLOOD BY AUTOMATED COUNT: 15 % (ref 12.3–15.4)
GFR SERPL CREATININE-BSD FRML MDRD: 58 ML/MIN/1.73
GLOBULIN UR ELPH-MCNC: 2.6 GM/DL
GLUCOSE BLD-MCNC: 185 MG/DL (ref 65–99)
HCT VFR BLD AUTO: 31.8 % (ref 34–46.6)
HGB BLD-MCNC: 10.7 G/DL (ref 12–15.9)
IMM GRANULOCYTES # BLD AUTO: 0.01 10*3/MM3 (ref 0–0.05)
IMM GRANULOCYTES NFR BLD AUTO: 0.3 % (ref 0–0.5)
LYMPHOCYTES # BLD AUTO: 0.91 10*3/MM3 (ref 0.7–3.1)
LYMPHOCYTES NFR BLD AUTO: 26 % (ref 19.6–45.3)
MCH RBC QN AUTO: 33.9 PG (ref 26.6–33)
MCHC RBC AUTO-ENTMCNC: 33.6 G/DL (ref 31.5–35.7)
MCV RBC AUTO: 100.6 FL (ref 79–97)
MONOCYTES # BLD AUTO: 0.28 10*3/MM3 (ref 0.1–0.9)
MONOCYTES NFR BLD AUTO: 8 % (ref 5–12)
NEUTROPHILS # BLD AUTO: 2.12 10*3/MM3 (ref 1.4–7)
NEUTROPHILS NFR BLD AUTO: 60.6 % (ref 42.7–76)
NRBC BLD AUTO-RTO: 0 /100 WBC (ref 0–0)
PLATELET # BLD AUTO: 225 10*3/MM3 (ref 140–450)
PMV BLD AUTO: 10.1 FL (ref 6–12)
POTASSIUM BLD-SCNC: 4 MMOL/L (ref 3.5–5.2)
PROT SERPL-MCNC: 6.9 G/DL (ref 6–8.5)
RBC # BLD AUTO: 3.16 10*6/MM3 (ref 3.77–5.28)
SODIUM BLD-SCNC: 140 MMOL/L (ref 136–145)
WBC NRBC COR # BLD: 3.5 10*3/MM3 (ref 3.4–10.8)

## 2019-02-22 PROCEDURE — 96523 IRRIG DRUG DELIVERY DEVICE: CPT | Performed by: NURSE PRACTITIONER

## 2019-02-22 PROCEDURE — 25010000002 ALTEPLASE 2 MG RECONSTITUTED SOLUTION: Performed by: NURSE PRACTITIONER

## 2019-02-22 PROCEDURE — 85025 COMPLETE CBC W/AUTO DIFF WBC: CPT | Performed by: INTERNAL MEDICINE

## 2019-02-22 PROCEDURE — 80053 COMPREHEN METABOLIC PANEL: CPT | Performed by: INTERNAL MEDICINE

## 2019-02-22 PROCEDURE — 86300 IMMUNOASSAY TUMOR CA 15-3: CPT | Performed by: INTERNAL MEDICINE

## 2019-02-22 PROCEDURE — 36593 DECLOT VASCULAR DEVICE: CPT | Performed by: NURSE PRACTITIONER

## 2019-02-22 RX ORDER — SODIUM CHLORIDE 0.9 % (FLUSH) 0.9 %
10 SYRINGE (ML) INJECTION AS NEEDED
Status: CANCELLED | OUTPATIENT
Start: 2019-02-22

## 2019-02-22 RX ORDER — SODIUM CHLORIDE 0.9 % (FLUSH) 0.9 %
10 SYRINGE (ML) INJECTION AS NEEDED
Status: DISCONTINUED | OUTPATIENT
Start: 2019-02-22 | End: 2019-02-22 | Stop reason: HOSPADM

## 2019-02-22 RX ADMIN — ALTEPLASE: 2.2 INJECTION, POWDER, LYOPHILIZED, FOR SOLUTION INTRAVENOUS at 12:07

## 2019-03-02 DIAGNOSIS — C50.919: ICD-10-CM

## 2019-03-04 RX ORDER — PNV NO.95/FERROUS FUM/FOLIC AC 28MG-0.8MG
TABLET ORAL
Qty: 30 TABLET | Refills: 5 | Status: SHIPPED | OUTPATIENT
Start: 2019-03-04 | End: 2020-08-14

## 2019-03-11 RX ORDER — LETROZOLE 2.5 MG/1
TABLET, FILM COATED ORAL
Qty: 30 TABLET | Refills: 6 | Status: SHIPPED | OUTPATIENT
Start: 2019-03-11 | End: 2019-10-16 | Stop reason: SDUPTHER

## 2019-03-22 ENCOUNTER — INFUSION (OUTPATIENT)
Dept: ONCOLOGY | Facility: HOSPITAL | Age: 64
End: 2019-03-22

## 2019-03-22 DIAGNOSIS — D51.0 VITAMIN B12 DEFICIENCY ANEMIA DUE TO INTRINSIC FACTOR DEFICIENCY: ICD-10-CM

## 2019-03-22 DIAGNOSIS — R01.1 HEART MURMUR, SYSTOLIC: ICD-10-CM

## 2019-03-22 DIAGNOSIS — D68.59 THROMBOPHILIA (HCC): ICD-10-CM

## 2019-03-22 DIAGNOSIS — C50.912 PRIMARY MALIGNANT NEOPLASM OF LEFT BREAST (HCC): Primary | ICD-10-CM

## 2019-03-22 DIAGNOSIS — M85.89 OSTEOPENIA OF MULTIPLE SITES: ICD-10-CM

## 2019-03-22 DIAGNOSIS — Z45.2 FITTING AND ADJUSTMENT OF VASCULAR CATHETER: ICD-10-CM

## 2019-03-22 DIAGNOSIS — C79.51 BONE METASTASIS: ICD-10-CM

## 2019-03-22 LAB
BASOPHILS # BLD AUTO: 0.04 10*3/MM3 (ref 0–0.2)
BASOPHILS NFR BLD AUTO: 1.2 % (ref 0–1.5)
DEPRECATED RDW RBC AUTO: 55 FL (ref 37–54)
EOSINOPHIL # BLD AUTO: 0.08 10*3/MM3 (ref 0–0.4)
EOSINOPHIL NFR BLD AUTO: 2.5 % (ref 0.3–6.2)
ERYTHROCYTE [DISTWIDTH] IN BLOOD BY AUTOMATED COUNT: 15 % (ref 12.3–15.4)
HCT VFR BLD AUTO: 30.1 % (ref 34–46.6)
HGB BLD-MCNC: 10.2 G/DL (ref 12–15.9)
IMM GRANULOCYTES # BLD AUTO: 0.01 10*3/MM3 (ref 0–0.05)
IMM GRANULOCYTES NFR BLD AUTO: 0.3 % (ref 0–0.5)
LYMPHOCYTES # BLD AUTO: 1.01 10*3/MM3 (ref 0.7–3.1)
LYMPHOCYTES NFR BLD AUTO: 31.2 % (ref 19.6–45.3)
MCH RBC QN AUTO: 34.1 PG (ref 26.6–33)
MCHC RBC AUTO-ENTMCNC: 33.9 G/DL (ref 31.5–35.7)
MCV RBC AUTO: 100.7 FL (ref 79–97)
MONOCYTES # BLD AUTO: 0.3 10*3/MM3 (ref 0.1–0.9)
MONOCYTES NFR BLD AUTO: 9.3 % (ref 5–12)
NEUTROPHILS # BLD AUTO: 1.8 10*3/MM3 (ref 1.4–7)
NEUTROPHILS NFR BLD AUTO: 55.5 % (ref 42.7–76)
NRBC BLD AUTO-RTO: 0 /100 WBC (ref 0–0)
PLATELET # BLD AUTO: 213 10*3/MM3 (ref 140–450)
PMV BLD AUTO: 10.4 FL (ref 6–12)
RBC # BLD AUTO: 2.99 10*6/MM3 (ref 3.77–5.28)
WBC NRBC COR # BLD: 3.24 10*3/MM3 (ref 3.4–10.8)

## 2019-03-22 PROCEDURE — 96523 IRRIG DRUG DELIVERY DEVICE: CPT | Performed by: INTERNAL MEDICINE

## 2019-03-22 PROCEDURE — 85025 COMPLETE CBC W/AUTO DIFF WBC: CPT | Performed by: INTERNAL MEDICINE

## 2019-03-22 RX ORDER — SODIUM CHLORIDE 0.9 % (FLUSH) 0.9 %
10 SYRINGE (ML) INJECTION AS NEEDED
Status: CANCELLED | OUTPATIENT
Start: 2019-03-22

## 2019-03-22 RX ORDER — SODIUM CHLORIDE 0.9 % (FLUSH) 0.9 %
10 SYRINGE (ML) INJECTION AS NEEDED
Status: DISCONTINUED | OUTPATIENT
Start: 2019-03-22 | End: 2019-03-22 | Stop reason: HOSPADM

## 2019-03-22 RX ADMIN — SODIUM CHLORIDE, PRESERVATIVE FREE 10 ML: 5 INJECTION INTRAVENOUS at 11:12

## 2019-03-22 RX ADMIN — SODIUM CHLORIDE, PRESERVATIVE FREE 500 UNITS: 5 INJECTION INTRAVENOUS at 11:13

## 2019-04-19 ENCOUNTER — TELEPHONE (OUTPATIENT)
Dept: ONCOLOGY | Facility: HOSPITAL | Age: 64
End: 2019-04-19

## 2019-04-19 ENCOUNTER — OFFICE VISIT (OUTPATIENT)
Dept: ONCOLOGY | Facility: CLINIC | Age: 64
End: 2019-04-19

## 2019-04-19 ENCOUNTER — INFUSION (OUTPATIENT)
Dept: ONCOLOGY | Facility: HOSPITAL | Age: 64
End: 2019-04-19

## 2019-04-19 ENCOUNTER — TELEPHONE (OUTPATIENT)
Dept: ONCOLOGY | Facility: CLINIC | Age: 64
End: 2019-04-19

## 2019-04-19 VITALS
TEMPERATURE: 97.8 F | RESPIRATION RATE: 14 BRPM | OXYGEN SATURATION: 97 % | SYSTOLIC BLOOD PRESSURE: 149 MMHG | HEIGHT: 64 IN | BODY MASS INDEX: 35.42 KG/M2 | WEIGHT: 207.5 LBS | HEART RATE: 88 BPM | DIASTOLIC BLOOD PRESSURE: 78 MMHG

## 2019-04-19 DIAGNOSIS — D68.59 THROMBOPHILIA (HCC): ICD-10-CM

## 2019-04-19 DIAGNOSIS — D51.0 VITAMIN B12 DEFICIENCY ANEMIA DUE TO INTRINSIC FACTOR DEFICIENCY: ICD-10-CM

## 2019-04-19 DIAGNOSIS — C79.51 BONE METASTASIS: ICD-10-CM

## 2019-04-19 DIAGNOSIS — C50.912 PRIMARY MALIGNANT NEOPLASM OF LEFT BREAST (HCC): Primary | ICD-10-CM

## 2019-04-19 DIAGNOSIS — M85.89 OSTEOPENIA OF MULTIPLE SITES: Primary | ICD-10-CM

## 2019-04-19 DIAGNOSIS — C50.912 PRIMARY MALIGNANT NEOPLASM OF LEFT BREAST (HCC): ICD-10-CM

## 2019-04-19 DIAGNOSIS — Z45.2 FITTING AND ADJUSTMENT OF VASCULAR CATHETER: ICD-10-CM

## 2019-04-19 DIAGNOSIS — R01.1 HEART MURMUR, SYSTOLIC: ICD-10-CM

## 2019-04-19 DIAGNOSIS — T45.1X5A LEUKOPENIA DUE TO ANTINEOPLASTIC CHEMOTHERAPY (HCC): ICD-10-CM

## 2019-04-19 DIAGNOSIS — Z45.2 FITTING AND ADJUSTMENT OF VASCULAR CATHETER: Primary | ICD-10-CM

## 2019-04-19 DIAGNOSIS — M85.89 OSTEOPENIA OF MULTIPLE SITES: ICD-10-CM

## 2019-04-19 DIAGNOSIS — D50.0 IRON DEFICIENCY ANEMIA DUE TO CHRONIC BLOOD LOSS: ICD-10-CM

## 2019-04-19 DIAGNOSIS — D70.1 LEUKOPENIA DUE TO ANTINEOPLASTIC CHEMOTHERAPY (HCC): ICD-10-CM

## 2019-04-19 LAB
ALBUMIN SERPL-MCNC: 4 G/DL (ref 3.5–5.2)
ALBUMIN/GLOB SERPL: 1.6 G/DL (ref 1.1–2.4)
ALP SERPL-CCNC: 93 U/L (ref 38–116)
ALT SERPL W P-5'-P-CCNC: 23 U/L (ref 0–33)
ANION GAP SERPL CALCULATED.3IONS-SCNC: 11.9 MMOL/L
AST SERPL-CCNC: 19 U/L (ref 0–32)
BASOPHILS # BLD AUTO: 0.04 10*3/MM3 (ref 0–0.2)
BASOPHILS NFR BLD AUTO: 1.5 % (ref 0–1.5)
BILIRUB SERPL-MCNC: 0.3 MG/DL (ref 0.2–1.2)
BUN BLD-MCNC: 22 MG/DL (ref 6–20)
BUN/CREAT SERPL: 23.9 (ref 7.3–30)
CALCIUM SPEC-SCNC: 8.9 MG/DL (ref 8.5–10.2)
CANCER AG15-3 SERPL-ACNC: 15.3 U/ML
CHLORIDE SERPL-SCNC: 103 MMOL/L (ref 98–107)
CO2 SERPL-SCNC: 25.1 MMOL/L (ref 22–29)
CREAT BLD-MCNC: 0.92 MG/DL (ref 0.6–1.1)
DEPRECATED RDW RBC AUTO: 55.3 FL (ref 37–54)
EOSINOPHIL # BLD AUTO: 0.09 10*3/MM3 (ref 0–0.4)
EOSINOPHIL NFR BLD AUTO: 3.3 % (ref 0.3–6.2)
ERYTHROCYTE [DISTWIDTH] IN BLOOD BY AUTOMATED COUNT: 14.9 % (ref 12.3–15.4)
GFR SERPL CREATININE-BSD FRML MDRD: 62 ML/MIN/1.73
GLOBULIN UR ELPH-MCNC: 2.5 GM/DL (ref 1.8–3.5)
GLUCOSE BLD-MCNC: 108 MG/DL (ref 74–124)
HCT VFR BLD AUTO: 29.8 % (ref 34–46.6)
HGB BLD-MCNC: 9.9 G/DL (ref 12–15.9)
IMM GRANULOCYTES # BLD AUTO: 0.01 10*3/MM3 (ref 0–0.05)
IMM GRANULOCYTES NFR BLD AUTO: 0.4 % (ref 0–0.5)
LYMPHOCYTES # BLD AUTO: 0.89 10*3/MM3 (ref 0.7–3.1)
LYMPHOCYTES NFR BLD AUTO: 32.6 % (ref 19.6–45.3)
MAGNESIUM SERPL-MCNC: 1.9 MG/DL (ref 1.8–2.5)
MCH RBC QN AUTO: 33.3 PG (ref 26.6–33)
MCHC RBC AUTO-ENTMCNC: 33.2 G/DL (ref 31.5–35.7)
MCV RBC AUTO: 100.3 FL (ref 79–97)
MONOCYTES # BLD AUTO: 0.3 10*3/MM3 (ref 0.1–0.9)
MONOCYTES NFR BLD AUTO: 11 % (ref 5–12)
NEUTROPHILS # BLD AUTO: 1.4 10*3/MM3 (ref 1.4–7)
NEUTROPHILS NFR BLD AUTO: 51.2 % (ref 42.7–76)
NRBC BLD AUTO-RTO: 0 /100 WBC (ref 0–0)
PHOSPHATE SERPL-MCNC: 2.2 MG/DL (ref 2.5–4.5)
PLATELET # BLD AUTO: 204 10*3/MM3 (ref 140–450)
PMV BLD AUTO: 9.7 FL (ref 6–12)
POTASSIUM BLD-SCNC: 4.2 MMOL/L (ref 3.5–4.7)
PROT SERPL-MCNC: 6.5 G/DL (ref 6.3–8)
RBC # BLD AUTO: 2.97 10*6/MM3 (ref 3.77–5.28)
SODIUM BLD-SCNC: 140 MMOL/L (ref 134–145)
WBC NRBC COR # BLD: 2.73 10*3/MM3 (ref 3.4–10.8)

## 2019-04-19 PROCEDURE — 99215 OFFICE O/P EST HI 40 MIN: CPT | Performed by: INTERNAL MEDICINE

## 2019-04-19 PROCEDURE — 83735 ASSAY OF MAGNESIUM: CPT | Performed by: INTERNAL MEDICINE

## 2019-04-19 PROCEDURE — 85025 COMPLETE CBC W/AUTO DIFF WBC: CPT | Performed by: INTERNAL MEDICINE

## 2019-04-19 PROCEDURE — 25010000002 DENOSUMAB 120 MG/1.7ML SOLUTION: Performed by: INTERNAL MEDICINE

## 2019-04-19 PROCEDURE — 80053 COMPREHEN METABOLIC PANEL: CPT | Performed by: INTERNAL MEDICINE

## 2019-04-19 PROCEDURE — 96372 THER/PROPH/DIAG INJ SC/IM: CPT | Performed by: INTERNAL MEDICINE

## 2019-04-19 PROCEDURE — G0463 HOSPITAL OUTPT CLINIC VISIT: HCPCS | Performed by: INTERNAL MEDICINE

## 2019-04-19 PROCEDURE — 96523 IRRIG DRUG DELIVERY DEVICE: CPT | Performed by: INTERNAL MEDICINE

## 2019-04-19 PROCEDURE — 84100 ASSAY OF PHOSPHORUS: CPT | Performed by: INTERNAL MEDICINE

## 2019-04-19 PROCEDURE — 86300 IMMUNOASSAY TUMOR CA 15-3: CPT | Performed by: INTERNAL MEDICINE

## 2019-04-19 RX ORDER — HYDROCODONE BITARTRATE AND ACETAMINOPHEN 5; 325 MG/1; MG/1
1 TABLET ORAL EVERY 12 HOURS PRN
Qty: 60 TABLET | Refills: 0 | Status: SHIPPED | OUTPATIENT
Start: 2019-04-19 | End: 2019-05-19

## 2019-04-19 RX ORDER — SODIUM CHLORIDE 0.9 % (FLUSH) 0.9 %
10 SYRINGE (ML) INJECTION AS NEEDED
Status: CANCELLED | OUTPATIENT
Start: 2019-04-19

## 2019-04-19 RX ORDER — HYDROCODONE BITARTRATE AND ACETAMINOPHEN 5; 325 MG/1; MG/1
1 TABLET ORAL EVERY 12 HOURS PRN
Qty: 60 TABLET | Refills: 0 | Status: SHIPPED | OUTPATIENT
Start: 2019-04-19 | End: 2019-04-19 | Stop reason: SDUPTHER

## 2019-04-19 RX ORDER — SODIUM CHLORIDE 0.9 % (FLUSH) 0.9 %
10 SYRINGE (ML) INJECTION AS NEEDED
Status: DISCONTINUED | OUTPATIENT
Start: 2019-04-19 | End: 2019-04-19 | Stop reason: HOSPADM

## 2019-04-19 RX ADMIN — DENOSUMAB 120 MG: 120 INJECTION SUBCUTANEOUS at 09:27

## 2019-04-19 RX ADMIN — SODIUM CHLORIDE, PRESERVATIVE FREE 10 ML: 5 INJECTION INTRAVENOUS at 08:32

## 2019-04-19 RX ADMIN — Medication 500 UNITS: at 08:32

## 2019-04-19 NOTE — PROGRESS NOTES
Subjective  REASONS FOR FOLLOWUP: 1. Metastatic breast cancer to multiple skeletal sites including cervical spine, sternum, lumbar spine and right femur, underwent Abraxane every 4 weeks and Xgeva every  3 months, DOCUMENTED RADIOLOGICAL PROGRESSION BY BONE SCAN 3/15/17 [facility] PRESENTLY ON FEMARA AND KISQALI , XGEVA EVERY 3 MONTHS,    2.Iron deficiency anemia du to GI blood loss, Xarelto stopped months ago, Iron initiated, Pepcid along with Aleve for gastric protection.           History of Present Illness  This patient returns today to the office for followup stating that for the last 6 weeks she has had a persistent pain triggered by changes in position in the right sacroiliac joint in absence of any trauma or rash. The pain only happens in certain specific positions especially when she is sitting or lying in the bed. The pain feels like a dull achy discomfort that triggers spasm in the muscles and is not propagated down into the right lower extremity and does not go in company of any other neurological symptomatology. The pain is not getting any worse through the time that she has had it. It is just there depending on the time of the day. It does not bother her when she is standing up or walking. She denies any bladder or bowel difficulties. She has not had any fevers or chills. She has not had any swelling in the lower extremities but in company of this pain she has had some discomfort in the right calf. She has not had any swelling or edema. She has not had any motor deficit. Other than that her appetite remains good, she has not had any cardiovascular  or respiratory issues and no neurological problems. She is functioning well able to work, drive her car and do anything that she pleases with no limitations. She is taking Aleve in the morning and in the evening. She takes her chemotherapy medicines with no difficulty and she has not found any problems in the logistics of delivering her chemo pills at home.           Past Medical History:   Diagnosis Date   • Abnormal mammogram    • Abnormal menstrual cycle    • Arthritis    • Bone metastasis (CMS/HCC)    • Breast cancer (CMS/HCC) 2000    right breast   • Drug therapy 2001    breast cancer   • Drug therapy 2017    right femur/associated with breast cancer   • History of colon polyps    • Hx of radiation therapy 2000    breast cancer   • Hx of radiation therapy 2015    bone cancer right femur/ associated with breast cancer   • Hypercholesterolemia    • Hyperlipidemia    • Hypertension    • Multiple benign polyps of large intestine    • Reflux esophagitis      Social History     Socioeconomic History   • Marital status: Single     Spouse name: Not on file   • Number of children: Not on file   • Years of education: Not on file   • Highest education level: Not on file   Occupational History   • Occupation:      Employer: [employer]   Tobacco Use   • Smoking status: Never Smoker   Substance and Sexual Activity   • Alcohol use: No   • Drug use: No     Family History   Problem Relation Age of Onset   • Hypertension Mother    • Diabetes Mother    • Macular degeneration Mother    • Heart disease Father    • Glaucoma Brother    • Diabetes Brother    • Colon cancer Paternal Grandmother    • Thyroid disease Other    • Kidney disease Other    • Glaucoma Other    • Cancer Other    • Diabetes Other            Review of Systems       General: no fever, no chills, no fatigue,no weight changes, no lack of appetite.  Eyes: no epiphora, xerophthalmia,conjunctivitis, pain, glaucoma, blurred vision,  blindness, secretion, photophobia, proptosis, diplopia.  Ears: no otorrhea, tinnitus, otorrhagia, deafness, pain, vertigo.  Nose: no rhinorrhea, no epistaxis, no alteration in perception of odors, no sinuses pressure.  Mouth: no alteration in gums or teeth,  No ulcers, no difficulty with mastication or deglut ion, no odynophagia.  Neck: no masses or pain, no thyroid alterations, no pain in muscles or arteries, no carotid odynia, no crepitation.  Respiratory: no cough, no sputum production,no dyspnea,no trepopnea, no pleuritic pain,no hemoptysis.  Heart: no syncope, no irregularity, no palpitations, no angina,no orthopnea,no paroxysmal nocturnal dyspnea.  Vascular Venous: no tenderness,no edema,no palpable cords,no postphlebitic syndrome, no skin changes no ulcerations.  Vascular Arterial: no distal ischemia, noclaudication, no gangrene, no neuropathic ischemic pain, no skin ulcers, no paleness no cyanosis.  GI: no dysphagia, no odynophagia, no regurgitation, no heartburn,no indigestion,no nausea,no vomiting,no hematemesis ,no melena,no jaundice,no distention, no obstipation,no enterorrhagia,no proctalgia,no anal  lesions, no changes in bowel habits.  : no frequency, no hesitancy, no hematuria, no discharge,no  pain.  Musculoskeletal: stated muscle or tendon pain or inflammation,no  joint pain, no edema, no functional limitation,no fasciculations, no mass.  Neurologic: no headache, no seizures, noalterations on Craneal nerves, no motor deficit, no sensory deficit, normal coordination, no alteration in memory,normal orientation, calculation,normal writting, verbal and written language.  Skin: no rashes,no pruritus no localized lesions.  Psychiatric: no anxiety, no depression,no agitation, no delusions, proper insight.      Medications:  The current medication list was reviewed in the EMR    ALLERGIES:    Allergies   Allergen Reactions   • Codeine    • Docetaxel    • Paclitaxel        Objective      Vitals:     "04/19/19 0843   BP: 149/78   Pulse: 88   Resp: 14   Temp: 97.8 °F (36.6 °C)   TempSrc: Oral   SpO2: 97%   Weight: 94.1 kg (207 lb 8 oz)   Height: 163 cm (64.17\")   PainSc: 1  Comment: lower back pain     Current Status 4/19/2019   ECOG score 0         Physical Exam    GENERAL:  Well-developed, well-nourished  Patient  in no acute distress.   SKIN:  Warm, dry ,NO rashes,NO purpura ,NO petechiae.  HEENT:  Pupils were equal and reactive to light and accomodation, conjunctivas non injected, no pterigion, normal extraocular movements, normal visual acuity.   Mouth mucosa was moist, no exudates in oropharynx, normal gum line, normal roof of the mouth and pillars, normal papillations of the tongue.  NECK:  Supple with good range of motion; no thyromegaly or masses, no JVD or bruits, no cervical adenopathies.No carotid arteries pain, no carotid abnormal pulsation , NO arterial dance.  LYMPHATICS:  No cervical, NO supraclavicular, NO axillary,NO epitrochlear , NO inguinal adenopathy.  CHEST:  Normal excursion of both stacy thoraces, normal voice fremitus, no subcutaneous emphysema, normal axillas, no rashes or acanthosis nigricans. Lungs clear to percussion and auscultation, normal breath sounds bilaterally, no wheezing,NO crackles NO ronchi, NO stridor, NO rubs.  CARDIAC AND VASCULAR:  normal rate and regular rhythm, without murmurs,NO rubs NO S3 NO S4 right or left . Normal femoral, popliteal, pedis, brachial and carotid pulses.  ABDOMEN:  Soft, nontender with no organomegaly or masses, no ascites, no collateral circulation,no distention,no Randall sign, no abdominal pain, no inguinal hernias,no umbilical hernia, no abdominal bruits. Normal bowel sounds.  GENITAL: Not  Performed.  EXTREMITIES  AND SPINE:  No clubbing, cyanosis or edema, no deformities or pain .No kyphosis, scoliosis, deformities or pain in spine, ribs or pelvic bone.The patient has pain in the right sacroiliac joint without any soft tissue mass or bone mass " or deformity. She has no pain in the ischial tuberosity on the right side and she has no pain in the sacrum per se. There is no tenderness in the lumbar spine, rib cage, chest wall, anterior pelvic bone, pubis or femurs. She has minimal tenderness in the right calf with no soft tissue swelling, no vein dilatation or sentinel veins. There is no swelling in the right lower extremity. She has no motor deficit. She has no sensory deficit. She has normal pulses in both lower extremities.       NEUROLOGICAL:  Patient was awake, alert, oriented to time, person and place.                            Hematology WBC   Date Value Ref Range Status   04/19/2019 2.73 (L) 3.40 - 10.80 10*3/mm3 Final   01/14/2019 3.69 (L) 4.50 - 10.70 10*3/mm3 Final     RBC   Date Value Ref Range Status   04/19/2019 2.97 (L) 3.77 - 5.28 10*6/mm3 Final   01/14/2019 3.08 (L) 3.90 - 5.20 10*6/mm3 Final     Hemoglobin   Date Value Ref Range Status   04/19/2019 9.9 (L) 12.0 - 15.9 g/dL Final     Hematocrit   Date Value Ref Range Status   04/19/2019 29.8 (L) 34.0 - 46.6 % Final     Platelets   Date Value Ref Range Status   04/19/2019 204 140 - 450 10*3/mm3 Final        Component      Latest Ref Rng & Units 10/29/2018 12/28/2018 2/22/2019   CA 15-3      <=25.0 U/mL 12.5 15.1 14.2           Assessment/Plan     1.  Patient has metastatic breast cancer to multiple sites in her skeleton and has been undergoing therapy with Kisqali and Femara with no significant side effects.        We have seen that the patient has a steady decline and normalization of her tumor marker indicating response to chemotherapy medicine Femara and Kisqali but now she has developed a new pain in the pelvic bone, right sacroiliac joint that is concerning in regard to potential metastasis versus arthritis. All if this happens in certain positions, does not bother her all the time but is enough to be there present day in and day out. 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 Therefore under the present circumstances I feel the obligation to proceed with another Doppler of the right lower extremity that has been called to us stating that there are no acute clots.     For the above reason I have advised the patient the followin. She will remain on her Femara, Kisqali at the same dosing schedule at this time.  2. She is due for her Xgeva injection today that she receives every 3 months. Her magnesium and phosphorus will be checked.   3. The patient remains on her Aleve twice a day 2 tablets in the morning 1 tablet in the evening.  4. I sent some tablets of Lortab to take half a tablet or 1 tablet at bedtime.   5. I asked her to take MiraLax for narcotic induced constipation if she uses her narcotic medication.  6. I scheduled her to have a bone scan. Hopefully this will give us an idea about what the nature of the pain. If the bone scan does not define that the next thing will be to proceed with an MRI scan of the pelvis.   7. I gave her a tentative appointment to come back and see us in 6 weeks.   8. In regard to the leukopenia and anemia these are related to the Kisqali and this needs to be watched in absence of any other intervention. The patient remains on ferrous sulfate.   9. The patient's port was flushed today.                 2019

## 2019-04-19 NOTE — TELEPHONE ENCOUNTER
----- Message from Tonia Barnes sent at 4/19/2019  9:23 AM EDT -----  Contact: [phone]  Paula benjamin     Clarify Hydrocodone dosage.  Clarified with pharmacy.

## 2019-04-19 NOTE — TELEPHONE ENCOUNTER
----- Message from Nader Saez MD sent at 4/19/2019  3:32 PM EDT -----  Call her cancer marker is stable at 15 [facility]

## 2019-04-23 ENCOUNTER — HOSPITAL ENCOUNTER (OUTPATIENT)
Dept: NUCLEAR MEDICINE | Facility: HOSPITAL | Age: 64
Discharge: HOME | End: 2019-04-23

## 2019-04-23 DIAGNOSIS — R01.1 HEART MURMUR, SYSTOLIC: ICD-10-CM

## 2019-04-23 DIAGNOSIS — D68.59 THROMBOPHILIA (HCC): ICD-10-CM

## 2019-04-23 DIAGNOSIS — C50.912 PRIMARY MALIGNANT NEOPLASM OF LEFT BREAST (HCC): ICD-10-CM

## 2019-04-23 DIAGNOSIS — D50.0 IRON DEFICIENCY ANEMIA DUE TO CHRONIC BLOOD LOSS: ICD-10-CM

## 2019-04-23 DIAGNOSIS — C79.51 BONE METASTASIS: ICD-10-CM

## 2019-04-23 DIAGNOSIS — M85.89 OSTEOPENIA OF MULTIPLE SITES: ICD-10-CM

## 2019-04-23 DIAGNOSIS — T45.1X5A LEUKOPENIA DUE TO ANTINEOPLASTIC CHEMOTHERAPY (HCC): ICD-10-CM

## 2019-04-23 DIAGNOSIS — Z45.2 FITTING AND ADJUSTMENT OF VASCULAR CATHETER: ICD-10-CM

## 2019-04-23 DIAGNOSIS — D70.1 LEUKOPENIA DUE TO ANTINEOPLASTIC CHEMOTHERAPY (HCC): ICD-10-CM

## 2019-04-23 PROCEDURE — A9503 TC99M MEDRONATE: HCPCS | Performed by: INTERNAL MEDICINE

## 2019-04-23 PROCEDURE — 78306 BONE IMAGING WHOLE BODY: CPT

## 2019-04-23 PROCEDURE — 0 TECHNETIUM MEDRONATE KIT: Performed by: INTERNAL MEDICINE

## 2019-04-23 RX ORDER — TC 99M MEDRONATE 20 MG/10ML
22 INJECTION, POWDER, LYOPHILIZED, FOR SOLUTION INTRAVENOUS
Status: COMPLETED | OUTPATIENT
Start: 2019-04-23 | End: 2019-04-23

## 2019-04-23 RX ADMIN — Medication 22 MILLICURIE: at 11:08

## 2019-04-24 ENCOUNTER — TELEPHONE (OUTPATIENT)
Dept: ONCOLOGY | Facility: CLINIC | Age: 64
End: 2019-04-24

## 2019-04-24 DIAGNOSIS — C79.51 BONE METASTASIS: ICD-10-CM

## 2019-04-24 DIAGNOSIS — C50.919 METASTATIC BREAST CANCER: Primary | ICD-10-CM

## 2019-04-24 NOTE — TELEPHONE ENCOUNTER
----- Message from Nader Saez MD sent at 4/24/2019 12:47 PM EDT -----   READ MY PHONE NOTE SHE NEEDS MRI L SPINE AND PELVIS IN DAYS

## 2019-04-24 NOTE — PROGRESS NOTES
MRI Lumbar spine and MRI pelvis both with and without orders placed per VO Dr Saez  Message to appt desk to arrange

## 2019-04-24 NOTE — TELEPHONE ENCOUNTER
I discussed with the patient on the telephone today the findings of the bone scan that showed multiple areas of bone metastases but actually the degree of uptake is less evident on this scan than previously indicated control of her disease.  Because I mentioned the difficulty controlling her disease just by 1 radiological means I would like her to proceed with an MRI of the lumbar spine and pelvis to further delineate the reason why she could be having pain.  This will be scheduled and then she will be called at home with the report of this to decide if any radiation therapy would be beneficial.  She agrees with this.    NUCLEAR MEDICINE WHOLE BODY BONE SCAN     HISTORY: Breast cancer. Evaluate metastatic disease. Followup exam.     TECHNIQUE:  22 mCi of 99m technetium MDP was administered IV followed by  3 hour delayed phase whole body imaging with selected spot views.     COMPARISON: Whole body bone scan 06/07/2017, 02/28/2017     FINDINGS:  Multifocal areas of abnormal uptake are present with similar  pattern to the previous exam though the overall degree of uptake is  improved when compared to the previous study consistent with areas of  treated metastases. Uptake within the spine is less heterogenous. There  is persistent uptake within the spine and sacrum, calvarium, right  proximal humerus, pelvis and greater trochanters. There has been  previous surgical fixation of the right distal femur and right distal  femoral uptakes without significant change. Small metastatic lesions  overlies the left distal femur without change. Both kidneys and urinary  bladder are visualized.     IMPRESSION;  Multifocal osseous metastatic disease with similar pattern to the  previous exam though there is evidence for improvement in the degrees of  uptake.     This report was finalized on 4/23/2019 3:40 PM by Dr. Jayme Busby M.D.

## 2019-05-02 ENCOUNTER — HOSPITAL ENCOUNTER (OUTPATIENT)
Dept: MRI IMAGING | Facility: HOSPITAL | Age: 64
Discharge: HOME | End: 2019-05-02

## 2019-05-02 ENCOUNTER — HOSPITAL ENCOUNTER (OUTPATIENT)
Dept: MRI IMAGING | Facility: HOSPITAL | Age: 64
Discharge: HOME | End: 2019-05-02
Admitting: INTERNAL MEDICINE

## 2019-05-02 DIAGNOSIS — C79.51 BONE METASTASIS: ICD-10-CM

## 2019-05-02 DIAGNOSIS — C50.919 METASTATIC BREAST CANCER: ICD-10-CM

## 2019-05-02 PROCEDURE — A9577 INJ MULTIHANCE: HCPCS | Performed by: INTERNAL MEDICINE

## 2019-05-02 PROCEDURE — 0 GADOBENATE DIMEGLUMINE 529 MG/ML SOLUTION: Performed by: INTERNAL MEDICINE

## 2019-05-02 PROCEDURE — 72158 MRI LUMBAR SPINE W/O & W/DYE: CPT

## 2019-05-02 PROCEDURE — 72197 MRI PELVIS W/O & W/DYE: CPT

## 2019-05-02 RX ADMIN — GADOBENATE DIMEGLUMINE 20 ML: 529 INJECTION, SOLUTION INTRAVENOUS at 19:13

## 2019-05-14 ENCOUNTER — TELEPHONE (OUTPATIENT)
Dept: ONCOLOGY | Facility: CLINIC | Age: 64
End: 2019-05-14

## 2019-05-14 NOTE — TELEPHONE ENCOUNTER
LEFT MESSAGE IN VOICE MAIL ABOUT MRI THAT DOES NOT SHOW SIGNIFICANT CHANGE, SHE NEEDS TO CALL US BACK TOMORROW

## 2019-05-15 ENCOUNTER — TELEPHONE (OUTPATIENT)
Dept: ONCOLOGY | Facility: HOSPITAL | Age: 64
End: 2019-05-15

## 2019-05-15 NOTE — TELEPHONE ENCOUNTER
Attempted to call patient. No answer. [facility]     ----- Message from Tonia Barens sent at 5/15/2019  2:53 PM EDT -----  Contact: [phone]  Pt returning call about MRI had done .

## 2019-05-15 NOTE — TELEPHONE ENCOUNTER
Pt instructed by Dr. Saez to call today and let us know how she is feeling.  Pt states that pain is a little better, but is still there.  Message sent to Dr. Saez regarding same.  Pt aware of same.

## 2019-05-17 RX ORDER — ATORVASTATIN CALCIUM 20 MG/1
TABLET, FILM COATED ORAL
Qty: 90 TABLET | Refills: 0 | Status: SHIPPED | OUTPATIENT
Start: 2019-05-17 | End: 2019-08-13 | Stop reason: SDUPTHER

## 2019-05-17 RX ORDER — LISINOPRIL AND HYDROCHLOROTHIAZIDE 12.5; 1 MG/1; MG/1
TABLET ORAL
Qty: 180 TABLET | Refills: 0 | Status: SHIPPED | OUTPATIENT
Start: 2019-05-17 | End: 2019-08-13 | Stop reason: SDUPTHER

## 2019-05-20 ENCOUNTER — DOCUMENTATION (OUTPATIENT)
Dept: ONCOLOGY | Facility: CLINIC | Age: 64
End: 2019-05-20

## 2019-05-20 NOTE — PROGRESS NOTES
[facility] requesting new Kisqali rx. Per last office note from Dr Saez-pt will continue. I have escribed a new rx to [facility].

## 2019-05-30 ENCOUNTER — TELEPHONE (OUTPATIENT)
Dept: ONCOLOGY | Facility: HOSPITAL | Age: 64
End: 2019-05-30

## 2019-05-30 NOTE — TELEPHONE ENCOUNTER
[facility] CALLING NEEDING CLARIFICATION ON RX FOR DICLOFENAC GEL. THEY NEED TO KNOW HOW MANY GRAMS PER APPLICATION. PER DR. MIRANDA OK FOR PT TO APPLY 2-4G PER APPLICATION BID. CALLED [facility] BACK AND S/W PHARMACIST AND GAVE HER THE NEW DOSING INSTRUCTIONS AND SHE V/U.

## 2019-05-31 ENCOUNTER — LAB (OUTPATIENT)
Dept: ONCOLOGY | Facility: HOSPITAL | Age: 64
End: 2019-05-31

## 2019-05-31 ENCOUNTER — APPOINTMENT (OUTPATIENT)
Dept: ONCOLOGY | Facility: CLINIC | Age: 64
End: 2019-05-31

## 2019-05-31 ENCOUNTER — OFFICE VISIT (OUTPATIENT)
Dept: ONCOLOGY | Facility: CLINIC | Age: 64
End: 2019-05-31

## 2019-05-31 ENCOUNTER — APPOINTMENT (OUTPATIENT)
Dept: LAB | Facility: HOSPITAL | Age: 64
End: 2019-05-31

## 2019-05-31 VITALS
HEART RATE: 98 BPM | BODY MASS INDEX: 35.56 KG/M2 | WEIGHT: 208.3 LBS | TEMPERATURE: 98.5 F | RESPIRATION RATE: 14 BRPM | OXYGEN SATURATION: 97 % | HEIGHT: 64 IN | DIASTOLIC BLOOD PRESSURE: 79 MMHG | SYSTOLIC BLOOD PRESSURE: 142 MMHG

## 2019-05-31 DIAGNOSIS — D70.1 LEUKOPENIA DUE TO ANTINEOPLASTIC CHEMOTHERAPY (HCC): ICD-10-CM

## 2019-05-31 DIAGNOSIS — D68.59 THROMBOPHILIA (HCC): ICD-10-CM

## 2019-05-31 DIAGNOSIS — C50.912 PRIMARY MALIGNANT NEOPLASM OF LEFT BREAST (HCC): ICD-10-CM

## 2019-05-31 DIAGNOSIS — D50.0 IRON DEFICIENCY ANEMIA DUE TO CHRONIC BLOOD LOSS: ICD-10-CM

## 2019-05-31 DIAGNOSIS — D51.0 VITAMIN B12 DEFICIENCY ANEMIA DUE TO INTRINSIC FACTOR DEFICIENCY: ICD-10-CM

## 2019-05-31 DIAGNOSIS — R01.1 HEART MURMUR, SYSTOLIC: ICD-10-CM

## 2019-05-31 DIAGNOSIS — T45.1X5A LEUKOPENIA DUE TO ANTINEOPLASTIC CHEMOTHERAPY (HCC): ICD-10-CM

## 2019-05-31 DIAGNOSIS — Z45.2 FITTING AND ADJUSTMENT OF VASCULAR CATHETER: ICD-10-CM

## 2019-05-31 DIAGNOSIS — C50.912 PRIMARY MALIGNANT NEOPLASM OF LEFT BREAST (HCC): Primary | ICD-10-CM

## 2019-05-31 DIAGNOSIS — C79.51 BONE METASTASIS: ICD-10-CM

## 2019-05-31 DIAGNOSIS — M85.89 OSTEOPENIA OF MULTIPLE SITES: ICD-10-CM

## 2019-05-31 LAB
ALBUMIN SERPL-MCNC: 4.4 G/DL (ref 3.5–5.2)
ALBUMIN/GLOB SERPL: 1.7 G/DL (ref 1.1–2.4)
ALP SERPL-CCNC: 99 U/L (ref 38–116)
ALT SERPL W P-5'-P-CCNC: 21 U/L (ref 0–33)
ANION GAP SERPL CALCULATED.3IONS-SCNC: 12 MMOL/L
AST SERPL-CCNC: 17 U/L (ref 0–32)
BASOPHILS # BLD AUTO: 0.06 10*3/MM3 (ref 0–0.2)
BASOPHILS NFR BLD AUTO: 1.5 % (ref 0–1.5)
BILIRUB SERPL-MCNC: 0.2 MG/DL (ref 0.2–1.2)
BUN BLD-MCNC: 27 MG/DL (ref 6–20)
BUN/CREAT SERPL: 28.4 (ref 7.3–30)
CALCIUM SPEC-SCNC: 9.9 MG/DL (ref 8.5–10.2)
CANCER AG15-3 SERPL-ACNC: 18.2 U/ML
CHLORIDE SERPL-SCNC: 101 MMOL/L (ref 98–107)
CO2 SERPL-SCNC: 26 MMOL/L (ref 22–29)
CREAT BLD-MCNC: 0.95 MG/DL (ref 0.6–1.1)
DEPRECATED RDW RBC AUTO: 56.5 FL (ref 37–54)
EOSINOPHIL # BLD AUTO: 0.09 10*3/MM3 (ref 0–0.4)
EOSINOPHIL NFR BLD AUTO: 2.2 % (ref 0.3–6.2)
ERYTHROCYTE [DISTWIDTH] IN BLOOD BY AUTOMATED COUNT: 15.2 % (ref 12.3–15.4)
GFR SERPL CREATININE-BSD FRML MDRD: 60 ML/MIN/1.73
GLOBULIN UR ELPH-MCNC: 2.6 GM/DL (ref 1.8–3.5)
GLUCOSE BLD-MCNC: 99 MG/DL (ref 74–124)
HCT VFR BLD AUTO: 31.5 % (ref 34–46.6)
HGB BLD-MCNC: 10.4 G/DL (ref 12–15.9)
IMM GRANULOCYTES # BLD AUTO: 0.03 10*3/MM3 (ref 0–0.05)
IMM GRANULOCYTES NFR BLD AUTO: 0.7 % (ref 0–0.5)
LYMPHOCYTES # BLD AUTO: 1.08 10*3/MM3 (ref 0.7–3.1)
LYMPHOCYTES NFR BLD AUTO: 26.5 % (ref 19.6–45.3)
MCH RBC QN AUTO: 33.3 PG (ref 26.6–33)
MCHC RBC AUTO-ENTMCNC: 33 G/DL (ref 31.5–35.7)
MCV RBC AUTO: 101 FL (ref 79–97)
MONOCYTES # BLD AUTO: 0.38 10*3/MM3 (ref 0.1–0.9)
MONOCYTES NFR BLD AUTO: 9.3 % (ref 5–12)
NEUTROPHILS # BLD AUTO: 2.43 10*3/MM3 (ref 1.7–7)
NEUTROPHILS NFR BLD AUTO: 59.8 % (ref 42.7–76)
NRBC BLD AUTO-RTO: 0 /100 WBC (ref 0–0.2)
PLATELET # BLD AUTO: 237 10*3/MM3 (ref 140–450)
PMV BLD AUTO: 10.1 FL (ref 6–12)
POTASSIUM BLD-SCNC: 4.2 MMOL/L (ref 3.5–4.7)
PROT SERPL-MCNC: 7 G/DL (ref 6.3–8)
RBC # BLD AUTO: 3.12 10*6/MM3 (ref 3.77–5.28)
SODIUM BLD-SCNC: 139 MMOL/L (ref 134–145)
WBC NRBC COR # BLD: 4.07 10*3/MM3 (ref 3.4–10.8)

## 2019-05-31 PROCEDURE — 96523 IRRIG DRUG DELIVERY DEVICE: CPT | Performed by: INTERNAL MEDICINE

## 2019-05-31 PROCEDURE — 86300 IMMUNOASSAY TUMOR CA 15-3: CPT | Performed by: INTERNAL MEDICINE

## 2019-05-31 PROCEDURE — 80053 COMPREHEN METABOLIC PANEL: CPT | Performed by: INTERNAL MEDICINE

## 2019-05-31 PROCEDURE — 85025 COMPLETE CBC W/AUTO DIFF WBC: CPT | Performed by: INTERNAL MEDICINE

## 2019-05-31 PROCEDURE — 99214 OFFICE O/P EST MOD 30 MIN: CPT | Performed by: INTERNAL MEDICINE

## 2019-05-31 PROCEDURE — 36415 COLL VENOUS BLD VENIPUNCTURE: CPT | Performed by: INTERNAL MEDICINE

## 2019-05-31 RX ORDER — RIBOCICLIB 200 MG/1
TABLET, FILM COATED ORAL
COMMUNITY
Start: 2019-05-08 | End: 2019-06-26 | Stop reason: SDUPTHER

## 2019-05-31 RX ORDER — 0.9 % SODIUM CHLORIDE 0.9 %
10 VIAL (ML) INJECTION ONCE
Status: COMPLETED | OUTPATIENT
Start: 2019-05-31 | End: 2019-05-31

## 2019-05-31 RX ORDER — HYDROCODONE BITARTRATE AND ACETAMINOPHEN 5; 325 MG/1; MG/1
1 TABLET ORAL EVERY 6 HOURS PRN
COMMUNITY
End: 2021-08-30 | Stop reason: SDUPTHER

## 2019-05-31 RX ADMIN — SODIUM CHLORIDE, PRESERVATIVE FREE 500 UNITS: 5 INJECTION INTRAVENOUS at 15:29

## 2019-05-31 RX ADMIN — SODIUM CHLORIDE 10 ML: 9 INJECTION, SOLUTION INTRAMUSCULAR; INTRAVENOUS; SUBCUTANEOUS at 15:30

## 2019-06-17 RX ORDER — MAGNESIUM 64 MG (MAGNESIUM CHLORIDE) TABLET,DELAYED RELEASE
Qty: 30 TABLET | Refills: 5 | Status: SHIPPED | OUTPATIENT
Start: 2019-06-17 | End: 2020-01-06

## 2019-06-26 ENCOUNTER — DOCUMENTATION (OUTPATIENT)
Dept: ONCOLOGY | Facility: CLINIC | Age: 64
End: 2019-06-26

## 2019-06-26 RX ORDER — RIBOCICLIB 200 MG/1
400 TABLET, FILM COATED ORAL DAILY
Qty: 42 TABLET | Refills: 6 | Status: SHIPPED | OUTPATIENT
Start: 2019-06-26 | End: 2019-07-01 | Stop reason: SDUPTHER

## 2019-06-26 NOTE — PROGRESS NOTES
[facility] is requesting a new Kisqali rx. I sent them one on 5/20/19 but this was discontinued in pts chart on 5/31/19 by Dr Saez. Per his 5/31/19 office note-pt is to continue on the same dose she has been on. She has been on 400 mg daily for 21 days on then 7 days off. I have escribed another rx to [facility].

## 2019-07-01 ENCOUNTER — DOCUMENTATION (OUTPATIENT)
Dept: ONCOLOGY | Facility: CLINIC | Age: 64
End: 2019-07-01

## 2019-07-01 RX ORDER — RIBOCICLIB 200 MG/1
400 TABLET, FILM COATED ORAL DAILY
Qty: 42 TABLET | Refills: 6 | Status: SHIPPED | OUTPATIENT
Start: 2019-07-01 | End: 2020-06-19 | Stop reason: ALTCHOICE

## 2019-07-19 DIAGNOSIS — E78.5 HYPERLIPIDEMIA, UNSPECIFIED HYPERLIPIDEMIA TYPE: ICD-10-CM

## 2019-07-19 DIAGNOSIS — E53.8 B12 DEFICIENCY: Primary | ICD-10-CM

## 2019-07-19 LAB
ALBUMIN SERPL-MCNC: 4.2 G/DL (ref 3.5–5.2)
ALBUMIN/GLOB SERPL: 1.8 G/DL
ALP SERPL-CCNC: 91 U/L (ref 39–117)
ALT SERPL-CCNC: 21 U/L (ref 1–33)
AST SERPL-CCNC: 22 U/L (ref 1–32)
BILIRUB SERPL-MCNC: 0.3 MG/DL (ref 0.2–1.2)
BUN SERPL-MCNC: 27 MG/DL (ref 8–23)
BUN/CREAT SERPL: 29 (ref 7–25)
CALCIUM SERPL-MCNC: 9.5 MG/DL (ref 8.6–10.5)
CHLORIDE SERPL-SCNC: 98 MMOL/L (ref 98–107)
CHOLEST SERPL-MCNC: 179 MG/DL (ref 0–200)
CO2 SERPL-SCNC: 25.3 MMOL/L (ref 22–29)
CREAT SERPL-MCNC: 0.93 MG/DL (ref 0.57–1)
GLOBULIN SER CALC-MCNC: 2.4 GM/DL
GLUCOSE SERPL-MCNC: 112 MG/DL (ref 65–99)
HDLC SERPL-MCNC: 53 MG/DL (ref 40–60)
LDLC SERPL CALC-MCNC: 81 MG/DL (ref 0–100)
POTASSIUM SERPL-SCNC: 4.5 MMOL/L (ref 3.5–5.2)
PROT SERPL-MCNC: 6.6 G/DL (ref 6–8.5)
SODIUM SERPL-SCNC: 137 MMOL/L (ref 136–145)
TRIGL SERPL-MCNC: 223 MG/DL (ref 0–150)
VIT B12 SERPL-MCNC: 1261 PG/ML (ref 211–946)
VLDLC SERPL CALC-MCNC: 44.6 MG/DL

## 2019-07-25 ENCOUNTER — OFFICE VISIT (OUTPATIENT)
Dept: ONCOLOGY | Facility: CLINIC | Age: 64
End: 2019-07-25

## 2019-07-25 ENCOUNTER — INFUSION (OUTPATIENT)
Dept: ONCOLOGY | Facility: HOSPITAL | Age: 64
End: 2019-07-25

## 2019-07-25 VITALS
BODY MASS INDEX: 35.56 KG/M2 | WEIGHT: 208.3 LBS | RESPIRATION RATE: 16 BRPM | DIASTOLIC BLOOD PRESSURE: 74 MMHG | OXYGEN SATURATION: 96 % | HEIGHT: 64 IN | TEMPERATURE: 98.3 F | HEART RATE: 104 BPM | SYSTOLIC BLOOD PRESSURE: 125 MMHG

## 2019-07-25 DIAGNOSIS — Z45.2 FITTING AND ADJUSTMENT OF VASCULAR CATHETER: ICD-10-CM

## 2019-07-25 DIAGNOSIS — D68.59 THROMBOPHILIA (HCC): ICD-10-CM

## 2019-07-25 DIAGNOSIS — T45.1X5A LEUKOPENIA DUE TO ANTINEOPLASTIC CHEMOTHERAPY (HCC): ICD-10-CM

## 2019-07-25 DIAGNOSIS — M85.89 OSTEOPENIA OF MULTIPLE SITES: Primary | ICD-10-CM

## 2019-07-25 DIAGNOSIS — C50.912 PRIMARY MALIGNANT NEOPLASM OF LEFT BREAST (HCC): Primary | ICD-10-CM

## 2019-07-25 DIAGNOSIS — D70.1 LEUKOPENIA DUE TO ANTINEOPLASTIC CHEMOTHERAPY (HCC): ICD-10-CM

## 2019-07-25 DIAGNOSIS — C79.51 BONE METASTASIS: ICD-10-CM

## 2019-07-25 DIAGNOSIS — D50.0 IRON DEFICIENCY ANEMIA DUE TO CHRONIC BLOOD LOSS: ICD-10-CM

## 2019-07-25 DIAGNOSIS — R01.1 HEART MURMUR, SYSTOLIC: ICD-10-CM

## 2019-07-25 DIAGNOSIS — D51.0 VITAMIN B12 DEFICIENCY ANEMIA DUE TO INTRINSIC FACTOR DEFICIENCY: ICD-10-CM

## 2019-07-25 LAB
ALBUMIN SERPL-MCNC: 4.5 G/DL (ref 3.5–5.2)
ALBUMIN/GLOB SERPL: 1.5 G/DL (ref 1.1–2.4)
ALP SERPL-CCNC: 97 U/L (ref 38–116)
ALT SERPL W P-5'-P-CCNC: 20 U/L (ref 0–33)
ANION GAP SERPL CALCULATED.3IONS-SCNC: 14.4 MMOL/L (ref 5–15)
AST SERPL-CCNC: 20 U/L (ref 0–32)
BASOPHILS # BLD AUTO: 0.04 10*3/MM3 (ref 0–0.2)
BASOPHILS NFR BLD AUTO: 1 % (ref 0–1.5)
BILIRUB SERPL-MCNC: 0.4 MG/DL (ref 0.2–1.2)
BUN BLD-MCNC: 29 MG/DL (ref 6–20)
BUN/CREAT SERPL: 25.2 (ref 7.3–30)
CALCIUM SPEC-SCNC: 10.1 MG/DL (ref 8.5–10.2)
CANCER AG15-3 SERPL-ACNC: 23.4 U/ML
CHLORIDE SERPL-SCNC: 100 MMOL/L (ref 98–107)
CO2 SERPL-SCNC: 24.6 MMOL/L (ref 22–29)
CREAT BLD-MCNC: 1.15 MG/DL (ref 0.6–1.1)
DEPRECATED RDW RBC AUTO: 55.5 FL (ref 37–54)
EOSINOPHIL # BLD AUTO: 0.11 10*3/MM3 (ref 0–0.4)
EOSINOPHIL NFR BLD AUTO: 2.7 % (ref 0.3–6.2)
ERYTHROCYTE [DISTWIDTH] IN BLOOD BY AUTOMATED COUNT: 14.7 % (ref 12.3–15.4)
FERRITIN SERPL-MCNC: 246.6 NG/ML (ref 13–150)
GFR SERPL CREATININE-BSD FRML MDRD: 48 ML/MIN/1.73
GLOBULIN UR ELPH-MCNC: 3 GM/DL (ref 1.8–3.5)
GLUCOSE BLD-MCNC: 135 MG/DL (ref 74–124)
HCT VFR BLD AUTO: 33.5 % (ref 34–46.6)
HGB BLD-MCNC: 11.2 G/DL (ref 12–15.9)
HGB RETIC QN AUTO: 37.1 PG (ref 29.8–36.1)
IMM GRANULOCYTES # BLD AUTO: 0.03 10*3/MM3 (ref 0–0.05)
IMM GRANULOCYTES NFR BLD AUTO: 0.7 % (ref 0–0.5)
IMM RETICS NFR: 23.4 % (ref 3–15.8)
IRON 24H UR-MRATE: 78 MCG/DL (ref 37–145)
IRON SATN MFR SERPL: 20 % (ref 14–48)
LYMPHOCYTES # BLD AUTO: 1.15 10*3/MM3 (ref 0.7–3.1)
LYMPHOCYTES NFR BLD AUTO: 28.5 % (ref 19.6–45.3)
MAGNESIUM SERPL-MCNC: 2.2 MG/DL (ref 1.8–2.5)
MCH RBC QN AUTO: 33.8 PG (ref 26.6–33)
MCHC RBC AUTO-ENTMCNC: 33.4 G/DL (ref 31.5–35.7)
MCV RBC AUTO: 101.2 FL (ref 79–97)
MONOCYTES # BLD AUTO: 0.31 10*3/MM3 (ref 0.1–0.9)
MONOCYTES NFR BLD AUTO: 7.7 % (ref 5–12)
NEUTROPHILS # BLD AUTO: 2.39 10*3/MM3 (ref 1.7–7)
NEUTROPHILS NFR BLD AUTO: 59.4 % (ref 42.7–76)
NRBC BLD AUTO-RTO: 0 /100 WBC (ref 0–0.2)
PHOSPHATE SERPL-MCNC: 3.4 MG/DL (ref 2.5–4.5)
PLATELET # BLD AUTO: 238 10*3/MM3 (ref 140–450)
PMV BLD AUTO: 10.9 FL (ref 6–12)
POTASSIUM BLD-SCNC: 4.2 MMOL/L (ref 3.5–4.7)
PROT SERPL-MCNC: 7.5 G/DL (ref 6.3–8)
RBC # BLD AUTO: 3.31 10*6/MM3 (ref 3.77–5.28)
RETICS/RBC NFR AUTO: 2.71 % (ref 0.7–1.9)
SODIUM BLD-SCNC: 139 MMOL/L (ref 134–145)
TIBC SERPL-MCNC: 396 MCG/DL (ref 249–505)
TRANSFERRIN SERPL-MCNC: 283 MG/DL (ref 200–360)
WBC NRBC COR # BLD: 4.03 10*3/MM3 (ref 3.4–10.8)

## 2019-07-25 PROCEDURE — 96372 THER/PROPH/DIAG INJ SC/IM: CPT

## 2019-07-25 PROCEDURE — 84100 ASSAY OF PHOSPHORUS: CPT | Performed by: INTERNAL MEDICINE

## 2019-07-25 PROCEDURE — 25010000002 DENOSUMAB 120 MG/1.7ML SOLUTION: Performed by: INTERNAL MEDICINE

## 2019-07-25 PROCEDURE — 36415 COLL VENOUS BLD VENIPUNCTURE: CPT | Performed by: INTERNAL MEDICINE

## 2019-07-25 PROCEDURE — 84466 ASSAY OF TRANSFERRIN: CPT | Performed by: INTERNAL MEDICINE

## 2019-07-25 PROCEDURE — 82728 ASSAY OF FERRITIN: CPT | Performed by: INTERNAL MEDICINE

## 2019-07-25 PROCEDURE — 86300 IMMUNOASSAY TUMOR CA 15-3: CPT | Performed by: INTERNAL MEDICINE

## 2019-07-25 PROCEDURE — 80053 COMPREHEN METABOLIC PANEL: CPT | Performed by: INTERNAL MEDICINE

## 2019-07-25 PROCEDURE — 99214 OFFICE O/P EST MOD 30 MIN: CPT | Performed by: INTERNAL MEDICINE

## 2019-07-25 PROCEDURE — 25010000002 ALTEPLASE 2 MG RECONSTITUTED SOLUTION: Performed by: INTERNAL MEDICINE

## 2019-07-25 PROCEDURE — 96523 IRRIG DRUG DELIVERY DEVICE: CPT | Performed by: INTERNAL MEDICINE

## 2019-07-25 PROCEDURE — 85025 COMPLETE CBC W/AUTO DIFF WBC: CPT | Performed by: INTERNAL MEDICINE

## 2019-07-25 PROCEDURE — 83735 ASSAY OF MAGNESIUM: CPT | Performed by: INTERNAL MEDICINE

## 2019-07-25 PROCEDURE — 83540 ASSAY OF IRON: CPT | Performed by: INTERNAL MEDICINE

## 2019-07-25 PROCEDURE — 85046 RETICYTE/HGB CONCENTRATE: CPT | Performed by: INTERNAL MEDICINE

## 2019-07-25 RX ORDER — SODIUM CHLORIDE 0.9 % (FLUSH) 0.9 %
10 SYRINGE (ML) INJECTION AS NEEDED
Status: DISCONTINUED | OUTPATIENT
Start: 2019-07-25 | End: 2019-07-25 | Stop reason: HOSPADM

## 2019-07-25 RX ORDER — SODIUM CHLORIDE 0.9 % (FLUSH) 0.9 %
10 SYRINGE (ML) INJECTION AS NEEDED
Status: CANCELLED | OUTPATIENT
Start: 2019-07-25

## 2019-07-25 RX ADMIN — SODIUM CHLORIDE, PRESERVATIVE FREE 500 UNITS: 5 INJECTION INTRAVENOUS at 15:12

## 2019-07-25 RX ADMIN — DENOSUMAB 120 MG: 120 INJECTION SUBCUTANEOUS at 15:01

## 2019-07-25 RX ADMIN — ALTEPLASE: 2.2 INJECTION, POWDER, LYOPHILIZED, FOR SOLUTION INTRAVENOUS at 14:06

## 2019-07-25 RX ADMIN — Medication 10 ML: at 15:12

## 2019-07-25 NOTE — PROGRESS NOTES
Subjective  REASONS FOR FOLLOWUP: 1. Metastatic breast cancer to multiple skeletal sites including cervical spine, sternum, lumbar spine and right femur, underwent Abraxane every 4 weeks and Xgeva every  3 months, DOCUMENTED RADIOLOGICAL PROGRESSION BY BONE SCAN 3[facility]15[facility], PRESENTLY ON FEMARA AND KISQALI , XGEVA EVERY 3 MONTHS,    2.Iron deficiency anemia du to GI blood loss, Xarelto stopped months ago, Iron initiated, Pepcid along with Aleve for gastric protection.           History of Present Illness This patient returns today to the office for followup. She is here today with no specific complaints besides mild pain in the distal aspect of the right femur associated with her metastatic disease, status post definitive radiation therapy. She has not developed any new sites of pain in the skeleton. She was missing her oral iron replacement therapy and she is back on it. She has no GI symptomatology. Her bowel function and urination are normal. No cardiovascular or respiratory issues. She remains functional taking care of her health, traveling to see her mother in [place] and going to work on regular schedule. Tolerance to her Femara and Kisqali is excellent and she is now into the 1st week of the next cycle. She is due for Xgeva today. She receives this medicine every 3 months.              Past Medical History:   Diagnosis Date   • Abnormal mammogram    • Abnormal menstrual cycle    • Arthritis    • Bone metastasis (CMS/HCC)    • Breast cancer (CMS/HCC) 2000    Left breast   • Drug therapy 2001    breast cancer   • Drug therapy 2017    right  femur/associated with breast cancer   • H/O Heart murmur    • History of colon polyps    • Hx of radiation therapy 2000    breast cancer   • Hx of radiation therapy 2015    bone cancer right femur/ associated with breast cancer   • Hypercholesterolemia    • Hyperlipidemia    • Hypertension    • Multiple benign polyps of large intestine    • Reflux esophagitis      Social History     Socioeconomic History   • Marital status: Single     Spouse name: Not on file   • Number of children: Not on file   • Years of education: Not on file   • Highest education level: Not on file   Occupational History   • Occupation:      Employer: [employer]   Tobacco Use   • Smoking status: Never Smoker   • Smokeless tobacco: Never Used   Substance and Sexual Activity   • Alcohol use: No   • Drug use: No     Family History   Problem Relation Age of Onset   • Hypertension Mother    • Diabetes Mother    • Macular degeneration Mother    • Thyroid disease Mother    • Heart disease Father    • Stroke Father    • Kidney disease Father    • Glaucoma Brother    • Diabetes Brother    • Hypertension Brother    • Colon cancer Paternal Grandmother    • Thyroid disease Other    • Kidney disease Other    • Glaucoma Other    • Cancer Other    • Diabetes Other            Review of Systems       General: no fever, no chills,  fatigue,no weight changes, no lack of appetite.  Eyes: no epiphora, xerophthalmia,conjunctivitis, pain, glaucoma, blurred vision, blindness, secretion, photophobia, proptosis, diplopia.  Ears: no otorrhea, tinnitus, otorrhagia, deafness, pain, vertigo.  Nose: no rhinorrhea, no epistaxis, no alteration in perception of odors, no sinuses pressure.  Mouth: no alteration in gums or teeth,  No ulcers, no difficulty with mastication or deglut ion, no odynophagia.  Neck: no masses or pain, no thyroid alterations, no pain in muscles or arteries, no carotid odynia, no crepitation.  Respiratory: no cough, no  "sputum production,no dyspnea,no trepopnea, no pleuritic pain,no hemoptysis.  Heart: no syncope, no irregularity, no palpitations, no angina,no orthopnea,no paroxysmal nocturnal dyspnea.  Vascular Venous: no tenderness,no edema,no palpable cords,no postphlebitic syndrome, no skin changes no ulcerations.  Vascular Arterial: no distal ischemia, noclaudication, no gangrene, no neuropathic ischemic pain, no skin ulcers, no paleness no cyanosis.  GI: no dysphagia, no odynophagia, no regurgitation, no heartburn,no indigestion,no nausea,no vomiting,no hematemesis ,no melena,no jaundice,no distention, no obstipation,no enterorrhagia,no proctalgia,no anal  lesions, no changes in bowel habits.  : no frequency, no hesitancy, no hematuria, no discharge,no  pain.  Musculoskeletal: stated muscle or tendon pain or inflammation,no  joint pain, no edema, no functional limitation,no fasciculations, no mass.  Neurologic: no headache, no seizures, noalterations on Craneal nerves, no motor deficit, no sensory deficit, normal coordination, no alteration in memory,normal orientation, calculation,normal writting, verbal and written language.  Skin: no rashes,no pruritus no localized lesions.  Psychiatric: no anxiety, no depression,no agitation, no delusions, proper insight.        Medications:  The current medication list was reviewed in the EMR    ALLERGIES:    Allergies   Allergen Reactions   • Codeine    • Docetaxel    • Paclitaxel        Objective      Vitals:    07/25/19 1421   BP: 125/74   Pulse: 104   Resp: 16   Temp: 98.3 °F (36.8 °C)   SpO2: 96%   Weight: 94.5 kg (208 lb 4.8 oz)   Height: 163 cm (64.17\")   PainSc:   2   PainLoc: Generalized  Comment: right hip,leg and ankle     Current Status 7/25/2019   ECOG score 0         Physical Exam    GENERAL:  Well-developed, well-nourished  Patient  in no acute distress.   SKIN:  Warm, dry ,NO rashes,NO purpura ,NO petechiae.  HEENT:  Pupils were equal and reactive to light and " accomodation, conjunctivas non injected, no pterigion, normal extraocular movements, normal visual acuity.   Mouth mucosa was moist, no exudates in oropharynx, normal gum line, normal roof of the mouth and pillars, normal papillations of the tongue.  NECK:  Supple with good range of motion; no thyromegaly or masses, no JVD or bruits, no cervical adenopathies.No carotid arteries pain, no carotid abnormal pulsation , NO arterial dance.  LYMPHATICS:  No cervical, NO supraclavicular, NO axillary,NO epitrochlear , NO inguinal adenopathy.  CHEST:  Normal excursion of both stacy thoraces, normal voice fremitus, no subcutaneous emphysema, normal axillas, no rashes or acanthosis nigricans. Lungs clear to percussion and auscultation, normal breath sounds bilaterally, no wheezing,NO crackles NO ronchi, NO stridor, NO rubs.  CARDIAC AND VASCULAR:  normal rate and regular rhythm, without murmurs,NO rubs NO S3 NO S4 right or left . Normal femoral, popliteal, pedis, brachial and carotid pulses.  ABDOMEN:  Soft, nontender with no organomegaly or masses, no ascites, no collateral circulation,no distention,no Randall sign, no abdominal pain, no inguinal hernias,no umbilical hernia, no abdominal bruits. Normal bowel sounds.  GENITAL: Not  Performed.  EXTREMITIES  AND SPINE:  No clubbing, cyanosis or edema, no deformities or pain .No kyphosis, scoliosis, deformities or pain in spine, ribs or pelvic bone.  NEUROLOGICAL:  Patient was awake, alert, oriented to time, person and place.                                      Hematology WBC   Date Value Ref Range Status   07/25/2019 4.03 3.40 - 10.80 10*3/mm3 Final   01/14/2019 3.69 (L) 4.50 - 10.70 10*3/mm3 Final     RBC   Date Value Ref Range Status   07/25/2019 3.31 (L) 3.77 - 5.28 10*6/mm3 Final   01/14/2019 3.08 (L) 3.90 - 5.20 10*6/mm3 Final     Hemoglobin   Date Value Ref Range Status   07/25/2019 11.2 (L) 12.0 - 15.9 g/dL Final     Hematocrit   Date Value Ref Range Status   07/25/2019  33.5 (L) 34.0 - 46.6 % Final     Platelets   Date Value Ref Range Status   07/25/2019 238 140 - 450 10*3/mm3 Final          Assessment/Plan     1.  Patient has metastatic breast cancer to multiple sites in her skeleton and has been undergoing therapy with Kisqali and Femara with no significant side effects.        We have seen that the patient has a steady decline and normalization of her tumor marker indicating response to chemotherapy medicine Femara and KISQALI.During the previous visit, the patient had new pain in the sacroiliac joint on the right side in absence of any trauma and obviously, we thought pertinent to pursue a bone scan that was not conclusive in regard to disease status. Subsequently an MRI was done and it was dubious about any progressive disease. She had a lot of sclerotic lesions throughout her skeleton pertinent to her breast cancer but overall it was suggestive of healing more than anything else. She had no obvious pathological fractures. Given the significant improvement of her pain in the last 2 weeks spontaneously, leads me to believe that this pain was not related to her malignancy but related to musculoskeletal problem more than anything else. Obviously, we need to keep the site on her all the time.     She is back onto her usual dose of Naprosyn. She has not required the use of any narcotic and she remains on her Femara and Kisqali, doing extremely well.     The Kisqali is not producing any side effects including no major neutropenia, minor anemia. No major thrombocytopenia.     Kisqali is not producing any cardiac issues either.     She remains on her magnesium and potassium supplementation.       I do believe that the patient on clinical grounds and biochemically is very stable in regard to her metastatic breast cancer to the skeleton. She has not had any visceral metastasis. She remains functional. Her white count is stable. Hemoglobin has improved now that she is back on oral iron  therapy. The platelet count is excellent. Her tumor marker has remained stable. We have done radiological analysis not too long ago, extremely difficult to interpret though and she is aware of that.     RECOMMENDATIONS:   1. The patient will remain on her ibuprofen or Aleve on p.r.n. basis for pain.   2. She will remain on her oral iron preparation 3 times a week, Monday, Wednesday and Friday.   3. She will remain on her usual dose of Kisqali/Femara according to protocol.   4. She will receive Xgeva today and every 3 months.   5. Her port has been flushed today.   6. She will be seen again in 6 weeks for port flush and laboratory workup including CBC, CMP, CA 15-3; doctor visit in 3 months with similar labs and Xgeva therapy at that point.     I find no need for any other radiological assessment at this time.     She has Lortab at home in case that she needs any. She has not had the need for any in a long time.                   7/25/2019

## 2019-07-26 ENCOUNTER — OFFICE VISIT (OUTPATIENT)
Dept: INTERNAL MEDICINE | Facility: CLINIC | Age: 64
End: 2019-07-26

## 2019-07-26 ENCOUNTER — TRANSCRIBE ORDERS (OUTPATIENT)
Dept: ADMINISTRATIVE | Facility: HOSPITAL | Age: 64
End: 2019-07-26

## 2019-07-26 VITALS
WEIGHT: 209 LBS | DIASTOLIC BLOOD PRESSURE: 78 MMHG | OXYGEN SATURATION: 98 % | BODY MASS INDEX: 35.68 KG/M2 | HEIGHT: 64 IN | SYSTOLIC BLOOD PRESSURE: 120 MMHG | HEART RATE: 87 BPM

## 2019-07-26 DIAGNOSIS — R73.01 IFG (IMPAIRED FASTING GLUCOSE): ICD-10-CM

## 2019-07-26 DIAGNOSIS — C79.51 BONE METASTASIS: ICD-10-CM

## 2019-07-26 DIAGNOSIS — Z12.31 VISIT FOR SCREENING MAMMOGRAM: Primary | ICD-10-CM

## 2019-07-26 DIAGNOSIS — C50.912 PRIMARY MALIGNANT NEOPLASM OF LEFT BREAST (HCC): ICD-10-CM

## 2019-07-26 DIAGNOSIS — I10 BENIGN ESSENTIAL HTN: Primary | ICD-10-CM

## 2019-07-26 DIAGNOSIS — E78.5 HYPERLIPIDEMIA, UNSPECIFIED HYPERLIPIDEMIA TYPE: ICD-10-CM

## 2019-07-26 PROCEDURE — 99214 OFFICE O/P EST MOD 30 MIN: CPT | Performed by: INTERNAL MEDICINE

## 2019-07-26 NOTE — PROGRESS NOTES
Subjective     Maggie Suero is a 64 y.o. female who presents with   Chief Complaint   Patient presents with   • Hypertension   • Hyperlipidemia   • Hyperglycemia       History of Present Illness     HTN. Control is good.   HLD.  Control is excellent on atorvastatin.    IFG.  Moderate increase in BMD.  Patient was encouraged to watch sugars and white carbohydrates in diet, exercise and maintain a healthy weight to prevent diabetes.    Reviewed last BMD which was excellent with Xygeva for bone mets.     Review of Systems   Respiratory: Negative.    Cardiovascular: Negative.        The following portions of the patient's history were reviewed and updated as appropriate: allergies, current medications and problem list.    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       • Thrombophilia (CMS/HCC) 02/18/2016     Note Last Updated: 2/18/2016     Description: associated with femoral fracture     • Hyperlipidemia 02/18/2016   • IFG (impaired fasting glucose) 02/18/2016   • Osteopenia 02/18/2016     Note Last Updated: 3/8/2016     Description: Fosamax stopped after 10 years of therapy in May of 2012.  On Zometa with metastatic breast cancer followed by Xgeva currently.           Current Outpatient Medications on File Prior to Visit   Medication Sig Dispense Refill   • Ascorbic Acid (VITAMIN C PO) Take  by mouth Daily.     • atorvastatin  "(LIPITOR) 20 MG tablet TAKE 1 TABLET BY MOUTH ONCE DAILY 90 tablet 0   • calcium carbonate-vitamin d 600-400 MG-UNIT per tablet Take 1 tablet by mouth daily.     • cetirizine (zyrTEC) 10 MG tablet Take 10 mg by mouth As Needed for Allergies.     • ciclopirox (PENLAC) 8 % solution Apply  topically Every Night. 6 mL 0   • Cyanocobalamin (B-12 PO) Take 1,000 mcg by mouth Daily.     • denosumab (XGEVA) 120 MG/1.7ML solution injection Inject  under the skin 1 (one) time.     • diclofenac (VOLTAREN) 1 % gel gel Apply  topically to the appropriate area as directed 2 (Two) Times a Day. 2-4G PER APPLICATION  g 6   • Ferrous Sulfate (IRON) 325 (65 Fe) MG tablet TAKE 1 TABLET BY MOUTH ONCE DAILY WITH BREAKFAST 30 tablet 5   • HYDROcodone-acetaminophen (NORCO) 5-325 MG per tablet Take 1 tablet by mouth Every 6 (Six) Hours As Needed.     • KISQALI 400 DOSE 200 MG tablet therapy pack tablet Take 2 tablets by mouth Daily. For 21 days on then 7 days off. 42 tablet 6   • letrozole (FEMARA) 2.5 MG tablet TAKE 1 TABLET BY MOUTH ONCE DAILY START  WITH  KISQALI 30 tablet 6   • lisinopril-hydrochlorothiazide (PRINZIDE,ZESTORETIC) 10-12.5 MG per tablet TAKE 1 TABLET BY MOUTH TWICE DAILY 180 tablet 0   • MAG64 64 MG DR tablet TAKE 1 TABLET BY MOUTH ONCE DAILY 30 tablet 5   • Multiple Vitamins-Minerals (HAIR SKIN AND NAILS FORMULA PO) Take  by mouth.     • Naproxen Sod-Diphenhydramine (ALEVE PM PO) Take  by mouth as needed.     • Naproxen Sodium (ALEVE PO) Take  by mouth.     • Omega-3 Fatty Acids (FISH OIL) 645 MG capsule Take  by mouth.     • OMEPRAZOLE PO Take  by mouth Daily.     • ondansetron ODT (ZOFRAN-ODT) 8 MG disintegrating tablet Dissolve one tablet on tongue every 6 hours for nausea and vomiting 12 tablet 0     No current facility-administered medications on file prior to visit.        Objective     /78   Pulse 87   Ht 163 cm (64.17\")   Wt 94.8 kg (209 lb)   LMP  (LMP Unknown)   SpO2 98%   BMI 35.68 kg/m² "     Physical Exam   Constitutional: She is oriented to person, place, and time. She appears well-developed and well-nourished.   HENT:   Head: Normocephalic and atraumatic.   Cardiovascular: Normal rate, regular rhythm and normal heart sounds.   Pulmonary/Chest: Effort normal and breath sounds normal.   Neurological: She is alert and oriented to person, place, and time.   Skin: Skin is warm and dry.   Psychiatric: She has a normal mood and affect. Her behavior is normal.       Assessment/Plan   Maggie was seen today for hypertension, hyperlipidemia and hyperglycemia.    Diagnoses and all orders for this visit:    Benign essential HTN    Hyperlipidemia, unspecified hyperlipidemia type    IFG (impaired fasting glucose)    Bone metastasis (CMS/HCC)    Primary malignant neoplasm of left breast (CMS/HCC)        Discussion  HTN.  Continue current.  HLD.  Continue atorvastatin.   IFG.  We discussed diet.  I recommend a diet high in fruits, vegetables, whole grains, lean meats, nuts and beans.  I recommend limiting red meat, full fat dairy, eggs and processed white carbohydrates.  I recommend aerobic exercise at least 3 days per week.  Bone mets from breast CA.  Continue with oncology.         Future Appointments   Date Time Provider Department Center   9/6/2019 10:30 AM [facility] 2 [facility]   9/6/2019  1:30 PM [facility] PORT CHAIR  [facility]   10/18/2019  1:00 PM [facility] CBC [facility] PORT CHAIR  [facility]   10/18/2019  1:40 PM Nader Saez MD [facility] CBC [facility] CBC [facility]   10/18/2019  2:00 PM INJECTION CHAIR CBC [facility]RE  INFUS [facility]   2/3/2020  9:00 AM [facility] None   2/10/2020  1:30 PM Chetna Whalen MD [facility] None

## 2019-08-14 RX ORDER — LISINOPRIL AND HYDROCHLOROTHIAZIDE 12.5; 1 MG/1; MG/1
TABLET ORAL
Qty: 180 TABLET | Refills: 0 | Status: SHIPPED | OUTPATIENT
Start: 2019-08-14 | End: 2019-11-16 | Stop reason: SDUPTHER

## 2019-08-14 RX ORDER — ATORVASTATIN CALCIUM 20 MG/1
TABLET, FILM COATED ORAL
Qty: 90 TABLET | Refills: 0 | Status: SHIPPED | OUTPATIENT
Start: 2019-08-14 | End: 2019-11-21 | Stop reason: SDUPTHER

## 2019-09-06 ENCOUNTER — INFUSION (OUTPATIENT)
Dept: ONCOLOGY | Facility: HOSPITAL | Age: 64
End: 2019-09-06

## 2019-09-06 ENCOUNTER — HOSPITAL ENCOUNTER (OUTPATIENT)
Dept: MAMMOGRAPHY | Facility: HOSPITAL | Age: 64
Discharge: HOME | End: 2019-09-06
Admitting: INTERNAL MEDICINE

## 2019-09-06 DIAGNOSIS — Z12.31 VISIT FOR SCREENING MAMMOGRAM: ICD-10-CM

## 2019-09-06 DIAGNOSIS — D68.59 THROMBOPHILIA (HCC): ICD-10-CM

## 2019-09-06 DIAGNOSIS — D70.1 LEUKOPENIA DUE TO ANTINEOPLASTIC CHEMOTHERAPY (HCC): ICD-10-CM

## 2019-09-06 DIAGNOSIS — D51.0 VITAMIN B12 DEFICIENCY ANEMIA DUE TO INTRINSIC FACTOR DEFICIENCY: ICD-10-CM

## 2019-09-06 DIAGNOSIS — D50.0 IRON DEFICIENCY ANEMIA DUE TO CHRONIC BLOOD LOSS: ICD-10-CM

## 2019-09-06 DIAGNOSIS — C50.912 PRIMARY MALIGNANT NEOPLASM OF LEFT BREAST (HCC): ICD-10-CM

## 2019-09-06 DIAGNOSIS — C79.51 BONE METASTASIS: ICD-10-CM

## 2019-09-06 DIAGNOSIS — Z45.2 FITTING AND ADJUSTMENT OF VASCULAR CATHETER: Primary | ICD-10-CM

## 2019-09-06 DIAGNOSIS — T45.1X5A LEUKOPENIA DUE TO ANTINEOPLASTIC CHEMOTHERAPY (HCC): ICD-10-CM

## 2019-09-06 LAB
ALBUMIN SERPL-MCNC: 4.3 G/DL (ref 3.5–5.2)
ALBUMIN/GLOB SERPL: 1.7 G/DL (ref 1.1–2.4)
ALP SERPL-CCNC: 93 U/L (ref 38–116)
ALT SERPL W P-5'-P-CCNC: 18 U/L (ref 0–33)
ANION GAP SERPL CALCULATED.3IONS-SCNC: 14.5 MMOL/L (ref 5–15)
AST SERPL-CCNC: 17 U/L (ref 0–32)
BASOPHILS # BLD AUTO: 0.06 10*3/MM3 (ref 0–0.2)
BASOPHILS NFR BLD AUTO: 1.5 % (ref 0–1.5)
BILIRUB SERPL-MCNC: 0.4 MG/DL (ref 0.2–1.2)
BUN BLD-MCNC: 29 MG/DL (ref 6–20)
BUN/CREAT SERPL: 24.2 (ref 7.3–30)
CALCIUM SPEC-SCNC: 9.4 MG/DL (ref 8.5–10.2)
CANCER AG15-3 SERPL-ACNC: 21.7 U/ML
CHLORIDE SERPL-SCNC: 102 MMOL/L (ref 98–107)
CO2 SERPL-SCNC: 23.5 MMOL/L (ref 22–29)
CREAT BLD-MCNC: 1.2 MG/DL (ref 0.6–1.1)
DEPRECATED RDW RBC AUTO: 55.8 FL (ref 37–54)
EOSINOPHIL # BLD AUTO: 0.13 10*3/MM3 (ref 0–0.4)
EOSINOPHIL NFR BLD AUTO: 3.1 % (ref 0.3–6.2)
ERYTHROCYTE [DISTWIDTH] IN BLOOD BY AUTOMATED COUNT: 14.6 % (ref 12.3–15.4)
GFR SERPL CREATININE-BSD FRML MDRD: 45 ML/MIN/1.73
GLOBULIN UR ELPH-MCNC: 2.6 GM/DL (ref 1.8–3.5)
GLUCOSE BLD-MCNC: 100 MG/DL (ref 74–124)
HCT VFR BLD AUTO: 31.8 % (ref 34–46.6)
HGB BLD-MCNC: 10.4 G/DL (ref 12–15.9)
IMM GRANULOCYTES # BLD AUTO: 0.02 10*3/MM3 (ref 0–0.05)
IMM GRANULOCYTES NFR BLD AUTO: 0.5 % (ref 0–0.5)
LYMPHOCYTES # BLD AUTO: 1.27 10*3/MM3 (ref 0.7–3.1)
LYMPHOCYTES NFR BLD AUTO: 30.8 % (ref 19.6–45.3)
MCH RBC QN AUTO: 34.3 PG (ref 26.6–33)
MCHC RBC AUTO-ENTMCNC: 32.7 G/DL (ref 31.5–35.7)
MCV RBC AUTO: 105 FL (ref 79–97)
MONOCYTES # BLD AUTO: 0.4 10*3/MM3 (ref 0.1–0.9)
MONOCYTES NFR BLD AUTO: 9.7 % (ref 5–12)
NEUTROPHILS # BLD AUTO: 2.25 10*3/MM3 (ref 1.7–7)
NEUTROPHILS NFR BLD AUTO: 54.4 % (ref 42.7–76)
NRBC BLD AUTO-RTO: 0 /100 WBC (ref 0–0.2)
PLATELET # BLD AUTO: 227 10*3/MM3 (ref 140–450)
PMV BLD AUTO: 10.7 FL (ref 6–12)
POTASSIUM BLD-SCNC: 4.3 MMOL/L (ref 3.5–4.7)
PROT SERPL-MCNC: 6.9 G/DL (ref 6.3–8)
RBC # BLD AUTO: 3.03 10*6/MM3 (ref 3.77–5.28)
SODIUM BLD-SCNC: 140 MMOL/L (ref 134–145)
WBC NRBC COR # BLD: 4.13 10*3/MM3 (ref 3.4–10.8)

## 2019-09-06 PROCEDURE — 77063 BREAST TOMOSYNTHESIS BI: CPT

## 2019-09-06 PROCEDURE — 77067 SCR MAMMO BI INCL CAD: CPT

## 2019-09-06 PROCEDURE — 86300 IMMUNOASSAY TUMOR CA 15-3: CPT | Performed by: INTERNAL MEDICINE

## 2019-09-06 PROCEDURE — 36591 DRAW BLOOD OFF VENOUS DEVICE: CPT

## 2019-09-06 PROCEDURE — 85025 COMPLETE CBC W/AUTO DIFF WBC: CPT

## 2019-09-06 PROCEDURE — 80053 COMPREHEN METABOLIC PANEL: CPT

## 2019-09-06 RX ORDER — SODIUM CHLORIDE 0.9 % (FLUSH) 0.9 %
10 SYRINGE (ML) INJECTION AS NEEDED
Status: CANCELLED | OUTPATIENT
Start: 2019-09-06

## 2019-09-06 RX ORDER — SODIUM CHLORIDE 0.9 % (FLUSH) 0.9 %
10 SYRINGE (ML) INJECTION AS NEEDED
Status: DISCONTINUED | OUTPATIENT
Start: 2019-09-06 | End: 2019-09-06 | Stop reason: HOSPADM

## 2019-09-06 RX ADMIN — SODIUM CHLORIDE, PRESERVATIVE FREE 10 ML: 5 INJECTION INTRAVENOUS at 13:47

## 2019-09-06 RX ADMIN — Medication 500 UNITS: at 13:47

## 2019-10-17 ENCOUNTER — DOCUMENTATION (OUTPATIENT)
Dept: ONCOLOGY | Facility: CLINIC | Age: 64
End: 2019-10-17

## 2019-10-17 RX ORDER — LETROZOLE 2.5 MG/1
TABLET, FILM COATED ORAL
Qty: 30 TABLET | Refills: 6 | Status: SHIPPED | OUTPATIENT
Start: 2019-10-17 | End: 2020-05-18

## 2019-10-18 ENCOUNTER — INFUSION (OUTPATIENT)
Dept: ONCOLOGY | Facility: HOSPITAL | Age: 64
End: 2019-10-18

## 2019-10-18 ENCOUNTER — APPOINTMENT (OUTPATIENT)
Dept: ONCOLOGY | Facility: HOSPITAL | Age: 64
End: 2019-10-18

## 2019-10-18 ENCOUNTER — OFFICE VISIT (OUTPATIENT)
Dept: ONCOLOGY | Facility: CLINIC | Age: 64
End: 2019-10-18

## 2019-10-18 ENCOUNTER — APPOINTMENT (OUTPATIENT)
Dept: ONCOLOGY | Facility: CLINIC | Age: 64
End: 2019-10-18

## 2019-10-18 VITALS
BODY MASS INDEX: 36.09 KG/M2 | TEMPERATURE: 98.2 F | WEIGHT: 211.4 LBS | DIASTOLIC BLOOD PRESSURE: 75 MMHG | HEIGHT: 64 IN | RESPIRATION RATE: 16 BRPM | HEART RATE: 99 BPM | SYSTOLIC BLOOD PRESSURE: 129 MMHG | OXYGEN SATURATION: 96 %

## 2019-10-18 DIAGNOSIS — D51.0 VITAMIN B12 DEFICIENCY ANEMIA DUE TO INTRINSIC FACTOR DEFICIENCY: ICD-10-CM

## 2019-10-18 DIAGNOSIS — D68.59 THROMBOPHILIA (HCC): ICD-10-CM

## 2019-10-18 DIAGNOSIS — C50.912 PRIMARY MALIGNANT NEOPLASM OF LEFT BREAST (HCC): Primary | ICD-10-CM

## 2019-10-18 DIAGNOSIS — C50.912 PRIMARY MALIGNANT NEOPLASM OF LEFT BREAST (HCC): ICD-10-CM

## 2019-10-18 DIAGNOSIS — T45.1X5A LEUKOPENIA DUE TO ANTINEOPLASTIC CHEMOTHERAPY (HCC): ICD-10-CM

## 2019-10-18 DIAGNOSIS — Z45.2 FITTING AND ADJUSTMENT OF VASCULAR CATHETER: Primary | ICD-10-CM

## 2019-10-18 DIAGNOSIS — D50.0 IRON DEFICIENCY ANEMIA DUE TO CHRONIC BLOOD LOSS: ICD-10-CM

## 2019-10-18 DIAGNOSIS — D70.1 LEUKOPENIA DUE TO ANTINEOPLASTIC CHEMOTHERAPY (HCC): ICD-10-CM

## 2019-10-18 DIAGNOSIS — C79.51 BONE METASTASIS: ICD-10-CM

## 2019-10-18 LAB
ALBUMIN SERPL-MCNC: 4.4 G/DL (ref 3.5–5.2)
ALBUMIN/GLOB SERPL: 1.8 G/DL (ref 1.1–2.4)
ALP SERPL-CCNC: 95 U/L (ref 38–116)
ALT SERPL W P-5'-P-CCNC: 29 U/L (ref 0–33)
ANION GAP SERPL CALCULATED.3IONS-SCNC: 13.3 MMOL/L (ref 5–15)
AST SERPL-CCNC: 35 U/L (ref 0–32)
BASOPHILS # BLD AUTO: 0.06 10*3/MM3 (ref 0–0.2)
BASOPHILS NFR BLD AUTO: 1.4 % (ref 0–1.5)
BILIRUB SERPL-MCNC: 0.4 MG/DL (ref 0.2–1.2)
BUN BLD-MCNC: 35 MG/DL (ref 6–20)
BUN/CREAT SERPL: 30.4 (ref 7.3–30)
CALCIUM SPEC-SCNC: 9.7 MG/DL (ref 8.5–10.2)
CANCER AG15-3 SERPL-ACNC: 28.1 U/ML
CHLORIDE SERPL-SCNC: 100 MMOL/L (ref 98–107)
CO2 SERPL-SCNC: 23.7 MMOL/L (ref 22–29)
CREAT BLD-MCNC: 1.15 MG/DL (ref 0.6–1.1)
DEPRECATED RDW RBC AUTO: 54.7 FL (ref 37–54)
EOSINOPHIL # BLD AUTO: 0.12 10*3/MM3 (ref 0–0.4)
EOSINOPHIL NFR BLD AUTO: 2.9 % (ref 0.3–6.2)
ERYTHROCYTE [DISTWIDTH] IN BLOOD BY AUTOMATED COUNT: 14.7 % (ref 12.3–15.4)
GFR SERPL CREATININE-BSD FRML MDRD: 48 ML/MIN/1.73
GLOBULIN UR ELPH-MCNC: 2.4 GM/DL (ref 1.8–3.5)
GLUCOSE BLD-MCNC: 115 MG/DL (ref 74–124)
HCT VFR BLD AUTO: 31.8 % (ref 34–46.6)
HGB BLD-MCNC: 10.9 G/DL (ref 12–15.9)
IMM GRANULOCYTES # BLD AUTO: 0.03 10*3/MM3 (ref 0–0.05)
IMM GRANULOCYTES NFR BLD AUTO: 0.7 % (ref 0–0.5)
LYMPHOCYTES # BLD AUTO: 1.18 10*3/MM3 (ref 0.7–3.1)
LYMPHOCYTES NFR BLD AUTO: 28.4 % (ref 19.6–45.3)
MAGNESIUM SERPL-MCNC: 1.9 MG/DL (ref 1.8–2.5)
MCH RBC QN AUTO: 34.6 PG (ref 26.6–33)
MCHC RBC AUTO-ENTMCNC: 34.3 G/DL (ref 31.5–35.7)
MCV RBC AUTO: 101 FL (ref 79–97)
MONOCYTES # BLD AUTO: 0.34 10*3/MM3 (ref 0.1–0.9)
MONOCYTES NFR BLD AUTO: 8.2 % (ref 5–12)
NEUTROPHILS # BLD AUTO: 2.43 10*3/MM3 (ref 1.7–7)
NEUTROPHILS NFR BLD AUTO: 58.4 % (ref 42.7–76)
NRBC BLD AUTO-RTO: 0 /100 WBC (ref 0–0.2)
PHOSPHATE SERPL-MCNC: 3.8 MG/DL (ref 2.5–4.5)
PLATELET # BLD AUTO: 244 10*3/MM3 (ref 140–450)
PMV BLD AUTO: 11 FL (ref 6–12)
POTASSIUM BLD-SCNC: 5.3 MMOL/L (ref 3.5–4.7)
PROT SERPL-MCNC: 6.8 G/DL (ref 6.3–8)
RBC # BLD AUTO: 3.15 10*6/MM3 (ref 3.77–5.28)
SODIUM BLD-SCNC: 137 MMOL/L (ref 134–145)
WBC NRBC COR # BLD: 4.16 10*3/MM3 (ref 3.4–10.8)

## 2019-10-18 PROCEDURE — 84100 ASSAY OF PHOSPHORUS: CPT | Performed by: NURSE PRACTITIONER

## 2019-10-18 PROCEDURE — 83735 ASSAY OF MAGNESIUM: CPT | Performed by: NURSE PRACTITIONER

## 2019-10-18 PROCEDURE — 85025 COMPLETE CBC W/AUTO DIFF WBC: CPT | Performed by: NURSE PRACTITIONER

## 2019-10-18 PROCEDURE — 99214 OFFICE O/P EST MOD 30 MIN: CPT | Performed by: NURSE PRACTITIONER

## 2019-10-18 PROCEDURE — 80053 COMPREHEN METABOLIC PANEL: CPT | Performed by: NURSE PRACTITIONER

## 2019-10-18 PROCEDURE — 96523 IRRIG DRUG DELIVERY DEVICE: CPT | Performed by: NURSE PRACTITIONER

## 2019-10-18 PROCEDURE — 86300 IMMUNOASSAY TUMOR CA 15-3: CPT | Performed by: INTERNAL MEDICINE

## 2019-10-18 RX ORDER — SODIUM CHLORIDE 0.9 % (FLUSH) 0.9 %
10 SYRINGE (ML) INJECTION AS NEEDED
Status: DISCONTINUED | OUTPATIENT
Start: 2019-10-18 | End: 2019-10-18 | Stop reason: HOSPADM

## 2019-10-18 RX ORDER — SODIUM CHLORIDE 0.9 % (FLUSH) 0.9 %
10 SYRINGE (ML) INJECTION AS NEEDED
Status: CANCELLED | OUTPATIENT
Start: 2019-10-18

## 2019-10-18 RX ADMIN — SODIUM CHLORIDE, PRESERVATIVE FREE 10 ML: 5 INJECTION INTRAVENOUS at 11:30

## 2019-10-18 RX ADMIN — Medication 500 UNITS: at 11:30

## 2019-10-18 NOTE — PROGRESS NOTES
Subjective  REASONS FOR FOLLOWUP: 1. Metastatic breast cancer to multiple skeletal sites including cervical spine, sternum, lumbar spine and right femur, underwent Abraxane every 4 weeks and Xgeva every  3 months, DOCUMENTED RADIOLOGICAL PROGRESSION BY BONE SCAN 3/15/17 MOVING AWAY FROM ABRAXANE AND [facility], PRESENTLY ON FEMARA AND KISQALI , XGEVA EVERY 3 MONTHS,    2.Iron deficiency anemia du to GI blood loss, Xarelto stopped months ago, Iron initiated, Pepcid along with Aleve for gastric protection.           History of Present Illness Ms. Suero is a very pleasant 64-year-old female with the above-mentioned history who is here today for reevaluation, also due for Xgeva.  She continues on Kisquali and Femara.  She tolerates both drugs quite well.  She does report some persistent fatigue, and is noticed that she does not have the stamina that she did about a year ago.  She continues to work, but is otherwise not very active.  She does report shortness of breath with exertion.  She denies fevers, chills, or night sweats.  She denies any issues with nausea, vomiting, diarrhea, or constipation.        Past Medical History, Past Surgical History,    1. Hypertension.   2. Hyperlipidemia.   3. Reflux esophagitis.     GYN HISTORY: G0, P0. The patient went into menopause in 2000.     Review of Systems   Constitutional: Positive for fatigue. Negative for activity change, appetite change, chills and fever.   HENT: Negative for mouth sores, nosebleeds and trouble swallowing.    Respiratory: Negative for cough and shortness of breath.    Cardiovascular: Negative for chest pain and leg swelling.  "  Gastrointestinal: Negative for abdominal pain, constipation, diarrhea, nausea and vomiting.   Genitourinary: Negative for difficulty urinating.   Musculoskeletal: Positive for arthralgias.   Skin: Negative for rash.   Neurological: Negative for dizziness, weakness and numbness.   Hematological: Negative for adenopathy. Does not bruise/bleed easily.   Psychiatric/Behavioral: Negative for sleep disturbance.      Medications:  The current medication list was reviewed in the EMR    ALLERGIES:    Allergies   Allergen Reactions   • Codeine    • Docetaxel    • Paclitaxel        Objective      Vitals:    10/18/19 1138   BP: 129/75   Pulse: 99   Resp: 16   Temp: 98.2 °F (36.8 °C)   SpO2: 96%   Weight: 95.9 kg (211 lb 6.4 oz)   Height: 163 cm (64.17\")   PainSc:   3   PainLoc: Comment: rt. leg     Current Status 10/18/2019   ECOG score 0       Physical Exam   Constitutional: She is oriented to person, place, and time. She appears well-developed and well-nourished. No distress.   HENT:   Head: Normocephalic and atraumatic.   Mouth/Throat: Oropharynx is clear and moist and mucous membranes are normal. No oropharyngeal exudate.   Eyes: Pupils are equal, round, and reactive to light.   Neck: Normal range of motion.   Cardiovascular: Normal rate, regular rhythm and normal heart sounds.   No murmur heard.  Pulmonary/Chest: Effort normal and breath sounds normal. No respiratory distress. She has no wheezes. She has no rhonchi. She has no rales.   Abdominal: Soft. Normal appearance and bowel sounds are normal. She exhibits no distension. There is no hepatosplenomegaly.   Musculoskeletal: Normal range of motion. She exhibits no edema.   Neurological: She is alert and oriented to person, place, and time.   Skin: Skin is warm and dry. No rash noted.   Psychiatric: She has a normal mood and affect.   Vitals reviewed.        Hematology WBC   Date Value Ref Range Status   09/06/2019 4.13 3.40 - 10.80 10*3/mm3 Final   01/14/2019 3.69 (L) " 4.50 - 10.70 10*3/mm3 Final     RBC   Date Value Ref Range Status   09/06/2019 3.03 (L) 3.77 - 5.28 10*6/mm3 Final   01/14/2019 3.08 (L) 3.90 - 5.20 10*6/mm3 Final     Hemoglobin   Date Value Ref Range Status   09/06/2019 10.4 (L) 12.0 - 15.9 g/dL Final     Hematocrit   Date Value Ref Range Status   09/06/2019 31.8 (L) 34.0 - 46.6 % Final     Platelets   Date Value Ref Range Status   09/06/2019 227 140 - 450 10*3/mm3 Final        Component      Latest Ref Rng & Units 5/30/2017 7/7/2017 8/11/2017 10/10/2017          12:37 PM 10:06 AM  1:28 PM  3:07 PM   CA 15-3      <=25.0 U/mL 53.9 (H) 44.9 (H) 21.5 15.8     Component      Latest Ref Rng & Units 11/7/2017 12/5/2017 1/10/2018           3:26 PM 11:31 AM  9:32 AM   CA 15-3      <=25.0 U/mL 14.6 14.2 13.2     Assessment/Plan    . Metastatic  Left breast cancer to multiple skeletal sites including cervical spine, sternum, lumbar spine and right femur.Since the previous visit, the patient has been taking her Femara and Kisqali correctly   Today, the patient has leukopenia with no fever, anemia with no significant degree of fatigue and normal platelet count.  Most importantly, the tumor marker has dropped from 53.9 in 05/30 to 13.2 on 1/10/18.  We still feel that her disease is being controlled by this regimen of medicines.     Patient returns 4/13/2018.  She is tolerating treatment quite well.  She will continue on Kisqali and Femara, and we will procced with Xgeva today.  Patient will schedule her mammogram.    Patient returns 10/18/2019, continuing on Kisquali and Femara.  She is due for Xgeva, and is questioning the schedule, as her last bone density showed T-scores in the sixes and sevens.  I have discussed with Dr. Saez, and we will hold her Xgeva today and reevaluate in 3 months, potentially changing her Xgeva schedule.  She is also complaining of persistent issues with fatigue, but is not very active.  I have encouraged the patient to increase her activity, and  look into the live strong program at the [facility].  She also has access to exercise at her job at [employer] at no cost.  I have encouraged her to try to live strong program first, and she should start very slowly.    RECOMMENDATIONS:   1.  Continue Kisquali 40 mg 21 out of 20 days.  2.  Continue Femara 2.5 mg daily.  3.  We will hold Xgeva today and reevaluate in 3 months.  4.  Continue magnesium and potassium supplementation.  5.  Continue B12 and iron supplementation.    6.  Return in 3 months for follow-up visit with Dr. Saez for reevaluation prior to her next dose of Xgeva.       10/18/2019

## 2019-11-07 ENCOUNTER — DOCUMENTATION (OUTPATIENT)
Dept: ONCOLOGY | Facility: CLINIC | Age: 64
End: 2019-11-07

## 2019-11-18 RX ORDER — LISINOPRIL AND HYDROCHLOROTHIAZIDE 12.5; 1 MG/1; MG/1
TABLET ORAL
Qty: 180 TABLET | Refills: 0 | Status: SHIPPED | OUTPATIENT
Start: 2019-11-18 | End: 2020-02-17

## 2019-11-22 RX ORDER — ATORVASTATIN CALCIUM 20 MG/1
TABLET, FILM COATED ORAL
Qty: 90 TABLET | Refills: 0 | Status: SHIPPED | OUTPATIENT
Start: 2019-11-22 | End: 2020-02-17

## 2019-11-26 ENCOUNTER — DOCUMENTATION (OUTPATIENT)
Dept: ONCOLOGY | Facility: CLINIC | Age: 64
End: 2019-11-26

## 2019-11-27 ENCOUNTER — INFUSION (OUTPATIENT)
Dept: ONCOLOGY | Facility: HOSPITAL | Age: 64
End: 2019-11-27

## 2019-11-27 DIAGNOSIS — Z45.2 FITTING AND ADJUSTMENT OF VASCULAR CATHETER: Primary | ICD-10-CM

## 2019-11-27 PROCEDURE — 96523 IRRIG DRUG DELIVERY DEVICE: CPT | Performed by: INTERNAL MEDICINE

## 2019-11-27 RX ORDER — SODIUM CHLORIDE 0.9 % (FLUSH) 0.9 %
10 SYRINGE (ML) INJECTION AS NEEDED
Status: DISCONTINUED | OUTPATIENT
Start: 2019-11-27 | End: 2019-11-27 | Stop reason: HOSPADM

## 2019-11-27 RX ORDER — SODIUM CHLORIDE 0.9 % (FLUSH) 0.9 %
10 SYRINGE (ML) INJECTION AS NEEDED
Status: CANCELLED | OUTPATIENT
Start: 2019-11-27

## 2019-11-27 RX ADMIN — Medication 500 UNITS: at 13:15

## 2019-11-27 RX ADMIN — SODIUM CHLORIDE, PRESERVATIVE FREE 10 ML: 5 INJECTION INTRAVENOUS at 13:15

## 2020-01-02 ENCOUNTER — DOCUMENTATION (OUTPATIENT)
Dept: ONCOLOGY | Facility: CLINIC | Age: 65
End: 2020-01-02

## 2020-01-06 RX ORDER — MAGNESIUM 64 MG (MAGNESIUM CHLORIDE) TABLET,DELAYED RELEASE
Qty: 30 EACH | Refills: 2 | Status: SHIPPED | OUTPATIENT
Start: 2020-01-06 | End: 2020-05-04

## 2020-01-08 DIAGNOSIS — M85.89 OSTEOPENIA OF MULTIPLE SITES: ICD-10-CM

## 2020-01-10 ENCOUNTER — INFUSION (OUTPATIENT)
Dept: ONCOLOGY | Facility: HOSPITAL | Age: 65
End: 2020-01-10

## 2020-01-10 ENCOUNTER — OFFICE VISIT (OUTPATIENT)
Dept: ONCOLOGY | Facility: CLINIC | Age: 65
End: 2020-01-10

## 2020-01-10 VITALS
TEMPERATURE: 98.3 F | BODY MASS INDEX: 35.7 KG/M2 | SYSTOLIC BLOOD PRESSURE: 133 MMHG | DIASTOLIC BLOOD PRESSURE: 75 MMHG | WEIGHT: 209.1 LBS | RESPIRATION RATE: 18 BRPM | HEIGHT: 64 IN | OXYGEN SATURATION: 95 % | HEART RATE: 94 BPM

## 2020-01-10 DIAGNOSIS — M85.89 OSTEOPENIA OF MULTIPLE SITES: ICD-10-CM

## 2020-01-10 DIAGNOSIS — Z45.2 FITTING AND ADJUSTMENT OF VASCULAR CATHETER: Primary | ICD-10-CM

## 2020-01-10 DIAGNOSIS — C79.51 BONE METASTASIS: ICD-10-CM

## 2020-01-10 DIAGNOSIS — M85.89 OSTEOPENIA OF MULTIPLE SITES: Primary | ICD-10-CM

## 2020-01-10 LAB
ALBUMIN SERPL-MCNC: 4.3 G/DL (ref 3.5–5.2)
ALBUMIN/GLOB SERPL: 1.6 G/DL (ref 1.1–2.4)
ALP SERPL-CCNC: 100 U/L (ref 38–116)
ALT SERPL W P-5'-P-CCNC: 22 U/L (ref 0–33)
ANION GAP SERPL CALCULATED.3IONS-SCNC: 13.9 MMOL/L (ref 5–15)
AST SERPL-CCNC: 22 U/L (ref 0–32)
BASOPHILS # BLD AUTO: 0.05 10*3/MM3 (ref 0–0.2)
BASOPHILS NFR BLD AUTO: 1.3 % (ref 0–1.5)
BILIRUB SERPL-MCNC: 0.3 MG/DL (ref 0.2–1.2)
BUN BLD-MCNC: 32 MG/DL (ref 6–20)
BUN/CREAT SERPL: 27.4 (ref 7.3–30)
CALCIUM SPEC-SCNC: 9.9 MG/DL (ref 8.5–10.2)
CHLORIDE SERPL-SCNC: 99 MMOL/L (ref 98–107)
CO2 SERPL-SCNC: 25.1 MMOL/L (ref 22–29)
CREAT BLD-MCNC: 1.17 MG/DL (ref 0.6–1.1)
DEPRECATED RDW RBC AUTO: 52.8 FL (ref 37–54)
EOSINOPHIL # BLD AUTO: 0.07 10*3/MM3 (ref 0–0.4)
EOSINOPHIL NFR BLD AUTO: 1.8 % (ref 0.3–6.2)
ERYTHROCYTE [DISTWIDTH] IN BLOOD BY AUTOMATED COUNT: 14.2 % (ref 12.3–15.4)
GFR SERPL CREATININE-BSD FRML MDRD: 47 ML/MIN/1.73
GLOBULIN UR ELPH-MCNC: 2.7 GM/DL (ref 1.8–3.5)
GLUCOSE BLD-MCNC: 139 MG/DL (ref 74–124)
HCT VFR BLD AUTO: 30.8 % (ref 34–46.6)
HGB BLD-MCNC: 10.6 G/DL (ref 12–15.9)
IMM GRANULOCYTES # BLD AUTO: 0.02 10*3/MM3 (ref 0–0.05)
IMM GRANULOCYTES NFR BLD AUTO: 0.5 % (ref 0–0.5)
LYMPHOCYTES # BLD AUTO: 0.79 10*3/MM3 (ref 0.7–3.1)
LYMPHOCYTES NFR BLD AUTO: 20.6 % (ref 19.6–45.3)
MAGNESIUM SERPL-MCNC: 1.6 MG/DL (ref 1.8–2.5)
MCH RBC QN AUTO: 35 PG (ref 26.6–33)
MCHC RBC AUTO-ENTMCNC: 34.4 G/DL (ref 31.5–35.7)
MCV RBC AUTO: 101.7 FL (ref 79–97)
MONOCYTES # BLD AUTO: 0.28 10*3/MM3 (ref 0.1–0.9)
MONOCYTES NFR BLD AUTO: 7.3 % (ref 5–12)
NEUTROPHILS # BLD AUTO: 2.63 10*3/MM3 (ref 1.7–7)
NEUTROPHILS NFR BLD AUTO: 68.5 % (ref 42.7–76)
NRBC BLD AUTO-RTO: 0 /100 WBC (ref 0–0.2)
PHOSPHATE SERPL-MCNC: 3.5 MG/DL (ref 2.5–4.5)
PLATELET # BLD AUTO: 217 10*3/MM3 (ref 140–450)
PMV BLD AUTO: 10.6 FL (ref 6–12)
POTASSIUM BLD-SCNC: 4.1 MMOL/L (ref 3.5–4.7)
PROT SERPL-MCNC: 7 G/DL (ref 6.3–8)
RBC # BLD AUTO: 3.03 10*6/MM3 (ref 3.77–5.28)
SODIUM BLD-SCNC: 138 MMOL/L (ref 134–145)
WBC NRBC COR # BLD: 3.84 10*3/MM3 (ref 3.4–10.8)

## 2020-01-10 PROCEDURE — 99214 OFFICE O/P EST MOD 30 MIN: CPT | Performed by: NURSE PRACTITIONER

## 2020-01-10 PROCEDURE — 84100 ASSAY OF PHOSPHORUS: CPT

## 2020-01-10 PROCEDURE — 36593 DECLOT VASCULAR DEVICE: CPT

## 2020-01-10 PROCEDURE — 80053 COMPREHEN METABOLIC PANEL: CPT

## 2020-01-10 PROCEDURE — 25010000002 ALTEPLASE 2 MG RECONSTITUTED SOLUTION: Performed by: NURSE PRACTITIONER

## 2020-01-10 PROCEDURE — 85025 COMPLETE CBC W/AUTO DIFF WBC: CPT

## 2020-01-10 PROCEDURE — 83735 ASSAY OF MAGNESIUM: CPT

## 2020-01-10 RX ORDER — SODIUM CHLORIDE 0.9 % (FLUSH) 0.9 %
10 SYRINGE (ML) INJECTION AS NEEDED
Status: CANCELLED | OUTPATIENT
Start: 2020-01-10

## 2020-01-10 RX ORDER — HEPARIN SODIUM (PORCINE) LOCK FLUSH IV SOLN 100 UNIT/ML 100 UNIT/ML
500 SOLUTION INTRAVENOUS AS NEEDED
Status: CANCELLED | OUTPATIENT
Start: 2020-01-10

## 2020-01-10 RX ORDER — SODIUM CHLORIDE 0.9 % (FLUSH) 0.9 %
10 SYRINGE (ML) INJECTION AS NEEDED
Status: DISCONTINUED | OUTPATIENT
Start: 2020-01-10 | End: 2020-01-10 | Stop reason: HOSPADM

## 2020-01-10 RX ADMIN — ALTEPLASE: 2.2 INJECTION, POWDER, LYOPHILIZED, FOR SOLUTION INTRAVENOUS at 11:21

## 2020-01-10 RX ADMIN — Medication 500 UNITS: at 12:01

## 2020-01-10 RX ADMIN — SODIUM CHLORIDE, PRESERVATIVE FREE 10 ML: 5 INJECTION INTRAVENOUS at 12:01

## 2020-01-10 NOTE — PROGRESS NOTES
Port accessed per protocol. Blood tinge obtained but not enough to get any labs. Activase instilled @ 1121 and left to dwell while seeing APRN. Pt back to lab at 1200. Withdrew activase with great BR. Flushed port and D/C'd needle per protocol.

## 2020-01-10 NOTE — PROGRESS NOTES
Subjective  REASONS FOR FOLLOWUP:   1. Metastatic breast cancer to multiple skeletal sites including cervical spine, sternum, lumbar spine and right femur, underwent Abraxane every 4 weeks and Xgeva every  3 months, DOCUMENTED RADIOLOGICAL PROGRESSION BY BONE SCAN 3/15/17 MOVING AWAY FROM ABRAXANE AND HALAVEN, PRESENTLY ON FEMARA AND KISQALI , XGEVA EVERY 3 MONTHS,    2.Iron deficiency anemia du to GI blood loss, Xarelto stopped months ago, Iron initiated, Pepcid along with Aleve for gastric protection.   3.  1/10/2020 continued good tolerance of because Kasquali  and Femara.  We will continue to hold Xgeva with plans for DEXA scan in spring 2020 to determine the need further Xgeva.        History of Present Illness Ms. Suero is a very pleasant 64-year-old female with the above-mentioned here today for scheduled lab review, toxicity check and possible Xgeva.  she continues on Kisquali and Femara.  She reports feeling very well on therapy.  When she saw Hamida Sabillon last she did report increased fatigue.  She has been taking oral iron and feels this has helped her fatigue quite a bit.  She remains quite active at work at [employer].  She denies bleeding, bruising, swelling.  She denies fever, chills, skin changes, neuropathy.    Her CBC is stable.  Her vital signs are stable.    Past Medical History, Past Surgical History,    1. Hypertension.   2. Hyperlipidemia.   3. Reflux esophagitis.     GYN HISTORY: G0, P0. The patient went into menopause in 2000.     Review of Systems   Constitutional: Positive for fatigue (improved). Negative for activity change, appetite change,  "chills and fever.   HENT: Negative for mouth sores, nosebleeds and trouble swallowing.    Respiratory: Negative for cough and shortness of breath.    Cardiovascular: Negative for chest pain and leg swelling.   Gastrointestinal: Negative for abdominal pain, constipation, diarrhea, nausea and vomiting.   Genitourinary: Negative for difficulty urinating.   Musculoskeletal: Positive for arthralgias (stable).   Skin: Negative for rash.   Neurological: Negative for dizziness, weakness and numbness.   Hematological: Negative for adenopathy. Does not bruise/bleed easily.   Psychiatric/Behavioral: Negative for sleep disturbance.      Medications:  The current medication list was reviewed in the EMR    ALLERGIES:    Allergies   Allergen Reactions   • Codeine Unknown - Low Severity   • Docetaxel Unknown - Low Severity   • Paclitaxel Unknown - Low Severity       Objective      Vitals:    01/10/20 1105   Height: 163 cm (64.17\")     Current Status 10/18/2019   ECOG score 0       Physical Exam   Constitutional: She is oriented to person, place, and time. She appears well-developed and well-nourished. No distress.   HENT:   Head: Normocephalic and atraumatic.   Mouth/Throat: Oropharynx is clear and moist and mucous membranes are normal. No oropharyngeal exudate.   Eyes: Pupils are equal, round, and reactive to light.   Neck: Normal range of motion.   Cardiovascular: Normal rate, regular rhythm and normal heart sounds.   No murmur heard.  Pulmonary/Chest: Effort normal and breath sounds normal. No respiratory distress. She has no wheezes. She has no rhonchi. She has no rales.   Abdominal: Soft. Normal appearance and bowel sounds are normal. She exhibits no distension. There is no hepatosplenomegaly.   Musculoskeletal: Normal range of motion. She exhibits no edema.   Neurological: She is alert and oriented to person, place, and time.   Skin: Skin is warm and dry. No rash noted.   Psychiatric: She has a normal mood and affect. "   Vitals reviewed.  Unchanged since prior visit.    Hematology WBC   Date Value Ref Range Status   10/18/2019 4.16 3.40 - 10.80 10*3/mm3 Final   01/14/2019 3.69 (L) 4.50 - 10.70 10*3/mm3 Final     RBC   Date Value Ref Range Status   10/18/2019 3.15 (L) 3.77 - 5.28 10*6/mm3 Final   01/14/2019 3.08 (L) 3.90 - 5.20 10*6/mm3 Final     Hemoglobin   Date Value Ref Range Status   10/18/2019 10.9 (L) 12.0 - 15.9 g/dL Final     Hematocrit   Date Value Ref Range Status   10/18/2019 31.8 (L) 34.0 - 46.6 % Final     Platelets   Date Value Ref Range Status   10/18/2019 244 140 - 450 10*3/mm3 Final        Component      Latest Ref Rng & Units 5/30/2017 7/7/2017 8/11/2017 10/10/2017          12:37 PM 10:06 AM  1:28 PM  3:07 PM   CA 15-3      <=25.0 U/mL 53.9 (H) 44.9 (H) 21.5 15.8     Component      Latest Ref Rng & Units 11/7/2017 12/5/2017 1/10/2018           3:26 PM 11:31 AM  9:32 AM   CA 15-3      <=25.0 U/mL 14.6 14.2 13.2     Assessment/Plan    .   1. Metastatic  Left breast cancer to multiple skeletal sites including cervical spine, sternum, lumbar spine and right femur.Since the previous visit, the patient has been taking her Femara and Kisqali correctly   Today, the patient has leukopenia with no fever, anemia with no significant degree of fatigue and normal platelet count.  Most importantly, the tumor marker has dropped from 53.9 in 05/30 to 13.2 on 1/10/18.  We still feel that her disease is being controlled by this regimen of medicines.      4/13/2018.  She is tolerating treatment quite well.  She will continue on Kisqali and Femara, and we will procced with Xgeva today.  Patient will schedule her mammogram.    10/18/2019, continuing on Kisquali and Femara.  She is due for Xgeva held secondary to high T-scores in the sixes and sevens.  JESSICA Barron to discuss with Dr. Saldaña potentially moving the patient's schedule out to every 6 months or omitting Xgeva for the time being.    Is here today, 1/10/2020 for  scheduled lab review and possible Xgeva.  She continues to tolerate treatment extremely well.    RECOMMENDATIONS:   1.  Continue Kisquali 40 mg 21 out of 20 days.  2.  Continue Femara 2.5 mg daily.  3.  I have reviewed the patient's exam and lab work with Dr. Saldaña.  We will continue to hold Xgeva.  I have ordered a DEXA scan to be obtained 1 to 2 weeks before her scheduled visit with Dr. Saez in March 2020.  They will determine at this time if Xgeva is necessary.  4.  Continue magnesium and potassium supplementation.  5.  Continue B12 and iron supplementation.    6.  I have asked the patient to call the office with any new or worsening symptoms before her scheduled visits.       1/10/2020

## 2020-01-16 ENCOUNTER — TELEPHONE (OUTPATIENT)
Dept: ONCOLOGY | Facility: CLINIC | Age: 65
End: 2020-01-16

## 2020-01-16 NOTE — TELEPHONE ENCOUNTER
CALLED PT BACK ABOUT PORT FLUSHES. PT WILL NEED ONE SCHEDULED 2/10 AND 3/13 (PT REQUESTED THESE DATES). APPT DESK NOTIFIED TO SET UP. PT V/U.

## 2020-01-30 DIAGNOSIS — Z00.00 HEALTH CARE MAINTENANCE: Primary | ICD-10-CM

## 2020-01-30 DIAGNOSIS — E53.8 B12 DEFICIENCY: ICD-10-CM

## 2020-01-31 ENCOUNTER — TELEPHONE (OUTPATIENT)
Dept: ONCOLOGY | Facility: CLINIC | Age: 65
End: 2020-01-31

## 2020-02-03 LAB
ALBUMIN SERPL-MCNC: 4.3 G/DL (ref 3.5–5.2)
ALBUMIN/GLOB SERPL: 2.3 G/DL
ALP SERPL-CCNC: 101 U/L (ref 39–117)
ALT SERPL-CCNC: 20 U/L (ref 1–33)
APPEARANCE UR: CLEAR
AST SERPL-CCNC: 22 U/L (ref 1–32)
BACTERIA #/AREA URNS HPF: ABNORMAL /HPF
BASOPHILS # BLD AUTO: 0.06 10*3/MM3 (ref 0–0.2)
BASOPHILS NFR BLD AUTO: 1.5 % (ref 0–1.5)
BILIRUB SERPL-MCNC: 0.3 MG/DL (ref 0.2–1.2)
BILIRUB UR QL STRIP: NEGATIVE
BUN SERPL-MCNC: 31 MG/DL (ref 8–23)
BUN/CREAT SERPL: 26.3 (ref 7–25)
CALCIUM SERPL-MCNC: 9.9 MG/DL (ref 8.6–10.5)
CASTS URNS MICRO: ABNORMAL
CHLORIDE SERPL-SCNC: 97 MMOL/L (ref 98–107)
CHOLEST SERPL-MCNC: 169 MG/DL (ref 0–200)
CO2 SERPL-SCNC: 23.1 MMOL/L (ref 22–29)
COLOR UR: YELLOW
CREAT SERPL-MCNC: 1.18 MG/DL (ref 0.57–1)
EOSINOPHIL # BLD AUTO: 0.13 10*3/MM3 (ref 0–0.4)
EOSINOPHIL NFR BLD AUTO: 3.3 % (ref 0.3–6.2)
EPI CELLS #/AREA URNS HPF: ABNORMAL /HPF
ERYTHROCYTE [DISTWIDTH] IN BLOOD BY AUTOMATED COUNT: 14.9 % (ref 12.3–15.4)
GLOBULIN SER CALC-MCNC: 1.9 GM/DL
GLUCOSE SERPL-MCNC: 105 MG/DL (ref 65–99)
GLUCOSE UR QL: NEGATIVE
HCT VFR BLD AUTO: 30.5 % (ref 34–46.6)
HDLC SERPL-MCNC: 43 MG/DL (ref 40–60)
HGB BLD-MCNC: 10.2 G/DL (ref 12–15.9)
HGB UR QL STRIP: ABNORMAL
IMM GRANULOCYTES # BLD AUTO: 0.03 10*3/MM3 (ref 0–0.05)
IMM GRANULOCYTES NFR BLD AUTO: 0.8 % (ref 0–0.5)
KETONES UR QL STRIP: NEGATIVE
LDLC SERPL CALC-MCNC: 77 MG/DL (ref 0–100)
LEUKOCYTE ESTERASE UR QL STRIP: ABNORMAL
LYMPHOCYTES # BLD AUTO: 1.14 10*3/MM3 (ref 0.7–3.1)
LYMPHOCYTES NFR BLD AUTO: 29.3 % (ref 19.6–45.3)
MCH RBC QN AUTO: 33.1 PG (ref 26.6–33)
MCHC RBC AUTO-ENTMCNC: 33.4 G/DL (ref 31.5–35.7)
MCV RBC AUTO: 99 FL (ref 79–97)
MONOCYTES # BLD AUTO: 0.49 10*3/MM3 (ref 0.1–0.9)
MONOCYTES NFR BLD AUTO: 12.6 % (ref 5–12)
NEUTROPHILS # BLD AUTO: 2.04 10*3/MM3 (ref 1.7–7)
NEUTROPHILS NFR BLD AUTO: 52.5 % (ref 42.7–76)
NITRITE UR QL STRIP: NEGATIVE
NRBC BLD AUTO-RTO: 0.3 /100 WBC (ref 0–0.2)
PH UR STRIP: 5.5 [PH] (ref 5–8)
PLATELET # BLD AUTO: 244 10*3/MM3 (ref 140–450)
POTASSIUM SERPL-SCNC: 4.3 MMOL/L (ref 3.5–5.2)
PROT SERPL-MCNC: 6.2 G/DL (ref 6–8.5)
PROT UR QL STRIP: NEGATIVE
RBC # BLD AUTO: 3.08 10*6/MM3 (ref 3.77–5.28)
RBC #/AREA URNS HPF: ABNORMAL /HPF
SODIUM SERPL-SCNC: 134 MMOL/L (ref 136–145)
SP GR UR: 1.02 (ref 1–1.03)
TRIGL SERPL-MCNC: 244 MG/DL (ref 0–150)
TSH SERPL DL<=0.005 MIU/L-ACNC: 2.02 UIU/ML (ref 0.27–4.2)
UROBILINOGEN UR STRIP-MCNC: ABNORMAL MG/DL
VIT B12 SERPL-MCNC: 1615 PG/ML (ref 211–946)
VLDLC SERPL CALC-MCNC: 48.8 MG/DL
WBC # BLD AUTO: 3.89 10*3/MM3 (ref 3.4–10.8)
WBC #/AREA URNS HPF: ABNORMAL /HPF

## 2020-02-03 NOTE — TELEPHONE ENCOUNTER
I contacted [employer] [phone] to check on the Kisqali for pt. I spoke to Josr who states the rx is needing refills. She states a refill request was sent via escribe to our office on Thursday last week. We did not rec this. I asked if a request was faxed..she stated no.    I asked to speak with the pharmacist to provide a verbal. I was transferred to [facility]. I provided a verbal and asked for this to be rushed.

## 2020-02-10 ENCOUNTER — OFFICE VISIT (OUTPATIENT)
Dept: INTERNAL MEDICINE | Facility: CLINIC | Age: 65
End: 2020-02-10

## 2020-02-10 ENCOUNTER — DOCUMENTATION (OUTPATIENT)
Dept: ONCOLOGY | Facility: CLINIC | Age: 65
End: 2020-02-10

## 2020-02-10 ENCOUNTER — INFUSION (OUTPATIENT)
Dept: ONCOLOGY | Facility: HOSPITAL | Age: 65
End: 2020-02-10

## 2020-02-10 VITALS
DIASTOLIC BLOOD PRESSURE: 80 MMHG | HEIGHT: 64 IN | HEART RATE: 93 BPM | WEIGHT: 205 LBS | BODY MASS INDEX: 35 KG/M2 | OXYGEN SATURATION: 98 % | SYSTOLIC BLOOD PRESSURE: 130 MMHG

## 2020-02-10 DIAGNOSIS — I10 BENIGN ESSENTIAL HTN: ICD-10-CM

## 2020-02-10 DIAGNOSIS — R82.90 ABNORMAL URINE: ICD-10-CM

## 2020-02-10 DIAGNOSIS — Z00.00 WELL ADULT EXAM: Primary | ICD-10-CM

## 2020-02-10 DIAGNOSIS — E78.5 HYPERLIPIDEMIA, UNSPECIFIED HYPERLIPIDEMIA TYPE: ICD-10-CM

## 2020-02-10 DIAGNOSIS — Z45.2 FITTING AND ADJUSTMENT OF VASCULAR CATHETER: Primary | ICD-10-CM

## 2020-02-10 PROCEDURE — 25010000003 HEPARIN LOCK FLUCH PER 10 UNITS: Performed by: INTERNAL MEDICINE

## 2020-02-10 PROCEDURE — 99396 PREV VISIT EST AGE 40-64: CPT | Performed by: INTERNAL MEDICINE

## 2020-02-10 PROCEDURE — 96523 IRRIG DRUG DELIVERY DEVICE: CPT

## 2020-02-10 RX ORDER — SODIUM CHLORIDE 0.9 % (FLUSH) 0.9 %
10 SYRINGE (ML) INJECTION AS NEEDED
Status: DISCONTINUED | OUTPATIENT
Start: 2020-02-10 | End: 2020-02-10 | Stop reason: HOSPADM

## 2020-02-10 RX ORDER — HEPARIN SODIUM (PORCINE) LOCK FLUSH IV SOLN 100 UNIT/ML 100 UNIT/ML
500 SOLUTION INTRAVENOUS AS NEEDED
Status: CANCELLED | OUTPATIENT
Start: 2020-02-10

## 2020-02-10 RX ORDER — SODIUM CHLORIDE 0.9 % (FLUSH) 0.9 %
10 SYRINGE (ML) INJECTION AS NEEDED
Status: CANCELLED | OUTPATIENT
Start: 2020-02-10

## 2020-02-10 RX ORDER — HEPARIN SODIUM (PORCINE) LOCK FLUSH IV SOLN 100 UNIT/ML 100 UNIT/ML
500 SOLUTION INTRAVENOUS AS NEEDED
Status: DISCONTINUED | OUTPATIENT
Start: 2020-02-10 | End: 2020-02-10 | Stop reason: HOSPADM

## 2020-02-10 RX ADMIN — Medication 500 UNITS: at 15:46

## 2020-02-10 RX ADMIN — SODIUM CHLORIDE, PRESERVATIVE FREE 10 ML: 5 INJECTION INTRAVENOUS at 15:46

## 2020-02-11 NOTE — PROGRESS NOTES
Subjective     Maggie Suero is a 64 y.o. female who presents for a complete physical exam.      History of Present Illness     HTN. Control is good.   HLD.  Control is excellent on atorvastatin.      Right knee has been painful.  This is leg with hardware from metastasis fracture fixation.      Review of Systems   Constitutional: Positive for fatigue.   HENT: Negative.    Eyes: Negative.    Respiratory: Negative.    Cardiovascular: Negative.    Gastrointestinal: Negative.    Endocrine: Negative.    Genitourinary: Negative.    Musculoskeletal: Positive for arthralgias.   Skin: Negative.    Allergic/Immunologic: Negative.    Neurological: Negative.    Hematological: Negative.    Psychiatric/Behavioral: Negative.        The following portions of the patient's history were reviewed and updated as appropriate: allergies, current medications, past family history, past medical history, past social history, past surgical history and problem list.  Health maintenance tab was reviewed and updated with the patient.       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       • Thrombophilia (CMS/HCC) 02/18/2016     Note Last Updated: 2/18/2016     Description: associated with femoral fracture     • Hyperlipidemia 02/18/2016   • IFG (impaired fasting glucose) 02/18/2016   • Osteopenia 02/18/2016     Note Last Updated: 3/8/2016     Description:  Fosamax stopped after 10 years of therapy in May of 2012.  On Zometa with metastatic breast cancer followed by Xgeva currently.           Past Medical History:   Diagnosis Date   • Abnormal mammogram    • Abnormal menstrual cycle    • Arthritis    • Bone metastasis (CMS/HCC)    • Breast cancer (CMS/HCC) 2000    Left breast   • Drug therapy 2001    breast cancer   • Drug therapy 2017    right femur/associated with breast cancer   • H/O Heart murmur    • History of colon polyps    • Hx of radiation therapy 2000    breast cancer   • Hx of radiation therapy 2015    bone cancer right femur/ associated with breast cancer   • Hypercholesterolemia    • Hyperlipidemia    • Hypertension    • Multiple benign polyps of large intestine    • Reflux esophagitis        Past Surgical History:   Procedure Laterality Date   • BREAST BIOPSY Left 2000    breast cancer   • BREAST SURGERY  2000    lumpectomy, mastectomy, revision   • COLONOSCOPY  2012   • FEMUR FRACTURE SURGERY      secondary to metastatic breast cancer 7/2014, with revision in 9/2015   • MASTECTOMY Left 2000    transflap in 2003       Family History   Problem Relation Age of Onset   • Hypertension Mother    • Diabetes Mother    • Macular degeneration Mother    • Thyroid disease Mother    • Heart disease Father    • Stroke Father    • Kidney disease Father    • Glaucoma Brother    • Diabetes Brother    • Hypertension Brother    • Colon cancer Paternal Grandmother    • Thyroid disease Other    • Kidney disease Other    • Glaucoma Other    • Cancer Other    • Diabetes Other        Social History     Socioeconomic History   • Marital status: Single     Spouse name: Not on file   • Number of children: Not on file   • Years of education: Not on file   • Highest education level: Not on file   Occupational History   • Occupation:      Employer: [employer]   Tobacco Use   • Smoking status: Never Smoker   • Smokeless tobacco: Never Used   Substance  and Sexual Activity   • Alcohol use: No   • Drug use: No   • Sexual activity: Defer       Current Outpatient Medications on File Prior to Visit   Medication Sig Dispense Refill   • Ascorbic Acid (VITAMIN C PO) Take  by mouth Daily.     • atorvastatin (LIPITOR) 20 MG tablet TAKE 1 TABLET BY MOUTH ONCE DAILY 90 tablet 0   • calcium carbonate-vitamin d 600-400 MG-UNIT per tablet Take 1 tablet by mouth daily.     • cetirizine (zyrTEC) 10 MG tablet Take 10 mg by mouth As Needed for Allergies.     • ciclopirox (PENLAC) 8 % solution Apply  topically Every Night. 6 mL 0   • Cyanocobalamin (B-12 PO) Take 1,000 mcg by mouth Daily.     • denosumab (XGEVA) 120 MG/1.7ML solution injection Inject  under the skin 1 (one) time.     • diclofenac (VOLTAREN) 1 % gel gel Apply 2 to 4 grams topically to the appropriate area twice daily as directed 100 g 1   • Ferrous Sulfate (IRON) 325 (65 Fe) MG tablet TAKE 1 TABLET BY MOUTH ONCE DAILY WITH BREAKFAST 30 tablet 5   • HYDROcodone-acetaminophen (NORCO) 5-325 MG per tablet Take 1 tablet by mouth Every 6 (Six) Hours As Needed.     • KISQALI 400 DOSE 200 MG tablet therapy pack tablet Take 2 tablets by mouth Daily. For 21 days on then 7 days off. 42 tablet 6   • letrozole (FEMARA) 2.5 MG tablet TAKE 1 TABLET BY MOUTH ONCE DAILY START  WITH  KISQALI 30 tablet 6   • lisinopril-hydrochlorothiazide (PRINZIDE,ZESTORETIC) 10-12.5 MG per tablet TAKE 1 TABLET BY MOUTH TWICE DAILY 180 tablet 0   • MAG64 64 MG DR tablet TAKE 1 TABLET BY MOUTH ONCE DAILY 30 each 2   • Multiple Vitamins-Minerals (HAIR SKIN AND NAILS FORMULA PO) Take  by mouth.     • Naproxen Sod-Diphenhydramine (ALEVE PM PO) Take  by mouth as needed.     • Naproxen Sodium (ALEVE PO) Take  by mouth.     • Omega-3 Fatty Acids (FISH OIL) 645 MG capsule Take  by mouth.     • OMEPRAZOLE PO Take  by mouth Daily.     • ondansetron ODT (ZOFRAN-ODT) 8 MG disintegrating tablet Dissolve one tablet on tongue every 6 hours for nausea and vomiting 12  "tablet 0     No current facility-administered medications on file prior to visit.        Allergies   Allergen Reactions   • Codeine Unknown - Low Severity   • Docetaxel Unknown - Low Severity   • Paclitaxel Unknown - Low Severity       Immunization History   Administered Date(s) Administered   • Flu Vaccine Quad PF >18YRS 11/01/2017   • Influenza Quad Vaccine (Inpatient) 11/01/2017   • Influenza, Unspecified 01/21/2019   • Pneumococcal Polysaccharide (PPSV23) 01/21/2019   • Tdap 03/01/2012   • flucelvax quad pfs =>4 YRS 01/21/2019       Objective     /80   Pulse 93   Ht 163 cm (64.17\")   Wt 93 kg (205 lb)   LMP  (LMP Unknown)   SpO2 98%   BMI 35.00 kg/m²     Physical Exam   Constitutional: She is oriented to person, place, and time. She appears well-developed and well-nourished.   HENT:   Head: Normocephalic and atraumatic.   Right Ear: Hearing, tympanic membrane and external ear normal.   Left Ear: Hearing, tympanic membrane and external ear normal.   Nose: Nose normal.   Mouth/Throat: Oropharynx is clear and moist.   Neck: Neck supple. No thyromegaly present.   Cardiovascular: Normal rate, regular rhythm and normal heart sounds.   No murmur heard.  Pulmonary/Chest: Effort normal and breath sounds normal. Right breast exhibits no mass. Left breast exhibits no mass. No breast tenderness.   Abdominal: Soft. She exhibits no distension. There is no hepatosplenomegaly. There is no tenderness.   Genitourinary: No breast tenderness.   Musculoskeletal:        Right knee: She exhibits normal range of motion, no swelling, no effusion, no ecchymosis and no deformity. No tenderness found.   Lymphadenopathy:     She has no cervical adenopathy.   Neurological: She is alert and oriented to person, place, and time.   Skin: Skin is warm and dry.   Psychiatric: She has a normal mood and affect. Her speech is normal and behavior is normal. Judgment and thought content normal. Cognition and memory are normal. "       Assessment/Plan   Maggie was seen today for annual exam.    Diagnoses and all orders for this visit:    Well adult exam    Abnormal urine  -     UA / M With / Rflx Culture([facility] ONLY) - Urine, Clean Catch    Benign essential HTN    Hyperlipidemia, unspecified hyperlipidemia type        Discussion    Patient presents today for a CPE.  Mammogram is up to date.   Colon cancer screening is up to date.   Pap smears are no longer performed secondary to difficulty obtaining and very low risk (never sexually active).  HTN.  The patient will continue current regimen.      HLD.  The patient will continue current regimen.      Abnormal urine.  Recheck today.   Right knee pain. She is going to see the orthopedic that did her original surgery.    I have recommended that the patient get the following immunizations:  flu.      Health Maintenance   Topic Date Due   • INFLUENZA VACCINE  08/01/2019   • LIPID PANEL  02/03/2021   • ANNUAL PHYSICAL  02/11/2021   • DXA SCAN  07/19/2021   • MAMMOGRAM  09/06/2021   • TDAP/TD VACCINES (2 - Td) 03/01/2022   • COLONOSCOPY  10/29/2022   • HEPATITIS C SCREENING  Completed   • PAP SMEAR  Discontinued   • ZOSTER VACCINE  Discontinued            Future Appointments   Date Time Provider Department Center   3/13/2020  3:30 PM [facility] PORT CHAIR  [facility]   4/10/2020 12:30 PM LAB CHAIR 6 [facility]  [facility]   4/10/2020  1:00 PM Nader Saez MD [facility]   4/10/2020  1:45 PM INJECTION CHAIR [facility]  [facility]   8/7/2020  8:40 AM [facility] None   8/14/2020  1:00 PM Chetna Whalen MD [facility] None   2/12/2021  8:10 AM [facility] None   2/19/2021 11:15 AM Chetna Whalen MD [facility] None

## 2020-02-14 LAB
APPEARANCE UR: CLEAR
BACTERIA #/AREA URNS HPF: NORMAL /HPF
BACTERIA UR CULT: ABNORMAL
BACTERIA UR CULT: ABNORMAL
BILIRUB UR QL STRIP: NEGATIVE
COLOR UR: YELLOW
EPI CELLS #/AREA URNS HPF: NORMAL /HPF (ref 0–10)
GLUCOSE UR QL: NEGATIVE
HGB UR QL STRIP: NEGATIVE
KETONES UR QL STRIP: NEGATIVE
LEUKOCYTE ESTERASE UR QL STRIP: ABNORMAL
MICRO URNS: ABNORMAL
MUCOUS THREADS URNS QL MICRO: PRESENT /HPF
NITRITE UR QL STRIP: NEGATIVE
OTHER ANTIBIOTIC SUSC ISLT: ABNORMAL
PH UR STRIP: 5.5 [PH] (ref 5–7.5)
PROT UR QL STRIP: NEGATIVE
RBC #/AREA URNS HPF: NORMAL /HPF (ref 0–2)
SP GR UR: 1.01 (ref 1–1.03)
URINALYSIS REFLEX: ABNORMAL
UROBILINOGEN UR STRIP-MCNC: 0.2 MG/DL (ref 0.2–1)
WBC #/AREA URNS HPF: NORMAL /HPF (ref 0–5)

## 2020-02-17 RX ORDER — ATORVASTATIN CALCIUM 20 MG/1
TABLET, FILM COATED ORAL
Qty: 90 TABLET | Refills: 0 | Status: SHIPPED | OUTPATIENT
Start: 2020-02-17 | End: 2020-05-18

## 2020-02-17 RX ORDER — LISINOPRIL AND HYDROCHLOROTHIAZIDE 12.5; 1 MG/1; MG/1
TABLET ORAL
Qty: 180 TABLET | Refills: 0 | Status: SHIPPED | OUTPATIENT
Start: 2020-02-17 | End: 2020-05-25

## 2020-03-09 ENCOUNTER — DOCUMENTATION (OUTPATIENT)
Dept: ONCOLOGY | Facility: CLINIC | Age: 65
End: 2020-03-09

## 2020-03-13 ENCOUNTER — INFUSION (OUTPATIENT)
Dept: ONCOLOGY | Facility: HOSPITAL | Age: 65
End: 2020-03-13

## 2020-03-13 DIAGNOSIS — Z45.2 FITTING AND ADJUSTMENT OF VASCULAR CATHETER: Primary | ICD-10-CM

## 2020-03-13 PROCEDURE — 25010000003 HEPARIN LOCK FLUCH PER 10 UNITS: Performed by: INTERNAL MEDICINE

## 2020-03-13 PROCEDURE — 96523 IRRIG DRUG DELIVERY DEVICE: CPT

## 2020-03-13 RX ORDER — SODIUM CHLORIDE 0.9 % (FLUSH) 0.9 %
10 SYRINGE (ML) INJECTION AS NEEDED
Status: CANCELLED | OUTPATIENT
Start: 2020-03-13

## 2020-03-13 RX ORDER — HEPARIN SODIUM (PORCINE) LOCK FLUSH IV SOLN 100 UNIT/ML 100 UNIT/ML
500 SOLUTION INTRAVENOUS AS NEEDED
Status: CANCELLED | OUTPATIENT
Start: 2020-03-13

## 2020-03-13 RX ORDER — HEPARIN SODIUM (PORCINE) LOCK FLUSH IV SOLN 100 UNIT/ML 100 UNIT/ML
500 SOLUTION INTRAVENOUS AS NEEDED
Status: DISCONTINUED | OUTPATIENT
Start: 2020-03-13 | End: 2020-03-13 | Stop reason: HOSPADM

## 2020-03-13 RX ADMIN — Medication 500 UNITS: at 15:43

## 2020-03-30 ENCOUNTER — DOCUMENTATION (OUTPATIENT)
Dept: ONCOLOGY | Facility: CLINIC | Age: 65
End: 2020-03-30

## 2020-03-30 NOTE — PROGRESS NOTES
Call rec from Lynda Staples RN-She has rec a fax from [employer] stating they need an ICD-10 code for pts Kisqali. The fax also states that they shipped her Kisqali on 3/27/2020.    Pt would have rec her delivery on 3/28/2020.    I have contacted [employer] [phone] and provided them with the ICD-10 code of C50.912

## 2020-04-10 ENCOUNTER — OFFICE VISIT (OUTPATIENT)
Dept: ONCOLOGY | Facility: CLINIC | Age: 65
End: 2020-04-10

## 2020-04-10 ENCOUNTER — APPOINTMENT (OUTPATIENT)
Dept: ONCOLOGY | Facility: HOSPITAL | Age: 65
End: 2020-04-10

## 2020-04-10 ENCOUNTER — APPOINTMENT (OUTPATIENT)
Dept: LAB | Facility: HOSPITAL | Age: 65
End: 2020-04-10

## 2020-04-10 ENCOUNTER — INFUSION (OUTPATIENT)
Dept: ONCOLOGY | Facility: HOSPITAL | Age: 65
End: 2020-04-10

## 2020-04-10 ENCOUNTER — TELEPHONE (OUTPATIENT)
Dept: ONCOLOGY | Facility: CLINIC | Age: 65
End: 2020-04-10

## 2020-04-10 DIAGNOSIS — C79.51 BONE METASTASIS: Primary | ICD-10-CM

## 2020-04-10 DIAGNOSIS — D70.1 LEUKOPENIA DUE TO ANTINEOPLASTIC CHEMOTHERAPY (HCC): ICD-10-CM

## 2020-04-10 DIAGNOSIS — D68.59 THROMBOPHILIA (HCC): ICD-10-CM

## 2020-04-10 DIAGNOSIS — T45.1X5A LEUKOPENIA DUE TO ANTINEOPLASTIC CHEMOTHERAPY (HCC): ICD-10-CM

## 2020-04-10 DIAGNOSIS — Z45.2 FITTING AND ADJUSTMENT OF VASCULAR CATHETER: ICD-10-CM

## 2020-04-10 DIAGNOSIS — C79.51 BONE METASTASIS: ICD-10-CM

## 2020-04-10 DIAGNOSIS — Z79.811 AROMATASE INHIBITOR USE: ICD-10-CM

## 2020-04-10 DIAGNOSIS — C50.912 PRIMARY MALIGNANT NEOPLASM OF LEFT BREAST (HCC): Primary | ICD-10-CM

## 2020-04-10 DIAGNOSIS — C50.912 PRIMARY MALIGNANT NEOPLASM OF LEFT BREAST (HCC): ICD-10-CM

## 2020-04-10 DIAGNOSIS — D51.0 VITAMIN B12 DEFICIENCY ANEMIA DUE TO INTRINSIC FACTOR DEFICIENCY: ICD-10-CM

## 2020-04-10 LAB
ALBUMIN SERPL-MCNC: 4.1 G/DL (ref 3.5–5.2)
ALBUMIN/GLOB SERPL: 1.5 G/DL (ref 1.1–2.4)
ALP SERPL-CCNC: 116 U/L (ref 38–116)
ALT SERPL W P-5'-P-CCNC: 21 U/L (ref 0–33)
ANION GAP SERPL CALCULATED.3IONS-SCNC: 13.4 MMOL/L (ref 5–15)
AST SERPL-CCNC: 20 U/L (ref 0–32)
BASOPHILS # BLD AUTO: 0.04 10*3/MM3 (ref 0–0.2)
BASOPHILS NFR BLD AUTO: 1.1 % (ref 0–1.5)
BILIRUB SERPL-MCNC: 0.3 MG/DL (ref 0.2–1.2)
BUN BLD-MCNC: 31 MG/DL (ref 6–20)
BUN/CREAT SERPL: 27.4 (ref 7.3–30)
CALCIUM SPEC-SCNC: 10.8 MG/DL (ref 8.5–10.2)
CHLORIDE SERPL-SCNC: 98 MMOL/L (ref 98–107)
CO2 SERPL-SCNC: 26.6 MMOL/L (ref 22–29)
CREAT BLD-MCNC: 1.13 MG/DL (ref 0.6–1.1)
DEPRECATED RDW RBC AUTO: 55 FL (ref 37–54)
EOSINOPHIL # BLD AUTO: 0.12 10*3/MM3 (ref 0–0.4)
EOSINOPHIL NFR BLD AUTO: 3.3 % (ref 0.3–6.2)
ERYTHROCYTE [DISTWIDTH] IN BLOOD BY AUTOMATED COUNT: 14.6 % (ref 12.3–15.4)
GFR SERPL CREATININE-BSD FRML MDRD: 49 ML/MIN/1.73
GLOBULIN UR ELPH-MCNC: 2.8 GM/DL (ref 1.8–3.5)
GLUCOSE BLD-MCNC: 117 MG/DL (ref 74–124)
HCT VFR BLD AUTO: 31.2 % (ref 34–46.6)
HGB BLD-MCNC: 10.3 G/DL (ref 12–15.9)
IMM GRANULOCYTES # BLD AUTO: 0.01 10*3/MM3 (ref 0–0.05)
IMM GRANULOCYTES NFR BLD AUTO: 0.3 % (ref 0–0.5)
LYMPHOCYTES # BLD AUTO: 1.02 10*3/MM3 (ref 0.7–3.1)
LYMPHOCYTES NFR BLD AUTO: 28.3 % (ref 19.6–45.3)
MCH RBC QN AUTO: 33.8 PG (ref 26.6–33)
MCHC RBC AUTO-ENTMCNC: 33 G/DL (ref 31.5–35.7)
MCV RBC AUTO: 102.3 FL (ref 79–97)
MONOCYTES # BLD AUTO: 0.27 10*3/MM3 (ref 0.1–0.9)
MONOCYTES NFR BLD AUTO: 7.5 % (ref 5–12)
NEUTROPHILS # BLD AUTO: 2.14 10*3/MM3 (ref 1.7–7)
NEUTROPHILS NFR BLD AUTO: 59.5 % (ref 42.7–76)
NRBC BLD AUTO-RTO: 0 /100 WBC (ref 0–0.2)
PLATELET # BLD AUTO: 244 10*3/MM3 (ref 140–450)
PMV BLD AUTO: 10.1 FL (ref 6–12)
POTASSIUM BLD-SCNC: 4.3 MMOL/L (ref 3.5–4.7)
PROT SERPL-MCNC: 6.9 G/DL (ref 6.3–8)
RBC # BLD AUTO: 3.05 10*6/MM3 (ref 3.77–5.28)
SODIUM BLD-SCNC: 138 MMOL/L (ref 134–145)
WBC NRBC COR # BLD: 3.6 10*3/MM3 (ref 3.4–10.8)

## 2020-04-10 PROCEDURE — 80053 COMPREHEN METABOLIC PANEL: CPT

## 2020-04-10 PROCEDURE — 36591 DRAW BLOOD OFF VENOUS DEVICE: CPT

## 2020-04-10 PROCEDURE — 25010000003 HEPARIN LOCK FLUCH PER 10 UNITS: Performed by: INTERNAL MEDICINE

## 2020-04-10 PROCEDURE — 99441 PR PHYS/QHP TELEPHONE EVALUATION 5-10 MIN: CPT | Performed by: INTERNAL MEDICINE

## 2020-04-10 PROCEDURE — 85025 COMPLETE CBC W/AUTO DIFF WBC: CPT

## 2020-04-10 RX ORDER — SODIUM CHLORIDE 0.9 % (FLUSH) 0.9 %
10 SYRINGE (ML) INJECTION AS NEEDED
Status: DISCONTINUED | OUTPATIENT
Start: 2020-04-10 | End: 2020-04-10 | Stop reason: HOSPADM

## 2020-04-10 RX ORDER — HEPARIN SODIUM (PORCINE) LOCK FLUSH IV SOLN 100 UNIT/ML 100 UNIT/ML
500 SOLUTION INTRAVENOUS AS NEEDED
Status: CANCELLED | OUTPATIENT
Start: 2020-04-10

## 2020-04-10 RX ORDER — HEPARIN SODIUM (PORCINE) LOCK FLUSH IV SOLN 100 UNIT/ML 100 UNIT/ML
500 SOLUTION INTRAVENOUS AS NEEDED
Status: DISCONTINUED | OUTPATIENT
Start: 2020-04-10 | End: 2020-04-10 | Stop reason: HOSPADM

## 2020-04-10 RX ORDER — SODIUM CHLORIDE 0.9 % (FLUSH) 0.9 %
10 SYRINGE (ML) INJECTION AS NEEDED
Status: CANCELLED | OUTPATIENT
Start: 2020-04-10

## 2020-04-10 RX ADMIN — SODIUM CHLORIDE, PRESERVATIVE FREE 500 UNITS: 5 INJECTION INTRAVENOUS at 08:12

## 2020-04-10 RX ADMIN — Medication 10 ML: at 08:12

## 2020-04-10 NOTE — PROGRESS NOTES
Subjective  REASONS FOR FOLLOWUP:   1. Metastatic breast cancer to multiple skeletal sites including cervical spine, sternum, lumbar spine and right femur, underwent Abraxane every 4 weeks and Xgeva every  3 months, DOCUMENTED RADIOLOGICAL PROGRESSION BY BONE SCAN 3/15/17 MOVING AWAY FROM ABRAXANE AND HALAVEN, PRESENTLY ON FEMARA AND KISQALI , XGEVA EVERY 3 MONTHS,    2.Iron deficiency anemia du to GI blood loss, Xarelto stopped months ago, Iron initiated, Pepcid along with Aleve for gastric protection.   3.  1/10/2020 continued good tolerance of because Kasquali  and Femara.  We will continue to hold Xgeva with plans for DEXA scan in spring 2020 to determine the need further Xgeva.        History of Present Illness This patient has had an eVisit with me today given coronavirus crisis. She has consented first of all to me to have a verbal conversation with her today on the telephone.     Overall the patient continues doing relatively well from the point of view of her metastatic breast cancer to bones only. She has not had any new pains in her skeleton in the calvarium, in the cranium, in the neck, lumbar, thoracic spine or any upper or lower extremities. The chronic pain associated with right femur metastasis remains about the same. She continues taking 2-3 Aleve a day and she barely needs to take any other narcotic medication. She also used topical Voltaren gel when she needs more input in regard pain control in different sites. Her appetite and weight remain stable, her bowel activity remains normal and urination remains normal. She has not had any respiratory allergies or infections.  She denies any cough, sputum production or shortness of breath. She has not had any rashes in the skin. She remains on her Femara and Kisqali and she has not had any problems with the consecution of Kisqali. Ms. Sheri Mancuso has been instrumental to take care of this. The medicine is dispatched to her house.     I reviewed all of her medications today and I will make a note in regard to her high calcium level.       Past Medical History, Past Surgical History,    1. Hypertension.   2. Hyperlipidemia.   3. Reflux esophagitis.     GYN HISTORY: G0, P0. The patient went into menopause in 2000.     General: no fever, no chills, no fatigue,no weight changes, no lack of appetite.  Eyes: no epiphora, xerophthalmia,conjunctivitis, pain, glaucoma, blurred vision, blindness, secretion, photophobia, proptosis, diplopia.  Ears: no otorrhea, tinnitus, otorrhagia, deafness, pain, vertigo.  Nose: no rhinorrhea, no epistaxis, no alteration in perception of odors, no sinuses pressure.  Mouth: no alteration in gums or teeth,  No ulcers, no difficulty with mastication or deglut ion, no odynophagia.  Neck: no masses or pain, no thyroid alterations, no pain in muscles or arteries, no carotid odynia, no crepitation.  Respiratory: no cough, no sputum production,no dyspnea,no trepopnea, no pleuritic pain,no hemoptysis.  Heart: no syncope, no irregularity, no palpitations, no angina,no orthopnea,no paroxysmal nocturnal dyspnea.  Vascular Venous: no tenderness,no edema,no palpable cords,no postphlebitic syndrome, no skin changes no ulcerations.  Vascular Arterial: no distal ischemia, noclaudication, no gangrene, no neuropathic ischemic pain, no skin ulcers, no paleness no cyanosis.  GI: no dysphagia, no odynophagia, no regurgitation, no heartburn,no indigestion,no nausea,no vomiting,no hematemesis ,no melena,no jaundice,no distention, no obstipation,no enterorrhagia,no proctalgia,no anal  lesions, no changes in bowel habits.  : no  frequency, no hesitancy, no hematuria, no discharge,no  pain.  Musculoskeletal: STATED muscle or tendon pain or inflammation,no  joint pain, no edema, no functional limitation,no fasciculations, no mass.  Neurologic: no headache, no seizures, noalterations on Craneal nerves, no motor deficit, no sensory deficit, normal coordination, no alteration in memory,normal orientation, calculation,normal writting, verbal and written language.  Skin: no rashes,no pruritus no localized lesions.  Psychiatric: no anxiety, no depression,no agitation, no delusions, proper insight.   Medications:  The current medication list was reviewed in the EMR    ALLERGIES:    Allergies   Allergen Reactions   • Codeine Unknown - Low Severity   • Docetaxel Unknown - Low Severity   • Paclitaxel Unknown - Low Severity       Objective      There were no vitals filed for this visit.  Current Status 1/10/2020   ECOG score 0     EXAM NONE TODAY APPARENTLY ALL HER SYSTEMS WORKING WELL    Hematology WBC   Date Value Ref Range Status   04/10/2020 3.60 3.40 - 10.80 10*3/mm3 Final   02/03/2020 3.89 3.40 - 10.80 10*3/mm3 Final     RBC   Date Value Ref Range Status   04/10/2020 3.05 (L) 3.77 - 5.28 10*6/mm3 Final   02/03/2020 3.08 (L) 3.77 - 5.28 10*6/mm3 Final     Hemoglobin   Date Value Ref Range Status   04/10/2020 10.3 (L) 12.0 - 15.9 g/dL Final     Hematocrit   Date Value Ref Range Status   04/10/2020 31.2 (L) 34.0 - 46.6 % Final     Platelets   Date Value Ref Range Status   04/10/2020 244 140 - 450 10*3/mm3 Final          Assessment/Plan    .   1. Metastatic  Left breast cancer to multiple skeletal sites including cervical spine, sternum, lumbar spine and right femur.Since the previous visit, the patient has been taking her Femara and Kisqali correctly  .On eVisit on 04/10/2020 the patient states that her symptoms are very much quiet at this time. She has not developed any new sites of bone pain and she has not had any difficulty with her Kisqali and  Femara that remain ongoing.     The patient has not developed any new sites of bone pain as far as I can tell and she has not had the need to modify her pain medicine, mainly Aleve for controlling mainly the pain in the right femur and right knee areas.     So far the patient has not had any issues in regard to Kisqali side effects with minor leukopenia with no implications, no cardiac arrhythmia, no diarrhea, no rash.     Her Femara is still ongoing with no obvious modification or new joint pain.     In regard to her bone health, the patient has not received Xgeva in quite amount of time. I do believe that she is going to require to have a bone density and a bone scan at some point late in May and review her back in June to make the decision about when to resume her Xgeva.    Today we have noticed that her calcium level is minimally elevated at 10.5. Her diet is not excessive in calcium containing products but she remains on calcium and vitamin D supplementation. I asked her to hold off on this for the time being and stay away from taking Tums for acid indigestion that she does not use at all anyway.    I advised her to continue her Aleve in the same dosing, be sure that she remains on her proton pump inhibitor for gastric protection at this time.    Obviously it has been extremely difficult to know what is the status of the cancer in this patient because neither the tumor markers, her bone scan or MRIs are 100% reliable in feeling out what is the status for her disease has been since the time of her diagnosis of metastatic disease many years ago.    At least simple test like a bone density and a bone scan in late May could be useful and we will repeat her chemistry profile, CBC and tumor marker on her visit in June.    The patient will be called at home with the schedule for her future appointments.     Her Femara and Kisqali will remain the same.    She remains on oral iron supplementation for iron deficiency  anemia and she actually feels well, strong without any major fatigue.     Her visit in June also will measure her ferritin and iron profile.    Otherwise I asked her to be proactive in regard to coronavirus prevention and when she goes out for groceries she is wearing a mask and gloves and she is taking all of the precautions for hand hygiene. She is still working but she goes to the building where she works and she remains isolated from any public.    Otherwise no other intervention at this time.          4/10/2020

## 2020-05-04 RX ORDER — MAGNESIUM 64 MG (MAGNESIUM CHLORIDE) TABLET,DELAYED RELEASE
Qty: 30 EACH | Refills: 3 | Status: SHIPPED | OUTPATIENT
Start: 2020-05-04 | End: 2020-08-31

## 2020-05-06 ENCOUNTER — TELEPHONE (OUTPATIENT)
Dept: ONCOLOGY | Facility: CLINIC | Age: 65
End: 2020-05-06

## 2020-05-11 ENCOUNTER — APPOINTMENT (OUTPATIENT)
Dept: ONCOLOGY | Facility: HOSPITAL | Age: 65
End: 2020-05-11

## 2020-05-18 RX ORDER — ATORVASTATIN CALCIUM 20 MG/1
TABLET, FILM COATED ORAL
Qty: 90 TABLET | Refills: 1 | Status: SHIPPED | OUTPATIENT
Start: 2020-05-18 | End: 2020-11-23

## 2020-05-18 RX ORDER — LETROZOLE 2.5 MG/1
TABLET, FILM COATED ORAL
Qty: 90 TABLET | Refills: 0 | Status: SHIPPED | OUTPATIENT
Start: 2020-05-18 | End: 2020-06-25

## 2020-05-25 RX ORDER — LISINOPRIL AND HYDROCHLOROTHIAZIDE 12.5; 1 MG/1; MG/1
TABLET ORAL
Qty: 180 TABLET | Refills: 0 | Status: SHIPPED | OUTPATIENT
Start: 2020-05-25 | End: 2020-08-30

## 2020-05-26 ENCOUNTER — DOCUMENTATION (OUTPATIENT)
Dept: ONCOLOGY | Facility: CLINIC | Age: 65
End: 2020-05-26

## 2020-06-03 ENCOUNTER — APPOINTMENT (OUTPATIENT)
Dept: BONE DENSITY | Facility: HOSPITAL | Age: 65
End: 2020-06-03

## 2020-06-04 ENCOUNTER — HOSPITAL ENCOUNTER (OUTPATIENT)
Dept: BONE DENSITY | Facility: HOSPITAL | Age: 65
Discharge: HOME | End: 2020-06-04
Admitting: INTERNAL MEDICINE

## 2020-06-04 DIAGNOSIS — Z79.811 AROMATASE INHIBITOR USE: ICD-10-CM

## 2020-06-04 DIAGNOSIS — C79.51 BONE METASTASIS: ICD-10-CM

## 2020-06-04 DIAGNOSIS — C50.912 PRIMARY MALIGNANT NEOPLASM OF LEFT BREAST (HCC): ICD-10-CM

## 2020-06-04 PROCEDURE — 77080 DXA BONE DENSITY AXIAL: CPT

## 2020-06-10 ENCOUNTER — HOSPITAL ENCOUNTER (OUTPATIENT)
Dept: NUCLEAR MEDICINE | Facility: HOSPITAL | Age: 65
Discharge: HOME | End: 2020-06-10

## 2020-06-10 DIAGNOSIS — C50.912 PRIMARY MALIGNANT NEOPLASM OF LEFT BREAST (HCC): ICD-10-CM

## 2020-06-10 DIAGNOSIS — C79.51 BONE METASTASIS: ICD-10-CM

## 2020-06-10 PROCEDURE — A9503 TC99M MEDRONATE: HCPCS | Performed by: INTERNAL MEDICINE

## 2020-06-10 PROCEDURE — 0 TECHNETIUM MEDRONATE KIT: Performed by: INTERNAL MEDICINE

## 2020-06-10 PROCEDURE — 78306 BONE IMAGING WHOLE BODY: CPT

## 2020-06-10 RX ORDER — TC 99M MEDRONATE 20 MG/10ML
21.5 INJECTION, POWDER, LYOPHILIZED, FOR SOLUTION INTRAVENOUS
Status: COMPLETED | OUTPATIENT
Start: 2020-06-10 | End: 2020-06-10

## 2020-06-10 RX ADMIN — Medication 21.5 MILLICURIE: at 08:50

## 2020-06-19 ENCOUNTER — INFUSION (OUTPATIENT)
Dept: ONCOLOGY | Facility: HOSPITAL | Age: 65
End: 2020-06-19

## 2020-06-19 ENCOUNTER — OFFICE VISIT (OUTPATIENT)
Dept: ONCOLOGY | Facility: CLINIC | Age: 65
End: 2020-06-19

## 2020-06-19 ENCOUNTER — DOCUMENTATION (OUTPATIENT)
Dept: ONCOLOGY | Facility: CLINIC | Age: 65
End: 2020-06-19

## 2020-06-19 VITALS
TEMPERATURE: 96.9 F | HEIGHT: 64 IN | SYSTOLIC BLOOD PRESSURE: 127 MMHG | BODY MASS INDEX: 35.87 KG/M2 | OXYGEN SATURATION: 96 % | RESPIRATION RATE: 18 BRPM | HEART RATE: 95 BPM | DIASTOLIC BLOOD PRESSURE: 76 MMHG | WEIGHT: 210.1 LBS

## 2020-06-19 DIAGNOSIS — C79.51 BONE METASTASIS: ICD-10-CM

## 2020-06-19 DIAGNOSIS — T45.1X5A LEUKOPENIA DUE TO ANTINEOPLASTIC CHEMOTHERAPY (HCC): ICD-10-CM

## 2020-06-19 DIAGNOSIS — M85.89 OSTEOPENIA OF MULTIPLE SITES: Primary | ICD-10-CM

## 2020-06-19 DIAGNOSIS — C50.912 PRIMARY MALIGNANT NEOPLASM OF LEFT BREAST (HCC): Primary | ICD-10-CM

## 2020-06-19 DIAGNOSIS — Z45.2 FITTING AND ADJUSTMENT OF VASCULAR CATHETER: Primary | ICD-10-CM

## 2020-06-19 DIAGNOSIS — C50.912 PRIMARY MALIGNANT NEOPLASM OF LEFT BREAST (HCC): ICD-10-CM

## 2020-06-19 DIAGNOSIS — D51.0 VITAMIN B12 DEFICIENCY ANEMIA DUE TO INTRINSIC FACTOR DEFICIENCY: ICD-10-CM

## 2020-06-19 DIAGNOSIS — D68.59 THROMBOPHILIA (HCC): ICD-10-CM

## 2020-06-19 DIAGNOSIS — D70.1 LEUKOPENIA DUE TO ANTINEOPLASTIC CHEMOTHERAPY (HCC): ICD-10-CM

## 2020-06-19 DIAGNOSIS — Z45.2 FITTING AND ADJUSTMENT OF VASCULAR CATHETER: ICD-10-CM

## 2020-06-19 DIAGNOSIS — Z79.811 AROMATASE INHIBITOR USE: ICD-10-CM

## 2020-06-19 LAB
ALBUMIN SERPL-MCNC: 4.2 G/DL (ref 3.5–5.2)
ALBUMIN/GLOB SERPL: 1.7 G/DL (ref 1.1–2.4)
ALP SERPL-CCNC: 135 U/L (ref 38–116)
ALT SERPL W P-5'-P-CCNC: 21 U/L (ref 0–33)
ANION GAP SERPL CALCULATED.3IONS-SCNC: 14.3 MMOL/L (ref 5–15)
AST SERPL-CCNC: 26 U/L (ref 0–32)
BASOPHILS # BLD AUTO: 0.07 10*3/MM3 (ref 0–0.2)
BASOPHILS NFR BLD AUTO: 1.8 % (ref 0–1.5)
BILIRUB SERPL-MCNC: 0.4 MG/DL (ref 0.2–1.2)
BUN BLD-MCNC: 33 MG/DL (ref 6–20)
BUN/CREAT SERPL: 25.6 (ref 7.3–30)
CALCIUM SPEC-SCNC: 10.8 MG/DL (ref 8.5–10.2)
CANCER AG15-3 SERPL-ACNC: 74.8 U/ML
CHLORIDE SERPL-SCNC: 102 MMOL/L (ref 98–107)
CO2 SERPL-SCNC: 23.7 MMOL/L (ref 22–29)
CREAT BLD-MCNC: 1.29 MG/DL (ref 0.6–1.1)
DEPRECATED RDW RBC AUTO: 56.3 FL (ref 37–54)
EOSINOPHIL # BLD AUTO: 0.13 10*3/MM3 (ref 0–0.4)
EOSINOPHIL NFR BLD AUTO: 3.3 % (ref 0.3–6.2)
ERYTHROCYTE [DISTWIDTH] IN BLOOD BY AUTOMATED COUNT: 15 % (ref 12.3–15.4)
FERRITIN SERPL-MCNC: 546.7 NG/ML (ref 13–150)
GFR SERPL CREATININE-BSD FRML MDRD: 42 ML/MIN/1.73
GLOBULIN UR ELPH-MCNC: 2.5 GM/DL (ref 1.8–3.5)
GLUCOSE BLD-MCNC: 159 MG/DL (ref 74–124)
HCT VFR BLD AUTO: 29.2 % (ref 34–46.6)
HGB BLD-MCNC: 9.8 G/DL (ref 12–15.9)
HGB RETIC QN AUTO: 40.2 PG (ref 29.8–36.1)
IMM GRANULOCYTES # BLD AUTO: 0.01 10*3/MM3 (ref 0–0.05)
IMM GRANULOCYTES NFR BLD AUTO: 0.3 % (ref 0–0.5)
IMM RETICS NFR: 27 % (ref 3–15.8)
IRON 24H UR-MRATE: 96 MCG/DL (ref 37–145)
IRON SATN MFR SERPL: 27 % (ref 14–48)
LYMPHOCYTES # BLD AUTO: 1.24 10*3/MM3 (ref 0.7–3.1)
LYMPHOCYTES NFR BLD AUTO: 31.7 % (ref 19.6–45.3)
MAGNESIUM SERPL-MCNC: 1.3 MG/DL (ref 1.8–2.5)
MCH RBC QN AUTO: 34.5 PG (ref 26.6–33)
MCHC RBC AUTO-ENTMCNC: 33.6 G/DL (ref 31.5–35.7)
MCV RBC AUTO: 102.8 FL (ref 79–97)
MONOCYTES # BLD AUTO: 0.37 10*3/MM3 (ref 0.1–0.9)
MONOCYTES NFR BLD AUTO: 9.5 % (ref 5–12)
NEUTROPHILS # BLD AUTO: 2.09 10*3/MM3 (ref 1.7–7)
NEUTROPHILS NFR BLD AUTO: 53.4 % (ref 42.7–76)
NRBC BLD AUTO-RTO: 0 /100 WBC (ref 0–0.2)
PHOSPHATE SERPL-MCNC: 3.5 MG/DL (ref 2.5–4.5)
PLATELET # BLD AUTO: 210 10*3/MM3 (ref 140–450)
PMV BLD AUTO: 10.8 FL (ref 6–12)
POTASSIUM BLD-SCNC: 3.9 MMOL/L (ref 3.5–4.7)
PROT SERPL-MCNC: 6.7 G/DL (ref 6.3–8)
RBC # BLD AUTO: 2.84 10*6/MM3 (ref 3.77–5.28)
RETICS/RBC NFR AUTO: 2.19 % (ref 0.7–1.9)
SODIUM BLD-SCNC: 140 MMOL/L (ref 134–145)
TIBC SERPL-MCNC: 350 MCG/DL (ref 249–505)
TRANSFERRIN SERPL-MCNC: 250 MG/DL (ref 200–360)
WBC NRBC COR # BLD: 3.91 10*3/MM3 (ref 3.4–10.8)

## 2020-06-19 PROCEDURE — 25010000002 DENOSUMAB 120 MG/1.7ML SOLUTION: Performed by: INTERNAL MEDICINE

## 2020-06-19 PROCEDURE — 86300 IMMUNOASSAY TUMOR CA 15-3: CPT | Performed by: INTERNAL MEDICINE

## 2020-06-19 PROCEDURE — 85046 RETICYTE/HGB CONCENTRATE: CPT

## 2020-06-19 PROCEDURE — 84466 ASSAY OF TRANSFERRIN: CPT

## 2020-06-19 PROCEDURE — 99215 OFFICE O/P EST HI 40 MIN: CPT | Performed by: INTERNAL MEDICINE

## 2020-06-19 PROCEDURE — 84100 ASSAY OF PHOSPHORUS: CPT

## 2020-06-19 PROCEDURE — 80053 COMPREHEN METABOLIC PANEL: CPT

## 2020-06-19 PROCEDURE — 96372 THER/PROPH/DIAG INJ SC/IM: CPT

## 2020-06-19 PROCEDURE — 85025 COMPLETE CBC W/AUTO DIFF WBC: CPT

## 2020-06-19 PROCEDURE — 25010000003 HEPARIN LOCK FLUSH PER 10 UNITS: Performed by: INTERNAL MEDICINE

## 2020-06-19 PROCEDURE — 83540 ASSAY OF IRON: CPT

## 2020-06-19 PROCEDURE — 82728 ASSAY OF FERRITIN: CPT

## 2020-06-19 PROCEDURE — 83735 ASSAY OF MAGNESIUM: CPT

## 2020-06-19 RX ORDER — HEPARIN SODIUM (PORCINE) LOCK FLUSH IV SOLN 100 UNIT/ML 100 UNIT/ML
500 SOLUTION INTRAVENOUS AS NEEDED
Status: CANCELLED | OUTPATIENT
Start: 2020-06-19

## 2020-06-19 RX ORDER — SODIUM CHLORIDE 0.9 % (FLUSH) 0.9 %
10 SYRINGE (ML) INJECTION AS NEEDED
Status: CANCELLED | OUTPATIENT
Start: 2020-06-19

## 2020-06-19 RX ORDER — HEPARIN SODIUM (PORCINE) LOCK FLUSH IV SOLN 100 UNIT/ML 100 UNIT/ML
500 SOLUTION INTRAVENOUS AS NEEDED
Status: DISCONTINUED | OUTPATIENT
Start: 2020-06-19 | End: 2020-06-19 | Stop reason: HOSPADM

## 2020-06-19 RX ORDER — SODIUM CHLORIDE 0.9 % (FLUSH) 0.9 %
10 SYRINGE (ML) INJECTION AS NEEDED
Status: DISCONTINUED | OUTPATIENT
Start: 2020-06-19 | End: 2020-06-19 | Stop reason: HOSPADM

## 2020-06-19 RX ADMIN — DENOSUMAB 120 MG: 120 INJECTION SUBCUTANEOUS at 08:43

## 2020-06-19 RX ADMIN — SODIUM CHLORIDE, PRESERVATIVE FREE 500 UNITS: 5 INJECTION INTRAVENOUS at 07:42

## 2020-06-19 RX ADMIN — Medication 10 ML: at 07:42

## 2020-06-19 NOTE — PROGRESS NOTES
Subjective  REASONS FOR FOLLOWUP:   1. Metastatic breast cancer to multiple skeletal sites including cervical spine, sternum, lumbar spine and right femur, underwent Abraxane every 4 weeks and Xgeva every  3 months, DOCUMENTED RADIOLOGICAL PROGRESSION BY BONE SCAN 3/15/17 [facility], PRESENTLY ON FEMARA AND KISQALI , XGEVA EVERY 3 MONTHS,    2.Iron deficiency anemia du to GI blood loss, Xarelto stopped months ago, Iron initiated, Pepcid along with Aleve for gastric protection.   3.  1/10/2020 continued good tolerance of because Kasquali  and Femara.  We will continue to hold Xgeva with plans for DEXA scan in spring 2020 to determine the need further Xgeva.        History of Present Illness PATIENT WAS CALLED THE DAY BEFORE BY THE OFFICE TO ASK FOR SYMPTOMS THAT COULD BE CONSISTENT WITH CORONAVIRUS INFECTION, AND BEING NEGATIVE WAS SCHEDULED TO BE SEEN IN THE OFFICE TODAY. SIMILAR QUESTIONING TODAY INCLUDING, CHILLS, FEVER, NEW COUGH, SHORTNESS OF BREATH, DIARRHEA,DIFFUSE BODY ACHES  AND CHANGES IN SMELL OR TASTE WERE NEGATIVE.DURING THE VISIT WITH THE PATIENT TODAY , PATIENT HAD FACE MASK, I HAD PROPPER PROTECTIVE EQUIPMENT, AND I DID HAND HYGIENE WITH SOAP AND WATER BEFORE AND AFTER THE VISIT.  This patient returns today to the office for follow up of her metastatic breast cancer to the skeleton undergoing therapy with Femara and Kisqali. She has been undergoing this treatment close to 3 years. The patient is off the treatment at this time and in the interim she has had a bone scan for review today. She is stating that she is experiencing more pain in the hips  posteriorly bilaterally to the point that she would like to have a massage or some heat or sit in a shower with hot water all the time that gives her an element of relief. The pain in the right knee area remains about the same. She is not using a cane at this time. Besides this there is no pain in the rib cage, shoulders, elbows or hands and no pain in the left femur, right femur or lower extremities in the legs. Her appetite is good, her weight is stable. Her bowel function is normal, urination is normal. She feels a little bit more short of breath than usual. She has not had any palpitations. No clinical bleeding. Her energy level is acceptable and she is able to continue living a normal quality of life taking occasional pain medicine here and there in the form of antiinflammatory or in the form of occasional narcotic. She has not encountered any other side effects of the letrozole and the Kisqali.         Past Medical History, Past Surgical History,    1. Hypertension.   2. Hyperlipidemia.   3. Reflux esophagitis.     GYN HISTORY: G0, P0. The patient went into menopause in 2000.   ROS:  General: no fever, no chills,  fatigue,no weight changes, no lack of appetite.  Eyes: no epiphora, xerophthalmia,conjunctivitis, pain, glaucoma, blurred vision, blindness, secretion, photophobia, proptosis, diplopia.  Ears: no otorrhea, tinnitus, otorrhagia, deafness, pain, vertigo.  Nose: no rhinorrhea, no epistaxis, no alteration in perception of odors, no sinuses pressure.  Mouth: no alteration in gums or teeth,  No ulcers, no difficulty with mastication or deglut ion, no odynophagia.  Neck: no masses or pain, no thyroid alterations, no pain in muscles or arteries, no carotid odynia, no crepitation.  Respiratory: no cough, no sputum production, dyspnea,no trepopnea, no pleuritic pain,no hemoptysis.  Heart: no syncope, no irregularity, no palpitations, no angina,no orthopnea,no paroxysmal nocturnal dyspnea.  Vascular Venous: no  "tenderness,no edema,no palpable cords,no postphlebitic syndrome, no skin changes no ulcerations.  Vascular Arterial: no distal ischemia, noclaudication, no gangrene, no neuropathic ischemic pain, no skin ulcers, no paleness no cyanosis.  GI: no dysphagia, no odynophagia, no regurgitation, no heartburn,no indigestion,no nausea,no vomiting,no hematemesis ,no melena,no jaundice,no distention, no obstipation,no enterorrhagia,no proctalgia,no anal  lesions, no changes in bowel habits.  : no frequency, no hesitancy, no hematuria, no discharge,no  pain.  Musculoskeletal: STATED muscle or tendon pain or inflammation,no  joint pain, no edema, no functional limitation,no fasciculations, no mass.  Neurologic: no headache, no seizures, noalterations on Craneal nerves, no motor deficit, no sensory deficit, normal coordination, no alteration in memory,normal orientation, calculation,normal writting, verbal and written language.  Skin: no rashes,no pruritus no localized lesions.  Psychiatric: no anxiety, no depression,no agitation, no delusions, proper insight.     Medications:  The current medication list was reviewed in the EMR    ALLERGIES:    Allergies   Allergen Reactions   • Codeine Unknown - Low Severity   • Docetaxel Unknown - Low Severity   • Paclitaxel Unknown - Low Severity       Objective      Vitals:    06/19/20 0752   BP: 127/76   Pulse: 95   Resp: 18   Temp: 96.9 °F (36.1 °C)   SpO2: 96%   Weight: 95.3 kg (210 lb 1.6 oz)   Height: 163 cm (64.17\")   PainSc:   5   PainLoc: Generalized  Comment: bilateral hips right knee     Current Status 6/19/2020   ECOG score 0     EXAM     GENERAL:  Well-developed, well-nourished  Patient  in no acute distress.   SKIN:  Warm, dry ,NO rashes,NO purpura ,NO petechiae.  HEENT:  Pupils were equal and reactive to light and accomodation, conjunctivas non injected, no pterigion, normal extraocular movements, normal visual acuity.   Mouth mucosa was moist, no exudates in oropharynx, " normal gum line, normal roof of the mouth and pillars, normal papillations of the tongue.  NECK:  Supple with good range of motion; no thyromegaly or masses, no JVD or bruits, no cervical adenopathies.No carotid arteries pain, no carotid abnormal pulsation , NO arterial dance.  LYMPHATICS:  No cervical, NO supraclavicular, NO axillary,NO epitrochlear , NO inguinal adenopathy.  CHEST:  Normal excursion of both stacy thoraces, normal voice fremitus, no subcutaneous emphysema, normal axillas, no rashes or acanthosis nigricans. normal breath sounds bilaterally, NO wheezing,NO crackles NO ronchi, NO stridor, NO rubs.  CARDIAC   normal rate and regular rhythm, without murmur,NO rubs NO S3 NO S4 right or left . Normal femoral, popliteal, pedis, brachial and carotid pulses.  VASCULAR ARTERIAL: normal carotids,brachial,radial,femoral,popliteal, pedis pulses , no bruits.no paleness or cyanosis, no pain, no edema, no numbness, no gangrene.  VASCULAR VENOUS: no cyanosis, collateral circulation, varicosities, edema, palpable cords, pain, erythema.  ABDOMEN:  Soft, nontender with no hepatomegaly, no splenomegaly,no masses, no ascites, no collateral circulation,no distention,no [place] sign, no abdominal pain, no inguinal hernias,no umbilical hernia, no abdominal bruits. Normal bowel sounds.  GENITAL: Not  Performed.  EXTREMITIES  AND SPINE:  No clubbing, cyanosis or edema, no deformities  .No kyphosis, scoliosis.The patient has tenderness to palpation of both sacroiliac joints and pelvic bone ala. The patient has no tenderness in the femurs, no tenderness in the tibias or fibulas. There is no tenderness in the rib cage, thoracic, cervical or lower lumbar spine. There is no tenderness in shoulders or humerus.       NEUROLOGICAL:  Patient was awake, alert, oriented to time, person and place.        Hematology WBC   Date Value Ref Range Status   06/19/2020 3.91 3.40 - 10.80 10*3/mm3 Final   02/03/2020 3.89 3.40 - 10.80 10*3/mm3 Final      RBC   Date Value Ref Range Status   06/19/2020 2.84 (L) 3.77 - 5.28 10*6/mm3 Final   02/03/2020 3.08 (L) 3.77 - 5.28 10*6/mm3 Final     Hemoglobin   Date Value Ref Range Status   06/19/2020 9.8 (L) 12.0 - 15.9 g/dL Final     Hematocrit   Date Value Ref Range Status   06/19/2020 29.2 (L) 34.0 - 46.6 % Final     Platelets   Date Value Ref Range Status   06/19/2020 210 140 - 450 10*3/mm3 Final            NUCLEAR MEDICINE WHOLE BODY BONE SCAN     HISTORY: Breast cancer with osseous metastatic disease. Recent fall with  bruising left humeral area. Follow-up exam.     TECHNIQUE:  21.5 mCi of 99m technetium MDP was administered IV followed  by 3 hour delayed phase whole body imaging with selected spot views.     COMPARISON: Whole body bone scan 04/23/2019, 06/07/2017.     FINDINGS:  There is multifocal abnormal uptake throughout the spine,  calvarium, ribs, pelvis. Focal increased uptake persists at the right  humeral head/proximal metaphysis. There is also multifocal bilateral  femoral uptake. When allowing for differences in technique, the extent  and degrees of uptake are similar to the previous exam. There is  osteoarthritic uptake in both knees, greatest overlying the medial  compartments. Since the prior exam, there has developed increased uptake  over the proximal and distal fibula and this may represent metastatic  disease or be posttraumatic.     IMPRESSION;  Overall, multifocal uptake associated with metastatic disease is stable.  However, there has developed increased uptake overlying the proximal and  distal right fibula has compared to the previous exam and this could be  posttraumatic or metastatic.     This report was finalized on 6/11/2020 10:10 AM by Dr. Jayme Busby M.D.     BONE MINERAL DENSITOMETRY     HISTORY: Breast cancer with history of metastatic disease.  Postmenopausal.     COMPARISON: None     TECHNIQUE: The T score compares the patient's bone mineral density with  the peak bone mass of  young normal patients. Patients with T-scores  between 1.0 and 2.5 standard deviations below the mean are osteopenic.  Patients with T-scores greater than 2.5 standard deviation below the  mean are osteoporotic.     The Z score compares the patient's bone mineral density with sex and age  matched patients. Z score of -2.0 and lower is an indication of low  mineral density for the patient's age.     FINDINGS:   LUMBAR SPINE:  The BMD measured in the L3, L4 is 1.574 g/cm2 for a  T-score of 4.3 and a Z-score of 6.1     LEFT HIP: The BMD for the femoral neck is 1.469g/cm2 for a T score of   5.6 and a Z score of 7.0     RIGHT HIP:  The BMD for the femoral neck is 1.310g/cm2 for a T score of  4.2 and a Z score of 5.6     IMPRESSION:  Bone mineral density is abnormally elevated due to diffuse  osteosclerosis.     This report was finalized on 6/4/2020 12:33 PM by Dr. Jayme Busby M.D.     Comprehensive Metabolic Panel   Order: [id number]   Status:  Final result   Visible to patient:  No (Not Released)   Next appt:  06/24/2020 at 02:30 PM in Oncology ([facility])   Dx:  Bone metastasis (CMS/HCC); Aromatase ...   Specimen Information: Blood        Component  Ref Range & Units 07:43   Glucose  74 - 124 mg/dL 159High     BUN  6 - 20 mg/dL 33High     Creatinine  0.60 - 1.10 mg/dL 1.29High     Sodium  134 - 145 mmol/L 140    Potassium  3.5 - 4.7 mmol/L 3.9    Chloride  98 - 107 mmol/L 102    CO2  22.0 - 29.0 mmol/L 23.7    Calcium  8.5 - 10.2 mg/dL 10.8High Critical     Total Protein  6.3 - 8.0 g/dL 6.7    Albumin  3.50 - 5.20 g/dL 4.20    ALT (SGPT)  0 - 33 U/L 21    AST (SGOT)  0 - 32 U/L 26    Alkaline Phosphatase  38 - 116 U/L 135High     Total Bilirubin  0.2 - 1.2 mg/dL 0.4    eGFR Non African Amer  >60 mL/min/1.73 42Low     Globulin  1.8 - 3.5 gm/dL 2.5    A/G Ratio  1.1 - 2.4 g/dL 1.7                Assessment/Plan    .   1. Metastatic  Left breast cancer to multiple skeletal sites including cervical  spine, sternum, lumbar spine and right femur.Since the previous visit, the patient has been taking her Femara and Kisqali correctly  .On eVisit on 04/10/2020 the patient states that her symptoms are very much quiet at this time. She has not developed any new sites of bone pain and she has not had any difficulty with her Kisqali and Femara that remain ongoing.       The patient was further reviewed on clinical grounds on 06/19/2020. Now she is experiencing a new pain in the pelvic bone bilaterally close to the sacroiliac joints and ala of the pelvic bone. Other than that she has not had any other new sites of pain.     I reviewed with the patient her bone scan that shows unequivocally increased uptake in the sacroiliac joints as well in the pelvic bone bilaterally posteriorly. There is a new uptake in the fibula on the right side that is very minimal. This does not correspond to any site of pain clinically.     Given her high calcium and hypercalcemia of malignancy unequivocally I think that the patient is failing to respond to letrozole and Kisqali and I have gone ahead and discontinued these medicines at this time.     In regard to her anemia I think she has myelophthisis. Her hemoglobin remains low in spite of normal iron, normal ferritin, normal B12 level. I think this is representation of effect of Kisqali and also effect of her cancer on decreased production of red cells. Her white count is low, she has leukopenia and probably corresponds to Kisqali as well as maybe myelophthisis. Her platelet count is appropriate.    The patient has hypercalcemia of malignancy. She also has a minor bump in the creatinine 1.29.     RECOMMENDATIONS:  1. I have advised the patient to discontinue her Femara and Kisqali at this time.  2. I have made her an appointment to be seen by radiation oncology. She has had excellent results in the past with radiation to the bones where she has disease and maybe the pelvic bone could be  irradiated.   3. I have advised her that eventually upon completion of radiation therapy and if bone marrow allows the patient will be a candidate to receive Halaven. I am not sure that she will be able to receive Halaven with her blood counts like they are and status post radiation therapy to the pelvis in a normal schedule. At the most she will be able to receive 1 treatment every 2-3 weeks and she will probably require some Neupogen support.   4. She will receive Xgeva today that will be helpful in regard to the treatment of her hypercalcemia of malignancy and she will return to the office on weekly basis to monitor BMP and consideration of IV Zometa therapy if the calcium level does not come down. Also will be consideration for dexamethasone therapy transitorily.   5. I discussed with her the use of Tylenol for pain control, that is what she uses and very occasional antiinflammatory medication.   6. She will remain on her multivitamin. She is not taking any vitamin D or calcium supplements at this time.   7. I will review her back in 3 weeks and see where she stands.   8. From the point of view of her shortness of breath I do believe that this represents anemia. Her heart murmur in the aortic area that has been evaluated before with echocardiogram is unchanged, she has no gallop and she has no other pulmonary symptomatology.    I personally reviewed the patient in the PAC system [facility] the bone scan as well as the DEXA scan reports.     I DISCUSSED WITH PATIENT IN DETAIL FORMS TO DECREASE CHANCES OF CORONAVIRUS INFECTION INCLUDING ISOLATION, PROPER HAND HIGIENE, AVOID PUBLIC PLACES  WITH CROWDS, FOLLOW  CDC RECOMENDATIONS, AND KEEP PERSONAL AND SOCIAL RESPONSIBILITY, WARE A MASK IN PUBLIC PLACES.  PATIENT IS AWARE THIS INFECTION COULD HAVE SEVERE CONSEQUENCES TO PERSONAL HEALTH AND FAMILY RAMIFICATIONS OF THIS.      6/19/2020

## 2020-06-19 NOTE — PROGRESS NOTES
Staff message rec from Dr Saez-pt will stop the Kisqali.    I have notified [employer] of the discontinuation.

## 2020-06-22 ENCOUNTER — TELEPHONE (OUTPATIENT)
Dept: ONCOLOGY | Facility: CLINIC | Age: 65
End: 2020-06-22

## 2020-06-23 ENCOUNTER — TELEPHONE (OUTPATIENT)
Dept: ONCOLOGY | Facility: CLINIC | Age: 65
End: 2020-06-23

## 2020-06-23 NOTE — TELEPHONE ENCOUNTER
Pt had questions about if she was to continue letrozole when Kisqali discontinued. Per last office note, both medications are to be discontinued. Pt v/u.  Pt also has questions about the infusion she is to start this week. Pt is scheduled for Zometa. Educated pt on the purpose of this drug, she v/u. Pt also voiced multiple other concerns r/t discussion of starting on Halaven after completing radiation and how this might affect her quality of life. She also has questions r/t life expectancy. Told pt these questions would be directed to Dr. Saez. She v/u.

## 2020-06-23 NOTE — TELEPHONE ENCOUNTER
Patient has 2 questions    1) was taken off Kisqali, she also is taken Letrozole, the direction for this prescription states to be taken with Kisqali, so should she stop Letrozole as well  2) has several question about her upcoming appointment on 6-25-20    Please call [phone]

## 2020-06-24 ENCOUNTER — APPOINTMENT (OUTPATIENT)
Dept: ONCOLOGY | Facility: HOSPITAL | Age: 65
End: 2020-06-24

## 2020-06-25 ENCOUNTER — TELEPHONE (OUTPATIENT)
Dept: ONCOLOGY | Facility: CLINIC | Age: 65
End: 2020-06-25

## 2020-06-25 ENCOUNTER — CONSULT (OUTPATIENT)
Dept: RADIATION ONCOLOGY | Facility: HOSPITAL | Age: 65
End: 2020-06-25

## 2020-06-25 ENCOUNTER — APPOINTMENT (OUTPATIENT)
Dept: RADIATION ONCOLOGY | Facility: HOSPITAL | Age: 65
End: 2020-06-25

## 2020-06-25 ENCOUNTER — INFUSION (OUTPATIENT)
Dept: ONCOLOGY | Facility: HOSPITAL | Age: 65
End: 2020-06-25

## 2020-06-25 VITALS
HEART RATE: 93 BPM | SYSTOLIC BLOOD PRESSURE: 148 MMHG | DIASTOLIC BLOOD PRESSURE: 72 MMHG | RESPIRATION RATE: 16 BRPM | BODY MASS INDEX: 35.85 KG/M2 | OXYGEN SATURATION: 97 % | TEMPERATURE: 98.7 F | WEIGHT: 210 LBS

## 2020-06-25 VITALS
SYSTOLIC BLOOD PRESSURE: 148 MMHG | BODY MASS INDEX: 35.85 KG/M2 | WEIGHT: 210 LBS | DIASTOLIC BLOOD PRESSURE: 78 MMHG | TEMPERATURE: 98 F

## 2020-06-25 DIAGNOSIS — Z45.2 FITTING AND ADJUSTMENT OF VASCULAR CATHETER: ICD-10-CM

## 2020-06-25 DIAGNOSIS — C50.912 PRIMARY MALIGNANT NEOPLASM OF LEFT BREAST (HCC): Primary | ICD-10-CM

## 2020-06-25 DIAGNOSIS — M85.89 OSTEOPENIA OF MULTIPLE SITES: Primary | ICD-10-CM

## 2020-06-25 LAB
ALBUMIN SERPL-MCNC: 4.2 G/DL (ref 3.5–5.2)
ALBUMIN/GLOB SERPL: 1.5 G/DL (ref 1.1–2.4)
ALP SERPL-CCNC: 140 U/L (ref 38–116)
ALT SERPL W P-5'-P-CCNC: 26 U/L (ref 0–33)
ANION GAP SERPL CALCULATED.3IONS-SCNC: 10.9 MMOL/L (ref 5–15)
AST SERPL-CCNC: 33 U/L (ref 0–32)
BASOPHILS # BLD AUTO: 0.07 10*3/MM3 (ref 0–0.2)
BASOPHILS NFR BLD AUTO: 1.3 % (ref 0–1.5)
BILIRUB SERPL-MCNC: 0.3 MG/DL (ref 0.2–1.2)
BUN BLD-MCNC: 23 MG/DL (ref 6–20)
BUN/CREAT SERPL: 25.8 (ref 7.3–30)
CALCIUM SPEC-SCNC: 8.5 MG/DL (ref 8.5–10.2)
CHLORIDE SERPL-SCNC: 106 MMOL/L (ref 98–107)
CO2 SERPL-SCNC: 22.1 MMOL/L (ref 22–29)
CREAT BLD-MCNC: 0.89 MG/DL (ref 0.6–1.1)
DEPRECATED RDW RBC AUTO: 57.9 FL (ref 37–54)
EOSINOPHIL # BLD AUTO: 0.11 10*3/MM3 (ref 0–0.4)
EOSINOPHIL NFR BLD AUTO: 2.1 % (ref 0.3–6.2)
ERYTHROCYTE [DISTWIDTH] IN BLOOD BY AUTOMATED COUNT: 15.4 % (ref 12.3–15.4)
GFR SERPL CREATININE-BSD FRML MDRD: 64 ML/MIN/1.73
GLOBULIN UR ELPH-MCNC: 2.8 GM/DL (ref 1.8–3.5)
GLUCOSE BLD-MCNC: 109 MG/DL (ref 74–124)
HCT VFR BLD AUTO: 29.2 % (ref 34–46.6)
HGB BLD-MCNC: 9.7 G/DL (ref 12–15.9)
IMM GRANULOCYTES # BLD AUTO: 0.05 10*3/MM3 (ref 0–0.05)
IMM GRANULOCYTES NFR BLD AUTO: 0.9 % (ref 0–0.5)
LYMPHOCYTES # BLD AUTO: 1.26 10*3/MM3 (ref 0.7–3.1)
LYMPHOCYTES NFR BLD AUTO: 23.9 % (ref 19.6–45.3)
MCH RBC QN AUTO: 34.3 PG (ref 26.6–33)
MCHC RBC AUTO-ENTMCNC: 33.2 G/DL (ref 31.5–35.7)
MCV RBC AUTO: 103.2 FL (ref 79–97)
MONOCYTES # BLD AUTO: 0.67 10*3/MM3 (ref 0.1–0.9)
MONOCYTES NFR BLD AUTO: 12.7 % (ref 5–12)
NEUTROPHILS # BLD AUTO: 3.11 10*3/MM3 (ref 1.7–7)
NEUTROPHILS NFR BLD AUTO: 59.1 % (ref 42.7–76)
NRBC BLD AUTO-RTO: 0 /100 WBC (ref 0–0.2)
PLATELET # BLD AUTO: 231 10*3/MM3 (ref 140–450)
PMV BLD AUTO: 10.3 FL (ref 6–12)
POTASSIUM BLD-SCNC: 4.5 MMOL/L (ref 3.5–4.7)
PROT SERPL-MCNC: 7 G/DL (ref 6.3–8)
RBC # BLD AUTO: 2.83 10*6/MM3 (ref 3.77–5.28)
SODIUM BLD-SCNC: 139 MMOL/L (ref 134–145)
WBC NRBC COR # BLD: 5.27 10*3/MM3 (ref 3.4–10.8)

## 2020-06-25 PROCEDURE — 99243 OFF/OP CNSLTJ NEW/EST LOW 30: CPT | Performed by: RADIOLOGY

## 2020-06-25 PROCEDURE — 25010000003 HEPARIN LOCK FLUSH PER 10 UNITS: Performed by: INTERNAL MEDICINE

## 2020-06-25 PROCEDURE — 96523 IRRIG DRUG DELIVERY DEVICE: CPT

## 2020-06-25 PROCEDURE — 36415 COLL VENOUS BLD VENIPUNCTURE: CPT

## 2020-06-25 PROCEDURE — G0463 HOSPITAL OUTPT CLINIC VISIT: HCPCS | Performed by: RADIOLOGY

## 2020-06-25 PROCEDURE — 80053 COMPREHEN METABOLIC PANEL: CPT

## 2020-06-25 PROCEDURE — 85025 COMPLETE CBC W/AUTO DIFF WBC: CPT

## 2020-06-25 PROCEDURE — 77263 THER RADIOLOGY TX PLNG CPLX: CPT | Performed by: RADIOLOGY

## 2020-06-25 RX ORDER — SODIUM CHLORIDE 0.9 % (FLUSH) 0.9 %
10 SYRINGE (ML) INJECTION AS NEEDED
Status: CANCELLED | OUTPATIENT
Start: 2020-06-25

## 2020-06-25 RX ORDER — HEPARIN SODIUM (PORCINE) LOCK FLUSH IV SOLN 100 UNIT/ML 100 UNIT/ML
500 SOLUTION INTRAVENOUS AS NEEDED
Status: DISCONTINUED | OUTPATIENT
Start: 2020-06-25 | End: 2020-06-25 | Stop reason: HOSPADM

## 2020-06-25 RX ORDER — HEPARIN SODIUM (PORCINE) LOCK FLUSH IV SOLN 100 UNIT/ML 100 UNIT/ML
500 SOLUTION INTRAVENOUS AS NEEDED
Status: CANCELLED | OUTPATIENT
Start: 2020-06-25

## 2020-06-25 RX ORDER — SODIUM CHLORIDE 0.9 % (FLUSH) 0.9 %
10 SYRINGE (ML) INJECTION AS NEEDED
Status: DISCONTINUED | OUTPATIENT
Start: 2020-06-25 | End: 2020-06-25 | Stop reason: HOSPADM

## 2020-06-25 RX ADMIN — SODIUM CHLORIDE, PRESERVATIVE FREE 10 ML: 5 INJECTION INTRAVENOUS at 11:20

## 2020-06-25 RX ADMIN — Medication 500 UNITS: at 11:20

## 2020-06-25 NOTE — TELEPHONE ENCOUNTER
----- Message from Ramandeep Watkins RN sent at 6/25/2020 11:23 AM EDT -----  Please cancel next 3 weekly lab and RN review appts. Please thanks

## 2020-06-25 NOTE — PROGRESS NOTES
DIAGNOSIS and REASON FOR CONSULTATION:   Primary malignant neoplasm of left breast (CMS/HCC)  Stage IV (M1)  - with progressive bone mets, for advice and recommendations regarding the diagnosis    CHIEF COMPLAINT:  For advice and recommendations regarding Primary malignant neoplasm of left breast (CMS/HCC) - now with progressive bone mets  HISTORY OF PRESENT ILLNESS:  The patient is a 65 y.o. year old female who is well-known to me from previous radiation at the time of her initial breast cancer diagnosis in March, 2001 and more recently for palliative radiation therapy directed to the right femur postoperatively in August, 2014.    She continued on with systemic therapy with Abraxane and Halaven until evidence of progression in 2017.  She moved onto Femara and his quality with Xgeva and has been maintained on them since.    Her most recent bone scan on Rosibel 10, 2020 showed multifocal uptake throughout the spine, calvarium, ribs, pelvis, right humeral head and bilateral femurs.  The uptake was felt to be stable in these areas with questionable increased uptake in the proximal and distal fibula.  She additionally showed an elevated calcium suggesting progressive disease.    Concurrently she presented with complaints of more bilateral hip pain posteriorly these are her only areas of pain.  Her systemic therapy was stopped with discussions of switching to Halaven along with Xgeva.  She returns to see Dr. Saez to discuss this further on July 24, 2020 and I was asked to see the patient at the request of the referring provider noted above for advice and recommendations regarding this diagnosis.     Clinically, she is doing very well. Her performance status remains excellent and other than progressive mild SI joint pain, she is doing well.    Performance Status: (1) Restricted in physically strenuous activity, ambulatory and able to do work of light nature  Objective   Past Medical History: she  has a past medical  history of Abnormal mammogram, Abnormal menstrual cycle, Arthritis, Bone metastasis (CMS/HCC), Breast cancer (CMS/HCC) (2000), Drug therapy (2001), Drug therapy (2017), H/O Heart murmur, History of colon polyps, radiation therapy (2000), radiation therapy (2015), Hypercholesterolemia, Hyperlipidemia, Hypertension, Multiple benign polyps of large intestine, and Reflux esophagitis.    Past Surgical History:  she has a past surgical history that includes Femur fracture surgery; Breast surgery (2000); Colonoscopy (2012); Mastectomy (Left, 2000); and Breast biopsy (Left, 2000).    Meds:    Current Outpatient Medications:   •  Ascorbic Acid (VITAMIN C PO), Take  by mouth Daily., Disp: , Rfl:   •  atorvastatin (LIPITOR) 20 MG tablet, Take 1 tablet by mouth once daily, Disp: 90 tablet, Rfl: 1  •  Biotin w/ Vitamins C & E (HAIR SKIN & NAILS GUMMIES PO), Take  by mouth., Disp: , Rfl:   •  cetirizine (zyrTEC) 10 MG tablet, Take 10 mg by mouth As Needed for Allergies., Disp: , Rfl:   •  ciclopirox (PENLAC) 8 % solution, Apply  topically Every Night., Disp: 6 mL, Rfl: 0  •  Cyanocobalamin (B-12 PO), Take 1,000 mcg by mouth Daily., Disp: , Rfl:   •  denosumab (XGEVA) 120 MG/1.7ML solution injection, Inject  under the skin 1 (one) time., Disp: , Rfl:   •  diclofenac (VOLTAREN) 1 % gel gel, APPLY 2 TO 4 GRAMS TOPICALLY TO AFFECTED AREA TWICE DAILY AS DIRECTED, Disp: 100 g, Rfl: 2  •  Ferrous Sulfate (IRON) 325 (65 Fe) MG tablet, TAKE 1 TABLET BY MOUTH ONCE DAILY WITH BREAKFAST, Disp: 30 tablet, Rfl: 5  •  lisinopril-hydrochlorothiazide (PRINZIDE,ZESTORETIC) 10-12.5 MG per tablet, Take 1 tablet by mouth twice daily, Disp: 180 tablet, Rfl: 0  •  MAG64 64 MG DR tablet, Take 1 tablet by mouth once daily, Disp: 30 each, Rfl: 3  •  Multiple Vitamins-Minerals (HAIR SKIN AND NAILS FORMULA PO), Take  by mouth., Disp: , Rfl:   •  Naproxen Sodium (ALEVE PO), Take  by mouth., Disp: , Rfl:   •  Omega-3 Fatty Acids (FISH OIL) 645 MG capsule,  Take  by mouth., Disp: , Rfl:   •  OMEPRAZOLE PO, Take  by mouth Daily., Disp: , Rfl:   •  HYDROcodone-acetaminophen (NORCO) 5-325 MG per tablet, Take 1 tablet by mouth Every 6 (Six) Hours As Needed., Disp: , Rfl:   •  Naproxen Sod-Diphenhydramine (ALEVE PM PO), Take  by mouth as needed., Disp: , Rfl:   •  ondansetron ODT (ZOFRAN-ODT) 8 MG disintegrating tablet, Dissolve one tablet on tongue every 6 hours for nausea and vomiting, Disp: 12 tablet, Rfl: 0    Allergies:    Allergies   Allergen Reactions   • Codeine Unknown - Low Severity   • Docetaxel Unknown - Low Severity   • Paclitaxel Unknown - Low Severity       Family History:  her family history includes Cancer in an other family member; Colon cancer in her paternal grandmother; Diabetes in her brother, mother, and another family member; Glaucoma in her brother and another family member; Heart disease in her father; Hypertension in her brother and mother; Kidney disease in her father and another family member; Macular degeneration in her mother; Stroke in her father; Thyroid disease in her mother and another family member.    Social History:  she  reports that she has never smoked. She has never used smokeless tobacco. She reports that she does not drink alcohol or use drugs.    Pertinent Findings on   Review of Systems   Constitutional: Positive for fatigue. Negative for appetite change, chills, diaphoresis, fever and unexpected weight change.   HENT:   Negative for hearing loss, lump/mass, mouth sores, nosebleeds, sore throat, tinnitus, trouble swallowing and voice change.    Eyes: Negative for eye problems and icterus.   Respiratory: Negative for chest tightness, cough, hemoptysis, shortness of breath and wheezing.    Cardiovascular: Negative for chest pain, leg swelling and palpitations.   Gastrointestinal: Negative for abdominal distention, abdominal pain, blood in stool, constipation, diarrhea, nausea, rectal pain and vomiting.   Endocrine: Negative for  hot flashes.   Genitourinary: Negative for bladder incontinence, difficulty urinating, dyspareunia, dysuria, frequency, hematuria, menstrual problem, nocturia, pelvic pain, vaginal bleeding and vaginal discharge.    Musculoskeletal: Positive for arthralgias and back pain. Negative for flank pain, gait problem, myalgias, neck pain and neck stiffness.   Skin: Negative for itching, rash and wound.   Neurological: Negative for dizziness, extremity weakness, gait problem, headaches, light-headedness, numbness, seizures and speech difficulty.   Hematological: Negative for adenopathy. Does not bruise/bleed easily.   Psychiatric/Behavioral: Negative for confusion, decreased concentration, depression, sleep disturbance and suicidal ideas. The patient is not nervous/anxious.    :     Subjective   Vitals:    06/25/20 0905   BP: 148/78   Temp: 98 °F (36.7 °C)   TempSrc: Oral   Weight: 95.3 kg (210 lb)   PainSc:   3   PainLoc: Back       Pertinent Findings on:  Physical Exam   Constitutional: She is oriented to person, place, and time. She appears well-developed and well-nourished. She is active and cooperative. No distress.   HENT:   Head: Normocephalic and atraumatic.   Nose: Nose normal.   Mouth/Throat: Mucous membranes are normal. Normal dentition.   Eyes: Conjunctivae and EOM are normal.   Neck: Normal range of motion.   Pulmonary/Chest: Effort normal.   Abdominal: Normal appearance. There is no hepatosplenomegaly.   Musculoskeletal: Normal range of motion.   Pain localized to bilateral SI joint region - right worse than left. No leg weakness though pain in bilateral knees from degenerative change.     Vascular Status -  Her right foot exhibits no edema. Her left foot exhibits no edema.  Neurological: She is alert and oriented to person, place, and time.   Skin: Skin is warm and dry.   Psychiatric: She has a normal mood and affect. Her behavior is normal. Judgment and thought content normal.          Assessment: Primary  malignant neoplasm of left breast (CMS/HCC) - with progressive bone mets and symptomatic in bilateral SI joint regions, right worse than left  This assessment comes from my review of the imaging, pathology, physician notes and other pertinent information as mentioned.    Plan:   We reviewed today the progression of the diagnostic imaging studies, the role of her systemic treatment and also the role of the radiation therapy in palliating symptomatic lesions or those with impending consequences.   She is very familiar with the logistics and has had an excellent response previously.    We discussed a course of radiation therapy aimed at the bilateral SI joints to a total dose of 3000 cGy in 10 treatments over 2 weeks. We reviewed the logistics and goals of the course of treatment. We then discussed acute side effects which are expected be minimal but might include slight erythema of the skin, irritability of the bowel and diarrhea and  possible fatigue. We also discussed the long-term effect of the radiation therapy on the bone and I believe all their questions were answered to their satisfaction.      She would like to wait until the week of July 20th to start treatment as her boss will be out of the office for a couple of weeks and she feels that burden will be significant. As she is controlling her pain well, seems fine to wait. We will bring her in closer to that time to begin treatment planning.    Objective   I spent greater than 40 minutes in face-to-face time with the patient and 25 minutes of that time were spent in counseling and coordination of care, including review of imaging and pathology; indications, goals, logistics, alternatives and risks - both common and rare - for my recommendations as well as surveillance and potential outcomes.

## 2020-06-25 NOTE — PROGRESS NOTES
Reviewed CBC and CMP with Dr. Saez. Pt does not need zometa today. Cancel next 3 weekly RN reivews, pt will return to see Dr. Saez as scheduled on 7/24.

## 2020-06-29 ENCOUNTER — TELEPHONE (OUTPATIENT)
Dept: ONCOLOGY | Facility: CLINIC | Age: 65
End: 2020-06-29

## 2020-07-01 ENCOUNTER — TELEPHONE (OUTPATIENT)
Dept: ONCOLOGY | Facility: CLINIC | Age: 65
End: 2020-07-01

## 2020-07-01 DIAGNOSIS — C79.51 BONE METASTASIS: ICD-10-CM

## 2020-07-01 DIAGNOSIS — C50.912 PRIMARY MALIGNANT NEOPLASM OF LEFT BREAST (HCC): ICD-10-CM

## 2020-07-01 NOTE — TELEPHONE ENCOUNTER
I called the patient today because she has questions in regard therapy with Halaven. The first point that I made to her is that this medicine is a very short infusion of 10 minutes and is not typically sickening in regard nausea or vomiting. The main issue is hematological toxicity. The patient will require dose adjustment from the 1st time given the significant compromise of her bone marrow for the production of white blood cells. She will require even antibiotic therapy prophylactically after the 2nd infusion on day 8 to be sure that her blood counts are not going to end up in limbo and end up with neutropenic fever. I do not believe the patient will need to move to [place] to do this because that will require complete change in lifestyle, living environment and her personal situation. On the other hand if she wishes to do this we will be glad to provide this and help her into the process.     Her appointment will be on 07/24/2020 to be reviewed and then review blood counts and define the timing for initiation of the Halaven.

## 2020-07-02 ENCOUNTER — APPOINTMENT (OUTPATIENT)
Dept: ONCOLOGY | Facility: HOSPITAL | Age: 65
End: 2020-07-02

## 2020-07-09 ENCOUNTER — APPOINTMENT (OUTPATIENT)
Dept: ONCOLOGY | Facility: HOSPITAL | Age: 65
End: 2020-07-09

## 2020-07-16 ENCOUNTER — APPOINTMENT (OUTPATIENT)
Dept: RADIATION ONCOLOGY | Facility: HOSPITAL | Age: 65
End: 2020-07-16

## 2020-07-16 ENCOUNTER — TELEPHONE (OUTPATIENT)
Dept: ONCOLOGY | Facility: CLINIC | Age: 65
End: 2020-07-16

## 2020-07-16 PROCEDURE — 77290 THER RAD SIMULAJ FIELD CPLX: CPT | Performed by: RADIOLOGY

## 2020-07-16 PROCEDURE — 77334 RADIATION TREATMENT AID(S): CPT | Performed by: RADIOLOGY

## 2020-07-16 NOTE — TELEPHONE ENCOUNTER
Patient said would like to speak to on of our social workers in regards to her age and transitioning to medicaid and medicare, etc. Her callback is [phone]

## 2020-07-16 NOTE — TELEPHONE ENCOUNTER
Patient would like to speak to Makenna Singh if it is time for her to send in another financial aid application. Her callback is [phone]. Patient said may not answer so good to leave VM with information.

## 2020-07-17 ENCOUNTER — TELEPHONE (OUTPATIENT)
Dept: OTHER | Facility: HOSPITAL | Age: 65
End: 2020-07-17

## 2020-07-17 NOTE — TELEPHONE ENCOUNTER
Oncology Social Work    OSW received referral from radiation center to see for psychosocial support.  OSW called and spoke to patient.  Set up appt to see me on Monday 7/20 at 2:30pm before her radiation treatment.    Dunia Pearson, OXANAW, CSW, OSW-C

## 2020-07-20 ENCOUNTER — DOCUMENTATION (OUTPATIENT)
Dept: OTHER | Facility: HOSPITAL | Age: 65
End: 2020-07-20

## 2020-07-20 PROCEDURE — 77295 3-D RADIOTHERAPY PLAN: CPT | Performed by: RADIOLOGY

## 2020-07-20 PROCEDURE — 77412 RADIATION TX DELIVERY LVL 3: CPT | Performed by: RADIOLOGY

## 2020-07-20 PROCEDURE — 77300 RADIATION THERAPY DOSE PLAN: CPT | Performed by: RADIOLOGY

## 2020-07-20 PROCEDURE — 77334 RADIATION TREATMENT AID(S): CPT | Performed by: RADIOLOGY

## 2020-07-20 PROCEDURE — 77427 RADIATION TX MANAGEMENT X5: CPT | Performed by: RADIOLOGY

## 2020-07-20 NOTE — PROGRESS NOTES
Oncology Social Work  Radiation Center - [facility]    OSW met with patient today to answer questions related to Health Insurance ( Private Insurance, Medicare AB and Medicaid).  Patient also asked for a [facility] financial assistance application.     Encouraged patient to call if additional questions or needs arise  Thank you  Dunia Pearson, OXANAW,CSW, OSW-C

## 2020-07-21 PROCEDURE — 77336 RADIATION PHYSICS CONSULT: CPT | Performed by: RADIOLOGY

## 2020-07-21 PROCEDURE — 77412 RADIATION TX DELIVERY LVL 3: CPT | Performed by: RADIOLOGY

## 2020-07-22 PROCEDURE — 77412 RADIATION TX DELIVERY LVL 3: CPT | Performed by: RADIOLOGY

## 2020-07-23 PROCEDURE — 77412 RADIATION TX DELIVERY LVL 3: CPT | Performed by: RADIOLOGY

## 2020-07-24 ENCOUNTER — INFUSION (OUTPATIENT)
Dept: ONCOLOGY | Facility: HOSPITAL | Age: 65
End: 2020-07-24

## 2020-07-24 ENCOUNTER — OFFICE VISIT (OUTPATIENT)
Dept: ONCOLOGY | Facility: CLINIC | Age: 65
End: 2020-07-24

## 2020-07-24 VITALS
SYSTOLIC BLOOD PRESSURE: 142 MMHG | RESPIRATION RATE: 20 BRPM | WEIGHT: 208.2 LBS | TEMPERATURE: 97.6 F | HEIGHT: 64 IN | OXYGEN SATURATION: 98 % | BODY MASS INDEX: 35.55 KG/M2 | DIASTOLIC BLOOD PRESSURE: 88 MMHG | HEART RATE: 94 BPM

## 2020-07-24 DIAGNOSIS — Z45.2 FITTING AND ADJUSTMENT OF VASCULAR CATHETER: ICD-10-CM

## 2020-07-24 DIAGNOSIS — C50.912 PRIMARY MALIGNANT NEOPLASM OF LEFT BREAST (HCC): ICD-10-CM

## 2020-07-24 DIAGNOSIS — T45.1X5A LEUKOPENIA DUE TO ANTINEOPLASTIC CHEMOTHERAPY (HCC): ICD-10-CM

## 2020-07-24 DIAGNOSIS — D51.0 VITAMIN B12 DEFICIENCY ANEMIA DUE TO INTRINSIC FACTOR DEFICIENCY: ICD-10-CM

## 2020-07-24 DIAGNOSIS — D63.0 ANEMIA IN NEOPLASTIC DISEASE: ICD-10-CM

## 2020-07-24 DIAGNOSIS — D70.1 LEUKOPENIA DUE TO ANTINEOPLASTIC CHEMOTHERAPY (HCC): ICD-10-CM

## 2020-07-24 DIAGNOSIS — M85.89 OSTEOPENIA OF MULTIPLE SITES: Primary | ICD-10-CM

## 2020-07-24 DIAGNOSIS — C50.912 PRIMARY MALIGNANT NEOPLASM OF LEFT BREAST (HCC): Primary | ICD-10-CM

## 2020-07-24 DIAGNOSIS — C79.51 BONE METASTASIS: ICD-10-CM

## 2020-07-24 LAB
ALBUMIN SERPL-MCNC: 4.1 G/DL (ref 3.5–5.2)
ALBUMIN/GLOB SERPL: 1.6 G/DL (ref 1.1–2.4)
ALP SERPL-CCNC: 161 U/L (ref 38–116)
ALT SERPL W P-5'-P-CCNC: 29 U/L (ref 0–33)
ANION GAP SERPL CALCULATED.3IONS-SCNC: 11.9 MMOL/L (ref 5–15)
AST SERPL-CCNC: 26 U/L (ref 0–32)
BASOPHILS # BLD AUTO: 0.04 10*3/MM3 (ref 0–0.2)
BASOPHILS NFR BLD AUTO: 0.7 % (ref 0–1.5)
BILIRUB SERPL-MCNC: 0.3 MG/DL (ref 0.2–1.2)
BUN SERPL-MCNC: 27 MG/DL (ref 6–20)
BUN/CREAT SERPL: 31 (ref 7.3–30)
CALCIUM SPEC-SCNC: 9.2 MG/DL (ref 8.5–10.2)
CANCER AG15-3 SERPL-ACNC: 67 U/ML
CHLORIDE SERPL-SCNC: 103 MMOL/L (ref 98–107)
CO2 SERPL-SCNC: 24.1 MMOL/L (ref 22–29)
CREAT SERPL-MCNC: 0.87 MG/DL (ref 0.6–1.1)
DEPRECATED RDW RBC AUTO: 53.2 FL (ref 37–54)
EOSINOPHIL # BLD AUTO: 0.27 10*3/MM3 (ref 0–0.4)
EOSINOPHIL NFR BLD AUTO: 4.5 % (ref 0.3–6.2)
ERYTHROCYTE [DISTWIDTH] IN BLOOD BY AUTOMATED COUNT: 14.6 % (ref 12.3–15.4)
GFR SERPL CREATININE-BSD FRML MDRD: 66 ML/MIN/1.73
GLOBULIN UR ELPH-MCNC: 2.6 GM/DL (ref 1.8–3.5)
GLUCOSE SERPL-MCNC: 123 MG/DL (ref 74–124)
HCT VFR BLD AUTO: 28.9 % (ref 34–46.6)
HGB BLD-MCNC: 9.5 G/DL (ref 12–15.9)
IMM GRANULOCYTES # BLD AUTO: 0.03 10*3/MM3 (ref 0–0.05)
IMM GRANULOCYTES NFR BLD AUTO: 0.5 % (ref 0–0.5)
LYMPHOCYTES # BLD AUTO: 1.4 10*3/MM3 (ref 0.7–3.1)
LYMPHOCYTES NFR BLD AUTO: 23.4 % (ref 19.6–45.3)
MAGNESIUM SERPL-MCNC: 1.7 MG/DL (ref 1.8–2.5)
MCH RBC QN AUTO: 32.5 PG (ref 26.6–33)
MCHC RBC AUTO-ENTMCNC: 32.9 G/DL (ref 31.5–35.7)
MCV RBC AUTO: 99 FL (ref 79–97)
MONOCYTES # BLD AUTO: 0.55 10*3/MM3 (ref 0.1–0.9)
MONOCYTES NFR BLD AUTO: 9.2 % (ref 5–12)
NEUTROPHILS NFR BLD AUTO: 3.7 10*3/MM3 (ref 1.7–7)
NEUTROPHILS NFR BLD AUTO: 61.7 % (ref 42.7–76)
NRBC BLD AUTO-RTO: 0 /100 WBC (ref 0–0.2)
PHOSPHATE SERPL-MCNC: 2.6 MG/DL (ref 2.5–4.5)
PLATELET # BLD AUTO: 199 10*3/MM3 (ref 140–450)
PMV BLD AUTO: 10.8 FL (ref 6–12)
POTASSIUM SERPL-SCNC: 4.2 MMOL/L (ref 3.5–4.7)
PROT SERPL-MCNC: 6.7 G/DL (ref 6.3–8)
RBC # BLD AUTO: 2.92 10*6/MM3 (ref 3.77–5.28)
SODIUM SERPL-SCNC: 139 MMOL/L (ref 134–145)
WBC # BLD AUTO: 5.99 10*3/MM3 (ref 3.4–10.8)

## 2020-07-24 PROCEDURE — 83735 ASSAY OF MAGNESIUM: CPT

## 2020-07-24 PROCEDURE — 80053 COMPREHEN METABOLIC PANEL: CPT

## 2020-07-24 PROCEDURE — 77412 RADIATION TX DELIVERY LVL 3: CPT | Performed by: RADIOLOGY

## 2020-07-24 PROCEDURE — 99215 OFFICE O/P EST HI 40 MIN: CPT | Performed by: INTERNAL MEDICINE

## 2020-07-24 PROCEDURE — 96372 THER/PROPH/DIAG INJ SC/IM: CPT

## 2020-07-24 PROCEDURE — 86300 IMMUNOASSAY TUMOR CA 15-3: CPT | Performed by: INTERNAL MEDICINE

## 2020-07-24 PROCEDURE — 25010000002 DENOSUMAB 120 MG/1.7ML SOLUTION: Performed by: INTERNAL MEDICINE

## 2020-07-24 PROCEDURE — 85025 COMPLETE CBC W/AUTO DIFF WBC: CPT

## 2020-07-24 PROCEDURE — 36415 COLL VENOUS BLD VENIPUNCTURE: CPT | Performed by: INTERNAL MEDICINE

## 2020-07-24 PROCEDURE — 84100 ASSAY OF PHOSPHORUS: CPT

## 2020-07-24 RX ADMIN — DENOSUMAB 120 MG: 120 INJECTION SUBCUTANEOUS at 08:39

## 2020-07-24 NOTE — PROGRESS NOTES
Subjective  REASONS FOR FOLLOWUP:   1. Metastatic breast cancer to multiple skeletal sites including cervical spine, sternum, lumbar spine and right femur, underwent Abraxane every 4 weeks and Xgeva every  3 months, DOCUMENTED RADIOLOGICAL PROGRESSION BY BONE SCAN 3/15/17 MOVING AWAY FROM ABRAXANE AND HALAVEN, PRESENTLY ON FEMARA AND KISQALI , XGEVA EVERY 3 MONTHS,    2.Iron deficiency anemia du to GI blood loss, Xarelto stopped months ago, Iron initiated, Pepcid along with Aleve for gastric protection.   3.  1/10/2020 continued good tolerance of because Kasquali  and Femara.  We will continue to hold Xgeva with plans for DEXA scan in spring 2020 to determine the need further Xgeva.  4. Progression in the bone disease 6/20 Femara and KISQALI stopped, rad to pelvis scheduled, thereafter HALAVEN planned      History of Present Illness PATIENT WAS CALLED THE DAY BEFORE BY THE OFFICE TO ASK FOR SYMPTOMS THAT COULD BE CONSISTENT WITH CORONAVIRUS INFECTION, AND BEING NEGATIVE WAS SCHEDULED TO BE SEEN IN THE OFFICE TODAY. SIMILAR QUESTIONING TODAY INCLUDING, CHILLS, FEVER, NEW COUGH, SHORTNESS OF BREATH, DIARRHEA,DIFFUSE BODY ACHES  AND CHANGES IN SMELL OR TASTE WERE NEGATIVE.DURING THE VISIT WITH THE PATIENT TODAY , PATIENT HAD FACE MASK, I HAD PROPPER PROTECTIVE EQUIPMENT, AND I DID HAND HYGIENE WITH SOAP AND WATER BEFORE AND AFTER THE VISIT.    This patient returns today to the office for followup. She has completed today 5 radiation treatments to her pelvic bone and actually she has started to see some improvement in the pain. On the other hand she has developed pain in the right humerus in  absence of any trauma and this is one of the hot spots on the bone scan recently done. She has lost some mobility there but she has not had any obvious pain that would indicate a fracture. Overall she is taking more Aleve, 6 Aleve a day to control pain and she raised the question if it will be okay to start to take narcotic medication. She has not touched Lortab tablet that she has had for a long time. Other than that her appetite is good, her weight is stable, her bowel function is normal, urination is normal, no new clots, no cardiovascular or respiratory issues, no fevers, no infections.     She will be completing her radiation treatment a week from today.     I reviewed the bone scan today done recently and she has a hot spot in the right humerus where she has the pain and she will require palliative radiation therapy there.        Past Medical History:   Diagnosis Date   • Abnormal mammogram    • Abnormal menstrual cycle    • Arthritis    • Bone metastasis (CMS/HCC)    • Breast cancer (CMS/HCC) 2000    Left breast   • Drug therapy 2001    breast cancer   • Drug therapy 2017    right femur/associated with breast cancer   • H/O Heart murmur    • History of colon polyps    • Hx of radiation therapy 2000    breast cancer   • Hx of radiation therapy 2015    bone cancer right femur/ associated with breast cancer   • Hypercholesterolemia    • Hyperlipidemia    • Hypertension    • Multiple benign polyps of large intestine    • Reflux esophagitis      Social History     Socioeconomic History   • Marital status: Single     Spouse name: Not on file   • Number of children: Not on file   • Years of education: Not on file   • Highest education level: Not on file   Occupational History   • Occupation:      Employer: [employer]   Tobacco Use   • Smoking status: Never Smoker   • Smokeless tobacco: Never Used   Substance and Sexual Activity   • Alcohol use: No   • Drug use: No   • Sexual  activity: Defer     Family History   Problem Relation Age of Onset   • Hypertension Mother    • Diabetes Mother    • Macular degeneration Mother    • Thyroid disease Mother    • Heart disease Father    • Stroke Father    • Kidney disease Father    • Glaucoma Brother    • Diabetes Brother    • Hypertension Brother    • Colon cancer Paternal Grandmother    • Thyroid disease Other    • Kidney disease Other    • Glaucoma Other    • Cancer Other    • Diabetes Other      ROS:    General: no fever, no chills,  fatigue,no weight changes, no lack of appetite.  Eyes: no epiphora, xerophthalmia,conjunctivitis, pain, glaucoma, blurred vision, blindness, secretion, photophobia, proptosis, diplopia.  Ears: no otorrhea, tinnitus, otorrhagia, deafness, pain, vertigo.  Nose: no rhinorrhea, no epistaxis, no alteration in perception of odors, no sinuses pressure.  Mouth: no alteration in gums or teeth,  No ulcers, no difficulty with mastication or deglut ion, no odynophagia.  Neck: no masses or pain, no thyroid alterations, no pain in muscles or arteries, no carotid odynia, no crepitation.  Respiratory: no cough, no sputum production,no dyspnea,no trepopnea, no pleuritic pain,no hemoptysis.  Heart: no syncope, no irregularity, no palpitations, no angina,no orthopnea,no paroxysmal nocturnal dyspnea.  Vascular Venous: no tenderness,no edema,no palpable cords,no postphlebitic syndrome, no skin changes no ulcerations.  Vascular Arterial: no distal ischemia, noclaudication, no gangrene, no neuropathic ischemic pain, no skin ulcers, no paleness no cyanosis.  GI: no dysphagia, no odynophagia, no regurgitation, no heartburn,no indigestion,no nausea,no vomiting,no hematemesis ,no melena,no jaundice,no distention, no obstipation,no enterorrhagia,no proctalgia,no anal  lesions, no changes in bowel habits.  : no frequency, no hesitancy, no hematuria, no discharge,no  pain.  Musculoskeletal: stated muscle or tendon pain or inflammation,no  joint  "pain, no edema,  functional limitation,no fasciculations, no mass.  Neurologic: no headache, no seizures, noalterations on Craneal nerves, no motor deficit, no sensory deficit, normal coordination, no alteration in memory,normal orientation, calculation,normal writting, verbal and written language.  Skin: no rashes,no pruritus no localized lesions.  Psychiatric: no anxiety, no depression,no agitation, no delusions, proper insight.    Medications:  The current medication list was reviewed in the EMR    ALLERGIES:    Allergies   Allergen Reactions   • Codeine Unknown - Low Severity   • Docetaxel Unknown - Low Severity   • Paclitaxel Unknown - Low Severity       Objective      Vitals:    07/24/20 0802   BP: 142/88   Pulse: 94   Resp: 20   Temp: 97.6 °F (36.4 °C)   TempSrc: Temporal   SpO2: 98%   Weight: 94.4 kg (208 lb 3.2 oz)   Height: 163 cm (64.17\")   PainSc:   8   PainLoc: Shoulder  Comment: Right shoulder     Current Status 7/24/2020   ECOG score 0     EXAM     Patient screened  for depression based on a PHQ-9 score of 3 on 6/25/2020.           GENERAL ASPECT: IN GOOD HEALTH WALKING THROUGH THE ROOM NO DIFFICULTIES, 3/10 PAIN.PROPER NUTRITION.WELL KEPT PHYSICALLY.  EYES : NOT JAUNDICED, PUPILS WERE EQUAL.  NECK: NO CERVICAL ADENOPATHIES OR MASSES OR THYROID ENLARGEMENT.  HEART: REGULAR ,NO MURMURS , NO GALLOPS, NO RUBS.  ABDOMEN: NOT DISTENDED, NO PAIN, NO LIVER OR SPLEEN ENLARGEMENT, NO ASCITES, NO COLLATERAL CIRCULATION.  EXTREMITIES: NO EDEMA, r femur PAIN, r humerus pain limited rom, NO CLUBBING, NO DEFORMITIES.  NEUROLOGIC: NORMAL CONVERSATION, MEMORY , SPEECH AND GAIT.  SKIN: NO LESIONS.    Hematology WBC   Date Value Ref Range Status   07/24/2020 5.99 3.40 - 10.80 10*3/mm3 Final   02/03/2020 3.89 3.40 - 10.80 10*3/mm3 Final     RBC   Date Value Ref Range Status   07/24/2020 2.92 (L) 3.77 - 5.28 10*6/mm3 Final   02/03/2020 3.08 (L) 3.77 - 5.28 10*6/mm3 Final     Hemoglobin   Date Value Ref Range Status "   07/24/2020 9.5 (L) 12.0 - 15.9 g/dL Final     Hematocrit   Date Value Ref Range Status   07/24/2020 28.9 (L) 34.0 - 46.6 % Final     Platelets   Date Value Ref Range Status   07/24/2020 199 140 - 450 10*3/mm3 Final        Cancer Antigen 15-3   Order: [id number]   Status:  Final result   Visible to patient:  Yes (MyChart) Next appt:  Today at 03:10 PM in Radiation Oncology ([facility] [address]) Dx:  Bone metastasis (CMS/HCC); Aromatase ...   Specimen Information: Blood        Component  Ref Range & Units 1mo ago 9mo ago 10mo ago   CA 15-3  <=25.0 U/mL 74.8High   28.1High   21.7    Resulting Agency  [facility]      Narrative   Performed by:  [facility]   Results may be falsely decreased if patient taking Biotin.       Specimen Collected: 06/19/20 07:43 Last Resulted: 06/19/20 09:44                         Assessment/Plan    .   1. Metastatic  Left breast cancer to multiple skeletal sites including cervical spine, sternum, lumbar spine and right femur.Since the previous visit, the patient has been taking her Femara and Kisqali correctly  .On eVisit on 04/10/2020 the patient states that her symptoms are very much quiet at this time. She has not developed any new sites of bone pain and she has not had any difficulty with her Kisqali and Femara that remain ongoing.       The patient was further reviewed on clinical grounds on 06/19/2020. Now she is experiencing a new pain in the pelvic bone bilaterally close to the sacroiliac joints and ala of the pelvic bone. Other than that she has not had any other new sites of pain.     I reviewed with the patient her bone scan that shows unequivocally increased uptake in the sacroiliac joints as well in the pelvic bone bilaterally posteriorly. There is a new uptake in the fibula on the right side that is very minimal. This does not correspond to any site of pain clinically.       The patient was further reviewed on 07/24/2020. Since then she has initiated radiation  treatment and she has completed 5 treatments today out of 10. She has seen some improvement in the intensity of pain in the sacroiliac joints where she has sites of metastasis.  Now she has developed a new bone pain in the right humerus. Reviewing the bone scan with her she had a hot spot there and I will talk with Sarita Baldwin MD, today about adding some radiation therapy to this anatomical site.     Given the fact that the radiation field is larger in the pelvis we will delay the initiation of any Halaven until the patient completes her treatment and goes through expected hematological toxicity. We will proceed with a CBC next week and she will return in 5 weeks to have another CBC, CMP and CA 15-3. I discussed with her the fact that this is the 1st time since she has been my patient with metastatic breast cancer for the last 6 years or so that her CA 15-3 has risen to 79 during the previous visit. This will be monitored again at next visit in 5 weeks.     For hypercalcemia of malignancy the patient will receive Xgeva today and this medicine will remain on monthly basis until we achieve better control of the disease. I expect that we will be able to give her Halaven in 5 weeks because I expect that by then hematological toxicity should be subsided.    In regard to her anemia neoplastic disease the patient has been advised that when we measured her ferritin, iron profile during the previous visit these numbers were normal and I will discontinue her ferrous sulfate tablet today. She was taking B12 supplement and I asked her to take it only once a week.     In regard to pain management I asked her to take 1/2 tablet of Lortab after she gets to work that way there is no somnolence while driving and she can take another 1/2 tablet after she gets home in the evening. She is welcome to take this 1/2 tablet with some Aleve also to minimize pain.     If she takes the Lortab she will need to take MiraLax on a regular  schedule.     I will review her back in 5 weeks with CBC, CMP, CA 15-3, magnesium and phosphorus. She will have Xgeva then and hopefully she will be able to initiate her Halaven.     The patient has no need for prescription for pain medication at this time. She has plenty of medicine that she never has used before.    I reviewed the bone scan with her again and I placed a phone call to discuss the case with Dr. Baldwin.         I DISCUSSED WITH PATIENT IN DETAIL FORMS TO DECREASE CHANCES OF CORONAVIRUS INFECTION INCLUDING ISOLATION, PROPER HAND HIGIENE, AVOID PUBLIC PLACES  WITH CROWDS, FOLLOW  CDC RECOMENDATIONS, AND KEEP PERSONAL AND SOCIAL RESPONSIBILITY, WARE A MASK IN PUBLIC PLACES.  PATIENT IS AWARE THIS INFECTION COULD HAVE SEVERE CONSEQUENCES TO PERSONAL HEALTH AND FAMILY RAMIFICATIONS OF THIS.      7/24/2020

## 2020-07-24 NOTE — NURSING NOTE
Pt back to infusion. Per Dr. Saez, no chemo today, pt is opting for radiation only. Xgeva still to be given.

## 2020-07-27 PROCEDURE — 77427 RADIATION TX MANAGEMENT X5: CPT | Performed by: RADIOLOGY

## 2020-07-27 PROCEDURE — 77412 RADIATION TX DELIVERY LVL 3: CPT | Performed by: RADIOLOGY

## 2020-07-27 PROCEDURE — 77417 THER RADIOLOGY PORT IMAGE(S): CPT | Performed by: RADIOLOGY

## 2020-07-28 PROCEDURE — 77336 RADIATION PHYSICS CONSULT: CPT | Performed by: RADIOLOGY

## 2020-07-28 PROCEDURE — 77412 RADIATION TX DELIVERY LVL 3: CPT | Performed by: RADIOLOGY

## 2020-07-29 PROCEDURE — 77412 RADIATION TX DELIVERY LVL 3: CPT | Performed by: RADIOLOGY

## 2020-07-30 ENCOUNTER — LAB (OUTPATIENT)
Dept: LAB | Facility: HOSPITAL | Age: 65
End: 2020-07-30

## 2020-07-30 DIAGNOSIS — C79.51 BONE METASTASIS: ICD-10-CM

## 2020-07-30 DIAGNOSIS — Z45.2 FITTING AND ADJUSTMENT OF VASCULAR CATHETER: ICD-10-CM

## 2020-07-30 DIAGNOSIS — D70.1 LEUKOPENIA DUE TO ANTINEOPLASTIC CHEMOTHERAPY (HCC): ICD-10-CM

## 2020-07-30 DIAGNOSIS — C50.912 PRIMARY MALIGNANT NEOPLASM OF LEFT BREAST (HCC): ICD-10-CM

## 2020-07-30 DIAGNOSIS — T45.1X5A LEUKOPENIA DUE TO ANTINEOPLASTIC CHEMOTHERAPY (HCC): ICD-10-CM

## 2020-07-30 DIAGNOSIS — D63.0 ANEMIA IN NEOPLASTIC DISEASE: ICD-10-CM

## 2020-07-30 DIAGNOSIS — D51.0 VITAMIN B12 DEFICIENCY ANEMIA DUE TO INTRINSIC FACTOR DEFICIENCY: ICD-10-CM

## 2020-07-30 LAB
BASOPHILS # BLD AUTO: 0.03 10*3/MM3 (ref 0–0.2)
BASOPHILS NFR BLD AUTO: 0.5 % (ref 0–1.5)
DEPRECATED RDW RBC AUTO: 51.2 FL (ref 37–54)
EOSINOPHIL # BLD AUTO: 0.36 10*3/MM3 (ref 0–0.4)
EOSINOPHIL NFR BLD AUTO: 5.6 % (ref 0.3–6.2)
ERYTHROCYTE [DISTWIDTH] IN BLOOD BY AUTOMATED COUNT: 14.6 % (ref 12.3–15.4)
HCT VFR BLD AUTO: 29.7 % (ref 34–46.6)
HGB BLD-MCNC: 10.1 G/DL (ref 12–15.9)
IMM GRANULOCYTES # BLD AUTO: 0.08 10*3/MM3 (ref 0–0.05)
IMM GRANULOCYTES NFR BLD AUTO: 1.2 % (ref 0–0.5)
LYMPHOCYTES # BLD AUTO: 1.2 10*3/MM3 (ref 0.7–3.1)
LYMPHOCYTES NFR BLD AUTO: 18.5 % (ref 19.6–45.3)
MCH RBC QN AUTO: 32.9 PG (ref 26.6–33)
MCHC RBC AUTO-ENTMCNC: 34 G/DL (ref 31.5–35.7)
MCV RBC AUTO: 96.7 FL (ref 79–97)
MONOCYTES # BLD AUTO: 0.65 10*3/MM3 (ref 0.1–0.9)
MONOCYTES NFR BLD AUTO: 10 % (ref 5–12)
NEUTROPHILS NFR BLD AUTO: 4.16 10*3/MM3 (ref 1.7–7)
NEUTROPHILS NFR BLD AUTO: 64.2 % (ref 42.7–76)
NRBC BLD AUTO-RTO: 0 /100 WBC (ref 0–0.2)
PLATELET # BLD AUTO: 177 10*3/MM3 (ref 140–450)
PMV BLD AUTO: 11.4 FL (ref 6–12)
RBC # BLD AUTO: 3.07 10*6/MM3 (ref 3.77–5.28)
WBC # BLD AUTO: 6.48 10*3/MM3 (ref 3.4–10.8)

## 2020-07-30 PROCEDURE — 77412 RADIATION TX DELIVERY LVL 3: CPT | Performed by: RADIOLOGY

## 2020-07-30 PROCEDURE — 36415 COLL VENOUS BLD VENIPUNCTURE: CPT

## 2020-07-30 PROCEDURE — 85025 COMPLETE CBC W/AUTO DIFF WBC: CPT

## 2020-07-31 ENCOUNTER — RADIATION ONCOLOGY WEEKLY ASSESSMENT (OUTPATIENT)
Dept: RADIATION ONCOLOGY | Facility: HOSPITAL | Age: 65
End: 2020-07-31

## 2020-07-31 ENCOUNTER — DOCUMENTATION (OUTPATIENT)
Dept: RADIATION ONCOLOGY | Facility: HOSPITAL | Age: 65
End: 2020-07-31

## 2020-07-31 DIAGNOSIS — C79.51 BONE METASTASIS: Primary | ICD-10-CM

## 2020-07-31 PROCEDURE — 77412 RADIATION TX DELIVERY LVL 3: CPT | Performed by: RADIOLOGY

## 2020-07-31 NOTE — PROGRESS NOTES
Physician Weekly Management Note    Diagnosis:     Diagnosis Plan   1. Bone metastasis (CMS/HCC)         RT Details:  Fx10/10 si joint  Notes on Treatment course, Films, Medical progress:  Doing well pain improving, completes today    Weekly Management:  Medication reviewed?   Yes  New medications given?   No  Problemlist reviewed?   Yes  Problem added?   No  Issues raised requiring referral to support services - task assigned to:  homero    Technical aspects reviewed:  Weekly OBI approved?   Yes  Weekly port films approved?   Yes  Change requests noted on port film?   No  Patient setup and plan reviewed?   Yes    Chart Reviewed:  Continue current treatment plan?   Yes  Treatment plan change requested?   No  CBC reviewed?   No  Concurrent Chemo?   No    Objective     Toxicities:   none     Review of Systems   Constitutional: Negative.    Musculoskeletal: Positive for arthralgias.          There were no vitals filed for this visit.    Current Status 7/24/2020   ECOG score 0       Physical Exam   Constitutional: She appears well-developed and well-nourished.   Psychiatric: She has a normal mood and affect. Her behavior is normal.           Problem Summary List    Diagnosis:     Diagnosis Plan   1. Bone metastasis (CMS/HCC)       Pathology:   Bone mets    Past Medical History:   Diagnosis Date   • Abnormal mammogram    • Abnormal menstrual cycle    • Arthritis    • Bone metastasis (CMS/HCC)    • Breast cancer (CMS/HCC) 2000    Left breast   • Drug therapy 2001    breast cancer   • Drug therapy 2017    right femur/associated with breast cancer   • H/O Heart murmur    • History of colon polyps    • Hx of radiation therapy 2000    breast cancer   • Hx of radiation therapy 2015    bone cancer right femur/ associated with breast cancer   • Hypercholesterolemia    • Hyperlipidemia    • Hypertension    • Multiple benign polyps of large intestine    • Reflux esophagitis          Past Surgical History:   Procedure Laterality Date    • BREAST BIOPSY Left 2000    breast cancer   • BREAST SURGERY  2000    lumpectomy, mastectomy, revision   • COLONOSCOPY  2012   • FEMUR FRACTURE SURGERY      secondary to metastatic breast cancer 7/2014, with revision in 9/2015   • MASTECTOMY Left 2000    transflap in 2003         Current Outpatient Medications on File Prior to Visit   Medication Sig Dispense Refill   • Ascorbic Acid (VITAMIN C PO) Take  by mouth Daily.     • atorvastatin (LIPITOR) 20 MG tablet Take 1 tablet by mouth once daily 90 tablet 1   • Biotin w/ Vitamins C & E (HAIR SKIN & NAILS GUMMIES PO) Take  by mouth.     • cetirizine (zyrTEC) 10 MG tablet Take 10 mg by mouth As Needed for Allergies.     • ciclopirox (PENLAC) 8 % solution Apply  topically Every Night. 6 mL 0   • Cyanocobalamin (B-12 PO) Take 1,000 mcg by mouth Daily.     • denosumab (XGEVA) 120 MG/1.7ML solution injection Inject  under the skin 1 (one) time.     • diclofenac (VOLTAREN) 1 % gel gel APPLY 2 TO 4 GRAMS TOPICALLY TO AFFECTED AREA TWICE DAILY AS DIRECTED 100 g 3   • Ferrous Sulfate (IRON) 325 (65 Fe) MG tablet TAKE 1 TABLET BY MOUTH ONCE DAILY WITH BREAKFAST 30 tablet 5   • HYDROcodone-acetaminophen (NORCO) 5-325 MG per tablet Take 1 tablet by mouth Every 6 (Six) Hours As Needed.     • lisinopril-hydrochlorothiazide (PRINZIDE,ZESTORETIC) 10-12.5 MG per tablet Take 1 tablet by mouth twice daily 180 tablet 0   • MAG64 64 MG DR tablet Take 1 tablet by mouth once daily 30 each 3   • Multiple Vitamins-Minerals (HAIR SKIN AND NAILS FORMULA PO) Take  by mouth.     • Naproxen Sod-Diphenhydramine (ALEVE PM PO) Take  by mouth as needed.     • Naproxen Sodium (ALEVE PO) Take  by mouth.     • Omega-3 Fatty Acids (FISH OIL) 645 MG capsule Take  by mouth.     • OMEPRAZOLE PO Take  by mouth Daily.     • ondansetron ODT (ZOFRAN-ODT) 8 MG disintegrating tablet Dissolve one tablet on tongue every 6 hours for nausea and vomiting 12 tablet 0     No current facility-administered medications on  file prior to visit.        Allergies   Allergen Reactions   • Codeine Unknown - Low Severity   • Docetaxel Unknown - Low Severity   • Paclitaxel Unknown - Low Severity         Referring Provider:    No referring provider defined for this encounter.    Oncologist:  No primary care provider on file.      Seen and approved by:  Fanny Dennis MD  07/31/2020

## 2020-08-06 DIAGNOSIS — E78.5 HYPERLIPIDEMIA, UNSPECIFIED HYPERLIPIDEMIA TYPE: Primary | ICD-10-CM

## 2020-08-06 DIAGNOSIS — E53.8 B12 DEFICIENCY: ICD-10-CM

## 2020-08-07 LAB
ALBUMIN SERPL-MCNC: 4.5 G/DL (ref 3.5–5.2)
ALBUMIN/GLOB SERPL: 2.4 G/DL
ALP SERPL-CCNC: 150 U/L (ref 39–117)
ALT SERPL-CCNC: 35 U/L (ref 1–33)
AST SERPL-CCNC: 27 U/L (ref 1–32)
BILIRUB SERPL-MCNC: 0.3 MG/DL (ref 0–1.2)
BUN SERPL-MCNC: 29 MG/DL (ref 8–23)
BUN/CREAT SERPL: 33.7 (ref 7–25)
CALCIUM SERPL-MCNC: 8.6 MG/DL (ref 8.6–10.5)
CHLORIDE SERPL-SCNC: 98 MMOL/L (ref 98–107)
CHOLEST SERPL-MCNC: 191 MG/DL (ref 0–200)
CO2 SERPL-SCNC: 22 MMOL/L (ref 22–29)
CREAT SERPL-MCNC: 0.86 MG/DL (ref 0.57–1)
GLOBULIN SER CALC-MCNC: 1.9 GM/DL
GLUCOSE SERPL-MCNC: 108 MG/DL (ref 65–99)
HDLC SERPL-MCNC: 48 MG/DL (ref 40–60)
LDLC SERPL CALC-MCNC: 91 MG/DL (ref 0–100)
POTASSIUM SERPL-SCNC: 4.3 MMOL/L (ref 3.5–5.2)
PROT SERPL-MCNC: 6.4 G/DL (ref 6–8.5)
SODIUM SERPL-SCNC: 135 MMOL/L (ref 136–145)
TRIGL SERPL-MCNC: 261 MG/DL (ref 0–150)
VIT B12 SERPL-MCNC: 1905 PG/ML (ref 211–946)
VLDLC SERPL CALC-MCNC: 52.2 MG/DL

## 2020-08-10 NOTE — PROGRESS NOTES
RADIATION THERAPY TREATMENT SUMMARY  Patient: Maggie Suero  YOB: 1955  Diagnosis:    Bone metastasis (CMS/HCC)        Maggie Suero has recently completed the course of radiation therapy prescribed for the above-mentioned diagnosis.  Below, please find the specifics of the course of treatment delivered:    Radiation Details:    Tx Start Date  7/20/2020   Tx End date  7/31/2020  Intent   Palliative   Total Treatments 10  Prescription Name BILAT SI JTS  Primary/Boost  PRIMARY  Dose Per Fraction 300 cGy/Frac  Number of Fractions 10  Prescribe To  IsodoseLine 100 % 3000 cGy  300 cGy/Frac  Mode    Photon  Technique  3D CONFORMAL  Frequency  Once Daily  Energy   18X  Prescription Note PRESCRIBED 100% TO CALC PT     Tolerance and Toxicities: she tolerated the treatments well, requiring no treatment breaks. she completed the treatments in 11 elapsed days.    Follow-up Plans:  The patient has continued treatment and follow up established with the [facility] physicians. Therefore, I have not given the patient an appointment to return here but encouraged them to call if we could be of further help.

## 2020-08-14 ENCOUNTER — OFFICE VISIT (OUTPATIENT)
Dept: INTERNAL MEDICINE | Facility: CLINIC | Age: 65
End: 2020-08-14

## 2020-08-14 VITALS
DIASTOLIC BLOOD PRESSURE: 72 MMHG | OXYGEN SATURATION: 98 % | BODY MASS INDEX: 35.17 KG/M2 | HEIGHT: 64 IN | HEART RATE: 95 BPM | SYSTOLIC BLOOD PRESSURE: 120 MMHG | WEIGHT: 206 LBS

## 2020-08-14 DIAGNOSIS — E78.5 HYPERLIPIDEMIA, UNSPECIFIED HYPERLIPIDEMIA TYPE: ICD-10-CM

## 2020-08-14 DIAGNOSIS — I10 BENIGN ESSENTIAL HTN: Primary | ICD-10-CM

## 2020-08-14 PROCEDURE — 99213 OFFICE O/P EST LOW 20 MIN: CPT | Performed by: INTERNAL MEDICINE

## 2020-08-14 NOTE — PROGRESS NOTES
Subjective     Maggie Suero is a 65 y.o. female who presents with   Chief Complaint   Patient presents with   • Hypertension   • Hyperlipidemia       History of Present Illness     HTN.  Control is good.   HLD.  Control is fairly good with current.     Review of Systems   Respiratory: Negative.    Cardiovascular: Negative.    Musculoskeletal: Positive for arthralgias.       The following portions of the patient's history were reviewed and updated as appropriate: allergies, current medications and problem list.    Patient Active Problem List    Diagnosis Date Noted   • Leukopenia due to antineoplastic chemotherapy (CMS/HCC) 09/06/2017   • Vitamin B12 deficiency anemia due to intrinsic factor deficiency 06/13/2017   • Fitting and adjustment of vascular catheter 03/13/2017   • Heart murmur, systolic 10/25/2016   • Bone metastasis (CMS/HCC) 05/20/2016   • Anemia in neoplastic disease 05/20/2016   • History of colon polyps 03/08/2016   • Benign essential HTN 02/18/2016   • Primary malignant neoplasm of left breast (CMS/HCC) 02/18/2016     Note Last Updated: 3/8/2016     Left breast cancer in 2000  Mets to the hip in 2014 thought to be metastatic from the original.       • Thrombophilia (CMS/HCC) 02/18/2016     Note Last Updated: 2/18/2016     Description: associated with femoral fracture     • Hyperlipidemia 02/18/2016   • IFG (impaired fasting glucose) 02/18/2016   • Osteopenia 02/18/2016     Note Last Updated: 3/8/2016     Description: Fosamax stopped after 10 years of therapy in May of 2012.  On Zometa with metastatic breast cancer followed by Xgeva currently.           Current Outpatient Medications on File Prior to Visit   Medication Sig Dispense Refill   • atorvastatin (LIPITOR) 20 MG tablet Take 1 tablet by mouth once daily 90 tablet 1   • cetirizine (zyrTEC) 10 MG tablet Take 10 mg by mouth As Needed for Allergies.     • ciclopirox (PENLAC) 8 % solution Apply  topically Every Night. 6 mL 0   • Cyanocobalamin  "(B-12 PO) Take 1,000 mcg by mouth Daily.     • denosumab (XGEVA) 120 MG/1.7ML solution injection Inject  under the skin 1 (one) time.     • diclofenac (VOLTAREN) 1 % gel gel APPLY 2 TO 4 GRAMS TOPICALLY TO AFFECTED AREA TWICE DAILY AS DIRECTED 100 g 3   • HYDROcodone-acetaminophen (NORCO) 5-325 MG per tablet Take 1 tablet by mouth Every 6 (Six) Hours As Needed.     • lisinopril-hydrochlorothiazide (PRINZIDE,ZESTORETIC) 10-12.5 MG per tablet Take 1 tablet by mouth twice daily 180 tablet 0   • MAG64 64 MG DR tablet Take 1 tablet by mouth once daily 30 each 3   • Multiple Vitamins-Minerals (HAIR SKIN AND NAILS FORMULA PO) Take  by mouth.     • Naproxen Sod-Diphenhydramine (ALEVE PM PO) Take  by mouth as needed.     • Naproxen Sodium (ALEVE PO) Take  by mouth.     • Omega-3 Fatty Acids (FISH OIL) 645 MG capsule Take  by mouth.     • OMEPRAZOLE PO Take  by mouth Daily.     • Ascorbic Acid (VITAMIN C PO) Take  by mouth Daily.     • Biotin w/ Vitamins C & E (HAIR SKIN & NAILS GUMMIES PO) Take  by mouth.     • Ferrous Sulfate (IRON) 325 (65 Fe) MG tablet TAKE 1 TABLET BY MOUTH ONCE DAILY WITH BREAKFAST 30 tablet 5   • [DISCONTINUED] ondansetron ODT (ZOFRAN-ODT) 8 MG disintegrating tablet Dissolve one tablet on tongue every 6 hours for nausea and vomiting 12 tablet 0     No current facility-administered medications on file prior to visit.        Objective     /72   Pulse 95   Ht 163 cm (64.17\")   Wt 93.4 kg (206 lb)   LMP  (LMP Unknown)   SpO2 98%   BMI 35.17 kg/m²     Physical Exam   Constitutional: She is oriented to person, place, and time. She appears well-developed and well-nourished.   HENT:   Head: Normocephalic and atraumatic.   Pulmonary/Chest: Effort normal.   Neurological: She is alert and oriented to person, place, and time.   Psychiatric: She has a normal mood and affect. Her behavior is normal.       Assessment/Plan   Maggie was seen today for hypertension and hyperlipidemia.    Diagnoses and all " orders for this visit:    Benign essential HTN    Hyperlipidemia, unspecified hyperlipidemia type        Discussion    HTN.  The patient will continue current regimen.      HLD.  The patient will continue current regimen.             Future Appointments   Date Time Provider Department Center   9/2/2020  7:45 AM [facility] CHAIR  [facility]   9/2/2020  8:20 AM Nader Saez MD [facility]   9/2/2020  9:00 AM [address] [facility] INFUS [facility]   2/12/2021  8:10 AM [facility] None   2/19/2021 11:15 AM Chetna Whalen MD [facility] None

## 2020-08-28 RX ORDER — HEPARIN SODIUM (PORCINE) LOCK FLUSH IV SOLN 100 UNIT/ML 100 UNIT/ML
500 SOLUTION INTRAVENOUS AS NEEDED
Status: CANCELLED | OUTPATIENT
Start: 2020-08-28

## 2020-08-28 RX ORDER — SODIUM CHLORIDE 0.9 % (FLUSH) 0.9 %
10 SYRINGE (ML) INJECTION AS NEEDED
Status: CANCELLED | OUTPATIENT
Start: 2020-08-28

## 2020-08-30 RX ORDER — LISINOPRIL AND HYDROCHLOROTHIAZIDE 12.5; 1 MG/1; MG/1
TABLET ORAL
Qty: 180 TABLET | Refills: 0 | Status: SHIPPED | OUTPATIENT
Start: 2020-08-30 | End: 2020-11-30

## 2020-08-31 RX ORDER — MAGNESIUM 64 MG (MAGNESIUM CHLORIDE) TABLET,DELAYED RELEASE
Qty: 30 EACH | Refills: 0 | Status: SHIPPED | OUTPATIENT
Start: 2020-08-31 | End: 2020-09-28

## 2020-09-02 ENCOUNTER — INFUSION (OUTPATIENT)
Dept: ONCOLOGY | Facility: HOSPITAL | Age: 65
End: 2020-09-02

## 2020-09-02 ENCOUNTER — TELEPHONE (OUTPATIENT)
Dept: ONCOLOGY | Facility: CLINIC | Age: 65
End: 2020-09-02

## 2020-09-02 ENCOUNTER — OFFICE VISIT (OUTPATIENT)
Dept: ONCOLOGY | Facility: CLINIC | Age: 65
End: 2020-09-02

## 2020-09-02 VITALS
RESPIRATION RATE: 18 BRPM | WEIGHT: 208.4 LBS | OXYGEN SATURATION: 96 % | TEMPERATURE: 97.9 F | BODY MASS INDEX: 35.58 KG/M2 | DIASTOLIC BLOOD PRESSURE: 72 MMHG | SYSTOLIC BLOOD PRESSURE: 130 MMHG | HEIGHT: 64 IN | HEART RATE: 96 BPM

## 2020-09-02 DIAGNOSIS — D63.0 ANEMIA IN NEOPLASTIC DISEASE: ICD-10-CM

## 2020-09-02 DIAGNOSIS — D70.1 LEUKOPENIA DUE TO ANTINEOPLASTIC CHEMOTHERAPY (HCC): ICD-10-CM

## 2020-09-02 DIAGNOSIS — Z45.2 FITTING AND ADJUSTMENT OF VASCULAR CATHETER: ICD-10-CM

## 2020-09-02 DIAGNOSIS — C50.912 PRIMARY MALIGNANT NEOPLASM OF LEFT BREAST (HCC): ICD-10-CM

## 2020-09-02 DIAGNOSIS — C79.51 BONE METASTASIS: ICD-10-CM

## 2020-09-02 DIAGNOSIS — D51.0 VITAMIN B12 DEFICIENCY ANEMIA DUE TO INTRINSIC FACTOR DEFICIENCY: ICD-10-CM

## 2020-09-02 DIAGNOSIS — C79.51 BONE METASTASIS: Primary | ICD-10-CM

## 2020-09-02 DIAGNOSIS — C50.912 PRIMARY MALIGNANT NEOPLASM OF LEFT BREAST (HCC): Primary | ICD-10-CM

## 2020-09-02 DIAGNOSIS — M85.89 OSTEOPENIA OF MULTIPLE SITES: ICD-10-CM

## 2020-09-02 DIAGNOSIS — T82.518A VASCULAR CATHETER DYSFUNCTION, INITIAL ENCOUNTER (HCC): ICD-10-CM

## 2020-09-02 DIAGNOSIS — T45.1X5A LEUKOPENIA DUE TO ANTINEOPLASTIC CHEMOTHERAPY (HCC): ICD-10-CM

## 2020-09-02 LAB
ALBUMIN SERPL-MCNC: 4.1 G/DL (ref 3.5–5.2)
ALBUMIN/GLOB SERPL: 1.6 G/DL (ref 1.1–2.4)
ALP SERPL-CCNC: 140 U/L (ref 38–116)
ALT SERPL W P-5'-P-CCNC: 37 U/L (ref 0–33)
ANION GAP SERPL CALCULATED.3IONS-SCNC: 11.6 MMOL/L (ref 5–15)
AST SERPL-CCNC: 28 U/L (ref 0–32)
BASOPHILS # BLD AUTO: 0.03 10*3/MM3 (ref 0–0.2)
BASOPHILS NFR BLD AUTO: 0.6 % (ref 0–1.5)
BILIRUB SERPL-MCNC: 0.4 MG/DL (ref 0.2–1.2)
BUN SERPL-MCNC: 31 MG/DL (ref 6–20)
BUN/CREAT SERPL: 35.6 (ref 7.3–30)
CALCIUM SPEC-SCNC: 9.2 MG/DL (ref 8.5–10.2)
CANCER AG15-3 SERPL-ACNC: 67.8 U/ML
CHLORIDE SERPL-SCNC: 102 MMOL/L (ref 98–107)
CO2 SERPL-SCNC: 22.4 MMOL/L (ref 22–29)
CREAT SERPL-MCNC: 0.87 MG/DL (ref 0.6–1.1)
DEPRECATED RDW RBC AUTO: 53.5 FL (ref 37–54)
EOSINOPHIL # BLD AUTO: 0.26 10*3/MM3 (ref 0–0.4)
EOSINOPHIL NFR BLD AUTO: 4.9 % (ref 0.3–6.2)
ERYTHROCYTE [DISTWIDTH] IN BLOOD BY AUTOMATED COUNT: 15.1 % (ref 12.3–15.4)
GFR SERPL CREATININE-BSD FRML MDRD: 66 ML/MIN/1.73
GLOBULIN UR ELPH-MCNC: 2.5 GM/DL (ref 1.8–3.5)
GLUCOSE SERPL-MCNC: 121 MG/DL (ref 74–124)
HCT VFR BLD AUTO: 30.6 % (ref 34–46.6)
HGB BLD-MCNC: 10 G/DL (ref 12–15.9)
IMM GRANULOCYTES # BLD AUTO: 0.04 10*3/MM3 (ref 0–0.05)
IMM GRANULOCYTES NFR BLD AUTO: 0.8 % (ref 0–0.5)
LYMPHOCYTES # BLD AUTO: 0.63 10*3/MM3 (ref 0.7–3.1)
LYMPHOCYTES NFR BLD AUTO: 11.9 % (ref 19.6–45.3)
MAGNESIUM SERPL-MCNC: 1.7 MG/DL (ref 1.8–2.5)
MCH RBC QN AUTO: 31.8 PG (ref 26.6–33)
MCHC RBC AUTO-ENTMCNC: 32.7 G/DL (ref 31.5–35.7)
MCV RBC AUTO: 97.5 FL (ref 79–97)
MONOCYTES # BLD AUTO: 0.53 10*3/MM3 (ref 0.1–0.9)
MONOCYTES NFR BLD AUTO: 10 % (ref 5–12)
NEUTROPHILS NFR BLD AUTO: 3.8 10*3/MM3 (ref 1.7–7)
NEUTROPHILS NFR BLD AUTO: 71.8 % (ref 42.7–76)
NRBC BLD AUTO-RTO: 0 /100 WBC (ref 0–0.2)
PHOSPHATE SERPL-MCNC: 3.1 MG/DL (ref 2.5–4.5)
PLATELET # BLD AUTO: 221 10*3/MM3 (ref 140–450)
PMV BLD AUTO: 9.7 FL (ref 6–12)
POTASSIUM SERPL-SCNC: 4.2 MMOL/L (ref 3.5–4.7)
PROT SERPL-MCNC: 6.6 G/DL (ref 6.3–8)
RBC # BLD AUTO: 3.14 10*6/MM3 (ref 3.77–5.28)
SODIUM SERPL-SCNC: 136 MMOL/L (ref 134–145)
WBC # BLD AUTO: 5.29 10*3/MM3 (ref 3.4–10.8)

## 2020-09-02 PROCEDURE — 25010000002 DEXAMETHASONE SODIUM PHOSPHATE 100 MG/10ML SOLUTION: Performed by: INTERNAL MEDICINE

## 2020-09-02 PROCEDURE — 25010000002 DENOSUMAB 120 MG/1.7ML SOLUTION: Performed by: INTERNAL MEDICINE

## 2020-09-02 PROCEDURE — 86300 IMMUNOASSAY TUMOR CA 15-3: CPT | Performed by: INTERNAL MEDICINE

## 2020-09-02 PROCEDURE — 25010000002 ALTEPLASE 2 MG RECONSTITUTED SOLUTION: Performed by: INTERNAL MEDICINE

## 2020-09-02 PROCEDURE — 25010000002 ERIBULIN 1 MG/2ML SOLUTION: Performed by: INTERNAL MEDICINE

## 2020-09-02 PROCEDURE — 80053 COMPREHEN METABOLIC PANEL: CPT

## 2020-09-02 PROCEDURE — 85025 COMPLETE CBC W/AUTO DIFF WBC: CPT

## 2020-09-02 PROCEDURE — 83735 ASSAY OF MAGNESIUM: CPT

## 2020-09-02 PROCEDURE — 36593 DECLOT VASCULAR DEVICE: CPT

## 2020-09-02 PROCEDURE — 84100 ASSAY OF PHOSPHORUS: CPT

## 2020-09-02 PROCEDURE — 96409 CHEMO IV PUSH SNGL DRUG: CPT

## 2020-09-02 PROCEDURE — 99214 OFFICE O/P EST MOD 30 MIN: CPT | Performed by: INTERNAL MEDICINE

## 2020-09-02 PROCEDURE — 96372 THER/PROPH/DIAG INJ SC/IM: CPT

## 2020-09-02 PROCEDURE — 96375 TX/PRO/DX INJ NEW DRUG ADDON: CPT

## 2020-09-02 RX ORDER — SODIUM CHLORIDE 9 MG/ML
250 INJECTION, SOLUTION INTRAVENOUS ONCE
Status: CANCELLED | OUTPATIENT
Start: 2020-09-02

## 2020-09-02 RX ORDER — SODIUM CHLORIDE 9 MG/ML
250 INJECTION, SOLUTION INTRAVENOUS ONCE
Status: COMPLETED | OUTPATIENT
Start: 2020-09-02 | End: 2020-09-02

## 2020-09-02 RX ORDER — SODIUM CHLORIDE 9 MG/ML
250 INJECTION, SOLUTION INTRAVENOUS ONCE
Status: CANCELLED | OUTPATIENT
Start: 2020-09-09

## 2020-09-02 RX ADMIN — SODIUM CHLORIDE 250 ML: 9 INJECTION, SOLUTION INTRAVENOUS at 10:07

## 2020-09-02 RX ADMIN — ALTEPLASE: 2.2 INJECTION, POWDER, LYOPHILIZED, FOR SOLUTION INTRAVENOUS at 09:31

## 2020-09-02 RX ADMIN — DENOSUMAB 120 MG: 120 INJECTION SUBCUTANEOUS at 10:31

## 2020-09-02 RX ADMIN — DEXAMETHASONE SODIUM PHOSPHATE 12 MG: 10 INJECTION, SOLUTION INTRAMUSCULAR; INTRAVENOUS at 10:07

## 2020-09-02 RX ADMIN — ERIBULIN MESYLATE 2.4 MG: 0.5 INJECTION INTRAVENOUS at 10:31

## 2020-09-02 NOTE — PROGRESS NOTES
Subjective  REASONS FOR FOLLOWUP:   1. Metastatic breast cancer to multiple skeletal sites including cervical spine, sternum, lumbar spine and right femur, underwent Abraxane every 4 weeks and Xgeva every  3 months, DOCUMENTED RADIOLOGICAL PROGRESSION BY BONE SCAN 3/15/17 MOVING AWAY FROM ABRAXANE AND HALAVEN, PRESENTLY ON FEMARA AND KISQALI , XGEVA EVERY 3 MONTHS,    2.Iron deficiency anemia du to GI blood loss, Xarelto stopped months ago, Iron initiated, Pepcid along with Aleve for gastric protection.   3.  1/10/2020 continued good tolerance of because Kasquali  and Femara.  We will continue to hold Xgeva with plans for DEXA scan in spring 2020 to determine the need further Xgeva.  4. Progression in the bone disease 6/20 Femara and KISQALI stopped, rad to pelvis scheduled, thereafter HALAVEN INITIATED 9/20 AFTER PALLIATIVE RADIATION TO R SHOULDER AND PELVIS.      History of Present Illness   PATIENT WAS CALLED THE DAY BEFORE BY THE OFFICE TO ASK FOR SYMPTOMS THAT COULD BE CONSISTENT WITH CORONAVIRUS INFECTION, AND BEING NEGATIVE WAS SCHEDULED TO BE SEEN IN THE OFFICE TODAY. SIMILAR QUESTIONING TODAY INCLUDING, CHILLS, FEVER, NEW COUGH, SHORTNESS OF BREATH, DIARRHEA,DIFFUSE BODY ACHES  AND CHANGES IN SMELL OR TASTE WERE NEGATIVE.THE PATIENT DENIED ANY CONTACT WITH PERSONS WHO WERE POSITIVE FOR COVID, AND PATIENT IS NOT IN CATEGORY OF HIGH RISK BEHAVIOR TO ACQUIRE COVID.    DURING THE VISIT WITH THE PATIENT TODAY , PATIENT HAD FACE MASK, MY MEDICAL ASSISTANT AND I  HAD PROPPER PROTECTIVE EQUIPMENT, AND I DID HAND HYGIENE WITH SOAP AND WATER BEFORE AND AFTER THE VISIT.    This patient  returns today to the office for followup. She is here today extremely satisfied because after she received radiation therapy for palliation in the right shoulder and pelvic area, her pain has substantially improved and she is a lot more comfortable through the day and through the night. She has had less need to take pain medicine, Naprosyn, that she takes b.i.d. and occasional narcotic medication. She has gained back more functionality. She has less difficulty getting around, driving the car and doing normal things in her house. She has not developed any new sites of bone pain. Her appetite is good. Her weight is stable. Her bowel function and urination remain normal. No cardiovascular or respiratory issues at this time. As I discussed before after the radiation therapy was completed several weeks ago, the patient is supposed to proceed with initiation of therapy with Halaven today.            Past Medical History:   Diagnosis Date   • Abnormal mammogram    • Abnormal menstrual cycle    • Arthritis    • Bone metastasis (CMS/HCC)    • Breast cancer (CMS/HCC) 2000    Left breast   • Drug therapy 2001    breast cancer   • Drug therapy 2017    right femur/associated with breast cancer   • H/O Heart murmur    • History of colon polyps    • Hx of radiation therapy 2000    breast cancer   • Hx of radiation therapy 2015    bone cancer right femur/ associated with breast cancer   • Hypercholesterolemia    • Hyperlipidemia    • Hypertension    • Multiple benign polyps of large intestine    • Reflux esophagitis      Social History     Socioeconomic History   • Marital status: Single     Spouse name: Not on file   • Number of children: Not on file   • Years of education: Not on file   • Highest education level: Not on file   Occupational History   • Occupation:      Employer: [employer]   Tobacco Use   • Smoking status: Never Smoker   • Smokeless tobacco: Never Used   Substance and Sexual  Activity   • Alcohol use: No   • Drug use: No   • Sexual activity: Defer     Family History   Problem Relation Age of Onset   • Hypertension Mother    • Diabetes Mother    • Macular degeneration Mother    • Thyroid disease Mother    • Heart disease Father    • Stroke Father    • Kidney disease Father    • Glaucoma Brother    • Diabetes Brother    • Hypertension Brother    • Colon cancer Paternal Grandmother    • Thyroid disease Other    • Kidney disease Other    • Glaucoma Other    • Cancer Other    • Diabetes Other      Current Outpatient Medications on File Prior to Visit   Medication Sig Dispense Refill   • atorvastatin (LIPITOR) 20 MG tablet Take 1 tablet by mouth once daily 90 tablet 1   • cetirizine (zyrTEC) 10 MG tablet Take 10 mg by mouth As Needed for Allergies.     • ciclopirox (PENLAC) 8 % solution Apply  topically Every Night. 6 mL 0   • Cyanocobalamin (B-12 PO) Take 1,000 mcg by mouth Daily.     • denosumab (XGEVA) 120 MG/1.7ML solution injection Inject  under the skin 1 (one) time.     • diclofenac (VOLTAREN) 1 % gel gel APPLY 2 TO 4 GRAMS TOPICALLY TO AFFECTED AREA TWICE DAILY AS DIRECTED 100 g 3   • lisinopril-hydrochlorothiazide (PRINZIDE,ZESTORETIC) 10-12.5 MG per tablet Take 1 tablet by mouth twice daily 180 tablet 0   • MAG64 64 MG DR tablet Take 1 tablet by mouth once daily 30 each 0   • Multiple Vitamins-Minerals (HAIR SKIN AND NAILS FORMULA PO) Take  by mouth.     • Naproxen Sod-Diphenhydramine (ALEVE PM PO) Take  by mouth as needed.     • Naproxen Sodium (ALEVE PO) Take  by mouth.     • Omega-3 Fatty Acids (FISH OIL) 645 MG capsule Take  by mouth.     • OMEPRAZOLE PO Take  by mouth Daily.     • HYDROcodone-acetaminophen (NORCO) 5-325 MG per tablet Take 1 tablet by mouth Every 6 (Six) Hours As Needed.       No current facility-administered medications on file prior to visit.      Active Ambulatory Problems     Diagnosis Date Noted   • Benign essential HTN 02/18/2016   • Primary malignant  neoplasm of left breast (CMS/HCC) 02/18/2016   • Thrombophilia (CMS/HCC) 02/18/2016   • Hyperlipidemia 02/18/2016   • IFG (impaired fasting glucose) 02/18/2016   • Osteopenia 02/18/2016   • History of colon polyps 03/08/2016   • Bone metastasis (CMS/HCC) 05/20/2016   • Anemia in neoplastic disease 05/20/2016   • Heart murmur, systolic 10/25/2016   • Fitting and adjustment of vascular catheter 03/13/2017   • Vitamin B12 deficiency anemia due to intrinsic factor deficiency 06/13/2017   • Leukopenia due to antineoplastic chemotherapy (CMS/[facility]) 09/06/2017     Resolved Ambulatory Problems     Diagnosis Date Noted   • No Resolved Ambulatory Problems     Past Medical History:   Diagnosis Date   • Abnormal mammogram    • Abnormal menstrual cycle    • Arthritis    • Breast cancer (CMS/[facility]) 2000   • Drug therapy 2001   • Drug therapy 2017   • H/O Heart murmur    • Hx of radiation therapy 2000   • Hx of radiation therapy 2015   • Hypercholesterolemia    • Hypertension    • Multiple benign polyps of large intestine    • Reflux esophagitis       Past Surgical History:   Procedure Laterality Date   • BREAST BIOPSY Left 2000    breast cancer   • BREAST SURGERY  2000    lumpectomy, mastectomy, revision   • COLONOSCOPY  2012   • FEMUR FRACTURE SURGERY      secondary to metastatic breast cancer 7/2014, with revision in 9/2015   • MASTECTOMY Left 2000    transflap in 2003     ROS:      General: no fever, no chills,  fatigue,no weight changes, no lack of appetite.  Eyes: no epiphora, xerophthalmia,conjunctivitis, pain, glaucoma, blurred vision, blindness, secretion, photophobia, proptosis, diplopia.  Ears: no otorrhea, tinnitus, otorrhagia, deafness, pain, vertigo.  Nose: no rhinorrhea, no epistaxis, no alteration in perception of odors, no sinuses pressure.  Mouth: no alteration in gums or teeth,  No ulcers, no difficulty with mastication or deglut ion, no odynophagia.  Neck: no masses or pain, no thyroid alterations, no pain in  "muscles or arteries, no carotid odynia, no crepitation.  Respiratory: no cough, no sputum production,no dyspnea,no trepopnea, no pleuritic pain,no hemoptysis.  Heart: no syncope, no irregularity, no palpitations, no angina,no orthopnea,no paroxysmal nocturnal dyspnea.  Vascular Venous: no tenderness,no edema,no palpable cords,no postphlebitic syndrome, no skin changes no ulcerations.  Vascular Arterial: no distal ischemia, noclaudication, no gangrene, no neuropathic ischemic pain, no skin ulcers, no paleness no cyanosis.  GI: no dysphagia, no odynophagia, no regurgitation, no heartburn,no indigestion,no nausea,no vomiting,no hematemesis ,no melena,no jaundice,no distention, no obstipation,no enterorrhagia,no proctalgia,no anal  lesions, no changes in bowel habits.  : no frequency, no hesitancy, no hematuria, no discharge,no  pain.  Musculoskeletal: STATED muscle or tendon pain or inflammation,no  joint pain, no edema, LESSER functional limitation,no fasciculations, no mass.  Neurologic: no headache, no seizures, noalterations on Craneal nerves, no motor deficit, no sensory deficit, normal coordination, no alteration in memory,normal orientation, calculation,normal writting, verbal and written language.  Skin: no rashes,no pruritus no localized lesions.  Psychiatric: no anxiety, no depression,no agitation, no delusions, proper insight.  .    Medications:  The current medication list was reviewed in the EMR    ALLERGIES:    Allergies   Allergen Reactions   • Codeine Unknown - Low Severity   • Docetaxel Unknown - Low Severity   • Paclitaxel Unknown - Low Severity       Objective      Vitals:    09/02/20 0820   BP: 130/72   Pulse: 96   Resp: 18   Temp: 97.9 °F (36.6 °C)   TempSrc: Temporal   SpO2: 96%   Weight: 94.5 kg (208 lb 6.4 oz)   Height: 163 cm (64.17\")   PainSc:   2   PainLoc: Generalized  Comment: shoulder knee legs     Current Status 9/2/2020   ECOG score 0     EXAM: I HAVE PERSONALLY REVIEWED THE HISTORY OF " THE PRESENT ILLNESS, PAST MEDICAL HISTORY, FAMILY HISTORY, SOCIAL HISTORY, ALLERGIES, MEDICATIONS STATED ABOVE IN THE OFFICE NOTE FROM TODAY.        GENERAL:  Well-developed, well-nourished  Patient  in no acute distress.   SKIN:  Warm, dry ,NO rashes,NO purpura ,NO petechiae.  HEENT:  Pupils were equal and reactive to light and accomodation, conjunctivas non injected, no pterigion, normal extraocular movements, normal visual acuity.   NECK:  Supple with good range of motion; no thyromegaly or masses, no JVD or bruits, no cervical adenopathies.No carotid arteries pain, no carotid abnormal pulsation , NO arterial dance.  LYMPHATICS:  No cervical, NO supraclavicular, NO axillary,NO epitrochlear , NO inguinal adenopathy.  CARDIAC   normal rate and regular rhythm, without murmur,NO rubs NO S3 NO S4 right or left . Normal femoral, popliteal, pedis, brachial and carotid pulses.  VASCULAR ARTERIAL: normal carotids,brachial,radial,femoral,popliteal, pedis pulses , no bruits.no paleness or cyanosis, no pain, no edema, no numbness, no gangrene.  VASCULAR VENOUS: no cyanosis, collateral circulation, varicosities, edema, palpable cords, pain, erythema.  ABDOMEN:  Soft, nontender with no hepatomegaly, no splenomegaly,no masses, no ascites, no collateral circulation,no distention,no Hilham sign, no abdominal pain, no inguinal hernias,no umbilical hernia, no abdominal bruits. Normal bowel sounds.  GENITAL: Not  Performed.  EXTREMITIES  AND SPINE:  No clubbing, cyanosis or edema, no deformities MINOR R SHOULDER AND POSTERIOR PELVIC BONE pain .No kyphosis, scoliosis.  NEUROLOGICAL:  Patient was awake, alert, oriented to time, person and place.            Hematology WBC   Date Value Ref Range Status   09/02/2020 5.29 3.40 - 10.80 10*3/mm3 Final   02/03/2020 3.89 3.40 - 10.80 10*3/mm3 Final     RBC   Date Value Ref Range Status   09/02/2020 3.14 (L) 3.77 - 5.28 10*6/mm3 Final   02/03/2020 3.08 (L) 3.77 - 5.28 10*6/mm3 Final      Hemoglobin   Date Value Ref Range Status   09/02/2020 10.0 (L) 12.0 - 15.9 g/dL Final     Hematocrit   Date Value Ref Range Status   09/02/2020 30.6 (L) 34.0 - 46.6 % Final     Platelets   Date Value Ref Range Status   09/02/2020 221 140 - 450 10*3/mm3 Final                            Assessment/Plan    .   1. Metastatic  Left breast cancer to multiple skeletal sites including cervical spine, sternum, lumbar spine and right femur.Since the previous visit, the patient has been taking her Femara and Kisqali correctly  .On eVisit on 04/10/2020 the patient states that her symptoms are very much quiet at this time. She has not developed any new sites of bone pain and she has not had any difficulty with her Kisqali and Femara that remain ongoing.       The patient was further reviewed on clinical grounds on 06/19/2020. Now she is experiencing a new pain in the pelvic bone bilaterally close to the sacroiliac joints and ala of the pelvic bone. Other than that she has not had any other new sites of pain.     I reviewed with the patient her bone scan that shows unequivocally increased uptake in the sacroiliac joints as well in the pelvic bone bilaterally posteriorly. There is a new uptake in the fibula on the right side that is very minimal. This does not correspond to any site of pain clinically.       The patient was further reviewed on 07/24/2020. Since then she has initiated radiation treatment and she has completed 5 treatments today out of 10. She has seen some improvement in the intensity of pain in the sacroiliac joints where she has sites of metastasis.  Now she has developed a new bone pain in the right humerus. Reviewing the bone scan with her she had a hot spot there and I will talk with Sarita Baldwin MD, today about adding some radiation therapy to this anatomical site.     Given the fact that the radiation field is larger in the pelvis we will delay the initiation of any Halaven until the patient completes  her treatment and goes through expected hematological toxicity.   The patient was further reviewed on 09/02/2020. Because the completion of radiation therapy happened close to a month ago and her white count is stable and improving, the hemoglobin is stable and her platelet count is fine, the patient achieved significant improvement in functionality related to increased pain in the right shoulder and pelvic bone due to affect and benefit of radiation therapy, we advised the patient today to proceed with Halaven. She understands that this infusion is very short; it is a push, and she understands that this medicine is going to be given to her on day 1 and day 8. Typically the treatment will be given to her every 3 weeks.     The patient also will be due to continue her Xgeva once a month and this medicine will be given to her next week.     The patient is aware of the side effects of Halaven including leukopenia, anemia, thrombocytopenia. Again, given the previous radiation therapy to the pelvic bone, we need to monitor this very close and I would not be surprised if at some point we have to decrease the dose of Halaven and modify the schedule to be given to her on day 1, day 15 instead.     I discussed with her the need for continuation of her anti-inflammatory medication for pain and use a narcotic medication time to time if needed. She has a prescription available.     I advised her to continue using MiraLAX if necessary for constipation. I advised her about the hemoglobin remains low. I strongly believe that part of this is myelophthisis. She has had ferritin, iron, TIBC, B12, folic acid level checked before as well as Hemoccult and all this has been negative for iron deficiency or blood loss.     I advised the patient that her port will be flushed and used today for her infusion. I was advised by the nurse that the flushing was very sluggish and maybe a catheter cloth will be required to be sure that it is not  going to be clogging in the tip of the catheter. The patient has not had any difficulty with the flushing of the material when she is flushed in the first place and she has not developed any lymphedema or edema in upper extremities or pain at the flushing time.     Appointments were made for continuation of her infusion and continuation of her laboratory parameters that require a CBC on weekly basis. Also I measured a CA 15-3 baseline today that will be repeated every 3 or 4 weeks. Also the patient will require 3 cycles of Halaven, again each cycle being 1 at day 8 before we reassess her with no new radiological assessment and by tumor markers. As in the past, it has been extremely difficult to reassess this patient radiologically speaking. Maybe a bone scan will be the easiest simplest tool that we have to see what continues going on in her bones.     She never has had visceral metastasis.        I DISCUSSED WITH PATIENT IN DETAIL FORMS TO DECREASE CHANCES OF CORONAVIRUS INFECTION INCLUDING ISOLATION, PROPER HAND HIGIENE, AVOID PUBLIC PLACES  WITH CROWDS, FOLLOW  CDC RECOMENDATIONS, AND KEEP PERSONAL AND SOCIAL RESPONSIBILITY, WARE A MASK IN PUBLIC PLACES.  PATIENT IS AWARE THIS INFECTION COULD HAVE SEVERE CONSEQUENCES TO PERSONAL HEALTH AND FAMILY RAMIFICATIONS OF THIS.      9/2/2020

## 2020-09-02 NOTE — TELEPHONE ENCOUNTER
----- Message from Julia Staples RN sent at 9/2/2020  1:26 PM EDT -----  Order placed. Thanks!  ----- Message -----  From: Jina Nichole RN  Sent: 9/2/2020   1:06 PM EDT  To: Oncology Nurses    Could someone schedule this pt for a port dye study and call pt with appointment.    thanks  ----- Message -----  From: Nader Saez MD  Sent: 9/2/2020  12:56 PM EDT  To: Jina Nichole RN    PLEASE  ----- Message -----  From: Jina Nichole RN  Sent: 9/2/2020  10:55 AM EDT  To: Nader Saez MD    I had difficulty getting blood return from this patient. I had to inject with activase and only withdrew 2 ml of blood. She reports nurses having trouble getting blood return each time she's here. She has never had a dye study and wanted to know if she could be scheduled for one.    Thanks

## 2020-09-03 ENCOUNTER — TELEPHONE (OUTPATIENT)
Dept: ONCOLOGY | Facility: CLINIC | Age: 65
End: 2020-09-03

## 2020-09-03 ENCOUNTER — HOSPITAL ENCOUNTER (OUTPATIENT)
Dept: GENERAL RADIOLOGY | Facility: HOSPITAL | Age: 65
Discharge: HOME | End: 2020-09-03
Admitting: INTERNAL MEDICINE

## 2020-09-03 DIAGNOSIS — Z45.2 FITTING AND ADJUSTMENT OF VASCULAR CATHETER: ICD-10-CM

## 2020-09-03 DIAGNOSIS — T82.518A VASCULAR CATHETER DYSFUNCTION, INITIAL ENCOUNTER (HCC): Primary | ICD-10-CM

## 2020-09-03 PROCEDURE — 25010000003 HEPARIN LOCK FLUSH PER 10 UNITS: Performed by: INTERNAL MEDICINE

## 2020-09-03 PROCEDURE — 36598 INJ W/FLUOR EVAL CV DEVICE: CPT

## 2020-09-03 PROCEDURE — 0 IOPAMIDOL PER 1 ML: Performed by: INTERNAL MEDICINE

## 2020-09-03 RX ORDER — HEPARIN SODIUM (PORCINE) LOCK FLUSH IV SOLN 100 UNIT/ML 100 UNIT/ML
500 SOLUTION INTRAVENOUS AS NEEDED
Status: DISCONTINUED | OUTPATIENT
Start: 2020-09-03 | End: 2020-09-04 | Stop reason: HOSPADM

## 2020-09-03 RX ORDER — HEPARIN SODIUM (PORCINE) LOCK FLUSH IV SOLN 100 UNIT/ML 100 UNIT/ML
500 SOLUTION INTRAVENOUS AS NEEDED
Status: CANCELLED | OUTPATIENT
Start: 2020-09-03

## 2020-09-03 RX ORDER — SODIUM CHLORIDE 0.9 % (FLUSH) 0.9 %
10 SYRINGE (ML) INJECTION AS NEEDED
Status: CANCELLED | OUTPATIENT
Start: 2020-09-03

## 2020-09-03 RX ORDER — SODIUM CHLORIDE 0.9 % (FLUSH) 0.9 %
10 SYRINGE (ML) INJECTION AS NEEDED
Status: DISCONTINUED | OUTPATIENT
Start: 2020-09-03 | End: 2020-09-04 | Stop reason: HOSPADM

## 2020-09-03 RX ADMIN — Medication 500 UNITS: at 11:11

## 2020-09-03 RX ADMIN — IOPAMIDOL 7 ML: 755 INJECTION, SOLUTION INTRAVENOUS at 10:50

## 2020-09-03 RX ADMIN — Medication 10 ML: at 11:10

## 2020-09-03 NOTE — NURSING NOTE
Pt arrived to Radiology triage [place] [facility] for Port Dye study. Pt has a mask in place as well as this RN with goggles at bedside.

## 2020-09-09 ENCOUNTER — INFUSION (OUTPATIENT)
Dept: ONCOLOGY | Facility: HOSPITAL | Age: 65
End: 2020-09-09

## 2020-09-09 VITALS
WEIGHT: 210.4 LBS | SYSTOLIC BLOOD PRESSURE: 117 MMHG | OXYGEN SATURATION: 93 % | BODY MASS INDEX: 35.92 KG/M2 | DIASTOLIC BLOOD PRESSURE: 66 MMHG | TEMPERATURE: 97.7 F | HEART RATE: 108 BPM

## 2020-09-09 DIAGNOSIS — C79.51 BONE METASTASIS: Primary | ICD-10-CM

## 2020-09-09 DIAGNOSIS — C50.912 PRIMARY MALIGNANT NEOPLASM OF LEFT BREAST (HCC): ICD-10-CM

## 2020-09-09 LAB
ANION GAP SERPL CALCULATED.3IONS-SCNC: 12.6 MMOL/L (ref 5–15)
BASOPHILS # BLD AUTO: 0.04 10*3/MM3 (ref 0–0.2)
BASOPHILS NFR BLD AUTO: 0.9 % (ref 0–1.5)
BUN SERPL-MCNC: 32 MG/DL (ref 6–20)
BUN/CREAT SERPL: 31.4 (ref 7.3–30)
CALCIUM SPEC-SCNC: 9.1 MG/DL (ref 8.5–10.2)
CHLORIDE SERPL-SCNC: 99 MMOL/L (ref 98–107)
CO2 SERPL-SCNC: 23.4 MMOL/L (ref 22–29)
CREAT SERPL-MCNC: 1.02 MG/DL (ref 0.6–1.1)
DEPRECATED RDW RBC AUTO: 53.4 FL (ref 37–54)
EOSINOPHIL # BLD AUTO: 0.07 10*3/MM3 (ref 0–0.4)
EOSINOPHIL NFR BLD AUTO: 1.5 % (ref 0.3–6.2)
ERYTHROCYTE [DISTWIDTH] IN BLOOD BY AUTOMATED COUNT: 15.5 % (ref 12.3–15.4)
GFR SERPL CREATININE-BSD FRML MDRD: 55 ML/MIN/1.73
GLUCOSE SERPL-MCNC: 133 MG/DL (ref 74–124)
HCT VFR BLD AUTO: 27.8 % (ref 34–46.6)
HGB BLD-MCNC: 9.3 G/DL (ref 12–15.9)
IMM GRANULOCYTES # BLD AUTO: 0.06 10*3/MM3 (ref 0–0.05)
IMM GRANULOCYTES NFR BLD AUTO: 1.3 % (ref 0–0.5)
LYMPHOCYTES # BLD AUTO: 0.6 10*3/MM3 (ref 0.7–3.1)
LYMPHOCYTES NFR BLD AUTO: 13.2 % (ref 19.6–45.3)
MAGNESIUM SERPL-MCNC: 1.5 MG/DL (ref 1.8–2.5)
MCH RBC QN AUTO: 32.1 PG (ref 26.6–33)
MCHC RBC AUTO-ENTMCNC: 33.5 G/DL (ref 31.5–35.7)
MCV RBC AUTO: 95.9 FL (ref 79–97)
MONOCYTES # BLD AUTO: 0.43 10*3/MM3 (ref 0.1–0.9)
MONOCYTES NFR BLD AUTO: 9.5 % (ref 5–12)
NEUTROPHILS NFR BLD AUTO: 3.34 10*3/MM3 (ref 1.7–7)
NEUTROPHILS NFR BLD AUTO: 73.6 % (ref 42.7–76)
NRBC BLD AUTO-RTO: 0 /100 WBC (ref 0–0.2)
PLATELET # BLD AUTO: 200 10*3/MM3 (ref 140–450)
PMV BLD AUTO: 10 FL (ref 6–12)
POTASSIUM SERPL-SCNC: 3.8 MMOL/L (ref 3.5–4.7)
RBC # BLD AUTO: 2.9 10*6/MM3 (ref 3.77–5.28)
SODIUM SERPL-SCNC: 135 MMOL/L (ref 134–145)
WBC # BLD AUTO: 4.54 10*3/MM3 (ref 3.4–10.8)

## 2020-09-09 PROCEDURE — 25010000002 DEXAMETHASONE SODIUM PHOSPHATE 100 MG/10ML SOLUTION: Performed by: INTERNAL MEDICINE

## 2020-09-09 PROCEDURE — 25010000002 ERIBULIN 1 MG/2ML SOLUTION: Performed by: INTERNAL MEDICINE

## 2020-09-09 PROCEDURE — 85025 COMPLETE CBC W/AUTO DIFF WBC: CPT

## 2020-09-09 PROCEDURE — 80048 BASIC METABOLIC PNL TOTAL CA: CPT

## 2020-09-09 PROCEDURE — 83735 ASSAY OF MAGNESIUM: CPT

## 2020-09-09 PROCEDURE — 96375 TX/PRO/DX INJ NEW DRUG ADDON: CPT

## 2020-09-09 PROCEDURE — 96409 CHEMO IV PUSH SNGL DRUG: CPT

## 2020-09-09 RX ORDER — CIPROFLOXACIN 500 MG/1
500 TABLET, FILM COATED ORAL 2 TIMES DAILY
Qty: 14 TABLET | Refills: 0 | Status: SHIPPED | OUTPATIENT
Start: 2020-09-09 | End: 2020-09-16

## 2020-09-09 RX ORDER — SODIUM CHLORIDE 9 MG/ML
250 INJECTION, SOLUTION INTRAVENOUS ONCE
Status: COMPLETED | OUTPATIENT
Start: 2020-09-09 | End: 2020-09-09

## 2020-09-09 RX ADMIN — DEXAMETHASONE SODIUM PHOSPHATE 12 MG: 10 INJECTION, SOLUTION INTRAMUSCULAR; INTRAVENOUS at 15:37

## 2020-09-09 RX ADMIN — SODIUM CHLORIDE 250 ML: 9 INJECTION, SOLUTION INTRAVENOUS at 15:37

## 2020-09-09 RX ADMIN — ERIBULIN MESYLATE 2.4 MG: 0.5 INJECTION INTRAVENOUS at 15:48

## 2020-09-09 NOTE — NURSING NOTE
Pt brought in  or opened oral chemo meds (Afinitor, Aromasin and letrazole) for disposal. These were given to Steph in pharmacy for disposal.    Pt also brought unused Kisqali to donate. These were given to Doris munoz/ pharmacy to evaluate for possible donation.

## 2020-09-11 ENCOUNTER — TELEPHONE (OUTPATIENT)
Dept: ONCOLOGY | Facility: HOSPITAL | Age: 65
End: 2020-09-11

## 2020-09-11 NOTE — TELEPHONE ENCOUNTER
Notified pt to double her magnesesium supplement, she v/u.    ----- Message from Nader Saez MD sent at 9/11/2020  8:04 AM EDT -----  Increase or double oral replacement  No need for iv mag  ----- Message -----  From: Barbie Griffin, RN  Sent: 9/9/2020   4:36 PM EDT  To: Nader Saez MD, Blessing Schmitt, [facility]    Pt left prior to magnesium resulting, Mg 1.5 today. She returns next week for RN review. Would it be appropriate to switch her over to infusion and give 2 g IV magnesium as she's already on oral supplementation. Please advise. Thanks.

## 2020-09-16 ENCOUNTER — LAB (OUTPATIENT)
Dept: LAB | Facility: HOSPITAL | Age: 65
End: 2020-09-16

## 2020-09-16 ENCOUNTER — CLINICAL SUPPORT (OUTPATIENT)
Dept: ONCOLOGY | Facility: HOSPITAL | Age: 65
End: 2020-09-16

## 2020-09-16 VITALS — SYSTOLIC BLOOD PRESSURE: 128 MMHG | TEMPERATURE: 98 F | DIASTOLIC BLOOD PRESSURE: 75 MMHG | HEART RATE: 102 BPM

## 2020-09-16 DIAGNOSIS — T45.1X5A LEUKOPENIA DUE TO ANTINEOPLASTIC CHEMOTHERAPY (HCC): ICD-10-CM

## 2020-09-16 DIAGNOSIS — C79.51 BONE METASTASIS: ICD-10-CM

## 2020-09-16 DIAGNOSIS — D70.1 LEUKOPENIA DUE TO ANTINEOPLASTIC CHEMOTHERAPY (HCC): ICD-10-CM

## 2020-09-16 DIAGNOSIS — D51.0 VITAMIN B12 DEFICIENCY ANEMIA DUE TO INTRINSIC FACTOR DEFICIENCY: ICD-10-CM

## 2020-09-16 DIAGNOSIS — C50.912 PRIMARY MALIGNANT NEOPLASM OF LEFT BREAST (HCC): ICD-10-CM

## 2020-09-16 DIAGNOSIS — D63.0 ANEMIA IN NEOPLASTIC DISEASE: ICD-10-CM

## 2020-09-16 LAB
BASOPHILS # BLD AUTO: 0.06 10*3/MM3 (ref 0–0.2)
BASOPHILS NFR BLD AUTO: 1.5 % (ref 0–1.5)
DEPRECATED RDW RBC AUTO: 54.1 FL (ref 37–54)
EOSINOPHIL # BLD AUTO: 0.02 10*3/MM3 (ref 0–0.4)
EOSINOPHIL NFR BLD AUTO: 0.5 % (ref 0.3–6.2)
ERYTHROCYTE [DISTWIDTH] IN BLOOD BY AUTOMATED COUNT: 16.1 % (ref 12.3–15.4)
HCT VFR BLD AUTO: 28 % (ref 34–46.6)
HGB BLD-MCNC: 9.3 G/DL (ref 12–15.9)
IMM GRANULOCYTES # BLD AUTO: 0.15 10*3/MM3 (ref 0–0.05)
IMM GRANULOCYTES NFR BLD AUTO: 3.7 % (ref 0–0.5)
LYMPHOCYTES # BLD AUTO: 0.71 10*3/MM3 (ref 0.7–3.1)
LYMPHOCYTES NFR BLD AUTO: 17.4 % (ref 19.6–45.3)
MCH RBC QN AUTO: 31.2 PG (ref 26.6–33)
MCHC RBC AUTO-ENTMCNC: 33.2 G/DL (ref 31.5–35.7)
MCV RBC AUTO: 94 FL (ref 79–97)
MONOCYTES # BLD AUTO: 0.54 10*3/MM3 (ref 0.1–0.9)
MONOCYTES NFR BLD AUTO: 13.3 % (ref 5–12)
NEUTROPHILS NFR BLD AUTO: 2.59 10*3/MM3 (ref 1.7–7)
NEUTROPHILS NFR BLD AUTO: 63.6 % (ref 42.7–76)
NRBC BLD AUTO-RTO: 1.2 /100 WBC (ref 0–0.2)
PLATELET # BLD AUTO: 225 10*3/MM3 (ref 140–450)
PMV BLD AUTO: 11.1 FL (ref 6–12)
RBC # BLD AUTO: 2.98 10*6/MM3 (ref 3.77–5.28)
WBC # BLD AUTO: 4.07 10*3/MM3 (ref 3.4–10.8)

## 2020-09-16 PROCEDURE — 36415 COLL VENOUS BLD VENIPUNCTURE: CPT

## 2020-09-16 PROCEDURE — G0463 HOSPITAL OUTPT CLINIC VISIT: HCPCS

## 2020-09-16 PROCEDURE — 85025 COMPLETE CBC W/AUTO DIFF WBC: CPT

## 2020-09-16 NOTE — PROGRESS NOTES
CBC reviewed with pt. Counts stable. Pt reports some mouth sensitivity nothing too bothersome. Advised pt on trying baking soda, salt water rinses. She v/u. Copy of labs given to pt. Advised her to call with any questions or concerns.     Lab Results   Component Value Date    WBC 4.07 09/16/2020    HGB 9.3 (L) 09/16/2020    HCT 28.0 (L) 09/16/2020    MCV 94.0 09/16/2020     09/16/2020

## 2020-09-22 DIAGNOSIS — C50.912 PRIMARY MALIGNANT NEOPLASM OF LEFT BREAST (HCC): ICD-10-CM

## 2020-09-22 DIAGNOSIS — C79.51 BONE METASTASIS: Primary | ICD-10-CM

## 2020-09-22 RX ORDER — SODIUM CHLORIDE 9 MG/ML
250 INJECTION, SOLUTION INTRAVENOUS ONCE
Status: CANCELLED | OUTPATIENT
Start: 2020-09-23

## 2020-09-22 RX ORDER — SODIUM CHLORIDE 9 MG/ML
250 INJECTION, SOLUTION INTRAVENOUS ONCE
Status: CANCELLED | OUTPATIENT
Start: 2020-09-30

## 2020-09-23 ENCOUNTER — INFUSION (OUTPATIENT)
Dept: ONCOLOGY | Facility: HOSPITAL | Age: 65
End: 2020-09-23

## 2020-09-23 VITALS
DIASTOLIC BLOOD PRESSURE: 70 MMHG | TEMPERATURE: 97.1 F | RESPIRATION RATE: 16 BRPM | OXYGEN SATURATION: 98 % | SYSTOLIC BLOOD PRESSURE: 147 MMHG | WEIGHT: 212.6 LBS | BODY MASS INDEX: 36.3 KG/M2 | HEART RATE: 106 BPM

## 2020-09-23 DIAGNOSIS — Z45.2 FITTING AND ADJUSTMENT OF VASCULAR CATHETER: ICD-10-CM

## 2020-09-23 DIAGNOSIS — C50.912 PRIMARY MALIGNANT NEOPLASM OF LEFT BREAST (HCC): ICD-10-CM

## 2020-09-23 DIAGNOSIS — C79.51 BONE METASTASIS: Primary | ICD-10-CM

## 2020-09-23 LAB
ALBUMIN SERPL-MCNC: 4 G/DL (ref 3.5–5.2)
ALBUMIN/GLOB SERPL: 1.5 G/DL (ref 1.1–2.4)
ALP SERPL-CCNC: 143 U/L (ref 38–116)
ALT SERPL W P-5'-P-CCNC: 58 U/L (ref 0–33)
ANION GAP SERPL CALCULATED.3IONS-SCNC: 9.9 MMOL/L (ref 5–15)
AST SERPL-CCNC: 41 U/L (ref 0–32)
BASOPHILS # BLD AUTO: 0.04 10*3/MM3 (ref 0–0.2)
BASOPHILS NFR BLD AUTO: 0.8 % (ref 0–1.5)
BILIRUB SERPL-MCNC: 0.3 MG/DL (ref 0.2–1.2)
BUN SERPL-MCNC: 29 MG/DL (ref 6–20)
BUN/CREAT SERPL: 29.9 (ref 7.3–30)
CALCIUM SPEC-SCNC: 9.2 MG/DL (ref 8.5–10.2)
CHLORIDE SERPL-SCNC: 102 MMOL/L (ref 98–107)
CO2 SERPL-SCNC: 24.1 MMOL/L (ref 22–29)
CREAT SERPL-MCNC: 0.97 MG/DL (ref 0.6–1.1)
DEPRECATED RDW RBC AUTO: 59.7 FL (ref 37–54)
EOSINOPHIL # BLD AUTO: 0.06 10*3/MM3 (ref 0–0.4)
EOSINOPHIL NFR BLD AUTO: 1.2 % (ref 0.3–6.2)
ERYTHROCYTE [DISTWIDTH] IN BLOOD BY AUTOMATED COUNT: 17 % (ref 12.3–15.4)
GFR SERPL CREATININE-BSD FRML MDRD: 58 ML/MIN/1.73
GLOBULIN UR ELPH-MCNC: 2.6 GM/DL (ref 1.8–3.5)
GLUCOSE SERPL-MCNC: 118 MG/DL (ref 74–124)
HCT VFR BLD AUTO: 27.6 % (ref 34–46.6)
HGB BLD-MCNC: 9 G/DL (ref 12–15.9)
IMM GRANULOCYTES # BLD AUTO: 0.05 10*3/MM3 (ref 0–0.05)
IMM GRANULOCYTES NFR BLD AUTO: 1 % (ref 0–0.5)
LYMPHOCYTES # BLD AUTO: 0.65 10*3/MM3 (ref 0.7–3.1)
LYMPHOCYTES NFR BLD AUTO: 13.2 % (ref 19.6–45.3)
MAGNESIUM SERPL-MCNC: 1.6 MG/DL (ref 1.8–2.5)
MCH RBC QN AUTO: 32.1 PG (ref 26.6–33)
MCHC RBC AUTO-ENTMCNC: 32.6 G/DL (ref 31.5–35.7)
MCV RBC AUTO: 98.6 FL (ref 79–97)
MONOCYTES # BLD AUTO: 0.78 10*3/MM3 (ref 0.1–0.9)
MONOCYTES NFR BLD AUTO: 15.9 % (ref 5–12)
NEUTROPHILS NFR BLD AUTO: 3.33 10*3/MM3 (ref 1.7–7)
NEUTROPHILS NFR BLD AUTO: 67.9 % (ref 42.7–76)
NRBC BLD AUTO-RTO: 0 /100 WBC (ref 0–0.2)
PLATELET # BLD AUTO: 207 10*3/MM3 (ref 140–450)
PMV BLD AUTO: 11.2 FL (ref 6–12)
POTASSIUM SERPL-SCNC: 4.5 MMOL/L (ref 3.5–4.7)
PROT SERPL-MCNC: 6.6 G/DL (ref 6.3–8)
RBC # BLD AUTO: 2.8 10*6/MM3 (ref 3.77–5.28)
SODIUM SERPL-SCNC: 136 MMOL/L (ref 134–145)
WBC # BLD AUTO: 4.91 10*3/MM3 (ref 3.4–10.8)

## 2020-09-23 PROCEDURE — 25010000002 ERIBULIN 1 MG/2ML SOLUTION: Performed by: INTERNAL MEDICINE

## 2020-09-23 PROCEDURE — 85025 COMPLETE CBC W/AUTO DIFF WBC: CPT

## 2020-09-23 PROCEDURE — 25010000003 HEPARIN LOCK FLUSH PER 10 UNITS: Performed by: INTERNAL MEDICINE

## 2020-09-23 PROCEDURE — 83735 ASSAY OF MAGNESIUM: CPT

## 2020-09-23 PROCEDURE — 36593 DECLOT VASCULAR DEVICE: CPT

## 2020-09-23 PROCEDURE — 25010000002 ALTEPLASE 2 MG RECONSTITUTED SOLUTION: Performed by: INTERNAL MEDICINE

## 2020-09-23 PROCEDURE — 25010000002 DEXAMETHASONE SODIUM PHOSPHATE 100 MG/10ML SOLUTION: Performed by: INTERNAL MEDICINE

## 2020-09-23 PROCEDURE — 96375 TX/PRO/DX INJ NEW DRUG ADDON: CPT

## 2020-09-23 PROCEDURE — 80053 COMPREHEN METABOLIC PANEL: CPT

## 2020-09-23 PROCEDURE — 96409 CHEMO IV PUSH SNGL DRUG: CPT

## 2020-09-23 RX ORDER — SODIUM CHLORIDE 9 MG/ML
250 INJECTION, SOLUTION INTRAVENOUS ONCE
Status: COMPLETED | OUTPATIENT
Start: 2020-09-23 | End: 2020-09-23

## 2020-09-23 RX ORDER — HEPARIN SODIUM (PORCINE) LOCK FLUSH IV SOLN 100 UNIT/ML 100 UNIT/ML
500 SOLUTION INTRAVENOUS AS NEEDED
Status: CANCELLED | OUTPATIENT
Start: 2020-09-23

## 2020-09-23 RX ORDER — SODIUM CHLORIDE 0.9 % (FLUSH) 0.9 %
10 SYRINGE (ML) INJECTION AS NEEDED
Status: CANCELLED | OUTPATIENT
Start: 2020-09-23

## 2020-09-23 RX ORDER — HEPARIN SODIUM (PORCINE) LOCK FLUSH IV SOLN 100 UNIT/ML 100 UNIT/ML
500 SOLUTION INTRAVENOUS AS NEEDED
Status: DISCONTINUED | OUTPATIENT
Start: 2020-09-23 | End: 2020-09-23 | Stop reason: HOSPADM

## 2020-09-23 RX ADMIN — SODIUM CHLORIDE 250 ML: 9 INJECTION, SOLUTION INTRAVENOUS at 16:05

## 2020-09-23 RX ADMIN — Medication 500 UNITS: at 16:36

## 2020-09-23 RX ADMIN — ALTEPLASE: 2.2 INJECTION, POWDER, LYOPHILIZED, FOR SOLUTION INTRAVENOUS at 15:03

## 2020-09-23 RX ADMIN — ERIBULIN MESYLATE 2.4 MG: 0.5 INJECTION INTRAVENOUS at 16:27

## 2020-09-23 RX ADMIN — DEXAMETHASONE SODIUM PHOSPHATE 12 MG: 10 INJECTION, SOLUTION INTRAMUSCULAR; INTRAVENOUS at 16:05

## 2020-09-23 NOTE — NURSING NOTE
Port not giving blood return, administered Activase. After sitting for 1 hour, port successfully gave blood return.

## 2020-09-28 RX ORDER — MAGNESIUM 64 MG (MAGNESIUM CHLORIDE) TABLET,DELAYED RELEASE
Qty: 30 EACH | Refills: 0 | Status: SHIPPED | OUTPATIENT
Start: 2020-09-28 | End: 2020-09-30 | Stop reason: SDUPTHER

## 2020-09-30 ENCOUNTER — INFUSION (OUTPATIENT)
Dept: ONCOLOGY | Facility: HOSPITAL | Age: 65
End: 2020-09-30

## 2020-09-30 ENCOUNTER — OFFICE VISIT (OUTPATIENT)
Dept: ONCOLOGY | Facility: CLINIC | Age: 65
End: 2020-09-30

## 2020-09-30 VITALS
DIASTOLIC BLOOD PRESSURE: 76 MMHG | RESPIRATION RATE: 20 BRPM | HEIGHT: 64 IN | TEMPERATURE: 97.8 F | WEIGHT: 211.7 LBS | HEART RATE: 99 BPM | BODY MASS INDEX: 36.14 KG/M2 | OXYGEN SATURATION: 97 % | SYSTOLIC BLOOD PRESSURE: 124 MMHG

## 2020-09-30 DIAGNOSIS — C50.912 PRIMARY MALIGNANT NEOPLASM OF LEFT BREAST (HCC): ICD-10-CM

## 2020-09-30 DIAGNOSIS — T45.1X5A LEUKOPENIA DUE TO ANTINEOPLASTIC CHEMOTHERAPY (HCC): ICD-10-CM

## 2020-09-30 DIAGNOSIS — C79.51 BONE METASTASIS: Primary | ICD-10-CM

## 2020-09-30 DIAGNOSIS — C79.51 BONE METASTASIS: ICD-10-CM

## 2020-09-30 DIAGNOSIS — D70.1 LEUKOPENIA DUE TO ANTINEOPLASTIC CHEMOTHERAPY (HCC): ICD-10-CM

## 2020-09-30 DIAGNOSIS — R01.1 HEART MURMUR, SYSTOLIC: ICD-10-CM

## 2020-09-30 DIAGNOSIS — C50.912 PRIMARY MALIGNANT NEOPLASM OF LEFT BREAST (HCC): Primary | ICD-10-CM

## 2020-09-30 DIAGNOSIS — D51.0 VITAMIN B12 DEFICIENCY ANEMIA DUE TO INTRINSIC FACTOR DEFICIENCY: ICD-10-CM

## 2020-09-30 DIAGNOSIS — E83.42 HYPOMAGNESEMIA: ICD-10-CM

## 2020-09-30 DIAGNOSIS — Z45.2 FITTING AND ADJUSTMENT OF VASCULAR CATHETER: ICD-10-CM

## 2020-09-30 DIAGNOSIS — D63.0 ANEMIA IN NEOPLASTIC DISEASE: ICD-10-CM

## 2020-09-30 LAB
ALBUMIN SERPL-MCNC: 3.8 G/DL (ref 3.5–5.2)
ALBUMIN/GLOB SERPL: 1.5 G/DL (ref 1.1–2.4)
ALP SERPL-CCNC: 139 U/L (ref 38–116)
ALT SERPL W P-5'-P-CCNC: 35 U/L (ref 0–33)
ANION GAP SERPL CALCULATED.3IONS-SCNC: 11.2 MMOL/L (ref 5–15)
AST SERPL-CCNC: 24 U/L (ref 0–32)
BASOPHILS # BLD AUTO: 0.05 10*3/MM3 (ref 0–0.2)
BASOPHILS NFR BLD AUTO: 0.8 % (ref 0–1.5)
BILIRUB SERPL-MCNC: 0.3 MG/DL (ref 0.2–1.2)
BUN SERPL-MCNC: 23 MG/DL (ref 6–20)
BUN/CREAT SERPL: 22.1 (ref 7.3–30)
CALCIUM SPEC-SCNC: 9 MG/DL (ref 8.5–10.2)
CANCER AG15-3 SERPL-ACNC: 52.4 U/ML
CHLORIDE SERPL-SCNC: 102 MMOL/L (ref 98–107)
CO2 SERPL-SCNC: 23.8 MMOL/L (ref 22–29)
CREAT SERPL-MCNC: 1.04 MG/DL (ref 0.6–1.1)
DEPRECATED RDW RBC AUTO: 58.1 FL (ref 37–54)
EOSINOPHIL # BLD AUTO: 0.03 10*3/MM3 (ref 0–0.4)
EOSINOPHIL NFR BLD AUTO: 0.5 % (ref 0.3–6.2)
ERYTHROCYTE [DISTWIDTH] IN BLOOD BY AUTOMATED COUNT: 16.6 % (ref 12.3–15.4)
GFR SERPL CREATININE-BSD FRML MDRD: 53 ML/MIN/1.73
GLOBULIN UR ELPH-MCNC: 2.5 GM/DL (ref 1.8–3.5)
GLUCOSE SERPL-MCNC: 165 MG/DL (ref 74–124)
HCT VFR BLD AUTO: 28.5 % (ref 34–46.6)
HGB BLD-MCNC: 9.3 G/DL (ref 12–15.9)
IMM GRANULOCYTES # BLD AUTO: 0.11 10*3/MM3 (ref 0–0.05)
IMM GRANULOCYTES NFR BLD AUTO: 1.8 % (ref 0–0.5)
LYMPHOCYTES # BLD AUTO: 0.8 10*3/MM3 (ref 0.7–3.1)
LYMPHOCYTES NFR BLD AUTO: 13.4 % (ref 19.6–45.3)
MAGNESIUM SERPL-MCNC: 1.5 MG/DL (ref 1.8–2.5)
MCH RBC QN AUTO: 32.1 PG (ref 26.6–33)
MCHC RBC AUTO-ENTMCNC: 32.6 G/DL (ref 31.5–35.7)
MCV RBC AUTO: 98.3 FL (ref 79–97)
MONOCYTES # BLD AUTO: 0.51 10*3/MM3 (ref 0.1–0.9)
MONOCYTES NFR BLD AUTO: 8.5 % (ref 5–12)
NEUTROPHILS NFR BLD AUTO: 4.47 10*3/MM3 (ref 1.7–7)
NEUTROPHILS NFR BLD AUTO: 75 % (ref 42.7–76)
NRBC BLD AUTO-RTO: 0.7 /100 WBC (ref 0–0.2)
PHOSPHATE SERPL-MCNC: 2.7 MG/DL (ref 2.5–4.5)
PLATELET # BLD AUTO: 216 10*3/MM3 (ref 140–450)
PMV BLD AUTO: 10.5 FL (ref 6–12)
POTASSIUM SERPL-SCNC: 4.1 MMOL/L (ref 3.5–4.7)
PROT SERPL-MCNC: 6.3 G/DL (ref 6.3–8)
RBC # BLD AUTO: 2.9 10*6/MM3 (ref 3.77–5.28)
SODIUM SERPL-SCNC: 137 MMOL/L (ref 134–145)
WBC # BLD AUTO: 5.97 10*3/MM3 (ref 3.4–10.8)

## 2020-09-30 PROCEDURE — 25010000002 ERIBULIN 1 MG/2ML SOLUTION: Performed by: INTERNAL MEDICINE

## 2020-09-30 PROCEDURE — 25010000002 DEXAMETHASONE SODIUM PHOSPHATE 100 MG/10ML SOLUTION: Performed by: INTERNAL MEDICINE

## 2020-09-30 PROCEDURE — 83735 ASSAY OF MAGNESIUM: CPT

## 2020-09-30 PROCEDURE — 85025 COMPLETE CBC W/AUTO DIFF WBC: CPT

## 2020-09-30 PROCEDURE — 99215 OFFICE O/P EST HI 40 MIN: CPT | Performed by: INTERNAL MEDICINE

## 2020-09-30 PROCEDURE — 25010000002 DENOSUMAB 120 MG/1.7ML SOLUTION: Performed by: INTERNAL MEDICINE

## 2020-09-30 PROCEDURE — 96372 THER/PROPH/DIAG INJ SC/IM: CPT

## 2020-09-30 PROCEDURE — 96375 TX/PRO/DX INJ NEW DRUG ADDON: CPT

## 2020-09-30 PROCEDURE — 96409 CHEMO IV PUSH SNGL DRUG: CPT

## 2020-09-30 PROCEDURE — 36415 COLL VENOUS BLD VENIPUNCTURE: CPT

## 2020-09-30 PROCEDURE — 84100 ASSAY OF PHOSPHORUS: CPT

## 2020-09-30 PROCEDURE — 86300 IMMUNOASSAY TUMOR CA 15-3: CPT | Performed by: INTERNAL MEDICINE

## 2020-09-30 PROCEDURE — 80053 COMPREHEN METABOLIC PANEL: CPT

## 2020-09-30 RX ORDER — SODIUM CHLORIDE 9 MG/ML
250 INJECTION, SOLUTION INTRAVENOUS ONCE
Status: COMPLETED | OUTPATIENT
Start: 2020-09-30 | End: 2020-09-30

## 2020-09-30 RX ORDER — MAGNESIUM CHLORIDE 64 MG
64 TABLET, DELAYED RELEASE (ENTERIC COATED) ORAL 2 TIMES DAILY
Qty: 180 TABLET | Refills: 3 | Status: SHIPPED | OUTPATIENT
Start: 2020-09-30

## 2020-09-30 RX ORDER — CIPROFLOXACIN 500 MG/1
500 TABLET, FILM COATED ORAL 2 TIMES DAILY
Qty: 14 TABLET | Refills: 0 | Status: SHIPPED | OUTPATIENT
Start: 2020-09-30 | End: 2020-10-07

## 2020-09-30 RX ADMIN — ERIBULIN MESYLATE 2.4 MG: 0.5 INJECTION INTRAVENOUS at 11:06

## 2020-09-30 RX ADMIN — SODIUM CHLORIDE 250 ML: 9 INJECTION, SOLUTION INTRAVENOUS at 10:36

## 2020-09-30 RX ADMIN — DENOSUMAB 120 MG: 120 INJECTION SUBCUTANEOUS at 11:13

## 2020-09-30 RX ADMIN — DEXAMETHASONE SODIUM PHOSPHATE 12 MG: 10 INJECTION, SOLUTION INTRAMUSCULAR; INTRAVENOUS at 10:35

## 2020-09-30 NOTE — PROGRESS NOTES
Labs reviewed with Dr. Saez today. Ok for xgeva. No need for IV mag. Pt is on oral mag per dr. Saez.

## 2020-10-05 ENCOUNTER — TELEPHONE (OUTPATIENT)
Dept: ONCOLOGY | Facility: CLINIC | Age: 65
End: 2020-10-05

## 2020-10-09 ENCOUNTER — TELEPHONE (OUTPATIENT)
Dept: ONCOLOGY | Facility: CLINIC | Age: 65
End: 2020-10-09

## 2020-10-13 DIAGNOSIS — C50.912 PRIMARY MALIGNANT NEOPLASM OF LEFT BREAST (HCC): ICD-10-CM

## 2020-10-13 DIAGNOSIS — C79.51 BONE METASTASIS: Primary | ICD-10-CM

## 2020-10-13 RX ORDER — SODIUM CHLORIDE 9 MG/ML
250 INJECTION, SOLUTION INTRAVENOUS ONCE
Status: CANCELLED | OUTPATIENT
Start: 2020-10-14

## 2020-10-13 RX ORDER — SODIUM CHLORIDE 9 MG/ML
250 INJECTION, SOLUTION INTRAVENOUS ONCE
Status: CANCELLED | OUTPATIENT
Start: 2020-10-21

## 2020-10-14 ENCOUNTER — INFUSION (OUTPATIENT)
Dept: ONCOLOGY | Facility: HOSPITAL | Age: 65
End: 2020-10-14

## 2020-10-14 VITALS
OXYGEN SATURATION: 97 % | SYSTOLIC BLOOD PRESSURE: 126 MMHG | DIASTOLIC BLOOD PRESSURE: 55 MMHG | RESPIRATION RATE: 21 BRPM | HEART RATE: 112 BPM | WEIGHT: 213.6 LBS | BODY MASS INDEX: 36.47 KG/M2 | TEMPERATURE: 97.1 F

## 2020-10-14 DIAGNOSIS — C79.51 BONE METASTASIS: Primary | ICD-10-CM

## 2020-10-14 DIAGNOSIS — C50.912 PRIMARY MALIGNANT NEOPLASM OF LEFT BREAST (HCC): ICD-10-CM

## 2020-10-14 LAB
ALBUMIN SERPL-MCNC: 3.9 G/DL (ref 3.5–5.2)
ALBUMIN/GLOB SERPL: 1.6 G/DL (ref 1.1–2.4)
ALP SERPL-CCNC: 123 U/L (ref 38–116)
ALT SERPL W P-5'-P-CCNC: 43 U/L (ref 0–33)
ANION GAP SERPL CALCULATED.3IONS-SCNC: 11.4 MMOL/L (ref 5–15)
AST SERPL-CCNC: 29 U/L (ref 0–32)
BASOPHILS # BLD AUTO: 0.05 10*3/MM3 (ref 0–0.2)
BASOPHILS NFR BLD AUTO: 0.9 % (ref 0–1.5)
BILIRUB SERPL-MCNC: 0.2 MG/DL (ref 0.2–1.2)
BUN SERPL-MCNC: 33 MG/DL (ref 6–20)
BUN/CREAT SERPL: 34 (ref 7.3–30)
CALCIUM SPEC-SCNC: 9.1 MG/DL (ref 8.5–10.2)
CHLORIDE SERPL-SCNC: 102 MMOL/L (ref 98–107)
CO2 SERPL-SCNC: 21.6 MMOL/L (ref 22–29)
CREAT SERPL-MCNC: 0.97 MG/DL (ref 0.6–1.1)
DEPRECATED RDW RBC AUTO: 61.1 FL (ref 37–54)
EOSINOPHIL # BLD AUTO: 0.06 10*3/MM3 (ref 0–0.4)
EOSINOPHIL NFR BLD AUTO: 1.1 % (ref 0.3–6.2)
ERYTHROCYTE [DISTWIDTH] IN BLOOD BY AUTOMATED COUNT: 17.5 % (ref 12.3–15.4)
GFR SERPL CREATININE-BSD FRML MDRD: 58 ML/MIN/1.73
GLOBULIN UR ELPH-MCNC: 2.5 GM/DL (ref 1.8–3.5)
GLUCOSE SERPL-MCNC: 104 MG/DL (ref 74–124)
HCT VFR BLD AUTO: 27.4 % (ref 34–46.6)
HGB BLD-MCNC: 8.9 G/DL (ref 12–15.9)
IMM GRANULOCYTES # BLD AUTO: 0.08 10*3/MM3 (ref 0–0.05)
IMM GRANULOCYTES NFR BLD AUTO: 1.5 % (ref 0–0.5)
LYMPHOCYTES # BLD AUTO: 0.76 10*3/MM3 (ref 0.7–3.1)
LYMPHOCYTES NFR BLD AUTO: 14.1 % (ref 19.6–45.3)
MAGNESIUM SERPL-MCNC: 1.5 MG/DL (ref 1.8–2.5)
MCH RBC QN AUTO: 31 PG (ref 26.6–33)
MCHC RBC AUTO-ENTMCNC: 32.5 G/DL (ref 31.5–35.7)
MCV RBC AUTO: 95.5 FL (ref 79–97)
MONOCYTES # BLD AUTO: 0.81 10*3/MM3 (ref 0.1–0.9)
MONOCYTES NFR BLD AUTO: 15 % (ref 5–12)
NEUTROPHILS NFR BLD AUTO: 3.64 10*3/MM3 (ref 1.7–7)
NEUTROPHILS NFR BLD AUTO: 67.4 % (ref 42.7–76)
NRBC BLD AUTO-RTO: 0.4 /100 WBC (ref 0–0.2)
PLATELET # BLD AUTO: 220 10*3/MM3 (ref 140–450)
PMV BLD AUTO: 10.4 FL (ref 6–12)
POTASSIUM SERPL-SCNC: 3.9 MMOL/L (ref 3.5–4.7)
PROT SERPL-MCNC: 6.4 G/DL (ref 6.3–8)
RBC # BLD AUTO: 2.87 10*6/MM3 (ref 3.77–5.28)
SODIUM SERPL-SCNC: 135 MMOL/L (ref 134–145)
WBC # BLD AUTO: 5.4 10*3/MM3 (ref 3.4–10.8)

## 2020-10-14 PROCEDURE — 25010000002 ALTEPLASE 2 MG RECONSTITUTED SOLUTION: Performed by: INTERNAL MEDICINE

## 2020-10-14 PROCEDURE — 25010000002 ERIBULIN 1 MG/2ML SOLUTION: Performed by: INTERNAL MEDICINE

## 2020-10-14 PROCEDURE — 36593 DECLOT VASCULAR DEVICE: CPT

## 2020-10-14 PROCEDURE — 83735 ASSAY OF MAGNESIUM: CPT

## 2020-10-14 PROCEDURE — 96375 TX/PRO/DX INJ NEW DRUG ADDON: CPT

## 2020-10-14 PROCEDURE — 25010000002 DEXAMETHASONE SODIUM PHOSPHATE 100 MG/10ML SOLUTION: Performed by: INTERNAL MEDICINE

## 2020-10-14 PROCEDURE — 80053 COMPREHEN METABOLIC PANEL: CPT

## 2020-10-14 PROCEDURE — 85025 COMPLETE CBC W/AUTO DIFF WBC: CPT

## 2020-10-14 PROCEDURE — 96409 CHEMO IV PUSH SNGL DRUG: CPT

## 2020-10-14 RX ORDER — SODIUM CHLORIDE 9 MG/ML
250 INJECTION, SOLUTION INTRAVENOUS ONCE
Status: COMPLETED | OUTPATIENT
Start: 2020-10-14 | End: 2020-10-14

## 2020-10-14 RX ADMIN — DEXAMETHASONE SODIUM PHOSPHATE 12 MG: 10 INJECTION, SOLUTION INTRAMUSCULAR; INTRAVENOUS at 14:38

## 2020-10-14 RX ADMIN — ERIBULIN MESYLATE 2.4 MG: 0.5 INJECTION INTRAVENOUS at 14:56

## 2020-10-14 RX ADMIN — ALTEPLASE: 2.2 INJECTION, POWDER, LYOPHILIZED, FOR SOLUTION INTRAVENOUS at 13:54

## 2020-10-14 RX ADMIN — SODIUM CHLORIDE 250 ML: 9 INJECTION, SOLUTION INTRAVENOUS at 14:38

## 2020-10-14 NOTE — TELEPHONE ENCOUNTER
[facility] requesting refill of pts Kisqali. Per last office note from Dr Saez-Pt will continue. New Rx escribed to [facility]   PROVIDER:[TOKEN:[[id number]],FOLLOWUP:[1 week]]

## 2020-10-15 ENCOUNTER — TELEPHONE (OUTPATIENT)
Dept: ONCOLOGY | Facility: CLINIC | Age: 65
End: 2020-10-15

## 2020-10-15 DIAGNOSIS — C79.51 BONE METASTASIS: ICD-10-CM

## 2020-10-15 DIAGNOSIS — Z17.0 MALIGNANT NEOPLASM OF LEFT BREAST IN FEMALE, ESTROGEN RECEPTOR POSITIVE, UNSPECIFIED SITE OF BREAST (HCC): Primary | ICD-10-CM

## 2020-10-15 DIAGNOSIS — L65.9 HAIR LOSS: ICD-10-CM

## 2020-10-15 DIAGNOSIS — C50.912 MALIGNANT NEOPLASM OF LEFT BREAST IN FEMALE, ESTROGEN RECEPTOR POSITIVE, UNSPECIFIED SITE OF BREAST (HCC): Primary | ICD-10-CM

## 2020-10-15 NOTE — TELEPHONE ENCOUNTER
PT CALLING BACK TO CHECK ON STATUS OF RX FOR CRANIAL PROSTHESIS. MESSAGE SENT TO JACQUI MIRANDA PT. PT V/U.

## 2020-10-15 NOTE — TELEPHONE ENCOUNTER
Patient called she would like a call back from the doctor's assistant she is following up on a conversation from last week   Best call back number [phone]

## 2020-10-21 ENCOUNTER — INFUSION (OUTPATIENT)
Dept: ONCOLOGY | Facility: HOSPITAL | Age: 65
End: 2020-10-21

## 2020-10-21 VITALS
DIASTOLIC BLOOD PRESSURE: 63 MMHG | HEART RATE: 109 BPM | SYSTOLIC BLOOD PRESSURE: 127 MMHG | BODY MASS INDEX: 36.3 KG/M2 | OXYGEN SATURATION: 96 % | TEMPERATURE: 97.1 F | RESPIRATION RATE: 18 BRPM | WEIGHT: 212.6 LBS

## 2020-10-21 DIAGNOSIS — Z45.2 FITTING AND ADJUSTMENT OF VASCULAR CATHETER: ICD-10-CM

## 2020-10-21 DIAGNOSIS — C50.912 PRIMARY MALIGNANT NEOPLASM OF LEFT BREAST (HCC): ICD-10-CM

## 2020-10-21 DIAGNOSIS — C79.51 BONE METASTASIS: Primary | ICD-10-CM

## 2020-10-21 LAB
ANION GAP SERPL CALCULATED.3IONS-SCNC: 13.1 MMOL/L (ref 5–15)
BASOPHILS # BLD AUTO: 0.06 10*3/MM3 (ref 0–0.2)
BASOPHILS NFR BLD AUTO: 1 % (ref 0–1.5)
BUN SERPL-MCNC: 38 MG/DL (ref 6–20)
BUN/CREAT SERPL: 39.2 (ref 7.3–30)
CALCIUM SPEC-SCNC: 9 MG/DL (ref 8.5–10.2)
CHLORIDE SERPL-SCNC: 101 MMOL/L (ref 98–107)
CO2 SERPL-SCNC: 22.9 MMOL/L (ref 22–29)
CREAT SERPL-MCNC: 0.97 MG/DL (ref 0.6–1.1)
DEPRECATED RDW RBC AUTO: 61.1 FL (ref 37–54)
EOSINOPHIL # BLD AUTO: 0.09 10*3/MM3 (ref 0–0.4)
EOSINOPHIL NFR BLD AUTO: 1.5 % (ref 0.3–6.2)
ERYTHROCYTE [DISTWIDTH] IN BLOOD BY AUTOMATED COUNT: 17.6 % (ref 12.3–15.4)
GFR SERPL CREATININE-BSD FRML MDRD: 58 ML/MIN/1.73
GLUCOSE SERPL-MCNC: 121 MG/DL (ref 74–124)
HCT VFR BLD AUTO: 27.4 % (ref 34–46.6)
HGB BLD-MCNC: 9 G/DL (ref 12–15.9)
IMM GRANULOCYTES # BLD AUTO: 0.12 10*3/MM3 (ref 0–0.05)
IMM GRANULOCYTES NFR BLD AUTO: 2 % (ref 0–0.5)
LYMPHOCYTES # BLD AUTO: 0.74 10*3/MM3 (ref 0.7–3.1)
LYMPHOCYTES NFR BLD AUTO: 12.1 % (ref 19.6–45.3)
MAGNESIUM SERPL-MCNC: 1.5 MG/DL (ref 1.8–2.5)
MCH RBC QN AUTO: 31.3 PG (ref 26.6–33)
MCHC RBC AUTO-ENTMCNC: 32.8 G/DL (ref 31.5–35.7)
MCV RBC AUTO: 95.1 FL (ref 79–97)
MONOCYTES # BLD AUTO: 0.57 10*3/MM3 (ref 0.1–0.9)
MONOCYTES NFR BLD AUTO: 9.3 % (ref 5–12)
NEUTROPHILS NFR BLD AUTO: 4.55 10*3/MM3 (ref 1.7–7)
NEUTROPHILS NFR BLD AUTO: 74.1 % (ref 42.7–76)
NRBC BLD AUTO-RTO: 0.7 /100 WBC (ref 0–0.2)
PLATELET # BLD AUTO: 241 10*3/MM3 (ref 140–450)
PMV BLD AUTO: 10.4 FL (ref 6–12)
POTASSIUM SERPL-SCNC: 3.9 MMOL/L (ref 3.5–4.7)
RBC # BLD AUTO: 2.88 10*6/MM3 (ref 3.77–5.28)
SODIUM SERPL-SCNC: 137 MMOL/L (ref 134–145)
WBC # BLD AUTO: 6.13 10*3/MM3 (ref 3.4–10.8)

## 2020-10-21 PROCEDURE — 25010000002 ERIBULIN 1 MG/2ML SOLUTION: Performed by: INTERNAL MEDICINE

## 2020-10-21 PROCEDURE — 80048 BASIC METABOLIC PNL TOTAL CA: CPT

## 2020-10-21 PROCEDURE — 96409 CHEMO IV PUSH SNGL DRUG: CPT

## 2020-10-21 PROCEDURE — 25010000003 HEPARIN LOCK FLUSH PER 10 UNITS: Performed by: INTERNAL MEDICINE

## 2020-10-21 PROCEDURE — 83735 ASSAY OF MAGNESIUM: CPT

## 2020-10-21 PROCEDURE — 85025 COMPLETE CBC W/AUTO DIFF WBC: CPT

## 2020-10-21 PROCEDURE — 25010000002 DEXAMETHASONE SODIUM PHOSPHATE 100 MG/10ML SOLUTION: Performed by: INTERNAL MEDICINE

## 2020-10-21 PROCEDURE — 96375 TX/PRO/DX INJ NEW DRUG ADDON: CPT

## 2020-10-21 RX ORDER — HEPARIN SODIUM (PORCINE) LOCK FLUSH IV SOLN 100 UNIT/ML 100 UNIT/ML
500 SOLUTION INTRAVENOUS AS NEEDED
Status: CANCELLED | OUTPATIENT
Start: 2020-10-21

## 2020-10-21 RX ORDER — SODIUM CHLORIDE 9 MG/ML
250 INJECTION, SOLUTION INTRAVENOUS ONCE
Status: COMPLETED | OUTPATIENT
Start: 2020-10-21 | End: 2020-10-21

## 2020-10-21 RX ORDER — HEPARIN SODIUM (PORCINE) LOCK FLUSH IV SOLN 100 UNIT/ML 100 UNIT/ML
500 SOLUTION INTRAVENOUS AS NEEDED
Status: DISCONTINUED | OUTPATIENT
Start: 2020-10-21 | End: 2020-10-21 | Stop reason: HOSPADM

## 2020-10-21 RX ORDER — CIPROFLOXACIN 500 MG/1
500 TABLET, FILM COATED ORAL 2 TIMES DAILY
Qty: 14 TABLET | Refills: 0 | Status: SHIPPED | OUTPATIENT
Start: 2020-10-21 | End: 2020-10-28

## 2020-10-21 RX ORDER — SODIUM CHLORIDE 0.9 % (FLUSH) 0.9 %
10 SYRINGE (ML) INJECTION AS NEEDED
Status: CANCELLED | OUTPATIENT
Start: 2020-10-21

## 2020-10-21 RX ADMIN — DEXAMETHASONE SODIUM PHOSPHATE 12 MG: 10 INJECTION, SOLUTION INTRAMUSCULAR; INTRAVENOUS at 14:00

## 2020-10-21 RX ADMIN — Medication 500 UNITS: at 14:30

## 2020-10-21 RX ADMIN — ERIBULIN MESYLATE 2.4 MG: 0.5 INJECTION INTRAVENOUS at 14:25

## 2020-10-21 RX ADMIN — SODIUM CHLORIDE 250 ML: 9 INJECTION, SOLUTION INTRAVENOUS at 13:59

## 2020-10-21 NOTE — NURSING NOTE
Mag 1.5 today. Pt reports her oral magnesium dose was doubled last week. R/w Dr. Saez, no IV replacement necessary. Per Dr. Saez, pt magnesium level is consistently 1.5 and that replacing magnesium doesn't seem to effect pt's magnesium level and causes her more issues than benefit.

## 2020-10-28 ENCOUNTER — HOSPITAL ENCOUNTER (OUTPATIENT)
Dept: NUCLEAR MEDICINE | Facility: HOSPITAL | Age: 65
Discharge: HOME | End: 2020-10-28

## 2020-10-28 DIAGNOSIS — Z45.2 FITTING AND ADJUSTMENT OF VASCULAR CATHETER: ICD-10-CM

## 2020-10-28 DIAGNOSIS — D63.0 ANEMIA IN NEOPLASTIC DISEASE: ICD-10-CM

## 2020-10-28 DIAGNOSIS — C79.51 BONE METASTASIS: ICD-10-CM

## 2020-10-28 DIAGNOSIS — R01.1 HEART MURMUR, SYSTOLIC: ICD-10-CM

## 2020-10-28 DIAGNOSIS — C50.912 PRIMARY MALIGNANT NEOPLASM OF LEFT BREAST (HCC): ICD-10-CM

## 2020-10-28 PROCEDURE — 0 TECHNETIUM MEDRONATE KIT: Performed by: INTERNAL MEDICINE

## 2020-10-28 PROCEDURE — 78306 BONE IMAGING WHOLE BODY: CPT

## 2020-10-28 PROCEDURE — A9503 TC99M MEDRONATE: HCPCS | Performed by: INTERNAL MEDICINE

## 2020-10-28 RX ORDER — TC 99M MEDRONATE 20 MG/10ML
22.8 INJECTION, POWDER, LYOPHILIZED, FOR SOLUTION INTRAVENOUS
Status: COMPLETED | OUTPATIENT
Start: 2020-10-28 | End: 2020-10-28

## 2020-10-28 RX ADMIN — Medication 22.8 MILLICURIE: at 09:33

## 2020-11-04 ENCOUNTER — TELEPHONE (OUTPATIENT)
Dept: ONCOLOGY | Facility: CLINIC | Age: 65
End: 2020-11-04

## 2020-11-04 ENCOUNTER — INFUSION (OUTPATIENT)
Dept: ONCOLOGY | Facility: HOSPITAL | Age: 65
End: 2020-11-04

## 2020-11-04 ENCOUNTER — OFFICE VISIT (OUTPATIENT)
Dept: ONCOLOGY | Facility: CLINIC | Age: 65
End: 2020-11-04

## 2020-11-04 VITALS
RESPIRATION RATE: 20 BRPM | SYSTOLIC BLOOD PRESSURE: 130 MMHG | DIASTOLIC BLOOD PRESSURE: 68 MMHG | TEMPERATURE: 97.7 F | HEART RATE: 105 BPM | BODY MASS INDEX: 36.09 KG/M2 | OXYGEN SATURATION: 96 % | WEIGHT: 211.4 LBS | HEIGHT: 64 IN

## 2020-11-04 DIAGNOSIS — C50.912 PRIMARY MALIGNANT NEOPLASM OF LEFT BREAST (HCC): Primary | ICD-10-CM

## 2020-11-04 DIAGNOSIS — M85.89 OSTEOPENIA OF MULTIPLE SITES: ICD-10-CM

## 2020-11-04 DIAGNOSIS — C50.912 PRIMARY MALIGNANT NEOPLASM OF LEFT BREAST (HCC): ICD-10-CM

## 2020-11-04 DIAGNOSIS — T45.1X5A LEUKOPENIA DUE TO ANTINEOPLASTIC CHEMOTHERAPY (HCC): ICD-10-CM

## 2020-11-04 DIAGNOSIS — E83.42 HYPOMAGNESEMIA: ICD-10-CM

## 2020-11-04 DIAGNOSIS — C79.51 BONE METASTASIS: Primary | ICD-10-CM

## 2020-11-04 DIAGNOSIS — D51.0 VITAMIN B12 DEFICIENCY ANEMIA DUE TO INTRINSIC FACTOR DEFICIENCY: ICD-10-CM

## 2020-11-04 DIAGNOSIS — C79.51 BONE METASTASIS: ICD-10-CM

## 2020-11-04 DIAGNOSIS — D70.1 LEUKOPENIA DUE TO ANTINEOPLASTIC CHEMOTHERAPY (HCC): ICD-10-CM

## 2020-11-04 DIAGNOSIS — Z45.2 FITTING AND ADJUSTMENT OF VASCULAR CATHETER: ICD-10-CM

## 2020-11-04 DIAGNOSIS — D63.0 ANEMIA IN NEOPLASTIC DISEASE: ICD-10-CM

## 2020-11-04 LAB
ABO GROUP BLD: NORMAL
ALBUMIN SERPL-MCNC: 3.9 G/DL (ref 3.5–5.2)
ALBUMIN/GLOB SERPL: 1.6 G/DL (ref 1.1–2.4)
ALP SERPL-CCNC: 120 U/L (ref 38–116)
ALT SERPL W P-5'-P-CCNC: 49 U/L (ref 0–33)
ANION GAP SERPL CALCULATED.3IONS-SCNC: 12.2 MMOL/L (ref 5–15)
AST SERPL-CCNC: 34 U/L (ref 0–32)
BASOPHILS # BLD AUTO: 0.03 10*3/MM3 (ref 0–0.2)
BASOPHILS NFR BLD AUTO: 0.5 % (ref 0–1.5)
BILIRUB SERPL-MCNC: 0.2 MG/DL (ref 0.2–1.2)
BLD GP AB SCN SERPL QL: NEGATIVE
BUN SERPL-MCNC: 27 MG/DL (ref 6–20)
BUN/CREAT SERPL: 30.7 (ref 7.3–30)
CALCIUM SPEC-SCNC: 9.3 MG/DL (ref 8.5–10.2)
CANCER AG15-3 SERPL-ACNC: 50.9 U/ML
CHLORIDE SERPL-SCNC: 102 MMOL/L (ref 98–107)
CO2 SERPL-SCNC: 21.8 MMOL/L (ref 22–29)
CREAT SERPL-MCNC: 0.88 MG/DL (ref 0.6–1.1)
DEPRECATED RDW RBC AUTO: 64.5 FL (ref 37–54)
EOSINOPHIL # BLD AUTO: 0.06 10*3/MM3 (ref 0–0.4)
EOSINOPHIL NFR BLD AUTO: 1.1 % (ref 0.3–6.2)
ERYTHROCYTE [DISTWIDTH] IN BLOOD BY AUTOMATED COUNT: 18.6 % (ref 12.3–15.4)
FERRITIN SERPL-MCNC: 633.2 NG/ML (ref 13–150)
GFR SERPL CREATININE-BSD FRML MDRD: 65 ML/MIN/1.73
GLOBULIN UR ELPH-MCNC: 2.5 GM/DL (ref 1.8–3.5)
GLUCOSE SERPL-MCNC: 152 MG/DL (ref 74–124)
HCT VFR BLD AUTO: 27.5 % (ref 34–46.6)
HGB BLD-MCNC: 9.1 G/DL (ref 12–15.9)
HGB RETIC QN AUTO: 33.1 PG (ref 29.8–36.1)
IMM GRANULOCYTES # BLD AUTO: 0.08 10*3/MM3 (ref 0–0.05)
IMM GRANULOCYTES NFR BLD AUTO: 1.4 % (ref 0–0.5)
IMM RETICS NFR: 34.6 % (ref 3–15.8)
IRON 24H UR-MRATE: 59 MCG/DL (ref 37–145)
IRON SATN MFR SERPL: 17 % (ref 14–48)
LYMPHOCYTES # BLD AUTO: 0.79 10*3/MM3 (ref 0.7–3.1)
LYMPHOCYTES NFR BLD AUTO: 14.3 % (ref 19.6–45.3)
MAGNESIUM SERPL-MCNC: 1.5 MG/DL (ref 1.8–2.5)
MCH RBC QN AUTO: 31.9 PG (ref 26.6–33)
MCHC RBC AUTO-ENTMCNC: 33.1 G/DL (ref 31.5–35.7)
MCV RBC AUTO: 96.5 FL (ref 79–97)
MONOCYTES # BLD AUTO: 0.82 10*3/MM3 (ref 0.1–0.9)
MONOCYTES NFR BLD AUTO: 14.8 % (ref 5–12)
NEUTROPHILS NFR BLD AUTO: 3.75 10*3/MM3 (ref 1.7–7)
NEUTROPHILS NFR BLD AUTO: 67.9 % (ref 42.7–76)
NRBC BLD AUTO-RTO: 0.5 /100 WBC (ref 0–0.2)
PHOSPHATE SERPL-MCNC: 3.3 MG/DL (ref 2.5–4.5)
PLATELET # BLD AUTO: 225 10*3/MM3 (ref 140–450)
PMV BLD AUTO: 10 FL (ref 6–12)
POTASSIUM SERPL-SCNC: 4.2 MMOL/L (ref 3.5–4.7)
PROT SERPL-MCNC: 6.4 G/DL (ref 6.3–8)
RBC # BLD AUTO: 2.85 10*6/MM3 (ref 3.77–5.28)
RETICS # AUTO: 0.1 10*6/MM3 (ref 0.02–0.13)
RETICS/RBC NFR AUTO: 3.64 % (ref 0.7–1.9)
RH BLD: NEGATIVE
RH BLD: NEGATIVE
SODIUM SERPL-SCNC: 136 MMOL/L (ref 134–145)
T&S EXPIRATION DATE: NORMAL
TIBC SERPL-MCNC: 353 MCG/DL (ref 249–505)
TRANSFERRIN SERPL-MCNC: 252 MG/DL (ref 200–360)
WBC # BLD AUTO: 5.53 10*3/MM3 (ref 3.4–10.8)

## 2020-11-04 PROCEDURE — 83735 ASSAY OF MAGNESIUM: CPT

## 2020-11-04 PROCEDURE — 25010000003 HEPARIN LOCK FLUSH PER 10 UNITS: Performed by: INTERNAL MEDICINE

## 2020-11-04 PROCEDURE — 86300 IMMUNOASSAY TUMOR CA 15-3: CPT | Performed by: INTERNAL MEDICINE

## 2020-11-04 PROCEDURE — 86850 RBC ANTIBODY SCREEN: CPT | Performed by: INTERNAL MEDICINE

## 2020-11-04 PROCEDURE — 99215 OFFICE O/P EST HI 40 MIN: CPT | Performed by: INTERNAL MEDICINE

## 2020-11-04 PROCEDURE — 85025 COMPLETE CBC W/AUTO DIFF WBC: CPT

## 2020-11-04 PROCEDURE — 96372 THER/PROPH/DIAG INJ SC/IM: CPT

## 2020-11-04 PROCEDURE — 25010000002 DENOSUMAB 120 MG/1.7ML SOLUTION: Performed by: INTERNAL MEDICINE

## 2020-11-04 PROCEDURE — 83540 ASSAY OF IRON: CPT | Performed by: INTERNAL MEDICINE

## 2020-11-04 PROCEDURE — 82728 ASSAY OF FERRITIN: CPT | Performed by: INTERNAL MEDICINE

## 2020-11-04 PROCEDURE — 86901 BLOOD TYPING SEROLOGIC RH(D): CPT | Performed by: INTERNAL MEDICINE

## 2020-11-04 PROCEDURE — 84100 ASSAY OF PHOSPHORUS: CPT

## 2020-11-04 PROCEDURE — 86920 COMPATIBILITY TEST SPIN: CPT

## 2020-11-04 PROCEDURE — 84466 ASSAY OF TRANSFERRIN: CPT | Performed by: INTERNAL MEDICINE

## 2020-11-04 PROCEDURE — 85046 RETICYTE/HGB CONCENTRATE: CPT | Performed by: INTERNAL MEDICINE

## 2020-11-04 PROCEDURE — 80053 COMPREHEN METABOLIC PANEL: CPT

## 2020-11-04 PROCEDURE — 86900 BLOOD TYPING SEROLOGIC ABO: CPT | Performed by: INTERNAL MEDICINE

## 2020-11-04 PROCEDURE — 36415 COLL VENOUS BLD VENIPUNCTURE: CPT | Performed by: INTERNAL MEDICINE

## 2020-11-04 RX ORDER — HEPARIN SODIUM (PORCINE) LOCK FLUSH IV SOLN 100 UNIT/ML 100 UNIT/ML
500 SOLUTION INTRAVENOUS AS NEEDED
Status: CANCELLED | OUTPATIENT
Start: 2020-11-04

## 2020-11-04 RX ORDER — SODIUM CHLORIDE 9 MG/ML
250 INJECTION, SOLUTION INTRAVENOUS AS NEEDED
Status: CANCELLED | OUTPATIENT
Start: 2020-11-04

## 2020-11-04 RX ORDER — SODIUM CHLORIDE 0.9 % (FLUSH) 0.9 %
10 SYRINGE (ML) INJECTION AS NEEDED
Status: CANCELLED | OUTPATIENT
Start: 2020-11-04

## 2020-11-04 RX ORDER — ACETAMINOPHEN 325 MG/1
650 TABLET ORAL ONCE
Status: CANCELLED | OUTPATIENT
Start: 2020-11-04 | End: 2020-11-04

## 2020-11-04 RX ORDER — HEPARIN SODIUM (PORCINE) LOCK FLUSH IV SOLN 100 UNIT/ML 100 UNIT/ML
500 SOLUTION INTRAVENOUS AS NEEDED
Status: DISCONTINUED | OUTPATIENT
Start: 2020-11-04 | End: 2020-11-04 | Stop reason: HOSPADM

## 2020-11-04 RX ADMIN — Medication 500 UNITS: at 15:15

## 2020-11-04 RX ADMIN — DENOSUMAB 120 MG: 120 INJECTION SUBCUTANEOUS at 15:13

## 2020-11-04 NOTE — PROGRESS NOTES
Subjective  REASONS FOR FOLLOWUP:   1. Metastatic breast cancer to multiple skeletal sites including cervical spine, sternum, lumbar spine and right femur, underwent Abraxane every 4 weeks and Xgeva every  3 months, DOCUMENTED RADIOLOGICAL PROGRESSION BY BONE SCAN 3/15/17 MOVING AWAY FROM ABRAXANE AND HALAVEN, PRESENTLY ON FEMARA AND KISQALI , XGEVA EVERY 3 MONTHS,    2.Iron deficiency anemia du to GI blood loss, Xarelto stopped months ago, Iron initiated, Pepcid along with Aleve for gastric protection.   3.  1/10/2020 continued good tolerance of because Kasquali  and Femara.  We will continue to hold Xgeva with plans for DEXA scan in spring 2020 to determine the need further Xgeva.  4. Progression in the bone disease 6/20 Femara and KISQALI stopped, rad to pelvis scheduled, thereafter HALAVEN INITIATED 9/20 AFTER PALLIATIVE RADIATION TO R SHOULDER AND PELVIS.      History of Present Illness       PATIENT WAS CALLED THE DAY BEFORE BY THE OFFICE TO ASK FOR SYMPTOMS THAT COULD BE CONSISTENT WITH CORONAVIRUS INFECTION, AND BEING NEGATIVE WAS SCHEDULED TO BE SEEN IN THE OFFICE TODAY. SIMILAR QUESTIONING TODAY INCLUDING, CHILLS, FEVER, NEW COUGH, SHORTNESS OF BREATH, DIARRHEA,DIFFUSE BODY ACHES  AND CHANGES IN SMELL OR TASTE WERE NEGATIVE.THE PATIENT DENIED ANY CONTACT WITH PERSONS WHO WERE POSITIVE FOR COVID, AND PATIENT IS NOT IN CATEGORY OF HIGH RISK BEHAVIOR TO ACQUIRE COVID.    DURING THE VISIT WITH THE PATIENT TODAY , PATIENT HAD FACE MASK, MY MEDICAL ASSISTANT AND I  HAD PROPPER PROTECTIVE EQUIPMENT, AND I DID HAND HYGIENE WITH SOAP AND WATER BEFORE AND AFTER THE VISIT.      This patient  returns today to the office for followup. She is here today stating that she is profoundly fatigued to the point of very low functionality. She has developed anemia of neoplastic disease and probably a component of myelophthisis with a hemoglobin of 9.1 today. She has not had any clinical bleeding. She denies any fever or chills. Her appetite is acceptable. Her weight is stable. She has no edema and she has normal urination with no hematuria. No vaginal bleeding. Her pain related to her malignancy in multiple sites of the skeleton is controlled with 3 Aleve or ibuprofen today and topical Voltaren that she applies on p.r.n. basis once or twice a day to different locations. Her shoulders are substantially better in regard to range of motion and less pain. Her back especially the sacrum and the lower lumbar spine and sacroiliac joints are also less painful than before. She has no cough but she has shortness of breath upon exercise. She has not had any jaundice or distention in the abdomen. She denies any passage of blood in the stool. There is no hematuria and there is no vaginal bleeding.            Past Medical History:   Diagnosis Date   • Abnormal mammogram    • Abnormal menstrual cycle    • Arthritis    • Bone metastasis (CMS/HCC)    • Breast cancer (CMS/HCC) 2000    Left breast   • Drug therapy 2001    breast cancer   • Drug therapy 2017    right femur/associated with breast cancer   • H/O Heart murmur    • History of colon polyps    • Hx of radiation therapy 2000    breast cancer   • Hx of radiation therapy 2015    bone cancer right femur/ associated with breast cancer   • Hypercholesterolemia    • Hyperlipidemia    • Hypertension    • Multiple benign polyps of large intestine    • Reflux esophagitis      Social History     Socioeconomic History   • Marital status: Single     Spouse name: Not on file   • Number of children: Not on file   • Years of education: Not on file   • Highest education level: Not on file    Occupational History   • Occupation:      Employer: [employer]   Tobacco Use   • Smoking status: Never Smoker   • Smokeless tobacco: Never Used   Substance and Sexual Activity   • Alcohol use: No   • Drug use: No   • Sexual activity: Defer     Family History   Problem Relation Age of Onset   • Hypertension Mother    • Diabetes Mother    • Macular degeneration Mother    • Thyroid disease Mother    • Heart disease Father    • Stroke Father    • Kidney disease Father    • Glaucoma Brother    • Diabetes Brother    • Hypertension Brother    • Colon cancer Paternal Grandmother    • Thyroid disease Other    • Kidney disease Other    • Glaucoma Other    • Cancer Other    • Diabetes Other      Current Outpatient Medications on File Prior to Visit   Medication Sig Dispense Refill   • atorvastatin (LIPITOR) 20 MG tablet Take 1 tablet by mouth once daily 90 tablet 1   • cetirizine (zyrTEC) 10 MG tablet Take 10 mg by mouth As Needed for Allergies.     • ciclopirox (PENLAC) 8 % solution Apply  topically Every Night. 6 mL 0   • Cyanocobalamin (B-12 PO) Take 1,000 mcg by mouth Daily.     • denosumab (XGEVA) 120 MG/1.7ML solution injection Inject  under the skin 1 (one) time.     • diclofenac (VOLTAREN) 1 % gel gel APPLY 2 TO 4 GRAMS TOPICALLY TO AFFECTED AREA TWICE DAILY AS DIRECTED 100 g 2   • HYDROcodone-acetaminophen (NORCO) 5-325 MG per tablet Take 1 tablet by mouth Every 6 (Six) Hours As Needed.     • lisinopril-hydrochlorothiazide (PRINZIDE,ZESTORETIC) 10-12.5 MG per tablet Take 1 tablet by mouth twice daily 180 tablet 0   • magnesium chloride ER (Mag64) 64 MG DR tablet Take 1 tablet by mouth 2 (two) times a day. 180 tablet 3   • Multiple Vitamins-Minerals (HAIR SKIN AND NAILS FORMULA PO) Take  by mouth.     • Naproxen Sod-Diphenhydramine (ALEVE PM PO) Take  by mouth as needed.     • Naproxen Sodium (ALEVE PO) Take  by mouth.     • Omega-3 Fatty Acids (FISH OIL) 645 MG capsule Take  by  mouth.     • OMEPRAZOLE PO Take  by mouth Daily.       No current facility-administered medications on file prior to visit.      Active Ambulatory Problems     Diagnosis Date Noted   • Benign essential HTN 02/18/2016   • Primary malignant neoplasm of left breast (CMS/HCC) 02/18/2016   • Thrombophilia (CMS/HCC) 02/18/2016   • Hyperlipidemia 02/18/2016   • IFG (impaired fasting glucose) 02/18/2016   • Osteopenia 02/18/2016   • History of colon polyps 03/08/2016   • Bone metastasis (CMS/[facility]) 05/20/2016   • Anemia in neoplastic disease 05/20/2016   • Heart murmur, systolic 10/25/2016   • Fitting and adjustment of vascular catheter 03/13/2017   • Vitamin B12 deficiency anemia due to intrinsic factor deficiency 06/13/2017   • Leukopenia due to antineoplastic chemotherapy (CMS/[facility]) 09/06/2017   • Hypomagnesemia 09/30/2020     Resolved Ambulatory Problems     Diagnosis Date Noted   • No Resolved Ambulatory Problems     Past Medical History:   Diagnosis Date   • Abnormal mammogram    • Abnormal menstrual cycle    • Arthritis    • Breast cancer (CMS/[facility]) 2000   • Drug therapy 2001   • Drug therapy 2017   • H/O Heart murmur    • Hx of radiation therapy 2000   • Hx of radiation therapy 2015   • Hypercholesterolemia    • Hypertension    • Multiple benign polyps of large intestine    • Reflux esophagitis       Past Surgical History:   Procedure Laterality Date   • BREAST BIOPSY Left 2000    breast cancer   • BREAST SURGERY  2000    lumpectomy, mastectomy, revision   • COLONOSCOPY  2012   • FEMUR FRACTURE SURGERY      secondary to metastatic breast cancer 7/2014, with revision in 9/2015   • MASTECTOMY Left 2000    transflap in 2003     ROS:  General: no fever, no chills, SEVERE fatigue,no weight changes, no lack of appetite.  Eyes: no epiphora, xerophthalmia,conjunctivitis, pain, glaucoma, blurred vision, blindness, secretion, photophobia, proptosis, diplopia.  Ears: no otorrhea, tinnitus, otorrhagia, deafness, pain,  vertigo.  Nose: no rhinorrhea, no epistaxis, no alteration in perception of odors, no sinuses pressure.  Mouth: no alteration in gums or teeth,  No ulcers, no difficulty with mastication or deglut ion, no odynophagia.  Neck: no masses or pain, no thyroid alterations, no pain in muscles or arteries, no carotid odynia, no crepitation.  Respiratory: no cough, no sputum production,EXCERSISE INDUCED dyspnea,no trepopnea, no pleuritic pain,no hemoptysis.  Heart: no syncope, no irregularity, no palpitations, no angina,no orthopnea,no paroxysmal nocturnal dyspnea.  Vascular Venous: no tenderness,no edema,no palpable cords,no postphlebitic syndrome, no skin changes no ulcerations.  Vascular Arterial: no distal ischemia, noclaudication, no gangrene, no neuropathic ischemic pain, no skin ulcers, no paleness no cyanosis.  GI: no dysphagia, no odynophagia, no regurgitation, no heartburn,no indigestion,no nausea,no vomiting,no hematemesis ,no melena,no jaundice,no distention, no obstipation,no enterorrhagia,no proctalgia,no anal  lesions, no changes in bowel habits.  : no frequency, no hesitancy, no hematuria, no discharge,no  pain.  Musculoskeletal: STATED muscle or tendon pain or inflammation,no  joint pain, no edema, SEVERE functional limitation,no fasciculations, no mass.  Neurologic: no headache, no seizures, noalterations on Craneal nerves, no motor deficit, no sensory deficit, normal coordination, no alteration in memory,normal orientation, calculation,normal writting, verbal and written language.  Skin: no rashes,no pruritus no localized lesions.  Psychiatric: no anxiety, no depression,no agitation, no delusions, proper insight.          .          ALLERGIES:    Allergies   Allergen Reactions   • Codeine Unknown - Low Severity   • Docetaxel Unknown - Low Severity   • Paclitaxel Unknown - Low Severity       Objective      Vitals:    11/04/20 1417   BP: 130/68   Pulse: 105   Resp: 20   Temp: 97.7 °F (36.5 °C)   TempSrc:  "Temporal   SpO2: 96%   Weight: 95.9 kg (211 lb 6.4 oz)   Height: 163 cm (64.17\")   PainSc:   2   PainLoc: Knee  Comment: lower back     Current Status 11/4/2020   ECOG score 0     EXAM:    I HAVE PERSONALLY REVIEWED THE HISTORY OF THE PRESENT ILLNESS, PAST MEDICAL HISTORY, FAMILY HISTORY, SOCIAL HISTORY, ALLERGIES, MEDICATIONS STATED ABOVE IN THE OFFICE NOTE FROM TODAY.        GENERAL:  Well-developed, well-nourished  Patient  in no acute distress.   SKIN:  Warm, dry ,NO rashes,NO purpura ,NO petechiae.PALE SKIN AND MUCOSAS  HEENT:  Pupils were equal and reactive to light and accomodation, conjunctivae noninjected, no pterigion, normal extraocular movements, normal visual acuity.   NECK:  Supple with good range of motion; no thyromegaly or masses, no JVD or bruits, no cervical adenopathies.No carotid artery pain, no carotid abnormal pulsation , NO arterial dance.  LYMPHATICS:  No cervical, NO supraclavicular, NO axillary,NO epitrochlear , NO inguinal adenopathy.  CARDIAC   normal rate and regular rhythm, without murmur,NO rubs NO S3 NO S4 right or left . Normal femoral, popliteal, pedis, brachial and carotid pulses.  VASCULAR ARTERIAL: normal carotids,brachial,radial,femoral,popliteal, pedis pulses , no bruits.no paleness or cyanosis, no pain, no edema, no numbness, no gangrene.  VASCULAR VENOUS: no cyanosis, collateral circulation, varicosities, edema, palpable cords, pain, erythema.  ABDOMEN:  Soft, nontender with no hepatomegaly, no splenomegaly,no masses, no ascites, no collateral circulation,no distention,no Randall sign, no abdominal pain, no inguinal hernias,no umbilical hernia, no abdominal bruits. Normal bowel sounds.  GENITAL: Not  Performed.  EXTREMITIES  AND SPINE:  No clubbing, cyanosis or edema, no deformities or pain .No kyphosis, scoliosis, deformities or pain in spine, ribs or pelvic bone.NO AREAS OF BONE PAIN TODAY EXCEPT R DISTAL FEMUR  NEUROLOGICAL:  Patient was awake, alert, oriented to time, " person and place.                        Hematology WBC   Date Value Ref Range Status   11/04/2020 5.53 3.40 - 10.80 10*3/mm3 Final   02/03/2020 3.89 3.40 - 10.80 10*3/mm3 Final     RBC   Date Value Ref Range Status   11/04/2020 2.85 (L) 3.77 - 5.28 10*6/mm3 Final   02/03/2020 3.08 (L) 3.77 - 5.28 10*6/mm3 Final     Hemoglobin   Date Value Ref Range Status   11/04/2020 9.1 (L) 12.0 - 15.9 g/dL Final     Hematocrit   Date Value Ref Range Status   11/04/2020 27.5 (L) 34.0 - 46.6 % Final     Platelets   Date Value Ref Range Status   11/04/2020 225 140 - 450 10*3/mm3 Final            Component      Latest Ref Rng & Units 10/18/2019 6/19/2020 7/24/2020 9/2/2020   CA 15-3      <=25.0 U/mL 28.1 (H) 74.8 (H) 67.0 (H) 67.8 (H)     Component      Latest Ref Rng & Units 9/30/2020   CA 15-3      <=25.0 U/mL 52.4 (H)         NUCLEAR MEDICINE WHOLE BODY BONE SCAN     HISTORY: Breast cancer.  Bony metastatic disease. Follow-up exam.     TECHNIQUE:  22.8 mCi of 99m technetium MDP was administered IV followed  by 3 hour delayed phase whole body imaging with selected spot views.     COMPARISON: Whole body bone scan 06/10/2020, 04/23/2019.     FINDINGS:  There is multifocal osseous metastatic disease with multiple  areas of increased uptake involving the cervical, thoracic, lumbar  spine, pelvis, sacrum, femora, right fibula, ribs, right proximal  humeral metaphysis, and calvarium. There is focal increased uptake  involving the caudal aspect of the right scapula that exhibits increased  compared to the previous exam. There is an area of increased uptake  overlying the right lateral calvarium and the region of the 5th rib that  is increased when compared to the previous exam. Both kidneys and  urinary bladder are visualized.     IMPRESSION:  Increased uptake caudal aspect of the right scapula as  compared to the previous exam consistent with a progressive metastatic  lesion. There has been no other significant change in osseous  metastatic  disease.     This report was finalized on 10/28/2020 3:45 PM by Dr. Jayme Busby M.D.            Assessment/Plan    .   1. Metastatic  Left breast cancer to multiple skeletal sites including cervical spine, sternum, lumbar spine and right femur.Since the previous visit, the patient has been taking her Femara and Kisqali correctly  .On eVisit on 04/10/2020 the patient states that her symptoms are very much quiet at this time. She has not developed any new sites of bone pain and she has not had any difficulty with her Kisqali and Femara that remain ongoing.       The patient was further reviewed on clinical grounds on 06/19/2020. Now she is experiencing a new pain in the pelvic bone bilaterally close to the sacroiliac joints and ala of the pelvic bone. Other than that she has not had any other new sites of pain.     I reviewed with the patient her bone scan that shows unequivocally increased uptake in the sacroiliac joints as well in the pelvic bone bilaterally posteriorly. There is a new uptake in the fibula on the right side that is very minimal. This does not correspond to any site of pain clinically.       The patient was further reviewed on 07/24/2020. Since then she has initiated radiation treatment and she has completed 5 treatments today out of 10. She has seen some improvement in the intensity of pain in the sacroiliac joints where she has sites of metastasis.  Now she has developed a new bone pain in the right humerus. Reviewing the bone scan with her she had a hot spot there and I will talk with Sarita Baldwin MD, today about adding some radiation therapy to this anatomical site.     Given the fact that the radiation field is larger in the pelvis we will delay the initiation of any Halaven until the patient completes her treatment and goes through expected hematological toxicity.   The patient was further reviewed on 09/02/2020. Because the completion of radiation therapy happened close to  a month ago and her white count is stable and improving, the hemoglobin is stable and her platelet count is fine, the patient achieved significant improvement in functionality related to increased pain in the right shoulder and pelvic bone due to affect and benefit of radiation therapy, we advised the patient today to proceed with Halaven. She understands that this infusion is very short; it is a push, and she understands that this medicine is going to be given to her on day 1 and day 8. Typically the treatment will be given to her every 3 weeks.     The patient was reviewed on clinical grounds on 11/04/2020. The patient states that she has been profoundly fatigued and she has been short of breath. Today her hemoglobin is 9.1. She has no jaundice and she has no nausea or GI blood loss or  blood loss. The patient is no longer taking anticoagulants.     The patient has tremendous degree of fatigue today and I think this is related in part to myelophthisis and in part also related to chemotherapy induced anemia and anemia of neoplastic disease. She has nothing to suggest hemolytic anemia or blood loss.     Her pain is relatively well controlled.     Her tumor marker has substantially decreased and we have another level today pending. The last one was 52 close to 5 weeks ago.     I think we need to put a time out in regard to the administration of chemotherapy on this patient because otherwise she is going to quit given the profound fatigue produced by Halaven and the anemia secondary to this. On the other hand, it will be fine for her to receive her Xgeva today.     In regard to her hypomagnesemia the level remains low. She is taking as much magnesium as she can but if she takes too much then diarrhea starts to happen and she is supplementing also with peanuts and cashews for the time being. This per se at the level of 1.5 is not tremendously damaging to her and is in fact not producing any symptoms.     Her white  count and her platelet count are stable.     I DISCUSSED WITH HER THE BONE SCAN FROM PACS THAT TO MY EYES IS STABLE IN COMPARASION WITH PREVIOUS, EXCEPT MINIMAL INCREASED UPTAKE IN R SCAPULA    Therefore my recommendations are as follows:   1. She will put the Halaven on hold today and for the next month or so.   2. She will proceed with her Xgeva today.   3. She will remain on her magnesium supplement.   4. She will remain on her Aleve or ibuprofen t.i.d. and Voltaren topical on p.r.n. basis.   5. The patient will be scheduled to have a [facility] 2 [facility]   6. We will review a ferritin, iron profile, reticulated hemoglobin today.   7. The patient should not have any B12 or folate deficiency. She is taking multivitamin.   8. The patient is aware that taking a timeout of chemotherapy can compromise the outcome but we have no other solution for this. She needs to have a holiday from treatment and this is getting to the point of profound fatigue and low functionality.     I will see her back in 1 month. Will have a CBC, a CMP, CA 15-3 and if she feels better she will resume the Halaven then.              I DISCUSSED WITH PATIENT IN DETAIL FORMS TO DECREASE CHANCES OF CORONAVIRUS INFECTION INCLUDING ISOLATION, PROPER HAND HIGIENE, AVOID PUBLIC PLACES  WITH CROWDS, FOLLOW  CDC RECOMENDATIONS, AND KEEP PERSONAL AND SOCIAL RESPONSIBILITY, WARE A MASK IN PUBLIC PLACES.  PATIENT IS AWARE THIS INFECTION COULD HAVE SEVERE CONSEQUENCES TO PERSONAL HEALTH AND FAMILY RAMIFICATIONS OF THIS.      11/4/2020

## 2020-11-04 NOTE — TELEPHONE ENCOUNTER
Pt would like for someone to give her a call with her bone scan results done on 10/28 at [facility].    She thought Dr Saez would have discussed this at her appt today, but he did not and she forgot to ask.    Best call back # [phone]

## 2020-11-04 NOTE — PROGRESS NOTES
Per Dr. Saez, no halaven today. Pt to be given xgeva and type and cross match drawn. Pt sent to appt desk to schedule transfusion. Pt is on oral magnesium. No IV replacement today per Dr. Saez.

## 2020-11-04 NOTE — NURSING NOTE
Per Dr. Saez, no Halaven today.  Pt to get Xgeva and have a T & S drawn.  T & S drawn and taken to lab.  Pt sent to schedule desk to make appointment for transfusion on Friday.  Pt instructed not to remove her yellow armband prior to her appointment on Friday.  Pt v/u.  Dr. Saez aware of low Magnesium.  Pt on po Mag at home.  No orders for IV replacement.

## 2020-11-06 ENCOUNTER — TELEPHONE (OUTPATIENT)
Dept: ONCOLOGY | Facility: CLINIC | Age: 65
End: 2020-11-06

## 2020-11-06 ENCOUNTER — HOSPITAL ENCOUNTER (OUTPATIENT)
Dept: INFUSION THERAPY | Facility: HOSPITAL | Age: 65
Setting detail: INFUSION SERIES
Discharge: HOME | End: 2020-11-06

## 2020-11-06 VITALS
HEART RATE: 87 BPM | OXYGEN SATURATION: 98 % | DIASTOLIC BLOOD PRESSURE: 67 MMHG | SYSTOLIC BLOOD PRESSURE: 122 MMHG | TEMPERATURE: 98 F | RESPIRATION RATE: 16 BRPM

## 2020-11-06 DIAGNOSIS — Z45.2 FITTING AND ADJUSTMENT OF VASCULAR CATHETER: ICD-10-CM

## 2020-11-06 DIAGNOSIS — D63.0 ANEMIA IN NEOPLASTIC DISEASE: Primary | ICD-10-CM

## 2020-11-06 PROCEDURE — 86900 BLOOD TYPING SEROLOGIC ABO: CPT

## 2020-11-06 PROCEDURE — P9016 RBC LEUKOCYTES REDUCED: HCPCS

## 2020-11-06 PROCEDURE — 25010000003 HEPARIN LOCK FLUSH PER 10 UNITS: Performed by: INTERNAL MEDICINE

## 2020-11-06 PROCEDURE — 36430 TRANSFUSION BLD/BLD COMPNT: CPT

## 2020-11-06 RX ORDER — SODIUM CHLORIDE 0.9 % (FLUSH) 0.9 %
10 SYRINGE (ML) INJECTION AS NEEDED
Status: CANCELLED | OUTPATIENT
Start: 2020-11-06

## 2020-11-06 RX ORDER — ACETAMINOPHEN 325 MG/1
650 TABLET ORAL ONCE
Status: COMPLETED | OUTPATIENT
Start: 2020-11-06 | End: 2020-11-06

## 2020-11-06 RX ORDER — HEPARIN SODIUM (PORCINE) LOCK FLUSH IV SOLN 100 UNIT/ML 100 UNIT/ML
500 SOLUTION INTRAVENOUS AS NEEDED
Status: DISCONTINUED | OUTPATIENT
Start: 2020-11-06 | End: 2020-11-08 | Stop reason: HOSPADM

## 2020-11-06 RX ORDER — SODIUM CHLORIDE 9 MG/ML
250 INJECTION, SOLUTION INTRAVENOUS AS NEEDED
Status: DISCONTINUED | OUTPATIENT
Start: 2020-11-06 | End: 2020-11-08 | Stop reason: HOSPADM

## 2020-11-06 RX ORDER — HEPARIN SODIUM (PORCINE) LOCK FLUSH IV SOLN 100 UNIT/ML 100 UNIT/ML
500 SOLUTION INTRAVENOUS AS NEEDED
Status: CANCELLED | OUTPATIENT
Start: 2020-11-06

## 2020-11-06 RX ADMIN — ACETAMINOPHEN 650 MG: 325 TABLET, FILM COATED ORAL at 08:47

## 2020-11-06 RX ADMIN — HEPARIN 500 UNITS: 100 SYRINGE at 14:17

## 2020-11-06 NOTE — PATIENT INSTRUCTIONS
"https://www.[facility].org/donate-blood/blood-donation-process/what-happens-to-donated-blood/blood-transfusions/types-of-blood-transfusions.html\"> https://www.hematology.org/education/patients/blood-basics/blood-safety-and-matching\"> https://www.nhlbi.nih.gov/health-topics/blood-transfusion\">   Blood Transfusion, Adult  A blood transfusion is a procedure in which you receive blood or a type of blood cell (blood component) through an IV. You may need a blood transfusion when your blood level is low. This may result from a bleeding disorder, illness, injury, or surgery. The blood may come from a donor. You may also be able to donate blood for yourself (autologous blood donation) before a planned surgery.  The blood given in a transfusion is made up of different blood components. You may receive:  · Red blood cells. These carry oxygen to the cells in the body.  · Platelets. These help your blood to clot.  · Plasma. This is the liquid part of your blood. It carries proteins and other substances throughout the body.  · White blood cells. These help you fight infections.  If you have hemophilia or another clotting disorder, you may also receive other types of blood products.  Tell a health care provider about:  · Any blood disorders you have.  · Any previous reactions you have had during a blood transfusion.  · Any allergies you have.  · All medicines you are taking, including vitamins, herbs, eye drops, creams, and over-the-counter medicines.  · Any surgeries you have had.  · Any medical conditions you have, including any recent fever or cold symptoms.  · Whether you are pregnant or may be pregnant.  What are the risks?  Generally, this is a safe procedure. However, problems may occur.  · The most common problems include:  ? A mild allergic reaction, such as red, swollen areas of skin (hives) and itching.  ? Fever or chills. This may be the body's response to new blood cells received. This may occur during or up to 4 " hours after the transfusion.  · More serious problems may include:  ? Transfusion-associated circulatory overload (TACO), or too much fluid in the lungs. This may cause breathing problems.  ? A serious allergic reaction, such as difficulty breathing or swelling around the face and lips.  ? Transfusion-related acute lung injury (TRALI), which causes breathing difficulty and low oxygen in the blood. This can occur within hours of the transfusion or several days later.  ? Iron overload. This can happen after receiving many blood transfusions over a period of time.  ? Infection or virus being transmitted. This is rare because donated blood is carefully tested before it is given.  ? Hemolytic transfusion reaction. This is rare. It happens when your body's defense system (immune system)tries to attack the new blood cells. Symptoms may include fever, chills, nausea, low blood pressure, and low back or chest pain.  ? Transfusion-associated jlwgg-jdpuff-hpke disease (TAGVHD). This is rare. It happens when donated cells attack your body's healthy tissues.  What happens before the procedure?  Medicines  Ask your health care provider about:  · Changing or stopping your regular medicines. This is especially important if you are taking diabetes medicines or blood thinners.  · Taking medicines such as aspirin and ibuprofen. These medicines can thin your blood. Do not take these medicines unless your health care provider tells you to take them.  · Taking over-the-counter medicines, vitamins, herbs, and supplements.  General instructions  · Follow instructions from your health care provider about eating and drinking restrictions.  · You will have a blood test to determine your blood type. This is necessary to know what kind of blood your body will accept and to match it to the donor blood.  · If you are going to have a planned surgery, you may be able to do an autologous blood donation. This may be done in case you need to have a  transfusion.  · You will have your temperature, blood pressure, and pulse monitored before the transfusion.  · If you have had an allergic reaction to a transfusion in the past, you may be given medicine to help prevent a reaction. This medicine may be given to you by mouth (orally) or through an IV.  · Set aside time for the blood transfusion. This procedure generally takes 1-4 hours to complete.  What happens during the procedure?    · An IV will be inserted into one of your veins.  · The bag of donated blood will be attached to your IV. The blood will then enter through your vein.  · Your temperature, blood pressure, and pulse will be monitored regularly during the transfusion. This monitoring is done to detect early signs of a transfusion reaction.  · Tell your nurse right away if you have any of these symptoms during the transfusion:  ? Shortness of breath or trouble breathing.  ? Chest or back pain.  ? Fever or chills.  ? Hives or itching.  · If you have any signs or symptoms of a reaction, your transfusion will be stopped and you may be given medicine.  · When the transfusion is complete, your IV will be removed.  · Pressure may be applied to the IV site for a few minutes.  · A bandage (dressing)will be applied.  The procedure may vary among health care providers and hospitals.  What happens after the procedure?  · Your temperature, blood pressure, pulse, breathing rate, and blood oxygen level will be monitored until you leave the hospital or clinic.  · Your blood may be tested to see how you are responding to the transfusion.  · You may be warmed with fluids or blankets to maintain a normal body temperature.  · If you receive your blood transfusion in an outpatient setting, you will be told whom to contact to report any reactions.  Where to find more information  For more information on blood transfusions, visit the [employer]: [facility].org  Summary  · A blood transfusion is a procedure in which you  "receive blood or a type of blood cell (blood component) through an IV.  · The blood you receive may come from a donor or be donated by yourself (autologous blood donation) before a planned surgery.  · The blood given in a transfusion is made up of different blood components. You may receive red blood cells, platelets, plasma, or white blood cells depending on the condition treated.  · Your temperature, blood pressure, and pulse will be monitored before, during, and after the transfusion.  · After the transfusion, your blood may be tested to see how your body has responded.  This information is not intended to replace advice given to you by your health care provider. Make sure you discuss any questions you have with your health care provider.  Document Released: 12/15/2001 Document Revised: 06/11/2020 Document Reviewed: 06/11/2020  [email or web address] Patient Education © 2020 [email or web address]    https://www.[facility].org/donate-blood/blood-donation-process/what-happens-to-donated-blood/blood-transfusions/types-of-blood-transfusions.html\"> https://www.[facility].org/donate-blood/blood-donation-process/what-happens-to-donated-blood/blood-transfusions/risks-complications.html\">   Blood Transfusion, Adult, Care After  This sheet gives you information about how to care for yourself after your procedure. Your health care provider may also give you more specific instructions. If you have problems or questions, contact your health care provider.  What can I expect after the procedure?  After the procedure, it is common to have:  · Bruising and soreness where the IV was inserted.  · A fever or chills on the day of the procedure. This may be your body's response to the new blood cells received.  · A headache.  Follow these instructions at home:  IV insertion site care         · Follow instructions from your health care provider about how to take care of your IV insertion site. Make sure you:  ? Wash your hands with soap and water " before and after you change your bandage (dressing). If soap and water are not available, use hand .  ? Change your dressing as told by your health care provider.  · Check your IV insertion site every day for signs of infection. Check for:  ? Redness, swelling, or pain.  ? Bleeding from the site.  ? Warmth.  ? Pus or a bad smell.  General instructions  · Take over-the-counter and prescription medicines only as told by your health care provider.  · Rest as told by your health care provider.  · Return to your normal activities as told by your health care provider.  · Keep all follow-up visits as told by your health care provider. This is important.  Contact a health care provider if:  · You have itching or red, swollen areas of skin (hives).  · You feel anxious.  · You feel weak after doing your normal activities.  · You have redness, swelling, warmth, or pain around the IV insertion site.  · You have blood coming from the IV insertion site that does not stop with pressure.  · You have pus or a bad smell coming from your IV insertion site.  Get help right away if:  · You have symptoms of a serious allergic or immune system reaction, including:  ? Trouble breathing or shortness of breath.  ? Swelling of the face or feeling flushed.  ? Fever or chills.  ? Pain in the head, back, or chest.  ? Dark urine or blood in the urine.  ? Widespread rash.  ? Fast heartbeat.  ? Feeling dizzy or light-headed.  If you receive your blood transfusion in an outpatient setting, you will be told whom to contact to report any reactions.  These symptoms may represent a serious problem that is an emergency. Do not wait to see if the symptoms will go away. Get medical help right away. Call your local emergency services (911 in the [place] Do not drive yourself to the hospital.  Summary  · Bruising and tenderness around the IV insertion site are common.  · Check your IV insertion site every day for signs of infection.  · Rest as told  by your health care provider. Return to your normal activities as told by your health care provider.  · Get help right away for symptoms of a serious allergic or immune system reaction to blood transfusion.  This information is not intended to replace advice given to you by your health care provider. Make sure you discuss any questions you have with your health care provider.  Document Released: 01/08/2016 Document Revised: 06/11/2020 Document Reviewed: 06/11/2020  Elsevier Patient Education © 2020 [employer] Inc.

## 2020-11-06 NOTE — TELEPHONE ENCOUNTER
Caller: TIMUR HANNAH    Relationship to patient: SELF    Best call back number: [phone]     PT STATES THE MISSED A CALL FROM ZEESHAN. PLEASE CALL BACK ASAP

## 2020-11-07 LAB
BH BB BLOOD EXPIRATION DATE: NORMAL
BH BB BLOOD EXPIRATION DATE: NORMAL
BH BB BLOOD TYPE BARCODE: 600
BH BB BLOOD TYPE BARCODE: 600
BH BB DISPENSE STATUS: NORMAL
BH BB DISPENSE STATUS: NORMAL
BH BB PRODUCT CODE: NORMAL
BH BB PRODUCT CODE: NORMAL
BH BB UNIT NUMBER: NORMAL
BH BB UNIT NUMBER: NORMAL
CROSSMATCH INTERPRETATION: NORMAL
CROSSMATCH INTERPRETATION: NORMAL
UNIT  ABO: NORMAL
UNIT  ABO: NORMAL
UNIT  RH: NORMAL
UNIT  RH: NORMAL

## 2020-11-13 ENCOUNTER — TELEPHONE (OUTPATIENT)
Dept: ONCOLOGY | Facility: CLINIC | Age: 65
End: 2020-11-13

## 2020-11-13 RX ORDER — ACYCLOVIR 400 MG/1
400 TABLET ORAL 3 TIMES DAILY
Qty: 30 TABLET | Refills: 0 | Status: SHIPPED | OUTPATIENT
Start: 2020-11-13 | End: 2020-11-13

## 2020-11-13 RX ORDER — FAMCICLOVIR 500 MG/1
500 TABLET ORAL 3 TIMES DAILY
Qty: 30 TABLET | Refills: 0 | Status: SHIPPED | OUTPATIENT
Start: 2020-11-13 | End: 2020-11-13

## 2020-11-13 RX ORDER — FAMCICLOVIR 500 MG/1
500 TABLET ORAL 3 TIMES DAILY
Qty: 30 TABLET | Refills: 0 | Status: SHIPPED | OUTPATIENT
Start: 2020-11-13 | End: 2021-01-12

## 2020-11-13 NOTE — TELEPHONE ENCOUNTER
PT has a spot of blisters on her ABD since Sunday. Its not painful and it does not itch. She noticed a second spot yesterday at the bra line that do itch. Pictures sent to Dr. Saez. Per Dr. Saez pt is to take 500 mg of Famvir TID for ten days for shingles. Pt informed and script sent to Dr. Saez for signature. Pt V/U.

## 2020-11-13 NOTE — TELEPHONE ENCOUNTER
Patient would like to speak to Dr. Saez or his nurse.  She believes she might have shingles.  She has small blisters/red spots on her torso since Sunday.  Another patch appeared last night.  No fever.        Please call her at- [phone] Work

## 2020-11-18 ENCOUNTER — TELEPHONE (OUTPATIENT)
Dept: ONCOLOGY | Facility: CLINIC | Age: 65
End: 2020-11-18

## 2020-11-18 NOTE — TELEPHONE ENCOUNTER
Her ins would only approve 21 pills of the Famciclovir, she did get those.  Does she need to try and get the others?

## 2020-11-18 NOTE — TELEPHONE ENCOUNTER
Pt contacted and she states that her insurance only approved 21 pills (7 days worth) of Famvir.  Pt will contact pharmacy and/or insurance to see what needs to be done to get the full 10 days of medication.  Instructed pt to call office if she needs assistance.  Pt v/u.

## 2020-11-19 ENCOUNTER — TRANSCRIBE ORDERS (OUTPATIENT)
Dept: INTERNAL MEDICINE | Facility: CLINIC | Age: 65
End: 2020-11-19

## 2020-11-19 DIAGNOSIS — Z12.31 BREAST CANCER SCREENING BY MAMMOGRAM: Primary | ICD-10-CM

## 2020-11-23 RX ORDER — ATORVASTATIN CALCIUM 20 MG/1
TABLET, FILM COATED ORAL
Qty: 90 TABLET | Refills: 0 | Status: SHIPPED | OUTPATIENT
Start: 2020-11-23 | End: 2021-02-17

## 2020-11-30 RX ORDER — LISINOPRIL AND HYDROCHLOROTHIAZIDE 12.5; 1 MG/1; MG/1
TABLET ORAL
Qty: 180 TABLET | Refills: 0 | Status: SHIPPED | OUTPATIENT
Start: 2020-11-30 | End: 2021-02-24

## 2020-12-02 ENCOUNTER — INFUSION (OUTPATIENT)
Dept: ONCOLOGY | Facility: HOSPITAL | Age: 65
End: 2020-12-02

## 2020-12-02 ENCOUNTER — OFFICE VISIT (OUTPATIENT)
Dept: ONCOLOGY | Facility: CLINIC | Age: 65
End: 2020-12-02

## 2020-12-02 VITALS
HEIGHT: 64 IN | DIASTOLIC BLOOD PRESSURE: 80 MMHG | OXYGEN SATURATION: 98 % | HEART RATE: 95 BPM | SYSTOLIC BLOOD PRESSURE: 128 MMHG | TEMPERATURE: 97.5 F | BODY MASS INDEX: 35.96 KG/M2 | WEIGHT: 210.6 LBS | RESPIRATION RATE: 16 BRPM

## 2020-12-02 DIAGNOSIS — E83.42 HYPOMAGNESEMIA: ICD-10-CM

## 2020-12-02 DIAGNOSIS — Z45.2 FITTING AND ADJUSTMENT OF VASCULAR CATHETER: ICD-10-CM

## 2020-12-02 DIAGNOSIS — C79.51 BONE METASTASIS: ICD-10-CM

## 2020-12-02 DIAGNOSIS — D70.1 LEUKOPENIA DUE TO ANTINEOPLASTIC CHEMOTHERAPY (HCC): ICD-10-CM

## 2020-12-02 DIAGNOSIS — D51.0 VITAMIN B12 DEFICIENCY ANEMIA DUE TO INTRINSIC FACTOR DEFICIENCY: ICD-10-CM

## 2020-12-02 DIAGNOSIS — D63.0 ANEMIA IN NEOPLASTIC DISEASE: ICD-10-CM

## 2020-12-02 DIAGNOSIS — C50.912 PRIMARY MALIGNANT NEOPLASM OF LEFT BREAST (HCC): ICD-10-CM

## 2020-12-02 DIAGNOSIS — M85.89 OSTEOPENIA OF MULTIPLE SITES: Primary | ICD-10-CM

## 2020-12-02 DIAGNOSIS — C50.912 PRIMARY MALIGNANT NEOPLASM OF LEFT BREAST (HCC): Primary | ICD-10-CM

## 2020-12-02 DIAGNOSIS — T45.1X5A LEUKOPENIA DUE TO ANTINEOPLASTIC CHEMOTHERAPY (HCC): ICD-10-CM

## 2020-12-02 DIAGNOSIS — M85.89 OSTEOPENIA OF MULTIPLE SITES: ICD-10-CM

## 2020-12-02 LAB
ALBUMIN SERPL-MCNC: 4.1 G/DL (ref 3.5–5.2)
ALBUMIN/GLOB SERPL: 1.6 G/DL (ref 1.1–2.4)
ALP SERPL-CCNC: 122 U/L (ref 38–116)
ALT SERPL W P-5'-P-CCNC: 31 U/L (ref 0–33)
ANION GAP SERPL CALCULATED.3IONS-SCNC: 11.3 MMOL/L (ref 5–15)
AST SERPL-CCNC: 30 U/L (ref 0–32)
BASOPHILS # BLD AUTO: 0.04 10*3/MM3 (ref 0–0.2)
BASOPHILS NFR BLD AUTO: 0.6 % (ref 0–1.5)
BILIRUB SERPL-MCNC: 0.2 MG/DL (ref 0.2–1.2)
BUN SERPL-MCNC: 36 MG/DL (ref 6–20)
BUN/CREAT SERPL: 45 (ref 7.3–30)
CALCIUM SPEC-SCNC: 9.9 MG/DL (ref 8.5–10.2)
CANCER AG15-3 SERPL-ACNC: 55.8 U/ML
CHLORIDE SERPL-SCNC: 100 MMOL/L (ref 98–107)
CO2 SERPL-SCNC: 22.7 MMOL/L (ref 22–29)
CREAT SERPL-MCNC: 0.8 MG/DL (ref 0.6–1.1)
DEPRECATED RDW RBC AUTO: 56 FL (ref 37–54)
EOSINOPHIL # BLD AUTO: 0.28 10*3/MM3 (ref 0–0.4)
EOSINOPHIL NFR BLD AUTO: 4 % (ref 0.3–6.2)
ERYTHROCYTE [DISTWIDTH] IN BLOOD BY AUTOMATED COUNT: 16.4 % (ref 12.3–15.4)
GFR SERPL CREATININE-BSD FRML MDRD: 72 ML/MIN/1.73
GLOBULIN UR ELPH-MCNC: 2.5 GM/DL (ref 1.8–3.5)
GLUCOSE SERPL-MCNC: 138 MG/DL (ref 74–124)
HCT VFR BLD AUTO: 31.9 % (ref 34–46.6)
HGB BLD-MCNC: 10.7 G/DL (ref 12–15.9)
IMM GRANULOCYTES # BLD AUTO: 0.05 10*3/MM3 (ref 0–0.05)
IMM GRANULOCYTES NFR BLD AUTO: 0.7 % (ref 0–0.5)
LYMPHOCYTES # BLD AUTO: 0.97 10*3/MM3 (ref 0.7–3.1)
LYMPHOCYTES NFR BLD AUTO: 13.9 % (ref 19.6–45.3)
MAGNESIUM SERPL-MCNC: 1.6 MG/DL (ref 1.8–2.5)
MCH RBC QN AUTO: 31.2 PG (ref 26.6–33)
MCHC RBC AUTO-ENTMCNC: 33.5 G/DL (ref 31.5–35.7)
MCV RBC AUTO: 93 FL (ref 79–97)
MONOCYTES # BLD AUTO: 0.67 10*3/MM3 (ref 0.1–0.9)
MONOCYTES NFR BLD AUTO: 9.6 % (ref 5–12)
NEUTROPHILS NFR BLD AUTO: 4.95 10*3/MM3 (ref 1.7–7)
NEUTROPHILS NFR BLD AUTO: 71.2 % (ref 42.7–76)
NRBC BLD AUTO-RTO: 0 /100 WBC (ref 0–0.2)
PHOSPHATE SERPL-MCNC: 3.5 MG/DL (ref 2.5–4.5)
PLATELET # BLD AUTO: 198 10*3/MM3 (ref 140–450)
PMV BLD AUTO: 10.7 FL (ref 6–12)
POTASSIUM SERPL-SCNC: 4 MMOL/L (ref 3.5–4.7)
PROT SERPL-MCNC: 6.6 G/DL (ref 6.3–8)
RBC # BLD AUTO: 3.43 10*6/MM3 (ref 3.77–5.28)
SODIUM SERPL-SCNC: 134 MMOL/L (ref 134–145)
WBC # BLD AUTO: 6.96 10*3/MM3 (ref 3.4–10.8)

## 2020-12-02 PROCEDURE — 80053 COMPREHEN METABOLIC PANEL: CPT

## 2020-12-02 PROCEDURE — 83735 ASSAY OF MAGNESIUM: CPT

## 2020-12-02 PROCEDURE — 96372 THER/PROPH/DIAG INJ SC/IM: CPT

## 2020-12-02 PROCEDURE — 25010000002 DENOSUMAB 120 MG/1.7ML SOLUTION: Performed by: NURSE PRACTITIONER

## 2020-12-02 PROCEDURE — 84100 ASSAY OF PHOSPHORUS: CPT

## 2020-12-02 PROCEDURE — 85025 COMPLETE CBC W/AUTO DIFF WBC: CPT

## 2020-12-02 PROCEDURE — 99215 OFFICE O/P EST HI 40 MIN: CPT | Performed by: NURSE PRACTITIONER

## 2020-12-02 PROCEDURE — 86300 IMMUNOASSAY TUMOR CA 15-3: CPT | Performed by: INTERNAL MEDICINE

## 2020-12-02 RX ADMIN — DENOSUMAB 120 MG: 120 INJECTION SUBCUTANEOUS at 14:41

## 2020-12-02 NOTE — PROGRESS NOTES
Subjective    REASONS FOR FOLLOWUP:   1. Metastatic breast cancer to multiple skeletal sites including cervical spine, sternum, lumbar spine and right femur. Previously treated with Abraxane, Ute eLe, now continuing on Halaven.  2.Iron deficiency anemia du to GI blood loss, Xarelto stopped months ago, Iron initiated, Pepcid along with Aleve for gastric protection.      History of Present Illness    The patient is a 65 y.o. female with the above mentioned history who returns to the office today to discuss resuming chemotherapy. When seen by Dr. Saez 11/4/2020, she was experiencing significant fatigue. It was recommended she take a short break from chemotherapy to recover. She returns today to discuss resuming treatment.     The patient reports today she is overall feeling very well.  She notes significant improvement in her energy since holding Halaven 1 month ago.  She also received blood transfusion which was very helpful in her energy.  She was treated for shingles of the left abdomen with Famvir with resolution of her symptoms.  She denies any new pain.  She denies shortness of breath.  Unfortunately, she is quite tearful in the exam room today.  She was unaware of the plan to resume treatment today and does not feel mentally prepared.  She has been feeling well off therapy and is not prepared for the decline in her energy with resuming treatment.  She denies fevers or chills, signs or symptoms of infection.    Past Medical History:   Diagnosis Date   • Abnormal mammogram    • Abnormal menstrual cycle    • Arthritis    • Bone metastasis (CMS/HCC)    • Breast cancer (CMS/HCC)  2000    Left breast   • Drug therapy 2001    breast cancer   • Drug therapy 2017    right femur/associated with breast cancer   • H/O Heart murmur    • History of colon polyps    • Hx of radiation therapy 2000    breast cancer   • Hx of radiation therapy 2015    bone cancer right femur/ associated with breast cancer   • Hypercholesterolemia    • Hyperlipidemia    • Hypertension    • Multiple benign polyps of large intestine    • Reflux esophagitis    • Shingles      Social History     Socioeconomic History   • Marital status: Single     Spouse name: Not on file   • Number of children: Not on file   • Years of education: Not on file   • Highest education level: Not on file   Occupational History   • Occupation:      Employer: [employer]   Tobacco Use   • Smoking status: Never Smoker   • Smokeless tobacco: Never Used   Substance and Sexual Activity   • Alcohol use: No   • Drug use: No   • Sexual activity: Defer     Family History   Problem Relation Age of Onset   • Hypertension Mother    • Diabetes Mother    • Macular degeneration Mother    • Thyroid disease Mother    • Heart disease Father    • Stroke Father    • Kidney disease Father    • Glaucoma Brother    • Diabetes Brother    • Hypertension Brother    • Colon cancer Paternal Grandmother    • Thyroid disease Other    • Kidney disease Other    • Glaucoma Other    • Cancer Other    • Diabetes Other      Current Outpatient Medications on File Prior to Visit   Medication Sig Dispense Refill   • atorvastatin (LIPITOR) 20 MG tablet Take 1 tablet by mouth once daily 90 tablet 0   • cetirizine (zyrTEC) 10 MG tablet Take 10 mg by mouth As Needed for Allergies.     • ciclopirox (PENLAC) 8 % solution Apply  topically Every Night. 6 mL 0   • Cyanocobalamin (B-12 PO) Take 1,000 mcg by mouth Daily.     • denosumab (XGEVA) 120 MG/1.7ML solution injection Inject  under the skin 1 (one) time.     • diclofenac (VOLTAREN) 1 % gel gel APPLY 2 TO 4  GRAMS TOPICALLY TO AFFECTED AREA TWICE A  g 2   • famciclovir (Famvir) 500 MG tablet Take 1 tablet by mouth 3 (Three) Times a Day. 30 tablet 0   • HYDROcodone-acetaminophen (NORCO) 5-325 MG per tablet Take 1 tablet by mouth Every 6 (Six) Hours As Needed.     • lisinopril-hydrochlorothiazide (PRINZIDE,ZESTORETIC) 10-12.5 MG per tablet Take 1 tablet by mouth twice daily 180 tablet 0   • magnesium chloride ER (Mag64) 64 MG DR tablet Take 1 tablet by mouth 2 (two) times a day. 180 tablet 3   • Multiple Vitamins-Minerals (HAIR SKIN AND NAILS FORMULA PO) Take  by mouth.     • Naproxen Sod-Diphenhydramine (ALEVE PM PO) Take  by mouth as needed.     • Naproxen Sodium (ALEVE PO) Take  by mouth.     • Omega-3 Fatty Acids (FISH OIL) 645 MG capsule Take  by mouth.     • OMEPRAZOLE PO Take  by mouth Daily.       No current facility-administered medications on file prior to visit.      Active Ambulatory Problems     Diagnosis Date Noted   • Benign essential HTN 02/18/2016   • Primary malignant neoplasm of left breast (CMS/HCC) 02/18/2016   • Thrombophilia (CMS/HCC) 02/18/2016   • Hyperlipidemia 02/18/2016   • IFG (impaired fasting glucose) 02/18/2016   • Osteopenia 02/18/2016   • History of colon polyps 03/08/2016   • Bone metastasis (CMS/HCC) 05/20/2016   • Anemia in neoplastic disease 05/20/2016   • Heart murmur, systolic 10/25/2016   • Fitting and adjustment of vascular catheter 03/13/2017   • Vitamin B12 deficiency anemia due to intrinsic factor deficiency 06/13/2017   • Leukopenia due to antineoplastic chemotherapy (CMS/HCC) 09/06/2017   • Hypomagnesemia 09/30/2020     Resolved Ambulatory Problems     Diagnosis Date Noted   • No Resolved Ambulatory Problems     Past Medical History:   Diagnosis Date   • Abnormal mammogram    • Abnormal menstrual cycle    • Arthritis    • Breast cancer (CMS/HCC) 2000   • Drug therapy 2001   • Drug therapy 2017   • H/O Heart murmur    • Hx of radiation therapy 2000   • Hx of radiation  "therapy 2015   • Hypercholesterolemia    • Hypertension    • Multiple benign polyps of large intestine    • Reflux esophagitis    • Shingles       Past Surgical History:   Procedure Laterality Date   • BREAST BIOPSY Left 2000    breast cancer   • BREAST SURGERY  2000    lumpectomy, mastectomy, revision   • COLONOSCOPY  2012   • FEMUR FRACTURE SURGERY      secondary to metastatic breast cancer 7/2014, with revision in 9/2015   • MASTECTOMY Left 2000    transflap in 2003     I have reviewed the patient's medical history in detail and updated the computerized patient record.     Review of Systems   Constitutional: Positive for fatigue. Negative for appetite change, chills and fever.   HENT:   Negative for trouble swallowing.    Respiratory: Negative for cough and shortness of breath.    Cardiovascular: Negative for chest pain and leg swelling.   Gastrointestinal: Negative for abdominal distention, blood in stool, constipation, diarrhea and nausea.   Musculoskeletal: Negative for arthralgias and myalgias.   Skin: Negative for rash.   Neurological: Negative for dizziness and extremity weakness.   Hematological: Does not bruise/bleed easily.   Psychiatric/Behavioral: The patient is nervous/anxious.    Review of systems 12/02/2020  unchanged from previous office visit except as updated.      ALLERGIES:    Allergies   Allergen Reactions   • Codeine Unknown - Low Severity   • Docetaxel Unknown - Low Severity   • Paclitaxel Unknown - Low Severity       Objective      Vitals:    12/02/20 1321   BP: 128/80   Pulse: 95   Resp: 16   Temp: 97.5 °F (36.4 °C)   TempSrc: Infrared   SpO2: 98%   Weight: 95.5 kg (210 lb 9.6 oz)   Height: 163 cm (64.17\")   PainSc: 0-No pain     Current Status 12/2/2020   ECOG score 0     EXAM  GENERAL:  Well-developed, well-nourished in no acute distress.   SKIN:  Warm, dry without rashes, purpura or petechiae.  HEAD:  Normocephalic.  EYES:  Pupils equal, round.  EOMs intact.  Conjunctivae normal.  EARS: "  Hearing intact.  NOSE:  Septum midline.  No excoriations or nasal discharge.  LYMPHATICS:  No cervical, supraclavicular, axillary adenopathy.  CHEST:  Lungs clear to auscultation. Good airflow.  CARDIAC:  Regular rate and rhythm without murmurs. Normal S1,S2.  ABDOMEN:  Soft, nontender with no organomegaly or masses. Bowel sounds present  EXTREMITIES:  No clubbing, cyanosis or edema.  NEUROLOGICAL: No focal neurological deficits.  PSYCHIATRIC: Patient is very tearful today  Physical exam 12/02/2020 unchanged from previous office visit except as updated.      RESULTS REVIEWED:  Results from last 7 days   Lab Units 12/02/20  1307   WBC 10*3/mm3 6.96   NEUTROS ABS 10*3/mm3 4.95   HEMOGLOBIN g/dL 10.7*   HEMATOCRIT % 31.9*   PLATELETS 10*3/mm3 198     Results from last 7 days   Lab Units 12/02/20  1307   SODIUM mmol/L 134   POTASSIUM mmol/L 4.0   CHLORIDE mmol/L 100   CO2 mmol/L 22.7   BUN mg/dL 36*   CREATININE mg/dL 0.80   CALCIUM mg/dL 9.9   ALBUMIN g/dL 4.10   BILIRUBIN mg/dL 0.2   ALK PHOS U/L 122*   ALT (SGPT) U/L 31   AST (SGOT) U/L 30   GLUCOSE mg/dL 138*   MAGNESIUM mg/dL 1.6*           Component      Latest Ref Rng & Units 6/19/2020 7/24/2020 9/2/2020 9/30/2020           7:43 AM  8:53 AM  8:01 AM  9:03 AM   CA 15-3      <=25.0 U/mL 74.8 (H) 67.0 (H) 67.8 (H) 52.4 (H)     Component      Latest Ref Rng & Units 11/4/2020           3:06 PM   CA 15-3      <=25.0 U/mL 50.9 (H)           Assessment/Plan    .     1. Metastatic left breast cancer to multiple skeletal sites including cervical spine, sternum, lumbar spine and right femur.  · Previously treated with Abraxane Xgeva  · Documented radiographic progression by bone scan 3/15/2017, treated with Femara Kisqali, Xgeva every 3 months  · Progression of bony disease June 2020.  Femara Kisqali discontinued.  Transition to [facility]  · Palliative skeletal radiation to the right shoulder and pelvis September 2020  · Treatment holiday from [facility] 11/4/2020 due to  progressive fatigue  · CA 15-3 has been declining while on Halaven, most recently 50.9.  This is pending today.  · We will await scheduling further treatment until results of today's CA 15-3    2.  Bony metastasis  · Continuing on Xgeva which she is now receiving monthly.    · We will proceed today, 12/12/2020 despite continuing to hold Halaven    3.  Anemia, multifactorial  · Previously with iron deficiency due to GI blood loss.  Anticoagulation has been discontinued  · Progressive anemia secondary to Halaven and neoplastic disease of the bones  · Status post transfusion 11/6/2020  · Hemoglobin is improved to 10.7 today    4.  Hypomagnesemia  · Continuing on magnesium replacement twice daily  · Magnesium today is 1.6    5.  Tearfulness  · The patient is very emotional today discussing quality of life and treatment plan.  She may benefit from a low-dose of olanzapine.  This will be discussed when I contact her to review CA 15-3 results    PLAN:  1. Halaven will remain on hold pending today CA 15-3.  Patient is very tearful today at the thought of resuming treatment due to previous poor tolerance  2. Proceed with Xgeva today  3. Continue magnesium twice daily  4. Follow-up will be scheduled pending CA 15-3 today.  I will call the patient with these results to discuss further management    Today's plan of care was discussed reviewed with Dr. Saez who is in agreement.  A total of 40 minutes were spent with the patient, 38 minutes spent in face-to-face counseling and education providing the patient reassurance and discussing plan of care.    Mary Lou Garcia, APRN  12/02/2020

## 2020-12-03 ENCOUNTER — TELEPHONE (OUTPATIENT)
Dept: ONCOLOGY | Facility: CLINIC | Age: 65
End: 2020-12-03

## 2020-12-03 DIAGNOSIS — C50.912 PRIMARY MALIGNANT NEOPLASM OF LEFT BREAST (HCC): Primary | ICD-10-CM

## 2020-12-03 DIAGNOSIS — C79.51 BONE METASTASIS: ICD-10-CM

## 2020-12-03 NOTE — TELEPHONE ENCOUNTER
I called and spoke with the patient today regarding her slightly elevated CA 15-3.  Yesterday's labs resulted at 55, compared to previous lab of 50.9.  This was reviewed with Dr. Saez.  Given the patient's poor tolerance to Halaven, we likely need to transition to a different treatment option.  We will obtain CT scans to evaluate her disease state.  I did contact the research RN to determine if the patient is eligible for the STRATA trial.     We will obtain a comprehensive list of previous treatments to determine treatment options moving forward.  I did discuss this all with the patient who is in agreement with this plan.  She will be scheduled for follow-up with Dr. Saez early January 2021 to review CT scans and determine further plan of care.    Mary Lou Garcia APRN

## 2020-12-12 PROCEDURE — 77263 THER RADIOLOGY TX PLNG CPLX: CPT | Performed by: RADIOLOGY

## 2020-12-28 ENCOUNTER — HOSPITAL ENCOUNTER (OUTPATIENT)
Dept: PET IMAGING | Facility: HOSPITAL | Age: 65
Discharge: HOME | End: 2020-12-28
Admitting: NURSE PRACTITIONER

## 2020-12-28 ENCOUNTER — INFUSION (OUTPATIENT)
Dept: ONCOLOGY | Facility: HOSPITAL | Age: 65
End: 2020-12-28

## 2020-12-28 DIAGNOSIS — C50.912 PRIMARY MALIGNANT NEOPLASM OF LEFT BREAST (HCC): ICD-10-CM

## 2020-12-28 DIAGNOSIS — C79.51 BONE METASTASIS: ICD-10-CM

## 2020-12-28 LAB — CREAT BLDA-MCNC: 1 MG/DL (ref 0.6–1.3)

## 2020-12-28 PROCEDURE — 82565 ASSAY OF CREATININE: CPT

## 2020-12-28 PROCEDURE — 74177 CT ABD & PELVIS W/CONTRAST: CPT

## 2020-12-28 PROCEDURE — 0 DIATRIZOATE MEGLUMINE & SODIUM PER 1 ML: Performed by: NURSE PRACTITIONER

## 2020-12-28 PROCEDURE — 71260 CT THORAX DX C+: CPT

## 2020-12-28 PROCEDURE — 25010000002 IOPAMIDOL 61 % SOLUTION: Performed by: NURSE PRACTITIONER

## 2020-12-28 RX ADMIN — IOPAMIDOL 85 ML: 612 INJECTION, SOLUTION INTRAVENOUS at 13:53

## 2020-12-28 RX ADMIN — DIATRIZOATE MEGLUMINE AND DIATRIZOATE SODIUM 30 ML: 660; 100 LIQUID ORAL; RECTAL at 13:00

## 2020-12-30 ENCOUNTER — TELEPHONE (OUTPATIENT)
Dept: ONCOLOGY | Facility: CLINIC | Age: 65
End: 2020-12-30

## 2021-01-04 ENCOUNTER — APPOINTMENT (OUTPATIENT)
Dept: ONCOLOGY | Facility: HOSPITAL | Age: 66
End: 2021-01-04

## 2021-01-04 ENCOUNTER — OFFICE VISIT (OUTPATIENT)
Dept: ONCOLOGY | Facility: CLINIC | Age: 66
End: 2021-01-04

## 2021-01-04 DIAGNOSIS — D63.0 ANEMIA IN NEOPLASTIC DISEASE: ICD-10-CM

## 2021-01-04 DIAGNOSIS — T45.1X5A LEUKOPENIA DUE TO ANTINEOPLASTIC CHEMOTHERAPY (HCC): ICD-10-CM

## 2021-01-04 DIAGNOSIS — D51.0 VITAMIN B12 DEFICIENCY ANEMIA DUE TO INTRINSIC FACTOR DEFICIENCY: ICD-10-CM

## 2021-01-04 DIAGNOSIS — D68.59 THROMBOPHILIA (HCC): ICD-10-CM

## 2021-01-04 DIAGNOSIS — Z45.2 FITTING AND ADJUSTMENT OF VASCULAR CATHETER: ICD-10-CM

## 2021-01-04 DIAGNOSIS — C79.51 BONE METASTASIS: ICD-10-CM

## 2021-01-04 DIAGNOSIS — C50.912 PRIMARY MALIGNANT NEOPLASM OF LEFT BREAST (HCC): Primary | ICD-10-CM

## 2021-01-04 DIAGNOSIS — D70.1 LEUKOPENIA DUE TO ANTINEOPLASTIC CHEMOTHERAPY (HCC): ICD-10-CM

## 2021-01-04 PROCEDURE — 99442 PR PHYS/QHP TELEPHONE EVALUATION 11-20 MIN: CPT | Performed by: INTERNAL MEDICINE

## 2021-01-04 NOTE — PROGRESS NOTES
Subjective  REASONS FOR FOLLOWUP:   1. Metastatic breast cancer to multiple skeletal sites including cervical spine, sternum, lumbar spine and right femur, underwent Abraxane every 4 weeks and Xgeva every  3 months, DOCUMENTED RADIOLOGICAL PROGRESSION BY BONE SCAN 3/15/17 MOVING AWAY FROM ABRAXANE AND HALAVEN, PRESENTLY ON FEMARA AND KISQALI , XGEVA EVERY 3 MONTHS,    2.Iron deficiency anemia du to GI blood loss, Xarelto stopped months ago, Iron initiated, Pepcid along with Aleve for gastric protection.   3.  1/10/2020 continued good tolerance of because Kasquali  and Femara.  We will continue to hold Xgeva with plans for DEXA scan in spring 2020 to determine the need further Xgeva.  4. Progression in the bone disease 6/20 Femara and KISQALI stopped, rad to pelvis scheduled, thereafter HALAVEN INITIATED 9/20 AFTER PALLIATIVE RADIATION TO R SHOULDER AND PELVIS.      History of Present Illness   I HAVE CALLED THIS PATIENT ON THE PHONE TODAY AND VERBALLY HAS CONSENTED ABOUT TELEMEDICINE VISIT       I have reviewed this patient today by telemedicine. She has had CT scans recently to reassess her breast cancer metastatic to the bone that we have been following up for almost 10 years. In any event the patient states that she has a little bit of pain in her right hip area but also in many other sites of her skeleton and she is using Aleve 2 tablets 2-3 times a day. This is enough to control her symptoms. She also uses Voltaren ointment and is effective. Besides this she has no difficulty with her gait. Her strength improved since transfusion was given to her several weeks ago when she was  profoundly anemic after therapy with Halaven. The patient's appetite is good, her weight is stable. Her bowel function and urination are normal. She has no cardiovascular or respiratory issues of any nature. No palpitations, no chest pain, no hemoptysis, no other symptoms. She has not had any swelling in her lower extremities. She has minimal degree of limping due to the chronic pain in the right femur where she had the original metastatic deposit at the time of the diagnosis of metastatic disease again many years ago. She has not had any neurological alterations.                 Past Medical History:   Diagnosis Date   • Abnormal mammogram    • Abnormal menstrual cycle    • Arthritis    • Bone metastasis (CMS/HCC)    • Breast cancer (CMS/HCC) 2000    Left breast   • Drug therapy 2001    breast cancer   • Drug therapy 2017    right femur/associated with breast cancer   • H/O Heart murmur    • History of colon polyps    • Hx of radiation therapy 2000    breast cancer   • Hx of radiation therapy 2015    bone cancer right femur/ associated with breast cancer   • Hypercholesterolemia    • Hyperlipidemia    • Hypertension    • Multiple benign polyps of large intestine    • Reflux esophagitis    • Shingles      Social History     Socioeconomic History   • Marital status: Single     Spouse name: Not on file   • Number of children: Not on file   • Years of education: Not on file   • Highest education level: Not on file   Occupational History   • Occupation:      Employer[place] [employer]   Tobacco Use   • Smoking status: Never Smoker   • Smokeless tobacco: Never Used   Substance and Sexual Activity   • Alcohol use: No   • Drug use: No   • Sexual activity: Defer     Family History   Problem Relation Age of Onset   • Hypertension Mother    • Diabetes Mother    • Macular degeneration Mother    • Thyroid disease Mother    • Heart disease Father    • Stroke Father    • Kidney disease Father    •  Glaucoma Brother    • Diabetes Brother    • Hypertension Brother    • Colon cancer Paternal Grandmother    • Thyroid disease Other    • Kidney disease Other    • Glaucoma Other    • Cancer Other    • Diabetes Other      Current Outpatient Medications on File Prior to Visit   Medication Sig Dispense Refill   • atorvastatin (LIPITOR) 20 MG tablet Take 1 tablet by mouth once daily 90 tablet 0   • cetirizine (zyrTEC) 10 MG tablet Take 10 mg by mouth As Needed for Allergies.     • ciclopirox (PENLAC) 8 % solution Apply  topically Every Night. 6 mL 0   • Cyanocobalamin (B-12 PO) Take 1,000 mcg by mouth Daily.     • denosumab (XGEVA) 120 MG/1.7ML solution injection Inject  under the skin 1 (one) time.     • diclofenac (VOLTAREN) 1 % gel gel APPLY 2 TO 4 GRAMS TOPICALLY TO AFFECTED AREA TWICE A  g 2   • famciclovir (Famvir) 500 MG tablet Take 1 tablet by mouth 3 (Three) Times a Day. 30 tablet 0   • HYDROcodone-acetaminophen (NORCO) 5-325 MG per tablet Take 1 tablet by mouth Every 6 (Six) Hours As Needed.     • lisinopril-hydrochlorothiazide (PRINZIDE,ZESTORETIC) 10-12.5 MG per tablet Take 1 tablet by mouth twice daily 180 tablet 0   • magnesium chloride ER (Mag64) 64 MG DR tablet Take 1 tablet by mouth 2 (two) times a day. 180 tablet 3   • Multiple Vitamins-Minerals (HAIR SKIN AND NAILS FORMULA PO) Take  by mouth.     • Naproxen Sod-Diphenhydramine (ALEVE PM PO) Take  by mouth as needed.     • Naproxen Sodium (ALEVE PO) Take  by mouth.     • Omega-3 Fatty Acids (FISH OIL) 645 MG capsule Take  by mouth.     • OMEPRAZOLE PO Take  by mouth Daily.       No current facility-administered medications on file prior to visit.      Active Ambulatory Problems     Diagnosis Date Noted   • Benign essential HTN 02/18/2016   • Primary malignant neoplasm of left breast (CMS/HCC) 02/18/2016   • Thrombophilia (CMS/HCC) 02/18/2016   • Hyperlipidemia 02/18/2016   • IFG (impaired fasting glucose) 02/18/2016   • Osteopenia 02/18/2016   •  History of colon polyps 03/08/2016   • Bone metastasis (CMS/HCC) 05/20/2016   • Anemia in neoplastic disease 05/20/2016   • Heart murmur, systolic 10/25/2016   • Fitting and adjustment of vascular catheter 03/13/2017   • Vitamin B12 deficiency anemia due to intrinsic factor deficiency 06/13/2017   • Leukopenia due to antineoplastic chemotherapy (CMS/HCC) 09/06/2017   • Hypomagnesemia 09/30/2020     Resolved Ambulatory Problems     Diagnosis Date Noted   • No Resolved Ambulatory Problems     Past Medical History:   Diagnosis Date   • Abnormal mammogram    • Abnormal menstrual cycle    • Arthritis    • Breast cancer (CMS/HCC) 2000   • Drug therapy 2001   • Drug therapy 2017   • H/O Heart murmur    • Hx of radiation therapy 2000   • Hx of radiation therapy 2015   • Hypercholesterolemia    • Hypertension    • Multiple benign polyps of large intestine    • Reflux esophagitis    • Shingles       Past Surgical History:   Procedure Laterality Date   • BREAST BIOPSY Left 2000    breast cancer   • BREAST SURGERY  2000    lumpectomy, mastectomy, revision   • COLONOSCOPY  2012   • FEMUR FRACTURE SURGERY      secondary to metastatic breast cancer 7/2014, with revision in 9/2015   • MASTECTOMY Left 2000    transflap in 2003           .          ALLERGIES:    Allergies   Allergen Reactions   • Codeine Unknown - Low Severity   • Docetaxel Unknown - Low Severity   • Paclitaxel Unknown - Low Severity       Objective      There were no vitals filed for this visit.  Current Status 12/2/2020   ECOG score 0     EXAM:I HAVE PERSONALLY REVIEWED THE HISTORY OF THE PRESENT ILLNESS, PAST MEDICAL HISTORY, FAMILY HISTORY, SOCIAL HISTORY, ALLERGIES, MEDICATIONS STATED ABOVE IN THE OFFICE NOTE FROM TODAY.    None telemedicine                            Hematology WBC   Date Value Ref Range Status   12/02/2020 6.96 3.40 - 10.80 10*3/mm3 Final   02/03/2020 3.89 3.40 - 10.80 10*3/mm3 Final     RBC   Date Value Ref Range Status   12/02/2020 3.43 (L)  3.77 - 5.28 10*6/mm3 Final   02/03/2020 3.08 (L) 3.77 - 5.28 10*6/mm3 Final     Hemoglobin   Date Value Ref Range Status   12/02/2020 10.7 (L) 12.0 - 15.9 g/dL Final     Hematocrit   Date Value Ref Range Status   12/02/2020 31.9 (L) 34.0 - 46.6 % Final     Platelets   Date Value Ref Range Status   12/02/2020 198 140 - 450 10*3/mm3 Final             CT CHEST, ABDOMEN, AND PELVIS WITH IV CONTRAST     HISTORY: Metastatic breast cancer with recent increased uptake seen on  skeletal scintigraphy     TECHNIQUE: Radiation dose reduction techniques were utilized, including  automated exposure control and exposure modulation based on body size.   3 mm images were obtained through the chest, abdomen, and pelvis with IV  contrast.      COMPARISON: Skeletal scintigraphy 10/28/2020 and CT chest, abdomen and  pelvis 11/20/2015.     FINDINGS:      Chest:      Subcentimeter hypodense lesion within the right thyroid gland is  present, as before.     There is no hilar, mediastinal or axillary adenopathy by size criteria.  Postsurgical changes from left breast lumpectomy and reconstruction and  left axillary lymph node dissection are present. Right Port-A-Cath tip  terminates in the superior vena cava. The heart is normal in size. There  appears to be a partially calcified embolus within the right lower lobe  pulmonary artery. The RV: LV ratio is not well evaluated due to contrast  timing. Sub-6 mm pulmonary nodules are present within the right upper  and left lower lobes (images 32, 46 and 60) and new since 11/20/2015.  There is a nodular pulmonary opacification within the right lower lobe  measuring 0.8 cm in average axial dimension. Mosaic attenuation is  present within the right base.     Abdomen and pelvis:      There are no findings of small bowel obstruction. The appendix is  unremarkable. Subcentimeter hypodense lesion within the medial aspect of  the left hepatic dome is grossly unchanged since 2015 and remains too  small to  characterize. A 1.5 cm hypodense lesion more inferiorly within  the right hepatic lobe is also grossly unchanged. There is a new  subcapsular hypodense lesion within the lateral aspect of the left  hepatic lobe with overlying capsular retraction measuring 2 x 1.5 cm.  The gallbladder, pancreas, spleen and adrenal glands have an  unremarkable postcontrast CT appearance. Subcentimeter renal lesions are  too small to characterize. There is a 3 to 4 mm calculus within the left  kidney. There is no hydronephrosis. The bladder is decompressed and not  well evaluated.     Portohepatic node measuring 1.1 cm in short axis dimension has increased  in size since 11/20/2015, previously measuring 0.5 cm.     There is colonic diverticulosis. Fat-containing periumbilical hernia is  present. There is also a right lateral ventral hernia containing a short  segment of the transverse colon without findings of obstruction. There  is no free intraperitoneal air or fluid. Presumed partially calcified  uterine fibroids are present.     Bone windows: Extensive, primarily sclerotic metastasis are present  throughout the visualized axial and appendicular skeleton, new since  11/20/2015. Pathologic fracture is present within the right scapula.  Intramedullary right femoral andi is incompletely visualized. There  appears to be a lytic lesion centered within the right femoral neck with  areas of discrete cortical breakthrough and overlying asymmetric soft  tissue density measuring approximately 0.9 cm in thickness compared with  0.6 cm on the contralateral left hip. There also appears to be a  discrete area of osteolysis within the left hip as well. There are  fractures extending through the bilateral pedicles of L4 and L3 with  what appears to be overlying callus formation..     IMPRESSION:  Impression:  1.  Partially calcified pulmonary embolus within the right interlobar  pulmonary artery of indeterminate acuity which given patient history  is  concerning for acute pulmonary embolus related to the patient's chronic  partially calcified DVT. Please note that the remainder of the pulmonary  arteries are not well evaluated and the RV LV ratio cannot be determined  due to contrast timing. Further evaluation with CTA of the chest can be  used for further characterization if clinically indicated.  2.  Extensive osseous metastatic disease which is new since 2015. There  is a lytic lesion with discrete overlying cortical breakthrough within  the right hip placing this patient at a significantly increased risk for  pathologic right hip fracture. There are ununited pathologic bilateral  pedicle fractures at L4 and L5 with what appears to be overlying  periosteal new bone formation suggestive of a subacute etiology.  However, findings remain indeterminate in age and can be further  evaluated with MRI if clinically indicated.  3.  New hypodense left hepatic lobe lesion with overlying capsular  retraction, most suggestive of metastatic disease. Findings can be  further evaluated with PET CT if clinically indicated.  4.  New subcentimeter pulmonary nodules measuring up to 0.8 cm,  concerning for metastatic disease and continued close attention on  follow-up is recommended.  5.  Interval enlargement of a corinne hepatic node which while  indeterminate raises concern for metastatic disease and continued close  attention on follow-up versus further evaluation with PET/CT is  recommended.  6.  Other findings as above.     The above findings were discussed with Dr. Saldaña by telephone by Eulalio Jurado at 4:30 PM on 12/29/2020.     This report was finalized on 12/29/2020 5:52 PM by Dr. Eulalio Jurado M.D.     Component      Latest Ref Rng & Units 10/18/2019 6/19/2020 7/24/2020 9/2/2020   CA 15-3      <=25.0 U/mL 28.1 (H) 74.8 (H) 67.0 (H) 67.8 (H)     Component      Latest Ref Rng & Units 9/30/2020 11/4/2020 12/2/2020   CA 15-3      <=25.0 U/mL 52.4 (H) 50.9 (H) 55.8 (H)            Assessment/Plan    .   1. Metastatic  Left breast cancer to multiple skeletal sites including cervical spine, sternum, lumbar spine and right femur.Since the previous visit, the patient has been taking her Femara and Kisqali correctly  .On eVisit on 04/10/2020 the patient states that her symptoms are very much quiet at this time. She has not developed any new sites of bone pain and she has not had any difficulty with her Kisqali and Femara that remain ongoing.       The patient was further reviewed on clinical grounds on 06/19/2020. Now she is experiencing a new pain in the pelvic bone bilaterally close to the sacroiliac joints and ala of the pelvic bone. Other than that she has not had any other new sites of pain.     I reviewed with the patient her bone scan that shows unequivocally increased uptake in the sacroiliac joints as well in the pelvic bone bilaterally posteriorly. There is a new uptake in the fibula on the right side that is very minimal. This does not correspond to any site of pain clinically.       The patient was further reviewed on 07/24/2020. Since then she has initiated radiation treatment and she has completed 5 treatments today out of 10. She has seen some improvement in the intensity of pain in the sacroiliac joints where she has sites of metastasis.  Now she has developed a new bone pain in the right humerus. Reviewing the bone scan with her she had a hot spot there and I will talk with Sarita Baldwin MD, today about adding some radiation therapy to this anatomical site.     Given the fact that the radiation field is larger in the pelvis we will delay the initiation of any Halaven until the patient completes her treatment and goes through expected hematological toxicity.   The patient was further reviewed on 09/02/2020. Because the completion of radiation therapy happened close to a month ago and her white count is stable and improving, the hemoglobin is stable and her platelet count  is fine, the patient achieved significant improvement in functionality related to increased pain in the right shoulder and pelvic bone due to affect and benefit of radiation therapy, we advised the patient today to proceed with Halaven. She understands that this infusion is very short; it is a push, and she understands that this medicine is going to be given to her on day 1 and day 8. Typically the treatment will be given to her every 3 weeks.     The patient was reviewed on clinical grounds on 11/04/2020. The patient states that she has been profoundly fatigued and she has been short of breath. Today her hemoglobin is 9.1. She has no jaundice and she has no nausea or GI blood loss or  blood loss. The patient is no longer taking anticoagulants.     The patient has tremendous degree of fatigue today and I think this is related in part to myelophthisis and in part also related to chemotherapy induced anemia and anemia of neoplastic disease. She has nothing to suggest hemolytic anemia or blood loss.     Her pain is relatively well controlled.     Her tumor marker has substantially decreased and we have another level today pending. The last one was 52 close to 5 weeks ago.     I reviewed the patient by telemedicine on 01/04/2021. I have reviewed the CT scan of the chest that to my eyes shows no pulmonary metastasis, no mediastinal adenopathy, normal cardiac size. In the liver anatomy there is a lesion that is hypodense in the left lobe of the liver that measures 1.5 cm in size with no enhancement that is difficult to tell if it is metastasis or not. There is a 1 cm corinne hepatis lymph node enlargement. Pancreas was normal, spleen is normal, kidneys are normal. Bowel is normal. The bone windows; multiple of the bones in the spine lumbar area, sacroiliac joints and right hip have obvious metastasis. It is difficult to know by just CT scan which is the metastasis that are very active. Remind ourselves that this patients  disease has been extremely difficult to measure through the years either with bone scan, MRI scan and on clinical grounds as well as biochemically by tumor marker.     Obviously the question is with how much of this bone disease is progressive, how much is stable. Unfortunately the recent CT scan was not compared with the bone scan that she had done recently and with previous MRIs that she has had done in 2019. We have asked the radiologist to do this comparison.     The most recent tumor marker CA 15-3 was elevated from 50 to 55 and leads me to believe that the Halaven even though it brought the tumor marker from 70 to 50 was tremendously impacting her in regard hematological toxicity, fatigue and poor tolerance to the medicine and this medicine will be discontinued at this point.     In regard to pain control and other issues I have advised the patient the followin. For pain management she will remain on Aleve 2 tablets in the morning, 2 tablets in the evening and if she needs to take 2 tablets in the middle of the day, she is welcome to do that.   2. She will continue taking H2 blocker to minimize side effect of Naprosyn and medicines of this nature.   3. She will continue using Voltaren topical in the areas of pain.  4. The patient will remain on her blood pressure medication like it is.  5. For her hypomagnesemia the patient will remain on magnesium supplementation.   6. For her bone metastatic disease she will remain on Xgeva and this medicine will remain on schedule every 3 months. Every XGEVA visit given to her we will recheck phosphorus and magnesium level.  7. I have sent a message to Daisy Baldwin MD, to review sites of radiation therapy that she has received to see if the right hip area has not received any to deliver some therapy to that location. It seems to be that is the area where the patient has most of the problem. Once that Dr. Baldwin answers me back I will get back with the patient on the  telephone and I advised her how to proceed.   8. In regard to systemic therapy for her disease I do believe that this patient will be a candidate to receive weekly adriamycin 3 weeks out of 4 for at least 2-3 months and see how she evolves from the point of view of her disease process at that time. Halaven will be permanently discontinued.    9. The patient is not planning to go and live with her parents in [place] up to this point. She is actually going to go back to work today and she is functional enough to be able to do this.     An addendum will be made to this dictation in regard how to proceed thereafter and the timing for initiation of adriamycin as well as the need to proceed with an echocardiogram in the future before adriamycin is initiated.     Because she requires toxicity assessment very close she will require assessment in the office in the next 2-3 weeks to evaluate status and then decide how to proceed thereafter and timing the initiation of her adriamycin.     DURATION OF PHONE VISIT 14 MINUTES 32 SEC    I DISCUSSED WITH PATIENT IN DETAIL FORMS TO DECREASE CHANCES OF CORONAVIRUS INFECTION INCLUDING ISOLATION, PROPER HAND HIGIENE, AVOID PUBLIC PLACES  WITH CROWDS, FOLLOW  CDC RECOMENDATIONS, AND KEEP PERSONAL AND SOCIAL RESPONSIBILITY, WARE A MASK IN PUBLIC PLACES.  PATIENT IS AWARE THIS INFECTION COULD HAVE SEVERE CONSEQUENCES TO PERSONAL HEALTH AND FAMILY RAMIFICATIONS OF THIS.      1/4/2021

## 2021-01-06 ENCOUNTER — APPOINTMENT (OUTPATIENT)
Dept: ONCOLOGY | Facility: HOSPITAL | Age: 66
End: 2021-01-06

## 2021-01-12 ENCOUNTER — CONSULT (OUTPATIENT)
Dept: RADIATION ONCOLOGY | Facility: HOSPITAL | Age: 66
End: 2021-01-12

## 2021-01-12 ENCOUNTER — APPOINTMENT (OUTPATIENT)
Dept: RADIATION ONCOLOGY | Facility: HOSPITAL | Age: 66
End: 2021-01-12

## 2021-01-12 VITALS
OXYGEN SATURATION: 99 % | SYSTOLIC BLOOD PRESSURE: 118 MMHG | TEMPERATURE: 95.8 F | WEIGHT: 205 LBS | BODY MASS INDEX: 35 KG/M2 | DIASTOLIC BLOOD PRESSURE: 75 MMHG | HEART RATE: 96 BPM

## 2021-01-12 DIAGNOSIS — C50.912 PRIMARY MALIGNANT NEOPLASM OF LEFT BREAST (HCC): Primary | ICD-10-CM

## 2021-01-12 DIAGNOSIS — C79.51 BONE METASTASIS: ICD-10-CM

## 2021-01-12 PROCEDURE — 77334 RADIATION TREATMENT AID(S): CPT | Performed by: RADIOLOGY

## 2021-01-12 PROCEDURE — 77290 THER RAD SIMULAJ FIELD CPLX: CPT | Performed by: RADIOLOGY

## 2021-01-12 PROCEDURE — 77470 SPECIAL RADIATION TREATMENT: CPT | Performed by: RADIOLOGY

## 2021-01-12 PROCEDURE — 99215 OFFICE O/P EST HI 40 MIN: CPT | Performed by: RADIOLOGY

## 2021-01-12 NOTE — PROGRESS NOTES
DIAGNOSIS and REASON FOR CONSULTATION:   Primary malignant neoplasm of left breast (CMS/HCC)  Stage IV (M1)  - with progressive bone mets in the right proximal femur, for advice and recommendations regarding the diagnosis    CHIEF COMPLAINT:  For advice and recommendations regarding Primary malignant neoplasm of left breast (CMS/HCC)    Bone metastasis (CMS/HCC) - now with progressive bone mets  HISTORY OF PRESENT ILLNESS:  The patient is a 65 y.o. year old female who is well-known to me from previous radiation at the time of her initial breast cancer diagnosis in March, 2001 and more recently for palliative radiation therapy directed to the right femur postoperatively in August, 2014 and most recently) of the bilateral SI joints in July, 2020.    Following this, her systemic therapy was switched to [place].  Bone scan October 2018, 2020 showed increase uptake in the right scapula suggesting slight progression but no other significant change in bony disease.  Her most recent CAT scan of the chest, abdomen and pelvis on December 28, 2020 showed a lytic lesion in the right hip, bilateral pedicle fractures at L4 and L5, new left hepatic liver lesion, new pulmonary nodules, interval enlargement of a corinne hepatic node suggesting progression.  Given these findings, she is discussing with Dr. Saez changed to Adriamycin and I was asked to see the patient by the referring to consider further palliative treatment to the right femur/hip.    Clinically, she complains only of a slight bit of pain in the right hip, using only Aleve for pain. She has returned to work and is trying to increase her activity daily.     Performance Status: (1) Restricted in physically strenuous activity, ambulatory and able to do work of light nature  Objective   Past Medical History: she  has a past medical history of Abnormal mammogram, Abnormal menstrual cycle, Arthritis, Bone metastasis (CMS/HCC), Breast cancer (CMS/HCC) (2000), Drug therapy  (2001), Drug therapy (2017), H/O Heart murmur, History of colon polyps, radiation therapy (2000), radiation therapy (2015), Hypercholesterolemia, Hyperlipidemia, Hypertension, Multiple benign polyps of large intestine, Reflux esophagitis, and Shingles.    Past Surgical History:  she has a past surgical history that includes Femur fracture surgery; Breast surgery (2000); Colonoscopy (2012); Mastectomy (Left, 2000); and Breast biopsy (Left, 2000).    Meds:    Current Outpatient Medications:   •  atorvastatin (LIPITOR) 20 MG tablet, Take 1 tablet by mouth once daily, Disp: 90 tablet, Rfl: 0  •  Cyanocobalamin (B-12 PO), Take 1,000 mcg by mouth Daily., Disp: , Rfl:   •  denosumab (XGEVA) 120 MG/1.7ML solution injection, Inject  under the skin 1 (one) time., Disp: , Rfl:   •  diclofenac (VOLTAREN) 1 % gel gel, APPLY 2 TO 4 GRAMS TOPICALLY TO AFFECTED AREA TWICE A DAY, Disp: 100 g, Rfl: 2  •  lisinopril-hydrochlorothiazide (PRINZIDE,ZESTORETIC) 10-12.5 MG per tablet, Take 1 tablet by mouth twice daily, Disp: 180 tablet, Rfl: 0  •  magnesium chloride ER (Mag64) 64 MG DR tablet, Take 1 tablet by mouth 2 (two) times a day., Disp: 180 tablet, Rfl: 3  •  Multiple Vitamins-Minerals (HAIR SKIN AND NAILS FORMULA PO), Take  by mouth., Disp: , Rfl:   •  Naproxen Sodium (ALEVE PO), Take  by mouth., Disp: , Rfl:   •  Omega-3 Fatty Acids (FISH OIL) 645 MG capsule, Take  by mouth., Disp: , Rfl:   •  OMEPRAZOLE PO, Take  by mouth Daily., Disp: , Rfl:   •  HYDROcodone-acetaminophen (NORCO) 5-325 MG per tablet, Take 1 tablet by mouth Every 6 (Six) Hours As Needed., Disp: , Rfl:   •  Naproxen Sod-Diphenhydramine (ALEVE PM PO), Take  by mouth as needed., Disp: , Rfl:     Allergies:    Allergies   Allergen Reactions   • Codeine Unknown - Low Severity   • Docetaxel Unknown - Low Severity   • Paclitaxel Unknown - Low Severity       Family History:  her family history includes Cancer in an other family member; Colon cancer in her paternal  grandmother; Diabetes in her brother, mother, and another family member; Glaucoma in her brother and another family member; Heart disease in her father; Hypertension in her brother and mother; Kidney disease in her father and another family member; Macular degeneration in her mother; Stroke in her father; Thyroid disease in her mother and another family member.    Social History:  she  reports that she has never smoked. She has never used smokeless tobacco. She reports that she does not drink alcohol or use drugs.    Pertinent Findings on   Review of Systems   Constitutional: Positive for fatigue. Negative for appetite change, chills, diaphoresis, fever and unexpected weight change.   HENT:   Negative for hearing loss, lump/mass, mouth sores, nosebleeds, sore throat, tinnitus, trouble swallowing and voice change.    Eyes: Negative for eye problems and icterus.   Respiratory: Negative for chest tightness, cough, hemoptysis, shortness of breath and wheezing.    Cardiovascular: Negative for chest pain, leg swelling and palpitations.   Gastrointestinal: Negative for abdominal distention, abdominal pain, blood in stool, constipation, diarrhea, nausea, rectal pain and vomiting.   Endocrine: Negative for hot flashes.   Genitourinary: Positive for pelvic pain. Negative for bladder incontinence, difficulty urinating, dyspareunia, dysuria, frequency, hematuria, menstrual problem, nocturia, vaginal bleeding and vaginal discharge.    Musculoskeletal: Positive for arthralgias. Negative for back pain, flank pain, gait problem, myalgias, neck pain and neck stiffness.   Skin: Negative for itching, rash and wound.   Neurological: Negative for dizziness, extremity weakness, gait problem, headaches, light-headedness, numbness, seizures and speech difficulty.   Hematological: Negative for adenopathy. Does not bruise/bleed easily.   Psychiatric/Behavioral: Negative for confusion, decreased concentration, depression, sleep disturbance  and suicidal ideas. The patient is not nervous/anxious.    :     Subjective   Vitals:    01/12/21 1019   BP: 118/75   Pulse: 96   Temp: 95.8 °F (35.4 °C)   TempSrc: Temporal   SpO2: 99%   Weight: 93 kg (205 lb)   PainSc:   3   PainLoc: Hip       Pertinent Findings on:  Physical Exam   Constitutional: She is oriented to person, place, and time. She appears well-developed. She is cooperative. No distress.   HENT:   Head: Normocephalic and atraumatic.   Nose: Nose normal.   Mouth/Throat: Normal dentition.   Eyes: Conjunctivae are normal.   Neck: Normal range of motion.   Pulmonary/Chest: Effort normal.   Abdominal: Normal appearance.   Musculoskeletal: Normal range of motion.      Comments: Pain intermittently present thru right SI, hip, femur, knee to ankle. None significant today on exam.     Vascular Status -  Her right foot exhibits no edema. Her left foot exhibits no edema.  Neurological: She is alert and oriented to person, place, and time.   Skin: Skin is warm and dry.   Psychiatric: Her behavior is normal. Judgment and thought content normal.          Assessment: Primary malignant neoplasm of left breast (CMS/HCC) - with progressive bone mets and progression in proximal right femur   This assessment comes from my review of the imaging, pathology, physician notes and other pertinent information as mentioned.    Plan:   We reviewed today the progression of the diagnostic imaging studies, the role of  systemic treatment and also the role of the radiation therapy in palliating symptomatic lesions or those with impending consequences.  We discussed a course of radiation therapy aimed at the proximal right femur to a total dose of 3000 cGy in 10 treatments over 2 weeks. We reviewed the logistics and goals of the course of treatment. We then discussed acute side effects which are expected be minimal but might include slight erythema of the skin and possible fatigue. We also discussed the long-term effect of the  radiation therapy on the bone and I believe all questions were answered.      We were able to take our treatment planning films today and will be getting the treatments underway within a couple of days.     Objective   I spent greater than 40 minutes in face-to-face time with the patient and 25 minutes of that time were spent in counseling and coordination of care, including review of imaging and pathology; indications, goals, logistics, alternatives and risks - both common and rare - for my recommendations as well as surveillance and potential outcomes.

## 2021-01-13 PROCEDURE — 77295 3-D RADIOTHERAPY PLAN: CPT | Performed by: RADIOLOGY

## 2021-01-13 PROCEDURE — 77334 RADIATION TREATMENT AID(S): CPT | Performed by: RADIOLOGY

## 2021-01-13 PROCEDURE — 77300 RADIATION THERAPY DOSE PLAN: CPT | Performed by: RADIOLOGY

## 2021-01-13 PROCEDURE — 77300 RADIATION THERAPY DOSE PLAN: CPT

## 2021-01-13 PROCEDURE — 77334 RADIATION TREATMENT AID(S): CPT

## 2021-01-13 PROCEDURE — 77295 3-D RADIOTHERAPY PLAN: CPT

## 2021-01-14 PROCEDURE — 77412 RADIATION TX DELIVERY LVL 3: CPT | Performed by: RADIOLOGY

## 2021-01-14 PROCEDURE — 77280 THER RAD SIMULAJ FIELD SMPL: CPT | Performed by: RADIOLOGY

## 2021-01-14 PROCEDURE — 77427 RADIATION TX MANAGEMENT X5: CPT | Performed by: RADIOLOGY

## 2021-01-15 PROCEDURE — 77412 RADIATION TX DELIVERY LVL 3: CPT | Performed by: RADIOLOGY

## 2021-01-18 PROCEDURE — 77412 RADIATION TX DELIVERY LVL 3: CPT | Performed by: RADIOLOGY

## 2021-01-19 PROCEDURE — 77412 RADIATION TX DELIVERY LVL 3: CPT | Performed by: RADIOLOGY

## 2021-01-19 PROCEDURE — 77336 RADIATION PHYSICS CONSULT: CPT | Performed by: RADIOLOGY

## 2021-01-20 PROCEDURE — 77412 RADIATION TX DELIVERY LVL 3: CPT | Performed by: RADIOLOGY

## 2021-01-21 ENCOUNTER — RADIATION ONCOLOGY WEEKLY ASSESSMENT (OUTPATIENT)
Dept: RADIATION ONCOLOGY | Facility: HOSPITAL | Age: 66
End: 2021-01-21

## 2021-01-21 DIAGNOSIS — C50.912 PRIMARY MALIGNANT NEOPLASM OF LEFT BREAST (HCC): Primary | ICD-10-CM

## 2021-01-21 DIAGNOSIS — C79.51 BONE METASTASIS: ICD-10-CM

## 2021-01-21 PROCEDURE — 77412 RADIATION TX DELIVERY LVL 3: CPT | Performed by: RADIOLOGY

## 2021-01-21 PROCEDURE — 77427 RADIATION TX MANAGEMENT X5: CPT | Performed by: RADIOLOGY

## 2021-01-21 PROCEDURE — 77417 THER RADIOLOGY PORT IMAGE(S): CPT | Performed by: RADIOLOGY

## 2021-01-21 NOTE — PROGRESS NOTES
"  Physician Weekly Management Note    Diagnosis:   Primary malignant neoplasm of left breast (CMS/HCC)    Bone metastasis (CMS/HCC)   Stage IV (M1)    Reason for Visit: Radiation (6/10)    Concurrent Chemo:   No    Notes on Treatment course, Films, Medical progress and Plan:  Doing well. Pain improved already. No problems or questions, cont on.  Subjective   Performance Status:  (1) Restricted in physically strenuous activity, ambulatory and able to do work of light nature    ROS - Other than as listed above, as follows:  Constitutional - Normal - no complaints of fatigue, denies lack of appetite, fever, night sweats or change in weight.  Cardiovascular - Normal - denies arrhythmias, chest pain, dyspnea, edema, orthopnea or palpitations.  Respiratory - Normal - denies cough, dyspnea, hemoptysis, hiccoughs, pleuritic chest pain or wheezing.  Gastrointestinal - Normal - no complaints of constipation, abdominal pain, diarrhea, heartburn/dyspepsia, hematemesis, hemorrhoids, melena or GI bleeding, nausea, pain or cramping or vomiting.  Objective   PHYSICAL EXAM - Other than as listed above, as follows:  There were no vitals filed for this visit.      Constitutional - Normal - no evidence of impaired alertness, inadequate appearance, premature or advanced chronologic age, uncooperativeness, altered mood, affect or disorientation.    Problem added:  No problems updated.  Medications added: No orders of the defined types were placed in this encounter.    Ancillary referrals made: No orders of the defined types were placed in this encounter.      Technical aspects reviewed:  Weekly OBI approved if applicable? Yes  Weekly port films approved?   Yes  Change requests noted if applicable?  Yes  Patient setup and plan reviewed?  Yes    Chart Reviewed:  Continue current treatment plan?  Yes  Treatment plan change requested?  No    I have reviewed and marked as \"reviewed\" the current medications, allergies and problem list in the " patients EMR.    I have reviewed the patient's medical, surgical  history in detail, reviewed any pertinent lab work  and updated the computerized patient record if needed.    Patient's Care Team:  Patient Care Team:  Chetna Whalen MD as PCP - General (Internal Medicine)  Nader Saez MD as Consulting Physician (Hematology and Oncology)  Ema Aguila MD as Referring Physician (General Surgery)  Daisy Baldwin MD as Consulting Physician (Radiation Oncology)

## 2021-01-22 PROCEDURE — 77412 RADIATION TX DELIVERY LVL 3: CPT | Performed by: RADIOLOGY

## 2021-01-25 ENCOUNTER — OFFICE VISIT (OUTPATIENT)
Dept: ONCOLOGY | Facility: CLINIC | Age: 66
End: 2021-01-25

## 2021-01-25 ENCOUNTER — INFUSION (OUTPATIENT)
Dept: ONCOLOGY | Facility: HOSPITAL | Age: 66
End: 2021-01-25

## 2021-01-25 VITALS
WEIGHT: 207.9 LBS | HEIGHT: 64 IN | TEMPERATURE: 97.3 F | SYSTOLIC BLOOD PRESSURE: 123 MMHG | DIASTOLIC BLOOD PRESSURE: 69 MMHG | OXYGEN SATURATION: 96 % | RESPIRATION RATE: 20 BRPM | BODY MASS INDEX: 35.49 KG/M2 | HEART RATE: 97 BPM

## 2021-01-25 DIAGNOSIS — T45.1X5A LEUKOPENIA DUE TO ANTINEOPLASTIC CHEMOTHERAPY (HCC): ICD-10-CM

## 2021-01-25 DIAGNOSIS — C50.912 PRIMARY MALIGNANT NEOPLASM OF LEFT BREAST (HCC): Primary | ICD-10-CM

## 2021-01-25 DIAGNOSIS — R01.1 HEART MURMUR, SYSTOLIC: ICD-10-CM

## 2021-01-25 DIAGNOSIS — C79.51 BONE METASTASIS: ICD-10-CM

## 2021-01-25 DIAGNOSIS — D70.1 LEUKOPENIA DUE TO ANTINEOPLASTIC CHEMOTHERAPY (HCC): ICD-10-CM

## 2021-01-25 DIAGNOSIS — D63.0 ANEMIA IN NEOPLASTIC DISEASE: ICD-10-CM

## 2021-01-25 DIAGNOSIS — Z45.2 FITTING AND ADJUSTMENT OF VASCULAR CATHETER: ICD-10-CM

## 2021-01-25 DIAGNOSIS — D68.59 THROMBOPHILIA (HCC): ICD-10-CM

## 2021-01-25 DIAGNOSIS — D51.0 VITAMIN B12 DEFICIENCY ANEMIA DUE TO INTRINSIC FACTOR DEFICIENCY: ICD-10-CM

## 2021-01-25 DIAGNOSIS — E83.42 HYPOMAGNESEMIA: ICD-10-CM

## 2021-01-25 LAB
ALBUMIN SERPL-MCNC: 4 G/DL (ref 3.5–5.2)
ALBUMIN/GLOB SERPL: 1.5 G/DL (ref 1.1–2.4)
ALP SERPL-CCNC: 147 U/L (ref 38–116)
ALT SERPL W P-5'-P-CCNC: 29 U/L (ref 0–33)
ANION GAP SERPL CALCULATED.3IONS-SCNC: 13.3 MMOL/L (ref 5–15)
AST SERPL-CCNC: 29 U/L (ref 0–32)
BASOPHILS # BLD AUTO: 0.04 10*3/MM3 (ref 0–0.2)
BASOPHILS NFR BLD AUTO: 0.7 % (ref 0–1.5)
BILIRUB SERPL-MCNC: 0.3 MG/DL (ref 0.2–1.2)
BUN SERPL-MCNC: 27 MG/DL (ref 6–20)
BUN/CREAT SERPL: 34.2 (ref 7.3–30)
CALCIUM SPEC-SCNC: 9.6 MG/DL (ref 8.5–10.2)
CANCER AG15-3 SERPL-ACNC: 58.4 U/ML
CHLORIDE SERPL-SCNC: 103 MMOL/L (ref 98–107)
CO2 SERPL-SCNC: 21.7 MMOL/L (ref 22–29)
CREAT SERPL-MCNC: 0.79 MG/DL (ref 0.6–1.1)
DEPRECATED RDW RBC AUTO: 56.5 FL (ref 37–54)
EOSINOPHIL # BLD AUTO: 0.32 10*3/MM3 (ref 0–0.4)
EOSINOPHIL NFR BLD AUTO: 5.8 % (ref 0.3–6.2)
ERYTHROCYTE [DISTWIDTH] IN BLOOD BY AUTOMATED COUNT: 16.6 % (ref 12.3–15.4)
GFR SERPL CREATININE-BSD FRML MDRD: 73 ML/MIN/1.73
GLOBULIN UR ELPH-MCNC: 2.7 GM/DL (ref 1.8–3.5)
GLUCOSE SERPL-MCNC: 113 MG/DL (ref 74–124)
HCT VFR BLD AUTO: 28.8 % (ref 34–46.6)
HGB BLD-MCNC: 9.5 G/DL (ref 12–15.9)
IMM GRANULOCYTES # BLD AUTO: 0.09 10*3/MM3 (ref 0–0.05)
IMM GRANULOCYTES NFR BLD AUTO: 1.6 % (ref 0–0.5)
LYMPHOCYTES # BLD AUTO: 0.86 10*3/MM3 (ref 0.7–3.1)
LYMPHOCYTES NFR BLD AUTO: 15.5 % (ref 19.6–45.3)
MCH RBC QN AUTO: 30.9 PG (ref 26.6–33)
MCHC RBC AUTO-ENTMCNC: 33 G/DL (ref 31.5–35.7)
MCV RBC AUTO: 93.8 FL (ref 79–97)
MONOCYTES # BLD AUTO: 0.6 10*3/MM3 (ref 0.1–0.9)
MONOCYTES NFR BLD AUTO: 10.8 % (ref 5–12)
NEUTROPHILS NFR BLD AUTO: 3.65 10*3/MM3 (ref 1.7–7)
NEUTROPHILS NFR BLD AUTO: 65.6 % (ref 42.7–76)
NRBC BLD AUTO-RTO: 0.4 /100 WBC (ref 0–0.2)
PLATELET # BLD AUTO: 199 10*3/MM3 (ref 140–450)
PMV BLD AUTO: 9.4 FL (ref 6–12)
POTASSIUM SERPL-SCNC: 4.1 MMOL/L (ref 3.5–4.7)
PROT SERPL-MCNC: 6.7 G/DL (ref 6.3–8)
RBC # BLD AUTO: 3.07 10*6/MM3 (ref 3.77–5.28)
SODIUM SERPL-SCNC: 138 MMOL/L (ref 134–145)
WBC # BLD AUTO: 5.56 10*3/MM3 (ref 3.4–10.8)

## 2021-01-25 PROCEDURE — 96523 IRRIG DRUG DELIVERY DEVICE: CPT

## 2021-01-25 PROCEDURE — 80053 COMPREHEN METABOLIC PANEL: CPT

## 2021-01-25 PROCEDURE — 77412 RADIATION TX DELIVERY LVL 3: CPT | Performed by: RADIOLOGY

## 2021-01-25 PROCEDURE — 25010000003 HEPARIN LOCK FLUSH PER 10 UNITS: Performed by: INTERNAL MEDICINE

## 2021-01-25 PROCEDURE — 36415 COLL VENOUS BLD VENIPUNCTURE: CPT

## 2021-01-25 PROCEDURE — 99215 OFFICE O/P EST HI 40 MIN: CPT | Performed by: INTERNAL MEDICINE

## 2021-01-25 PROCEDURE — 85025 COMPLETE CBC W/AUTO DIFF WBC: CPT

## 2021-01-25 PROCEDURE — 86300 IMMUNOASSAY TUMOR CA 15-3: CPT | Performed by: INTERNAL MEDICINE

## 2021-01-25 RX ORDER — HEPARIN SODIUM (PORCINE) LOCK FLUSH IV SOLN 100 UNIT/ML 100 UNIT/ML
500 SOLUTION INTRAVENOUS AS NEEDED
Status: CANCELLED | OUTPATIENT
Start: 2021-01-25

## 2021-01-25 RX ORDER — HEPARIN SODIUM (PORCINE) LOCK FLUSH IV SOLN 100 UNIT/ML 100 UNIT/ML
500 SOLUTION INTRAVENOUS AS NEEDED
Status: DISCONTINUED | OUTPATIENT
Start: 2021-01-25 | End: 2021-01-25 | Stop reason: HOSPADM

## 2021-01-25 RX ORDER — SODIUM CHLORIDE 0.9 % (FLUSH) 0.9 %
10 SYRINGE (ML) INJECTION AS NEEDED
Status: CANCELLED | OUTPATIENT
Start: 2021-01-25

## 2021-01-25 RX ADMIN — Medication 500 UNITS: at 08:38

## 2021-01-25 NOTE — PROGRESS NOTES
Subjective  REASONS FOR FOLLOWUP:   1. Metastatic breast cancer to multiple skeletal sites including cervical spine, sternum, lumbar spine and right femur, underwent Abraxane every 4 weeks and Xgeva every  3 months, DOCUMENTED RADIOLOGICAL PROGRESSION BY BONE SCAN 3/15/17 MOVING AWAY FROM ABRAXANE AND HALAVEN, PRESENTLY ON FEMARA AND KISQALI , XGEVA EVERY 3 MONTHS,    2.Iron deficiency anemia du to GI blood loss, Xarelto stopped months ago, Iron initiated, Pepcid along with Aleve for gastric protection.   3.  1/10/2020 continued good tolerance of because Kasquali  and Femara.  We will continue to hold Xgeva with plans for DEXA scan in spring 2020 to determine the need further Xgeva.  4. Progression in the bone disease 6/20 Femara and KISQALI stopped, rad to pelvis scheduled, thereafter HALAVEN INITIATED 9/20 AFTER PALLIATIVE RADIATION TO R SHOULDER AND PELVIS.      History of Present Illness     PATIENT WAS CALLED THE DAY BEFORE BY THE OFFICE TO ASK FOR SYMPTOMS THAT COULD BE CONSISTENT WITH CORONAVIRUS INFECTION, AND BEING NEGATIVE WAS SCHEDULED TO BE SEEN IN THE OFFICE TODAY. SIMILAR QUESTIONING TODAY INCLUDING, CHILLS, FEVER, NEW COUGH, SHORTNESS OF BREATH, DIARRHEA,DIFFUSE BODY ACHES  AND CHANGES IN SMELL OR TASTE WERE NEGATIVE.THE PATIENT DENIED ANY CONTACT WITH PERSONS WHO WERE POSITIVE FOR COVID, AND PATIENT IS NOT IN CATEGORY OF HIGH RISK BEHAVIOR TO ACQUIRE COVID.    DURING THE VISIT WITH THE PATIENT TODAY , PATIENT HAD FACE MASK, MY MEDICAL ASSISTANT AND I  HAD PROPPER PROTECTIVE EQUIPMENT, AND I DID HAND HYGIENE WITH SOAP AND WATER BEFORE AND AFTER THE VISIT.    This patient  returns today to the office for followup.  She is here today still undergoing radiation therapy for a painful metastases in the right hip.  She has noticed significant reduction in the amount of pain since the 3rd treatment and she still has 3 more treatments to go.  We have discontinued the Halaven that even though it was beneficial to decrease her tumor marker, produced very significant intolerance to profound fatigue, anemia and inability to function.  We are planning eventually upon completion of radiation therapy to initiate low-dose weekly dose Adriamycin.  The patient, besides this, has minor pain in the shoulders not requiring any intervention and she is taking 3 Aleve a day with good control of most of her symptoms.  She has not had any gastric upset with this dose of Aleve.  Her bowel activity is normal.  No passage of blood in the stool.  Urination is normal.  She continues taking proton pump inhibitor for gastric protection. The patient denies any cardiovascular or respiratory issues.  Her appetite is acceptable.  Her weight is stable.  She is able to work and function independently.              Past Medical History:   Diagnosis Date   • Abnormal mammogram    • Abnormal menstrual cycle    • Arthritis    • Bone metastasis (CMS/HCC)    • Breast cancer (CMS/HCC) 2000    Left breast   • Drug therapy 2001    breast cancer   • Drug therapy 2017    right femur/associated with breast cancer   • H/O Heart murmur    • History of colon polyps    • Hx of radiation therapy 2000    breast cancer   • Hx of radiation therapy 2015    bone cancer right femur/ associated with breast cancer   • Hypercholesterolemia    • Hyperlipidemia    • Hypertension    • Multiple benign polyps of large intestine    • Reflux esophagitis    • Shingles      Social History     Socioeconomic History   • Marital status: Single     Spouse name: Not on file   • Number of children: Not on file   • Years of education: Not on file   • Highest  education level: Not on file   Occupational History   • Occupation:      Employer: [employer]   Tobacco Use   • Smoking status: Never Smoker   • Smokeless tobacco: Never Used   Substance and Sexual Activity   • Alcohol use: No   • Drug use: No   • Sexual activity: Defer     Family History   Problem Relation Age of Onset   • Hypertension Mother    • Diabetes Mother    • Macular degeneration Mother    • Thyroid disease Mother    • Heart disease Father    • Stroke Father    • Kidney disease Father    • Glaucoma Brother    • Diabetes Brother    • Hypertension Brother    • Colon cancer Paternal Grandmother    • Thyroid disease Other    • Kidney disease Other    • Glaucoma Other    • Cancer Other    • Diabetes Other      Current Outpatient Medications on File Prior to Visit   Medication Sig Dispense Refill   • atorvastatin (LIPITOR) 20 MG tablet Take 1 tablet by mouth once daily 90 tablet 0   • Cyanocobalamin (B-12 PO) Take 1,000 mcg by mouth Daily.     • denosumab (XGEVA) 120 MG/1.7ML solution injection Inject  under the skin 1 (one) time.     • Diclofenac Sodium (VOLTAREN) 1 % gel gel APPLY 2 TO 4 GRAMS TOPICALLY TO AFFECTED AREA TWICE A  g 1   • lisinopril-hydrochlorothiazide (PRINZIDE,ZESTORETIC) 10-12.5 MG per tablet Take 1 tablet by mouth twice daily 180 tablet 0   • magnesium chloride ER (Mag64) 64 MG DR tablet Take 1 tablet by mouth 2 (two) times a day. 180 tablet 3   • Multiple Vitamins-Minerals (HAIR SKIN AND NAILS FORMULA PO) Take  by mouth.     • Multiple Vitamins-Minerals (PRESERVISION AREDS 2 PO) Take  by mouth.     • Naproxen Sodium (ALEVE PO) Take  by mouth.     • Omega-3 Fatty Acids (FISH OIL) 645 MG capsule Take  by mouth.     • OMEPRAZOLE PO Take  by mouth Daily.     • HYDROcodone-acetaminophen (NORCO) 5-325 MG per tablet Take 1 tablet by mouth Every 6 (Six) Hours As Needed.     • Naproxen Sod-Diphenhydramine (ALEVE PM PO) Take  by mouth as needed.       No  current facility-administered medications on file prior to visit.      Active Ambulatory Problems     Diagnosis Date Noted   • Benign essential HTN 02/18/2016   • Primary malignant neoplasm of left breast (CMS/HCC) 02/18/2016   • Thrombophilia (CMS/HCC) 02/18/2016   • Hyperlipidemia 02/18/2016   • IFG (impaired fasting glucose) 02/18/2016   • Osteopenia 02/18/2016   • History of colon polyps 03/08/2016   • Bone metastasis (CMS/HCC) 05/20/2016   • Anemia in neoplastic disease 05/20/2016   • Heart murmur, systolic 10/25/2016   • Fitting and adjustment of vascular catheter 03/13/2017   • Vitamin B12 deficiency anemia due to intrinsic factor deficiency 06/13/2017   • Leukopenia due to antineoplastic chemotherapy (CMS/HCC) 09/06/2017   • Hypomagnesemia 09/30/2020     Resolved Ambulatory Problems     Diagnosis Date Noted   • No Resolved Ambulatory Problems     Past Medical History:   Diagnosis Date   • Abnormal mammogram    • Abnormal menstrual cycle    • Arthritis    • Breast cancer (CMS/HCC) 2000   • Drug therapy 2001   • Drug therapy 2017   • H/O Heart murmur    • Hx of radiation therapy 2000   • Hx of radiation therapy 2015   • Hypercholesterolemia    • Hypertension    • Multiple benign polyps of large intestine    • Reflux esophagitis    • Shingles       Past Surgical History:   Procedure Laterality Date   • BREAST BIOPSY Left 2000    breast cancer   • BREAST SURGERY  2000    lumpectomy, mastectomy, revision   • COLONOSCOPY  2012   • FEMUR FRACTURE SURGERY      secondary to metastatic breast cancer 7/2014, with revision in 9/2015   • MASTECTOMY Left 2000    transflap in 2003           .          ALLERGIES:    Allergies   Allergen Reactions   • Codeine Unknown - Low Severity   • Docetaxel Unknown - Low Severity   • Paclitaxel Unknown - Low Severity       Objective      Vitals:    01/25/21 0836   BP: 123/69   Pulse: 97   Resp: 20   Temp: 97.3 °F (36.3 °C)   TempSrc: Temporal   SpO2: 96%   Weight: 94.3 kg (207 lb 14.4  "oz)   Height: 163 cm (64.17\")   PainSc: 0-No pain     Current Status 1/25/2021   ECOG score 0     EXAM:  I HAVE PERSONALLY REVIEWED THE HISTORY OF THE PRESENT ILLNESS, PAST MEDICAL HISTORY, FAMILY HISTORY, SOCIAL HISTORY, ALLERGIES, MEDICATIONS STATED ABOVE IN THE OFFICE NOTE FROM TODAY.        GENERAL:  Well-developed, well-nourished  Patient  in no acute distress.   SKIN:  Warm, dry ,NO rashes,NO purpura ,NO petechiae.  HEENT:  Pupils were equal and reactive to light and accomodation, conjunctivae noninjected, no pterygium, normal extraocular movements, normal visual acuity.   NECK:  Supple with good range of motion; no thyromegaly or masses, no JVD or bruits, no cervical adenopathies.No carotid artery pain, no carotid abnormal pulsation , NO arterial dance.  LYMPHATICS:  No cervical, NO supraclavicular, NO axillary,NO epitrochlear , NO inguinal adenopathy.  CARDIAC   normal rate and regular rhythm, without murmur,NO rubs NO S3 NO S4 right or left . Normal femoral, popliteal, pedis, brachial and carotid pulses.  VASCULAR ARTERIAL: normal carotids,brachial,radial,femoral,popliteal, pedis pulses , no bruits.no paleness or cyanosis, no pain, no edema, no numbness, no gangrene.  VASCULAR VENOUS: no cyanosis, collateral circulation, varicosities, edema, palpable cords, pain, erythema.  ABDOMEN:  Soft, nontender with no hepatomegaly, no splenomegaly,no masses, no ascites, no collateral circulation,no distention,no Randall sign, no abdominal pain, no inguinal hernias,no umbilical hernia, no abdominal bruits. Normal bowel sounds.  GENITAL: Not  Performed.  EXTREMITIES  AND SPINE:  No clubbing, cyanosis or edema, no deformities or pain .No kyphosis, scoliosis, deformities or pain in spine, ribs or pelvic bone.  NEUROLOGICAL:  Patient was awake, alert, oriented to time, person and place.                            Hematology WBC   Date Value Ref Range Status   01/25/2021 5.56 3.40 - 10.80 10*3/mm3 Final   02/03/2020 3.89 " 3.40 - 10.80 10*3/mm3 Final     RBC   Date Value Ref Range Status   01/25/2021 3.07 (L) 3.77 - 5.28 10*6/mm3 Final   02/03/2020 3.08 (L) 3.77 - 5.28 10*6/mm3 Final     Hemoglobin   Date Value Ref Range Status   01/25/2021 9.5 (L) 12.0 - 15.9 g/dL Final     Hematocrit   Date Value Ref Range Status   01/25/2021 28.8 (L) 34.0 - 46.6 % Final     Platelets   Date Value Ref Range Status   01/25/2021 199 140 - 450 10*3/mm3 Final               Assessment/Plan    .   1. Metastatic  Left breast cancer to multiple skeletal sites including cervical spine, sternum, lumbar spine and right femur.Since the previous visit, the patient has been taking her Femara and Kisqali correctly  .On eVisit on 04/10/2020 the patient states that her symptoms are very much quiet at this time. She has not developed any new sites of bone pain and she has not had any difficulty with her Kisqali and Femara that remain ongoing.       The patient was further reviewed on clinical grounds on 06/19/2020. Now she is experiencing a new pain in the pelvic bone bilaterally close to the sacroiliac joints and ala of the pelvic bone. Other than that she has not had any other new sites of pain.     I reviewed with the patient her bone scan that shows unequivocally increased uptake in the sacroiliac joints as well in the pelvic bone bilaterally posteriorly. There is a new uptake in the fibula on the right side that is very minimal. This does not correspond to any site of pain clinically.       The patient was further reviewed on 07/24/2020. Since then she has initiated radiation treatment and she has completed 5 treatments today out of 10. She has seen some improvement in the intensity of pain in the sacroiliac joints where she has sites of metastasis.  Now she has developed a new bone pain in the right humerus. Reviewing the bone scan with her she had a hot spot there and I will talk with Sarita Baldwin MD, today about adding some radiation therapy to this  anatomical site.     Given the fact that the radiation field is larger in the pelvis we will delay the initiation of any Halaven until the patient completes her treatment and goes through expected hematological toxicity.   The patient was further reviewed on 09/02/2020. Because the completion of radiation therapy happened close to a month ago and her white count is stable and improving, the hemoglobin is stable and her platelet count is fine, the patient achieved significant improvement in functionality related to increased pain in the right shoulder and pelvic bone due to affect and benefit of radiation therapy, we advised the patient today to proceed with Halaven. She understands that this infusion is very short; it is a push, and she understands that this medicine is going to be given to her on day 1 and day 8. Typically the treatment will be given to her every 3 weeks.     The patient was reviewed on clinical grounds on 11/04/2020. The patient states that she has been profoundly fatigued and she has been short of breath. Today her hemoglobin is 9.1. She has no jaundice and she has no nausea or GI blood loss or  blood loss. The patient is no longer taking anticoagulants.     The patient has tremendous degree of fatigue today and I think this is related in part to myelophthisis and in part also related to chemotherapy induced anemia and anemia of neoplastic disease. She has nothing to suggest hemolytic anemia or blood loss.     Her pain is relatively well controlled.     Her tumor marker has substantially decreased and we have another level today pending. The last one was 52 close to 5 weeks ago.       The patient was reviewed on 01/25/2021 in the office. She is still undergoing radiation therapy to her right hip metastasis and she has found significant relief of the pain even after the 3rd treatment. She still has 3 more treatments to go this week.    In regard to the overall treatment of her breast cancer, I  talked with her about doing low-dose Adriamycin 3 weeks out of 4. I discussed with her side effects of the medicine including anemia, leukopenia, thrombocytopenia, sores in her mouth as well as cardiomyopathy. I advised her that we will use a cardioprotective medicine, metoprolol in a very low dose in order to minimize cardiac toxicity. In preparation for the initiation of Adriamycin the patient will proceed with an echocardiogram.     Also, at this time the patient will require the continuation of her Xgeva for her bone metastasis and this medicine will be continued every 3 months.     In regard to pain management she believes that Aleve is enough along with topical Voltaren and I asked her to compliment with Tylenol if necessary. Obviously in order to minimize NSAIDs side-effects including gastric irritation, she will remain on proton pump inhibitor.     In regard to blood pressure control, it is satisfactory on lisinopril, hydrochlorothiazide and the lisinopril also will be cardioprotective to her.     The patient as we know has increased risk factors for cardiovascular disease given the use of anthracycline. Also the patient has had chemotherapy before. She has had multiple agents through the 10 years that we have been treating her metastatic breast cancer. This is the first time that I will use Adriamycin on her at this point.     The patient is aware that it will require 2 more cancer measurements, CA 15-3 periodically during the treatment and obviously at the time that she receives Xgeva, she will require comprehensive metabolic profile, magnesium and phosphorus level.     For her anemia of neoplastic disease, I find no need for transfusion. She remains on vitamin supplements and that is okay with me.  We have no documented any other recurrence of iron deficiency anemia that she had when she was taking anticoagulants.     She has not had any recurrence of deep vein thrombosis fortunately. She remains off  anticoagulants given GI bleeding.     We know that this patient has had a heart murmur that is documented today on exam. She has had echocardiogram before and this will be followed up in the echocardiogram that we have ordered today.     I will review her back on the 3rd weekly treatment of Adriamycin to monitor toxicity and assess tolerability.    THIS IS AN  ILLNESS THAT POSES A THREAT TO LIFE AND BODY FUNCTION, REQUIRED REVIEW OF EXTERNAL DOCUMENT, AND REQUIRES INTENSIVE MONITORING OF A LAB TEST, A PHYSIOLOGIC TEST OR IMAGING FOR THERAPY DIAGNOSES  AND  TOXICITY.            I DISCUSSED WITH PATIENT IN DETAIL FORMS TO DECREASE CHANCES OF CORONAVIRUS INFECTION INCLUDING ISOLATION, PROPER HAND HIGIENE, AVOID PUBLIC PLACES  WITH CROWDS, FOLLOW  CDC RECOMENDATIONS, AND KEEP PERSONAL AND SOCIAL RESPONSIBILITY, WARE A MASK IN PUBLIC PLACES.  PATIENT IS AWARE THIS INFECTION COULD HAVE SEVERE CONSEQUENCES TO PERSONAL HEALTH AND FAMILY RAMIFICATIONS OF THIS.      1/26/2021

## 2021-01-26 ENCOUNTER — TELEPHONE (OUTPATIENT)
Dept: ONCOLOGY | Facility: CLINIC | Age: 66
End: 2021-01-26

## 2021-01-26 ENCOUNTER — RADIATION ONCOLOGY WEEKLY ASSESSMENT (OUTPATIENT)
Dept: RADIATION ONCOLOGY | Facility: HOSPITAL | Age: 66
End: 2021-01-26

## 2021-01-26 DIAGNOSIS — C79.51 BONE METASTASIS: Primary | ICD-10-CM

## 2021-01-26 PROCEDURE — 77336 RADIATION PHYSICS CONSULT: CPT | Performed by: RADIOLOGY

## 2021-01-26 PROCEDURE — 77412 RADIATION TX DELIVERY LVL 3: CPT | Performed by: RADIOLOGY

## 2021-01-26 NOTE — PROGRESS NOTES
"  Physician Weekly Management Note    Diagnosis:   Bone metastasis (CMS/HCC)   Stage IV (M1)    Reason for Visit: Radiation (9/10)    Concurrent Chemo:   No    Notes on Treatment course, Films, Medical progress and Plan:  Doing well. Pain improved already. No problems or questions, cont on.  Will see back prn.    Subjective   Performance Status:  (1) Restricted in physically strenuous activity, ambulatory and able to do work of light nature    ROS - Other than as listed above, as follows:  Constitutional - Normal - no complaints of fatigue, denies lack of appetite, fever, night sweats or change in weight.  Cardiovascular - Normal - denies arrhythmias, chest pain, dyspnea, edema, orthopnea or palpitations.  Respiratory - Normal - denies cough, dyspnea, hemoptysis, hiccoughs, pleuritic chest pain or wheezing.  Gastrointestinal - Normal - no complaints of constipation, abdominal pain, diarrhea, heartburn/dyspepsia, hematemesis, hemorrhoids, melena or GI bleeding, nausea, pain or cramping or vomiting.  Objective   PHYSICAL EXAM - Other than as listed above, as follows:  There were no vitals filed for this visit.      Constitutional - Normal - no evidence of impaired alertness, inadequate appearance, premature or advanced chronologic age, uncooperativeness, altered mood, affect or disorientation.    Problem added:  No problems updated.  Medications added: No orders of the defined types were placed in this encounter.    Ancillary referrals made: No orders of the defined types were placed in this encounter.      Technical aspects reviewed:  Weekly OBI approved if applicable? Yes  Weekly port films approved?   Yes  Change requests noted if applicable?  Yes  Patient setup and plan reviewed?  Yes    Chart Reviewed:  Continue current treatment plan?  Yes  Treatment plan change requested?  No    I have reviewed and marked as \"reviewed\" the current medications, allergies and problem list in the patients EMR.    I have reviewed " the patient's medical, surgical  history in detail, reviewed any pertinent lab work  and updated the computerized patient record if needed.    Patient's Care Team:  Patient Care Team:  Chetna Whalen MD as PCP - General (Internal Medicine)  Nader Saez MD as Consulting Physician (Hematology and Oncology)  Ema Aguila MD as Referring Physician (General Surgery)  Daisy Baldwin MD as Consulting Physician (Radiation Oncology)

## 2021-01-26 NOTE — TELEPHONE ENCOUNTER
Caller: TIMUR HANNAH    Relationship to patient: SELF    Best call back number: [phone]    Chief complaint: NEEDS A LATER APPT TIME    Type of visit: PORT FLUSH, FOLLOW UP, INFUSION    Requested date: LATE SAME DAY     If rescheduling, when is the original appointment: 02/22

## 2021-01-27 ENCOUNTER — DOCUMENTATION (OUTPATIENT)
Dept: RADIATION ONCOLOGY | Facility: HOSPITAL | Age: 66
End: 2021-01-27

## 2021-01-27 PROCEDURE — 77412 RADIATION TX DELIVERY LVL 3: CPT | Performed by: RADIOLOGY

## 2021-01-28 ENCOUNTER — OFFICE VISIT (OUTPATIENT)
Dept: ONCOLOGY | Facility: CLINIC | Age: 66
End: 2021-01-28

## 2021-01-28 ENCOUNTER — DOCUMENTATION (OUTPATIENT)
Dept: ONCOLOGY | Facility: CLINIC | Age: 66
End: 2021-01-28

## 2021-01-28 DIAGNOSIS — C79.51 BONE METASTASIS: Primary | ICD-10-CM

## 2021-01-28 DIAGNOSIS — C50.912 PRIMARY MALIGNANT NEOPLASM OF LEFT BREAST (HCC): ICD-10-CM

## 2021-01-28 PROCEDURE — 99443 PR PHYS/QHP TELEPHONE EVALUATION 21-30 MIN: CPT | Performed by: NURSE PRACTITIONER

## 2021-01-28 RX ORDER — ONDANSETRON HYDROCHLORIDE 8 MG/1
8 TABLET, FILM COATED ORAL 3 TIMES DAILY PRN
Qty: 30 TABLET | Refills: 5 | Status: SHIPPED | OUTPATIENT
Start: 2021-01-28 | End: 2021-04-09

## 2021-01-28 NOTE — PROGRESS NOTES
[facility] Hematology/Oncology Treatment Plan Summary    Name: Maggie Suero  [facility]# [id number]  MD: Dr. Saez    Diagnosis:     ICD-10-CM ICD-9-CM   1. Bone metastasis (CMS/HCC)  C79.51 198.5   2. Primary malignant neoplasm of left breast (CMS/HCC)  C50.912 174.9     Stage: IV    Goal of chemotherapy: palliative    Treatment Medication(s):   1. Adriamycin    Frequency: once weekly for 3 week on, 1 week off, repeat    Number of cycles: to be determined    Starting on: 2/5/2021    Repeat after ~3 cycles: CT Scan    Items for home use: Senokot-S (for constipation), Ha of Magnesia (for constipation), Immodium AD (for diarrhea), Tylenol (for fever and/or pain) and Thermometer    Rx written for: [x] Nausea    [] Pre-Chemo   ondansetron 8 mg by mouth every 8 hours as needed for nausea    Notes:     Completing Provider: JESSICA Palomares           Date/time: 01/28/2021    Please note: You will be seen by a provider frequently with your treatment plan. This plan may change depending on many factors, if so, this will be discussed with you by your physician.  Last update 12/2020.

## 2021-01-28 NOTE — PROGRESS NOTES
Fax rec from [facility] stating [facility] has started a PA for pts Ondansetron.    I have completed the request and submitted to [place].    Request approved until 128/2022    [facility] notified

## 2021-01-28 NOTE — PROGRESS NOTES
____________________PATIENT EDUCATION____________________    PATIENT EDUCATION:  Today I met with the patient via televisit to discuss the chemotherapy regimen recommended for treatment of her breast cancer.  The patient was given explanation of treatment premed side effects including office policy that prohibits patients to drive if sedating medications are administered, MD explanation given regarding benefits, side effects, toxicities and goals of treatment.  The patient received a Chemotherapy/Biotherapy Plan Summary including diagnosis and explanation of specific treatment plan.    SIDE EFFECTS:  Common side effects were discussed with the patient and/or significant other.  Discussion included where applicable hair loss/discoloration, anemia/fatigue, infection/chills/fever, appetite, bleeding risk/precautions, constipation, diarrhea, mouth sores, taste alteration, loss of appetite,nausea/vomiting, peripheral neuropathy, skin/nail changes, rash, muscle aches/weakness, photosensitivity, weight gain/loss, hearing loss, dizziness, menopausal symptoms, menstrual irregularity, sterility, high blood pressure, heart damage, liver damage, lung damage, kidney damage, DVT/PE risk, fluid retention, pleural/pericardial effusion, somnolence, electrolyte/LFT imbalance, vein exercises and/or the possible need for vascular access/port placement.  The patient was advised that although uncommon, leakage of an infused medication from the vein or venous access device (port) may lead to skin breakdown and/or other tissue damage.  The patient was advised that he/she may have pain, bleeding, and/or bruising from the insertion of a needle in their vein or venous access device (port).  The patient was further advised that, in spite of proper technique, infection with redness and irritation may rarely occur at the site where the needle was inserted.  The patient was advised that if complications occur, additional medical treatment is  available.    Discussion also included side effects specific to drugs in the treatment plan, specifically Adriamycin.    A total of 25 minutes were spent with the patient, with 100% of time spent in education and counseling.    You have chosen to receive care through a telephone visit today. Do you consent to use a telephone visit for your medical care today? Yes     This visit has been rescheduled as a phone visit to comply with patient safety concerns in accordance with CDC recommendations. Total time of discussion was 25 minutes.    60400 is 5-10 minutes  21551 is 11-20 minutes  90335 is 21 or more minutes

## 2021-01-29 ENCOUNTER — HOSPITAL ENCOUNTER (OUTPATIENT)
Dept: CARDIOLOGY | Facility: HOSPITAL | Age: 66
Discharge: HOME | End: 2021-01-29
Admitting: INTERNAL MEDICINE

## 2021-01-29 DIAGNOSIS — C50.912 PRIMARY MALIGNANT NEOPLASM OF LEFT BREAST (HCC): ICD-10-CM

## 2021-01-29 DIAGNOSIS — T45.1X5A LEUKOPENIA DUE TO ANTINEOPLASTIC CHEMOTHERAPY (HCC): ICD-10-CM

## 2021-01-29 DIAGNOSIS — D70.1 LEUKOPENIA DUE TO ANTINEOPLASTIC CHEMOTHERAPY (HCC): ICD-10-CM

## 2021-01-29 DIAGNOSIS — D68.59 THROMBOPHILIA (HCC): ICD-10-CM

## 2021-01-29 DIAGNOSIS — D63.0 ANEMIA IN NEOPLASTIC DISEASE: ICD-10-CM

## 2021-01-29 DIAGNOSIS — C79.51 BONE METASTASIS: ICD-10-CM

## 2021-01-29 DIAGNOSIS — E83.42 HYPOMAGNESEMIA: ICD-10-CM

## 2021-01-29 PROCEDURE — 93306 TTE W/DOPPLER COMPLETE: CPT | Performed by: INTERNAL MEDICINE

## 2021-01-29 PROCEDURE — 93306 TTE W/DOPPLER COMPLETE: CPT

## 2021-01-29 PROCEDURE — 25010000002 PERFLUTREN (DEFINITY) 8.476 MG IN SODIUM CHLORIDE (PF) 0.9 % 10 ML INJECTION: Performed by: INTERNAL MEDICINE

## 2021-01-29 RX ADMIN — PERFLUTREN 1.5 ML: 6.52 INJECTION, SUSPENSION INTRAVENOUS at 07:43

## 2021-02-01 LAB
AORTIC ARCH: 2.7 CM
ASCENDING AORTA: 3 CM
BH CV ECHO MEAS - ACS: 1.2 CM
BH CV ECHO MEAS - AO ARCH DIAM (PROXIMAL TRANS.): 2.7 CM
BH CV ECHO MEAS - AO MAX PG (FULL): 3.6 MMHG
BH CV ECHO MEAS - AO MAX PG: 12.4 MMHG
BH CV ECHO MEAS - AO MEAN PG (FULL): 2 MMHG
BH CV ECHO MEAS - AO MEAN PG: 7 MMHG
BH CV ECHO MEAS - AO ROOT AREA (BSA CORRECTED): 1.4
BH CV ECHO MEAS - AO ROOT AREA: 6.2 CM^2
BH CV ECHO MEAS - AO ROOT DIAM: 2.8 CM
BH CV ECHO MEAS - AO V2 MAX: 176 CM/SEC
BH CV ECHO MEAS - AO V2 MEAN: 128 CM/SEC
BH CV ECHO MEAS - AO V2 VTI: 37.2 CM
BH CV ECHO MEAS - ASC AORTA: 3 CM
BH CV ECHO MEAS - AVA(I,A): 1.9 CM^2
BH CV ECHO MEAS - AVA(I,D): 1.9 CM^2
BH CV ECHO MEAS - AVA(V,A): 2.1 CM^2
BH CV ECHO MEAS - AVA(V,D): 2.1 CM^2
BH CV ECHO MEAS - BSA(HAYCOCK): 2.1 M^2
BH CV ECHO MEAS - BSA: 2 M^2
BH CV ECHO MEAS - BZI_BMI: 35.5 KILOGRAMS/M^2
BH CV ECHO MEAS - BZI_METRIC_HEIGHT: 162.6 CM
BH CV ECHO MEAS - BZI_METRIC_WEIGHT: 93.9 KG
BH CV ECHO MEAS - EDV(MOD-SP2): 103 ML
BH CV ECHO MEAS - EDV(MOD-SP4): 104 ML
BH CV ECHO MEAS - EDV(TEICH): 92.4 ML
BH CV ECHO MEAS - EF(CUBED): 80.7 %
BH CV ECHO MEAS - EF(MOD-BP): 69 %
BH CV ECHO MEAS - EF(MOD-SP2): 68 %
BH CV ECHO MEAS - EF(MOD-SP4): 72.1 %
BH CV ECHO MEAS - EF(TEICH): 73.4 %
BH CV ECHO MEAS - ESV(MOD-SP2): 33 ML
BH CV ECHO MEAS - ESV(MOD-SP4): 29 ML
BH CV ECHO MEAS - ESV(TEICH): 24.6 ML
BH CV ECHO MEAS - FS: 42.2 %
BH CV ECHO MEAS - IVS/LVPW: 1
BH CV ECHO MEAS - IVSD: 1 CM
BH CV ECHO MEAS - LAT PEAK E' VEL: 6.7 CM/SEC
BH CV ECHO MEAS - LV DIASTOLIC VOL/BSA (35-75): 52.4 ML/M^2
BH CV ECHO MEAS - LV MASS(C)D: 153.3 GRAMS
BH CV ECHO MEAS - LV MASS(C)DI: 77.2 GRAMS/M^2
BH CV ECHO MEAS - LV MAX PG: 8.8 MMHG
BH CV ECHO MEAS - LV MEAN PG: 5 MMHG
BH CV ECHO MEAS - LV SYSTOLIC VOL/BSA (12-30): 14.6 ML/M^2
BH CV ECHO MEAS - LV V1 MAX: 148 CM/SEC
BH CV ECHO MEAS - LV V1 MEAN: 107 CM/SEC
BH CV ECHO MEAS - LV V1 VTI: 27.8 CM
BH CV ECHO MEAS - LVIDD: 4.5 CM
BH CV ECHO MEAS - LVIDS: 2.6 CM
BH CV ECHO MEAS - LVLD AP2: 7.4 CM
BH CV ECHO MEAS - LVLD AP4: 7.4 CM
BH CV ECHO MEAS - LVLS AP2: 6.5 CM
BH CV ECHO MEAS - LVLS AP4: 5.9 CM
BH CV ECHO MEAS - LVOT AREA (M): 2.5 CM^2
BH CV ECHO MEAS - LVOT AREA: 2.5 CM^2
BH CV ECHO MEAS - LVOT DIAM: 1.8 CM
BH CV ECHO MEAS - LVPWD: 1 CM
BH CV ECHO MEAS - MED PEAK E' VEL: 8.2 CM/SEC
BH CV ECHO MEAS - MR MAX PG: 26.6 MMHG
BH CV ECHO MEAS - MR MAX VEL: 258 CM/SEC
BH CV ECHO MEAS - MV A DUR: 0.12 SEC
BH CV ECHO MEAS - MV A MAX VEL: 108 CM/SEC
BH CV ECHO MEAS - MV DEC SLOPE: 286 CM/SEC^2
BH CV ECHO MEAS - MV DEC TIME: 0.15 SEC
BH CV ECHO MEAS - MV E MAX VEL: 89.1 CM/SEC
BH CV ECHO MEAS - MV E/A: 0.83
BH CV ECHO MEAS - MV MAX PG: 3.4 MMHG
BH CV ECHO MEAS - MV MEAN PG: 2 MMHG
BH CV ECHO MEAS - MV P1/2T MAX VEL: 76 CM/SEC
BH CV ECHO MEAS - MV P1/2T: 77.8 MSEC
BH CV ECHO MEAS - MV V2 MAX: 91.8 CM/SEC
BH CV ECHO MEAS - MV V2 MEAN: 62.7 CM/SEC
BH CV ECHO MEAS - MV V2 VTI: 26 CM
BH CV ECHO MEAS - MVA P1/2T LCG: 2.9 CM^2
BH CV ECHO MEAS - MVA(P1/2T): 2.8 CM^2
BH CV ECHO MEAS - MVA(VTI): 2.7 CM^2
BH CV ECHO MEAS - PA ACC TIME: 0.11 SEC
BH CV ECHO MEAS - PA MAX PG (FULL): 4.3 MMHG
BH CV ECHO MEAS - PA MAX PG: 10.2 MMHG
BH CV ECHO MEAS - PA PR(ACCEL): 29.1 MMHG
BH CV ECHO MEAS - PA V2 MAX: 160 CM/SEC
BH CV ECHO MEAS - PULM A REVS DUR: 0.1 SEC
BH CV ECHO MEAS - PULM A REVS VEL: 37.3 CM/SEC
BH CV ECHO MEAS - PULM DIAS VEL: 47.6 CM/SEC
BH CV ECHO MEAS - PULM S/D: 1.5
BH CV ECHO MEAS - PULM SYS VEL: 72.4 CM/SEC
BH CV ECHO MEAS - PVA(V,A): 3.7 CM^2
BH CV ECHO MEAS - PVA(V,D): 3.7 CM^2
BH CV ECHO MEAS - QP/QS: 1.5
BH CV ECHO MEAS - RAP SYSTOLE: 3 MMHG
BH CV ECHO MEAS - RV MAX PG: 6 MMHG
BH CV ECHO MEAS - RV MEAN PG: 4 MMHG
BH CV ECHO MEAS - RV V1 MAX: 122 CM/SEC
BH CV ECHO MEAS - RV V1 MEAN: 95.2 CM/SEC
BH CV ECHO MEAS - RV V1 VTI: 21.6 CM
BH CV ECHO MEAS - RVOT AREA: 4.9 CM^2
BH CV ECHO MEAS - RVOT DIAM: 2.5 CM
BH CV ECHO MEAS - RVSP: 23 MMHG
BH CV ECHO MEAS - SI(AO): 115.4 ML/M^2
BH CV ECHO MEAS - SI(CUBED): 37.1 ML/M^2
BH CV ECHO MEAS - SI(LVOT): 35.6 ML/M^2
BH CV ECHO MEAS - SI(MOD-SP2): 35.3 ML/M^2
BH CV ECHO MEAS - SI(MOD-SP4): 37.8 ML/M^2
BH CV ECHO MEAS - SI(TEICH): 34.2 ML/M^2
BH CV ECHO MEAS - SUP REN AO DIAM: 1.7 CM
BH CV ECHO MEAS - SV(AO): 229.1 ML
BH CV ECHO MEAS - SV(CUBED): 73.5 ML
BH CV ECHO MEAS - SV(LVOT): 70.7 ML
BH CV ECHO MEAS - SV(MOD-SP2): 70 ML
BH CV ECHO MEAS - SV(MOD-SP4): 75 ML
BH CV ECHO MEAS - SV(RVOT): 106 ML
BH CV ECHO MEAS - SV(TEICH): 67.8 ML
BH CV ECHO MEAS - TR MAX VEL: 216 CM/SEC
BH CV ECHO MEASUREMENTS AVERAGE E/E' RATIO: 11.96
BH CV XLRA - RV BASE: 3.2 CM
BH CV XLRA - RV LENGTH: 5.2 CM
BH CV XLRA - RV MID: 2.3 CM
BH CV XLRA - TDI S': 12.9 CM/SEC
LEFT ATRIUM VOLUME INDEX: 24 ML/M2
MAXIMAL PREDICTED HEART RATE: 156 BPM
SINUS: 3 CM
STJ: 2.9 CM
STRESS TARGET HR: 133 BPM

## 2021-02-03 ENCOUNTER — TELEPHONE (OUTPATIENT)
Dept: ONCOLOGY | Facility: HOSPITAL | Age: 66
End: 2021-02-03

## 2021-02-04 DIAGNOSIS — C50.912 PRIMARY MALIGNANT NEOPLASM OF LEFT BREAST (HCC): ICD-10-CM

## 2021-02-04 DIAGNOSIS — C79.51 BONE METASTASIS: Primary | ICD-10-CM

## 2021-02-04 RX ORDER — SODIUM CHLORIDE 9 MG/ML
250 INJECTION, SOLUTION INTRAVENOUS ONCE
Status: CANCELLED | OUTPATIENT
Start: 2021-02-22

## 2021-02-04 RX ORDER — SODIUM CHLORIDE 9 MG/ML
250 INJECTION, SOLUTION INTRAVENOUS ONCE
Status: CANCELLED | OUTPATIENT
Start: 2021-02-12

## 2021-02-04 RX ORDER — PALONOSETRON 0.05 MG/ML
0.25 INJECTION, SOLUTION INTRAVENOUS ONCE
Status: CANCELLED | OUTPATIENT
Start: 2021-02-05

## 2021-02-04 RX ORDER — PALONOSETRON 0.05 MG/ML
0.25 INJECTION, SOLUTION INTRAVENOUS ONCE
Status: CANCELLED | OUTPATIENT
Start: 2021-02-12

## 2021-02-04 RX ORDER — DOXORUBICIN HYDROCHLORIDE 2 MG/ML
20 INJECTION, SOLUTION INTRAVENOUS ONCE
Status: CANCELLED | OUTPATIENT
Start: 2021-02-05

## 2021-02-04 RX ORDER — DOXORUBICIN HYDROCHLORIDE 2 MG/ML
20 INJECTION, SOLUTION INTRAVENOUS ONCE
Status: CANCELLED | OUTPATIENT
Start: 2021-02-12

## 2021-02-04 RX ORDER — DOXORUBICIN HYDROCHLORIDE 2 MG/ML
20 INJECTION, SOLUTION INTRAVENOUS ONCE
Status: CANCELLED | OUTPATIENT
Start: 2021-02-22

## 2021-02-04 RX ORDER — SODIUM CHLORIDE 9 MG/ML
250 INJECTION, SOLUTION INTRAVENOUS ONCE
Status: CANCELLED | OUTPATIENT
Start: 2021-02-05

## 2021-02-04 RX ORDER — PALONOSETRON 0.05 MG/ML
0.25 INJECTION, SOLUTION INTRAVENOUS ONCE
Status: CANCELLED | OUTPATIENT
Start: 2021-02-22

## 2021-02-05 ENCOUNTER — INFUSION (OUTPATIENT)
Dept: ONCOLOGY | Facility: HOSPITAL | Age: 66
End: 2021-02-05

## 2021-02-05 VITALS
BODY MASS INDEX: 35.78 KG/M2 | WEIGHT: 209.6 LBS | OXYGEN SATURATION: 97 % | HEART RATE: 102 BPM | DIASTOLIC BLOOD PRESSURE: 66 MMHG | TEMPERATURE: 97.8 F | RESPIRATION RATE: 16 BRPM | SYSTOLIC BLOOD PRESSURE: 155 MMHG

## 2021-02-05 DIAGNOSIS — Z45.2 FITTING AND ADJUSTMENT OF VASCULAR CATHETER: ICD-10-CM

## 2021-02-05 DIAGNOSIS — C79.51 BONE METASTASIS: Primary | ICD-10-CM

## 2021-02-05 DIAGNOSIS — C50.912 PRIMARY MALIGNANT NEOPLASM OF LEFT BREAST (HCC): ICD-10-CM

## 2021-02-05 LAB
ALBUMIN SERPL-MCNC: 4 G/DL (ref 3.5–5.2)
ALBUMIN/GLOB SERPL: 1.5 G/DL (ref 1.1–2.4)
ALP SERPL-CCNC: 154 U/L (ref 38–116)
ALT SERPL W P-5'-P-CCNC: 24 U/L (ref 0–33)
ANION GAP SERPL CALCULATED.3IONS-SCNC: 11.9 MMOL/L (ref 5–15)
AST SERPL-CCNC: 25 U/L (ref 0–32)
BASOPHILS # BLD AUTO: 0.02 10*3/MM3 (ref 0–0.2)
BASOPHILS NFR BLD AUTO: 0.4 % (ref 0–1.5)
BILIRUB SERPL-MCNC: 0.2 MG/DL (ref 0.2–1.2)
BUN SERPL-MCNC: 29 MG/DL (ref 6–20)
BUN/CREAT SERPL: 35.8 (ref 7.3–30)
CALCIUM SPEC-SCNC: 9 MG/DL (ref 8.5–10.2)
CHLORIDE SERPL-SCNC: 100 MMOL/L (ref 98–107)
CO2 SERPL-SCNC: 22.1 MMOL/L (ref 22–29)
CREAT SERPL-MCNC: 0.81 MG/DL (ref 0.6–1.1)
DEPRECATED RDW RBC AUTO: 56.3 FL (ref 37–54)
EOSINOPHIL # BLD AUTO: 0.24 10*3/MM3 (ref 0–0.4)
EOSINOPHIL NFR BLD AUTO: 5 % (ref 0.3–6.2)
ERYTHROCYTE [DISTWIDTH] IN BLOOD BY AUTOMATED COUNT: 16.5 % (ref 12.3–15.4)
GFR SERPL CREATININE-BSD FRML MDRD: 71 ML/MIN/1.73
GLOBULIN UR ELPH-MCNC: 2.6 GM/DL (ref 1.8–3.5)
GLUCOSE SERPL-MCNC: 117 MG/DL (ref 74–124)
HCT VFR BLD AUTO: 27.7 % (ref 34–46.6)
HGB BLD-MCNC: 9.4 G/DL (ref 12–15.9)
IMM GRANULOCYTES # BLD AUTO: 0.04 10*3/MM3 (ref 0–0.05)
IMM GRANULOCYTES NFR BLD AUTO: 0.8 % (ref 0–0.5)
LYMPHOCYTES # BLD AUTO: 0.59 10*3/MM3 (ref 0.7–3.1)
LYMPHOCYTES NFR BLD AUTO: 12.2 % (ref 19.6–45.3)
MCH RBC QN AUTO: 31.9 PG (ref 26.6–33)
MCHC RBC AUTO-ENTMCNC: 33.9 G/DL (ref 31.5–35.7)
MCV RBC AUTO: 93.9 FL (ref 79–97)
MONOCYTES # BLD AUTO: 0.55 10*3/MM3 (ref 0.1–0.9)
MONOCYTES NFR BLD AUTO: 11.4 % (ref 5–12)
NEUTROPHILS NFR BLD AUTO: 3.38 10*3/MM3 (ref 1.7–7)
NEUTROPHILS NFR BLD AUTO: 70.2 % (ref 42.7–76)
NRBC BLD AUTO-RTO: 0 /100 WBC (ref 0–0.2)
PLATELET # BLD AUTO: 193 10*3/MM3 (ref 140–450)
PMV BLD AUTO: 9.8 FL (ref 6–12)
POTASSIUM SERPL-SCNC: 4 MMOL/L (ref 3.5–4.7)
PROT SERPL-MCNC: 6.6 G/DL (ref 6.3–8)
RBC # BLD AUTO: 2.95 10*6/MM3 (ref 3.77–5.28)
SODIUM SERPL-SCNC: 134 MMOL/L (ref 134–145)
WBC # BLD AUTO: 4.82 10*3/MM3 (ref 3.4–10.8)

## 2021-02-05 PROCEDURE — 25010000002 DEXAMETHASONE SODIUM PHOSPHATE 100 MG/10ML SOLUTION: Performed by: INTERNAL MEDICINE

## 2021-02-05 PROCEDURE — 25010000003 HEPARIN LOCK FLUSH PER 10 UNITS: Performed by: INTERNAL MEDICINE

## 2021-02-05 PROCEDURE — 25010000002 ALTEPLASE 2 MG RECONSTITUTED SOLUTION: Performed by: INTERNAL MEDICINE

## 2021-02-05 PROCEDURE — 36593 DECLOT VASCULAR DEVICE: CPT

## 2021-02-05 PROCEDURE — 80053 COMPREHEN METABOLIC PANEL: CPT

## 2021-02-05 PROCEDURE — 85025 COMPLETE CBC W/AUTO DIFF WBC: CPT

## 2021-02-05 PROCEDURE — 96409 CHEMO IV PUSH SNGL DRUG: CPT

## 2021-02-05 PROCEDURE — 25010000002 DOXORUBICIN PER 10 MG: Performed by: INTERNAL MEDICINE

## 2021-02-05 PROCEDURE — 25010000002 PALONOSETRON PER 25 MCG: Performed by: INTERNAL MEDICINE

## 2021-02-05 PROCEDURE — 96375 TX/PRO/DX INJ NEW DRUG ADDON: CPT

## 2021-02-05 RX ORDER — PALONOSETRON 0.05 MG/ML
0.25 INJECTION, SOLUTION INTRAVENOUS ONCE
Status: COMPLETED | OUTPATIENT
Start: 2021-02-05 | End: 2021-02-05

## 2021-02-05 RX ORDER — HEPARIN SODIUM (PORCINE) LOCK FLUSH IV SOLN 100 UNIT/ML 100 UNIT/ML
500 SOLUTION INTRAVENOUS AS NEEDED
Status: CANCELLED | OUTPATIENT
Start: 2021-02-05

## 2021-02-05 RX ORDER — DOXORUBICIN HYDROCHLORIDE 2 MG/ML
20 INJECTION, SOLUTION INTRAVENOUS ONCE
Status: COMPLETED | OUTPATIENT
Start: 2021-02-05 | End: 2021-02-05

## 2021-02-05 RX ORDER — SODIUM CHLORIDE 0.9 % (FLUSH) 0.9 %
10 SYRINGE (ML) INJECTION AS NEEDED
Status: CANCELLED | OUTPATIENT
Start: 2021-02-05

## 2021-02-05 RX ORDER — HEPARIN SODIUM (PORCINE) LOCK FLUSH IV SOLN 100 UNIT/ML 100 UNIT/ML
500 SOLUTION INTRAVENOUS AS NEEDED
Status: DISCONTINUED | OUTPATIENT
Start: 2021-02-05 | End: 2021-02-05 | Stop reason: HOSPADM

## 2021-02-05 RX ORDER — SODIUM CHLORIDE 9 MG/ML
250 INJECTION, SOLUTION INTRAVENOUS ONCE
Status: COMPLETED | OUTPATIENT
Start: 2021-02-05 | End: 2021-02-05

## 2021-02-05 RX ADMIN — ALTEPLASE: 2.2 INJECTION, POWDER, LYOPHILIZED, FOR SOLUTION INTRAVENOUS at 13:57

## 2021-02-05 RX ADMIN — SODIUM CHLORIDE 250 ML: 9 INJECTION, SOLUTION INTRAVENOUS at 14:17

## 2021-02-05 RX ADMIN — PALONOSETRON 0.25 MG: 0.05 INJECTION, SOLUTION INTRAVENOUS at 14:18

## 2021-02-05 RX ADMIN — DOXORUBICIN HYDROCHLORIDE 40 MG: 2 INJECTION, SOLUTION INTRAVENOUS at 14:45

## 2021-02-05 RX ADMIN — DEXAMETHASONE SODIUM PHOSPHATE 12 MG: 10 INJECTION, SOLUTION INTRAMUSCULAR; INTRAVENOUS at 14:19

## 2021-02-05 RX ADMIN — Medication 500 UNITS: at 14:58

## 2021-02-05 NOTE — NURSING NOTE
Pt here for her first Adriamycin. Questions answered, consent signed. Port with excellent blood return after instilling Activase. Adriamycin given through the side arm of free-flowing NS. Blood return maintained throughout treatment.

## 2021-02-10 DIAGNOSIS — D51.0 VITAMIN B12 DEFICIENCY ANEMIA DUE TO INTRINSIC FACTOR DEFICIENCY: ICD-10-CM

## 2021-02-10 DIAGNOSIS — Z00.00 HEALTH CARE MAINTENANCE: Primary | ICD-10-CM

## 2021-02-12 ENCOUNTER — INFUSION (OUTPATIENT)
Dept: ONCOLOGY | Facility: HOSPITAL | Age: 66
End: 2021-02-12

## 2021-02-12 VITALS
TEMPERATURE: 97.8 F | BODY MASS INDEX: 35.61 KG/M2 | SYSTOLIC BLOOD PRESSURE: 117 MMHG | OXYGEN SATURATION: 95 % | WEIGHT: 208.6 LBS | DIASTOLIC BLOOD PRESSURE: 65 MMHG | RESPIRATION RATE: 16 BRPM | HEART RATE: 104 BPM

## 2021-02-12 DIAGNOSIS — D68.59 THROMBOPHILIA (HCC): ICD-10-CM

## 2021-02-12 DIAGNOSIS — T45.1X5A LEUKOPENIA DUE TO ANTINEOPLASTIC CHEMOTHERAPY (HCC): ICD-10-CM

## 2021-02-12 DIAGNOSIS — C50.912 PRIMARY MALIGNANT NEOPLASM OF LEFT BREAST (HCC): ICD-10-CM

## 2021-02-12 DIAGNOSIS — C79.51 BONE METASTASIS: Primary | ICD-10-CM

## 2021-02-12 DIAGNOSIS — E83.42 HYPOMAGNESEMIA: ICD-10-CM

## 2021-02-12 DIAGNOSIS — D63.0 ANEMIA IN NEOPLASTIC DISEASE: ICD-10-CM

## 2021-02-12 DIAGNOSIS — Z45.2 FITTING AND ADJUSTMENT OF VASCULAR CATHETER: ICD-10-CM

## 2021-02-12 DIAGNOSIS — D70.1 LEUKOPENIA DUE TO ANTINEOPLASTIC CHEMOTHERAPY (HCC): ICD-10-CM

## 2021-02-12 LAB
ALBUMIN SERPL-MCNC: 4 G/DL (ref 3.5–5.2)
ALBUMIN/GLOB SERPL: 1.6 G/DL (ref 1.1–2.4)
ALP SERPL-CCNC: 135 U/L (ref 38–116)
ALT SERPL W P-5'-P-CCNC: 21 U/L (ref 0–33)
ANION GAP SERPL CALCULATED.3IONS-SCNC: 10.3 MMOL/L (ref 5–15)
AST SERPL-CCNC: 20 U/L (ref 0–32)
BASOPHILS # BLD AUTO: 0.03 10*3/MM3 (ref 0–0.2)
BASOPHILS NFR BLD AUTO: 0.7 % (ref 0–1.5)
BILIRUB SERPL-MCNC: 0.3 MG/DL (ref 0.2–1.2)
BUN SERPL-MCNC: 32 MG/DL (ref 6–20)
BUN/CREAT SERPL: 33.3 (ref 7.3–30)
CALCIUM SPEC-SCNC: 10 MG/DL (ref 8.5–10.2)
CHLORIDE SERPL-SCNC: 99 MMOL/L (ref 98–107)
CO2 SERPL-SCNC: 24.7 MMOL/L (ref 22–29)
CREAT SERPL-MCNC: 0.96 MG/DL (ref 0.6–1.1)
DEPRECATED RDW RBC AUTO: 56.9 FL (ref 37–54)
EOSINOPHIL # BLD AUTO: 0.27 10*3/MM3 (ref 0–0.4)
EOSINOPHIL NFR BLD AUTO: 6.3 % (ref 0.3–6.2)
ERYTHROCYTE [DISTWIDTH] IN BLOOD BY AUTOMATED COUNT: 16.8 % (ref 12.3–15.4)
GFR SERPL CREATININE-BSD FRML MDRD: 59 ML/MIN/1.73
GLOBULIN UR ELPH-MCNC: 2.5 GM/DL (ref 1.8–3.5)
GLUCOSE SERPL-MCNC: 117 MG/DL (ref 74–124)
HCT VFR BLD AUTO: 28.5 % (ref 34–46.6)
HGB BLD-MCNC: 9.2 G/DL (ref 12–15.9)
IMM GRANULOCYTES # BLD AUTO: 0.05 10*3/MM3 (ref 0–0.05)
IMM GRANULOCYTES NFR BLD AUTO: 1.2 % (ref 0–0.5)
LYMPHOCYTES # BLD AUTO: 0.57 10*3/MM3 (ref 0.7–3.1)
LYMPHOCYTES NFR BLD AUTO: 13.4 % (ref 19.6–45.3)
MCH RBC QN AUTO: 30.6 PG (ref 26.6–33)
MCHC RBC AUTO-ENTMCNC: 32.3 G/DL (ref 31.5–35.7)
MCV RBC AUTO: 94.7 FL (ref 79–97)
MONOCYTES # BLD AUTO: 0.27 10*3/MM3 (ref 0.1–0.9)
MONOCYTES NFR BLD AUTO: 6.3 % (ref 5–12)
NEUTROPHILS NFR BLD AUTO: 3.07 10*3/MM3 (ref 1.7–7)
NEUTROPHILS NFR BLD AUTO: 72.1 % (ref 42.7–76)
NRBC BLD AUTO-RTO: 0.5 /100 WBC (ref 0–0.2)
PLATELET # BLD AUTO: 216 10*3/MM3 (ref 140–450)
PMV BLD AUTO: 9.9 FL (ref 6–12)
POTASSIUM SERPL-SCNC: 4.6 MMOL/L (ref 3.5–4.7)
PROT SERPL-MCNC: 6.5 G/DL (ref 6.3–8)
RBC # BLD AUTO: 3.01 10*6/MM3 (ref 3.77–5.28)
SODIUM SERPL-SCNC: 134 MMOL/L (ref 134–145)
WBC # BLD AUTO: 4.26 10*3/MM3 (ref 3.4–10.8)

## 2021-02-12 PROCEDURE — 85025 COMPLETE CBC W/AUTO DIFF WBC: CPT

## 2021-02-12 PROCEDURE — 25010000003 HEPARIN LOCK FLUSH PER 10 UNITS: Performed by: INTERNAL MEDICINE

## 2021-02-12 PROCEDURE — 25010000002 DEXAMETHASONE SODIUM PHOSPHATE 100 MG/10ML SOLUTION: Performed by: INTERNAL MEDICINE

## 2021-02-12 PROCEDURE — 25010000002 DOXORUBICIN PER 10 MG: Performed by: INTERNAL MEDICINE

## 2021-02-12 PROCEDURE — 80053 COMPREHEN METABOLIC PANEL: CPT

## 2021-02-12 PROCEDURE — 25010000002 PALONOSETRON PER 25 MCG: Performed by: INTERNAL MEDICINE

## 2021-02-12 PROCEDURE — 96375 TX/PRO/DX INJ NEW DRUG ADDON: CPT

## 2021-02-12 PROCEDURE — 96409 CHEMO IV PUSH SNGL DRUG: CPT

## 2021-02-12 RX ORDER — DOXORUBICIN HYDROCHLORIDE 2 MG/ML
20 INJECTION, SOLUTION INTRAVENOUS ONCE
Status: COMPLETED | OUTPATIENT
Start: 2021-02-12 | End: 2021-02-12

## 2021-02-12 RX ORDER — HEPARIN SODIUM (PORCINE) LOCK FLUSH IV SOLN 100 UNIT/ML 100 UNIT/ML
500 SOLUTION INTRAVENOUS AS NEEDED
Status: CANCELLED | OUTPATIENT
Start: 2021-02-12

## 2021-02-12 RX ORDER — HEPARIN SODIUM (PORCINE) LOCK FLUSH IV SOLN 100 UNIT/ML 100 UNIT/ML
500 SOLUTION INTRAVENOUS AS NEEDED
Status: DISCONTINUED | OUTPATIENT
Start: 2021-02-12 | End: 2021-02-12 | Stop reason: HOSPADM

## 2021-02-12 RX ORDER — SODIUM CHLORIDE 0.9 % (FLUSH) 0.9 %
10 SYRINGE (ML) INJECTION AS NEEDED
Status: CANCELLED | OUTPATIENT
Start: 2021-02-12

## 2021-02-12 RX ORDER — SODIUM CHLORIDE 9 MG/ML
250 INJECTION, SOLUTION INTRAVENOUS ONCE
Status: COMPLETED | OUTPATIENT
Start: 2021-02-12 | End: 2021-02-12

## 2021-02-12 RX ORDER — PALONOSETRON 0.05 MG/ML
0.25 INJECTION, SOLUTION INTRAVENOUS ONCE
Status: COMPLETED | OUTPATIENT
Start: 2021-02-12 | End: 2021-02-12

## 2021-02-12 RX ADMIN — DEXAMETHASONE SODIUM PHOSPHATE 12 MG: 10 INJECTION, SOLUTION INTRAMUSCULAR; INTRAVENOUS at 09:45

## 2021-02-12 RX ADMIN — PALONOSETRON 0.25 MG: 0.05 INJECTION, SOLUTION INTRAVENOUS at 09:43

## 2021-02-12 RX ADMIN — SODIUM CHLORIDE 250 ML: 9 INJECTION, SOLUTION INTRAVENOUS at 09:39

## 2021-02-12 RX ADMIN — Medication 500 UNITS: at 10:17

## 2021-02-12 RX ADMIN — DOXORUBICIN HYDROCHLORIDE 40 MG: 2 INJECTION, SOLUTION INTRAVENOUS at 10:08

## 2021-02-13 LAB
TSH SERPL DL<=0.005 MIU/L-ACNC: 1.87 UIU/ML (ref 0.45–4.5)
VIT B12 SERPL-MCNC: 792 PG/ML (ref 232–1245)

## 2021-02-14 LAB
APPEARANCE UR: CLEAR
BACTERIA #/AREA URNS HPF: NORMAL /[HPF]
BILIRUB UR QL STRIP: NEGATIVE
COLOR UR: YELLOW
EPI CELLS #/AREA URNS HPF: NORMAL /HPF (ref 0–10)
GLUCOSE UR QL: NEGATIVE
HGB UR QL STRIP: NEGATIVE
KETONES UR QL STRIP: NEGATIVE
LEUKOCYTE ESTERASE UR QL STRIP: NEGATIVE
MUCOUS THREADS URNS QL MICRO: PRESENT
NITRITE UR QL STRIP: NEGATIVE
PH UR STRIP: 6 [PH] (ref 5–7.5)
PROT UR QL STRIP: NEGATIVE
RBC #/AREA URNS HPF: NORMAL /HPF (ref 0–2)
SP GR UR: 1.02 (ref 1–1.03)
UROBILINOGEN UR STRIP-MCNC: 0.2 MG/DL (ref 0.2–1)
WBC #/AREA URNS HPF: NORMAL /HPF (ref 0–5)

## 2021-02-17 RX ORDER — ATORVASTATIN CALCIUM 20 MG/1
TABLET, FILM COATED ORAL
Qty: 90 TABLET | Refills: 0 | Status: SHIPPED | OUTPATIENT
Start: 2021-02-17 | End: 2021-05-17

## 2021-02-19 ENCOUNTER — OFFICE VISIT (OUTPATIENT)
Dept: INTERNAL MEDICINE | Facility: CLINIC | Age: 66
End: 2021-02-19

## 2021-02-19 VITALS
OXYGEN SATURATION: 98 % | BODY MASS INDEX: 35.68 KG/M2 | WEIGHT: 209 LBS | DIASTOLIC BLOOD PRESSURE: 60 MMHG | HEIGHT: 64 IN | SYSTOLIC BLOOD PRESSURE: 110 MMHG | HEART RATE: 86 BPM

## 2021-02-19 DIAGNOSIS — Z00.00 WELL ADULT EXAM: Primary | ICD-10-CM

## 2021-02-19 PROBLEM — Z86.711 HISTORY OF PULMONARY EMBOLISM: Status: ACTIVE | Noted: 2021-02-19

## 2021-02-19 PROCEDURE — 99396 PREV VISIT EST AGE 40-64: CPT | Performed by: INTERNAL MEDICINE

## 2021-02-19 NOTE — PROGRESS NOTES
Subjective     Maggie Suero is a 65 y.o. female who presents for a complete physical exam.      History of Present Illness     The following data was reviewed by: Chetna Whalen MD on 02/19/2021:  Common labs    Common Labsle 1/25/21 1/25/21 2/5/21 2/5/21 2/12/21 2/12/21    0826 0826 1329 1329 0903 0904   Glucose  113  117  117   BUN  27 (A)  29 (A)  32 (A)   Creatinine  0.79  0.81  0.96   eGFR Non  Am  73  71  59 (A)   Sodium  138  134  134   Potassium  4.1  4.0  4.6   Chloride  103  100  99   Calcium  9.6  9.0  10.0   Albumin  4.00  4.00  4.00   Total Bilirubin  0.3  0.2  0.3   Alkaline Phosphatase  147 (A)  154 (A)  135 (A)   AST (SGOT)  29  25  20   ALT (SGPT)  29  24  21   WBC 5.56  4.82  4.26    Hemoglobin 9.5 (A)  9.4 (A)  9.2 (A)    Hematocrit 28.8 (A)  27.7 (A)  28.5 (A)    Platelets 199  193  216    (A) Abnormal value            Data reviewed: Radiologic studies CTs in december         Review of Systems   Constitutional: Negative.    HENT: Negative.    Eyes: Negative.    Respiratory: Negative.    Cardiovascular: Negative.    Gastrointestinal: Negative.    Endocrine: Negative.    Genitourinary: Negative.    Musculoskeletal: Negative.    Skin: Negative.    Allergic/Immunologic: Negative.    Neurological: Negative.    Hematological: Negative.    Psychiatric/Behavioral: Negative.        The following portions of the patient's history were reviewed and updated as appropriate: allergies, current medications, past family history, past medical history, past social history, past surgical history and problem list.  Health maintenance tab was reviewed and updated with the patient.       Patient Active Problem List    Diagnosis Date Noted   • Hypomagnesemia 09/30/2020   • Leukopenia due to antineoplastic chemotherapy (CMS/HCC) 09/06/2017   • Vitamin B12 deficiency anemia due to intrinsic factor deficiency 06/13/2017   • Fitting and adjustment of vascular catheter 03/13/2017   • Heart murmur, systolic  10/25/2016   • Bone metastasis (CMS/HCC) 05/20/2016   • Anemia in neoplastic disease 05/20/2016   • History of colon polyps 03/08/2016   • Benign essential HTN 02/18/2016   • Primary malignant neoplasm of left breast (CMS/HCC) 02/18/2016     Note Last Updated: 3/8/2016     Left breast cancer in 2000  Mets to the hip in 2014 thought to be metastatic from the original.       • Thrombophilia (CMS/HCC) 02/18/2016     Note Last Updated: 2/18/2016     Description: associated with femoral fracture     • Hyperlipidemia 02/18/2016   • IFG (impaired fasting glucose) 02/18/2016   • Osteopenia 02/18/2016     Note Last Updated: 3/8/2016     Description: Fosamax stopped after 10 years of therapy in May of 2012.  On Zometa with metastatic breast cancer followed by Xgeva currently.           Past Medical History:   Diagnosis Date   • Abnormal mammogram    • Abnormal menstrual cycle    • Arthritis    • Bone metastasis (CMS/HCC)    • Breast cancer (CMS/HCC) 2000    Left breast   • Drug therapy 2001    breast cancer   • Drug therapy 2017    right femur/associated with breast cancer   • H/O Heart murmur    • History of colon polyps    • Hx of radiation therapy 2000    breast cancer   • Hx of radiation therapy 2015    bone cancer right femur/ associated with breast cancer   • Hypercholesterolemia    • Hyperlipidemia    • Hypertension    • Multiple benign polyps of large intestine    • Reflux esophagitis    • Shingles        Past Surgical History:   Procedure Laterality Date   • BREAST BIOPSY Left 2000    breast cancer   • BREAST SURGERY  2000    lumpectomy, mastectomy, revision   • COLONOSCOPY  2012   • FEMUR FRACTURE SURGERY      secondary to metastatic breast cancer 7/2014, with revision in 9/2015   • MASTECTOMY Left 2000    transflap in 2003       Family History   Problem Relation Age of Onset   • Hypertension Mother    • Diabetes Mother    • Macular degeneration Mother    • Thyroid disease Mother    • Heart disease Father    •  Stroke Father    • Kidney disease Father    • Glaucoma Brother    • Diabetes Brother    • Hypertension Brother    • Colon cancer Paternal Grandmother    • Thyroid disease Other    • Kidney disease Other    • Glaucoma Other    • Cancer Other    • Diabetes Other        Social History     Socioeconomic History   • Marital status: Single     Spouse name: Not on file   • Number of children: Not on file   • Years of education: Not on file   • Highest education level: Not on file   Occupational History   • Occupation:      Employer: [employer]   Tobacco Use   • Smoking status: Never Smoker   • Smokeless tobacco: Never Used   Substance and Sexual Activity   • Alcohol use: No   • Drug use: No   • Sexual activity: Defer       Current Outpatient Medications on File Prior to Visit   Medication Sig Dispense Refill   • atorvastatin (LIPITOR) 20 MG tablet Take 1 tablet by mouth once daily 90 tablet 0   • Cyanocobalamin (B-12 PO) Take 1,000 mcg by mouth Daily.     • denosumab (XGEVA) 120 MG/1.7ML solution injection Inject  under the skin 1 (one) time.     • Diclofenac Sodium (VOLTAREN) 1 % gel gel APPLY 2 TO 4 GRAMS TOPICALLY TO AFFECTED AREA TWICE A  g 1   • HYDROcodone-acetaminophen (NORCO) 5-325 MG per tablet Take 1 tablet by mouth Every 6 (Six) Hours As Needed.     • lisinopril-hydrochlorothiazide (PRINZIDE,ZESTORETIC) 10-12.5 MG per tablet Take 1 tablet by mouth twice daily 180 tablet 0   • magnesium chloride ER (Mag64) 64 MG DR tablet Take 1 tablet by mouth 2 (two) times a day. 180 tablet 3   • metoprolol tartrate (LOPRESSOR) 25 MG tablet Take 0.5 tablets by mouth 2 (Two) Times a Day. 30 tablet 6   • Multiple Vitamins-Minerals (HAIR SKIN AND NAILS FORMULA PO) Take  by mouth.     • Multiple Vitamins-Minerals (PRESERVISION AREDS 2 PO) Take  by mouth.     • Naproxen Sod-Diphenhydramine (ALEVE PM PO) Take  by mouth as needed.     • Naproxen Sodium (ALEVE PO) Take  by mouth.     • Omega-3  "Fatty Acids (FISH OIL) 645 MG capsule Take  by mouth.     • OMEPRAZOLE PO Take  by mouth Daily.     • ondansetron (ZOFRAN) 8 MG tablet Take 1 tablet by mouth 3 (Three) Times a Day As Needed for Nausea or Vomiting. 30 tablet 5     No current facility-administered medications on file prior to visit.        Allergies   Allergen Reactions   • Codeine Unknown - Low Severity   • Docetaxel Unknown - Low Severity   • Paclitaxel Unknown - Low Severity       Immunization History   Administered Date(s) Administered   • Flu Vaccine Quad PF >18YRS 11/01/2017   • Influenza Quad Vaccine (Inpatient) 11/01/2017   • Influenza, Unspecified 01/21/2019   • Pneumococcal Polysaccharide (PPSV23) 01/21/2019   • Tdap 03/01/2012   • flucelvax quad pfs =>4 YRS 01/21/2019       Objective     /60   Pulse 86   Ht 163 cm (64.17\")   Wt 94.8 kg (209 lb)   LMP  (LMP Unknown)   SpO2 98%   BMI 35.68 kg/m²     Physical Exam  Constitutional:       Appearance: She is well-developed.   HENT:      Head: Normocephalic and atraumatic.      Right Ear: Hearing, tympanic membrane and external ear normal.      Left Ear: Hearing, tympanic membrane and external ear normal.      Nose: Nose normal.   Neck:      Musculoskeletal: Neck supple.      Thyroid: No thyromegaly.   Cardiovascular:      Rate and Rhythm: Normal rate and regular rhythm.      Heart sounds: Normal heart sounds. No murmur.   Pulmonary:      Effort: Pulmonary effort is normal.      Breath sounds: Normal breath sounds.   Chest:      Breasts:         Right: No mass.         Left: No mass.   Abdominal:      General: There is no distension.      Palpations: Abdomen is soft.      Tenderness: There is no abdominal tenderness.   Lymphadenopathy:      Cervical: No cervical adenopathy.   Skin:     General: Skin is warm and dry.   Neurological:      Mental Status: She is alert and oriented to person, place, and time.   Psychiatric:         Speech: Speech normal.         Behavior: Behavior normal.   "       Thought Content: Thought content normal.         Judgment: Judgment normal.         Assessment/Plan   Diagnoses and all orders for this visit:    1. Well adult exam (Primary)        Discussion    Patient presents today for a CPE.      Patient follows a healthy diet.   Patient follows an adequate exercise regimen. Patient will schedule a mammogram. Colon cancer screening is up to date.    HTN.  The patient is advised to continue current dosage of lisinopril HCT.  HLD.  The patient is advised to continue current dosage of atorvastatin.            Health Maintenance   Topic Date Due   • MAMMOGRAM  09/06/2020   • ANNUAL PHYSICAL  02/11/2021   • INFLUENZA VACCINE  03/31/2021 (Originally 8/1/2020)   • LIPID PANEL  08/07/2021   • TDAP/TD VACCINES (2 - Td) 03/01/2022   • COLONOSCOPY  10/29/2022   • HEPATITIS C SCREENING  Completed   • Pneumococcal Vaccine 0-64  Aged Out   • MENINGOCOCCAL VACCINE  Aged Out   • PAP SMEAR  Discontinued   • ZOSTER VACCINE  Discontinued            Future Appointments   Date Time Provider Department Center   2/22/2021  2:15 PM [facility]  [facility]   2/22/2021  2:40 PM Nader Saez MD [facility]   2/22/2021  3:15 PM CHAIR 05 [facility]  [facility]   3/8/2021  4:00 PM [facility]   3/4/2022  8:00 AM [facility]   3/11/2022 10:00 AM Chetna Whalen MD [facility]

## 2021-02-22 ENCOUNTER — OFFICE VISIT (OUTPATIENT)
Dept: ONCOLOGY | Facility: CLINIC | Age: 66
End: 2021-02-22

## 2021-02-22 ENCOUNTER — INFUSION (OUTPATIENT)
Dept: ONCOLOGY | Facility: HOSPITAL | Age: 66
End: 2021-02-22

## 2021-02-22 VITALS
TEMPERATURE: 97 F | RESPIRATION RATE: 20 BRPM | SYSTOLIC BLOOD PRESSURE: 99 MMHG | HEIGHT: 64 IN | DIASTOLIC BLOOD PRESSURE: 58 MMHG | HEART RATE: 110 BPM | BODY MASS INDEX: 35.3 KG/M2 | WEIGHT: 206.8 LBS | OXYGEN SATURATION: 96 %

## 2021-02-22 DIAGNOSIS — C50.912 PRIMARY MALIGNANT NEOPLASM OF LEFT BREAST (HCC): Primary | ICD-10-CM

## 2021-02-22 DIAGNOSIS — C79.51 BONE METASTASIS: ICD-10-CM

## 2021-02-22 DIAGNOSIS — Z45.2 FITTING AND ADJUSTMENT OF VASCULAR CATHETER: ICD-10-CM

## 2021-02-22 DIAGNOSIS — D51.0 VITAMIN B12 DEFICIENCY ANEMIA DUE TO INTRINSIC FACTOR DEFICIENCY: ICD-10-CM

## 2021-02-22 DIAGNOSIS — M85.89 OSTEOPENIA OF MULTIPLE SITES: ICD-10-CM

## 2021-02-22 DIAGNOSIS — C50.912 PRIMARY MALIGNANT NEOPLASM OF LEFT BREAST (HCC): ICD-10-CM

## 2021-02-22 DIAGNOSIS — D68.59 THROMBOPHILIA (HCC): ICD-10-CM

## 2021-02-22 LAB
BASOPHILS # BLD AUTO: 0.02 10*3/MM3 (ref 0–0.2)
BASOPHILS NFR BLD AUTO: 0.5 % (ref 0–1.5)
CANCER AG15-3 SERPL-ACNC: 69.1 U/ML
DEPRECATED RDW RBC AUTO: 61.3 FL (ref 37–54)
EOSINOPHIL # BLD AUTO: 0.12 10*3/MM3 (ref 0–0.4)
EOSINOPHIL NFR BLD AUTO: 3.2 % (ref 0.3–6.2)
ERYTHROCYTE [DISTWIDTH] IN BLOOD BY AUTOMATED COUNT: 17.7 % (ref 12.3–15.4)
HCT VFR BLD AUTO: 27.6 % (ref 34–46.6)
HGB BLD-MCNC: 9.4 G/DL (ref 12–15.9)
IMM GRANULOCYTES # BLD AUTO: 0.01 10*3/MM3 (ref 0–0.05)
IMM GRANULOCYTES NFR BLD AUTO: 0.3 % (ref 0–0.5)
LYMPHOCYTES # BLD AUTO: 0.69 10*3/MM3 (ref 0.7–3.1)
LYMPHOCYTES NFR BLD AUTO: 18.4 % (ref 19.6–45.3)
MCH RBC QN AUTO: 32.6 PG (ref 26.6–33)
MCHC RBC AUTO-ENTMCNC: 34.1 G/DL (ref 31.5–35.7)
MCV RBC AUTO: 95.8 FL (ref 79–97)
MONOCYTES # BLD AUTO: 0.55 10*3/MM3 (ref 0.1–0.9)
MONOCYTES NFR BLD AUTO: 14.7 % (ref 5–12)
NEUTROPHILS NFR BLD AUTO: 2.35 10*3/MM3 (ref 1.7–7)
NEUTROPHILS NFR BLD AUTO: 62.9 % (ref 42.7–76)
NRBC BLD AUTO-RTO: 0 /100 WBC (ref 0–0.2)
PLATELET # BLD AUTO: 213 10*3/MM3 (ref 140–450)
PMV BLD AUTO: 9.4 FL (ref 6–12)
RBC # BLD AUTO: 2.88 10*6/MM3 (ref 3.77–5.28)
WBC # BLD AUTO: 3.74 10*3/MM3 (ref 3.4–10.8)

## 2021-02-22 PROCEDURE — 86300 IMMUNOASSAY TUMOR CA 15-3: CPT | Performed by: INTERNAL MEDICINE

## 2021-02-22 PROCEDURE — 96409 CHEMO IV PUSH SNGL DRUG: CPT

## 2021-02-22 PROCEDURE — 85025 COMPLETE CBC W/AUTO DIFF WBC: CPT

## 2021-02-22 PROCEDURE — 25010000002 DEXAMETHASONE SODIUM PHOSPHATE 100 MG/10ML SOLUTION: Performed by: INTERNAL MEDICINE

## 2021-02-22 PROCEDURE — 99214 OFFICE O/P EST MOD 30 MIN: CPT | Performed by: INTERNAL MEDICINE

## 2021-02-22 PROCEDURE — 25010000003 HEPARIN LOCK FLUSH PER 10 UNITS: Performed by: INTERNAL MEDICINE

## 2021-02-22 PROCEDURE — 96375 TX/PRO/DX INJ NEW DRUG ADDON: CPT

## 2021-02-22 PROCEDURE — 36415 COLL VENOUS BLD VENIPUNCTURE: CPT | Performed by: INTERNAL MEDICINE

## 2021-02-22 PROCEDURE — 25010000002 DOXORUBICIN PER 10 MG: Performed by: INTERNAL MEDICINE

## 2021-02-22 PROCEDURE — 25010000002 PALONOSETRON PER 25 MCG: Performed by: INTERNAL MEDICINE

## 2021-02-22 RX ORDER — SODIUM CHLORIDE 9 MG/ML
250 INJECTION, SOLUTION INTRAVENOUS ONCE
Status: COMPLETED | OUTPATIENT
Start: 2021-02-22 | End: 2021-02-22

## 2021-02-22 RX ORDER — HEPARIN SODIUM (PORCINE) LOCK FLUSH IV SOLN 100 UNIT/ML 100 UNIT/ML
500 SOLUTION INTRAVENOUS AS NEEDED
Status: DISCONTINUED | OUTPATIENT
Start: 2021-02-22 | End: 2021-02-22 | Stop reason: HOSPADM

## 2021-02-22 RX ORDER — HEPARIN SODIUM (PORCINE) LOCK FLUSH IV SOLN 100 UNIT/ML 100 UNIT/ML
500 SOLUTION INTRAVENOUS AS NEEDED
Status: CANCELLED | OUTPATIENT
Start: 2021-02-22

## 2021-02-22 RX ORDER — SODIUM CHLORIDE 0.9 % (FLUSH) 0.9 %
10 SYRINGE (ML) INJECTION AS NEEDED
Status: DISCONTINUED | OUTPATIENT
Start: 2021-02-22 | End: 2021-02-22 | Stop reason: HOSPADM

## 2021-02-22 RX ORDER — SODIUM CHLORIDE 0.9 % (FLUSH) 0.9 %
10 SYRINGE (ML) INJECTION AS NEEDED
Status: CANCELLED | OUTPATIENT
Start: 2021-02-22

## 2021-02-22 RX ORDER — PALONOSETRON 0.05 MG/ML
0.25 INJECTION, SOLUTION INTRAVENOUS ONCE
Status: COMPLETED | OUTPATIENT
Start: 2021-02-22 | End: 2021-02-22

## 2021-02-22 RX ORDER — DOXORUBICIN HYDROCHLORIDE 2 MG/ML
20 INJECTION, SOLUTION INTRAVENOUS ONCE
Status: COMPLETED | OUTPATIENT
Start: 2021-02-22 | End: 2021-02-22

## 2021-02-22 RX ADMIN — SODIUM CHLORIDE, PRESERVATIVE FREE 10 ML: 5 INJECTION INTRAVENOUS at 15:46

## 2021-02-22 RX ADMIN — Medication 500 UNITS: at 15:46

## 2021-02-22 RX ADMIN — DOXORUBICIN HYDROCHLORIDE 40 MG: 2 INJECTION, SOLUTION INTRAVENOUS at 15:41

## 2021-02-22 RX ADMIN — PALONOSETRON 0.25 MG: 0.05 INJECTION, SOLUTION INTRAVENOUS at 15:23

## 2021-02-22 RX ADMIN — DEXAMETHASONE SODIUM PHOSPHATE 12 MG: 10 INJECTION, SOLUTION INTRAMUSCULAR; INTRAVENOUS at 15:24

## 2021-02-22 RX ADMIN — SODIUM CHLORIDE 250 ML: 9 INJECTION, SOLUTION INTRAVENOUS at 15:23

## 2021-02-22 NOTE — PROGRESS NOTES
Subjective  REASONS FOR FOLLOWUP:   1. Metastatic breast cancer to multiple skeletal sites including cervical spine, sternum, lumbar spine and right femur, underwent Abraxane every 4 weeks and Xgeva every  3 months, DOCUMENTED RADIOLOGICAL PROGRESSION BY BONE SCAN 3/15/17 MOVING AWAY FROM ABRAXANE AND HALAVEN, PRESENTLY ON FEMARA AND KISQALI , XGEVA EVERY 3 MONTHS,    2.Iron deficiency anemia du to GI blood loss, Xarelto stopped months ago, Iron initiated, Pepcid along with Aleve for gastric protection.   3.  1/10/2020 continued good tolerance of because Kasquali  and Femara.  We will continue to hold Xgeva with plans for DEXA scan in spring 2020 to determine the need further Xgeva.  4. Progression in the bone disease 6/20 Femara and KISQALI stopped, rad to pelvis scheduled, thereafter HALAVEN INITIATED 9/20 AFTER PALLIATIVE RADIATION TO R SHOULDER AND PELVIS.      History of Present Illness     PATIENT WAS CALLED THE DAY BEFORE BY THE OFFICE TO ASK FOR SYMPTOMS THAT COULD BE CONSISTENT WITH CORONAVIRUS INFECTION, AND BEING NEGATIVE WAS SCHEDULED TO BE SEEN IN THE OFFICE TODAY. SIMILAR QUESTIONING TODAY INCLUDING, CHILLS, FEVER, NEW COUGH, SHORTNESS OF BREATH, DIARRHEA,DIFFUSE BODY ACHES  AND CHANGES IN SMELL OR TASTE WERE NEGATIVE.THE PATIENT DENIED ANY CONTACT WITH PERSONS WHO WERE POSITIVE FOR COVID, AND PATIENT IS NOT IN CATEGORY OF HIGH RISK BEHAVIOR TO ACQUIRE COVID.    DURING THE VISIT WITH THE PATIENT TODAY , PATIENT HAD FACE MASK, MY MEDICAL ASSISTANT AND I  HAD PROPPER PROTECTIVE EQUIPMENT, AND I DID HAND HYGIENE WITH SOAP AND WATER BEFORE AND AFTER THE VISIT.     This patient  returns today to the office for followup stating that her pain in her skeleton is very quiet at this time with the exception of the left shoulder that gives her a little bit more discomfort. She has not had the need to modify her Aleve dose that is 2 tablets twice a day. She still uses topical Voltaren here and there. She remains functional and she has minor constipation with the time that she receives her Adriamycin. She has been using a laxative. She has no passage of blood in the stool. Urination is normal. There is no fluid accumulation in her lower extremities. She remains on her Lisinopril and metoprolol that will be cardioprotective in regard to the use of Adriamycin. The patient remains fully functional, able to go to work, drive her car, cook and do all her personal needs with no limitations.                Past Medical History:   Diagnosis Date   • Abnormal mammogram    • Abnormal menstrual cycle    • Arthritis    • Bone metastasis (CMS/HCC)    • Breast cancer (CMS/HCC) 2000    Left breast   • Drug therapy 2001    breast cancer   • Drug therapy 2017    right femur/associated with breast cancer   • DVT (deep venous thrombosis) (CMS/HCC)    • H/O Heart murmur    • History of colon polyps    • Hx of radiation therapy 2000    breast cancer   • Hx of radiation therapy 2015    bone cancer right femur/ associated with breast cancer   • Hypercholesterolemia    • Hyperlipidemia    • Hypertension    • Multiple benign polyps of large intestine    • Reflux esophagitis    • Shingles      Social History     Socioeconomic History   • Marital status: Single     Spouse name: Not on file   • Number of children: Not on file   • Years of education: Not on file   • Highest education level: Not on file   Occupational History   • Occupation:      Employer: [employer]   Tobacco Use   • Smoking status: Never Smoker   • Smokeless tobacco: Never Used   Substance and Sexual Activity   • Alcohol use:  No   • Drug use: No   • Sexual activity: Defer     Family History   Problem Relation Age of Onset   • Hypertension Mother    • Diabetes Mother    • Macular degeneration Mother    • Thyroid disease Mother    • Heart disease Father    • Stroke Father    • Kidney disease Father    • Glaucoma Brother    • Diabetes Brother    • Hypertension Brother    • Colon cancer Paternal Grandmother    • Thyroid disease Other    • Kidney disease Other    • Glaucoma Other    • Cancer Other    • Diabetes Other      Current Outpatient Medications on File Prior to Visit   Medication Sig Dispense Refill   • atorvastatin (LIPITOR) 20 MG tablet Take 1 tablet by mouth once daily 90 tablet 0   • Cyanocobalamin (B-12 PO) Take 1,000 mcg by mouth Daily.     • denosumab (XGEVA) 120 MG/1.7ML solution injection Inject  under the skin 1 (one) time.     • Diclofenac Sodium (VOLTAREN) 1 % gel gel APPLY 2 TO 4 GRAMS TOPICALLY TO AFFECTED AREA TWICE A  g 1   • HYDROcodone-acetaminophen (NORCO) 5-325 MG per tablet Take 1 tablet by mouth Every 6 (Six) Hours As Needed.     • lisinopril-hydrochlorothiazide (PRINZIDE,ZESTORETIC) 10-12.5 MG per tablet Take 1 tablet by mouth twice daily 180 tablet 0   • magnesium chloride ER (Mag64) 64 MG DR tablet Take 1 tablet by mouth 2 (two) times a day. 180 tablet 3   • metoprolol tartrate (LOPRESSOR) 25 MG tablet Take 0.5 tablets by mouth 2 (Two) Times a Day. 30 tablet 6   • Multiple Vitamins-Minerals (HAIR SKIN AND NAILS FORMULA PO) Take  by mouth.     • Multiple Vitamins-Minerals (PRESERVISION AREDS 2 PO) Take  by mouth.     • Naproxen Sod-Diphenhydramine (ALEVE PM PO) Take  by mouth as needed.     • Naproxen Sodium (ALEVE PO) Take  by mouth.     • Omega-3 Fatty Acids (FISH OIL) 645 MG capsule Take  by mouth.     • OMEPRAZOLE PO Take  by mouth Daily.     • ondansetron (ZOFRAN) 8 MG tablet Take 1 tablet by mouth 3 (Three) Times a Day As Needed for Nausea or Vomiting. 30 tablet 5     No current  facility-administered medications on file prior to visit.      Active Ambulatory Problems     Diagnosis Date Noted   • Benign essential HTN 02/18/2016   • Primary malignant neoplasm of left breast (CMS/HCC) 02/18/2016   • Thrombophilia (CMS/[facility]) 02/18/2016   • Hyperlipidemia 02/18/2016   • IFG (impaired fasting glucose) 02/18/2016   • Osteopenia 02/18/2016   • History of colon polyps 03/08/2016   • Bone metastasis (CMS/[facility]) 05/20/2016   • Anemia in neoplastic disease 05/20/2016   • Heart murmur, systolic 10/25/2016   • Fitting and adjustment of vascular catheter 03/13/2017   • Vitamin B12 deficiency anemia due to intrinsic factor deficiency 06/13/2017   • Leukopenia due to antineoplastic chemotherapy (CMS/[facility]) 09/06/2017   • Hypomagnesemia 09/30/2020   • History of pulmonary embolism 02/19/2021     Resolved Ambulatory Problems     Diagnosis Date Noted   • No Resolved Ambulatory Problems     Past Medical History:   Diagnosis Date   • Abnormal mammogram    • Abnormal menstrual cycle    • Arthritis    • Breast cancer (CMS/[facility]) 2000   • Drug therapy 2001   • Drug therapy 2017   • DVT (deep venous thrombosis) (CMS/[facility])    • H/O Heart murmur    • Hx of radiation therapy 2000   • Hx of radiation therapy 2015   • Hypercholesterolemia    • Hypertension    • Multiple benign polyps of large intestine    • Reflux esophagitis    • Shingles       Past Surgical History:   Procedure Laterality Date   • BREAST BIOPSY Left 2000    breast cancer   • BREAST SURGERY  2000    lumpectomy, mastectomy, revision   • COLONOSCOPY  2012   • FEMUR FRACTURE SURGERY      secondary to metastatic breast cancer 7/2014, with revision in 9/2015   • MASTECTOMY Left 2000    transflap in 2003           .          ALLERGIES:    Allergies   Allergen Reactions   • Codeine Unknown - Low Severity   • Docetaxel Unknown - Low Severity   • Paclitaxel Unknown - Low Severity       Objective        EXAM:  I HAVE PERSONALLY REVIEWED THE HISTORY OF THE PRESENT  ILLNESS, PAST MEDICAL HISTORY, FAMILY HISTORY, SOCIAL HISTORY, ALLERGIES, MEDICATIONS STATED ABOVE IN THE OFFICE NOTE FROM TODAY.        GENERAL:  Well-developed, well-nourished  Patient  in no acute distress.   SKIN:  Warm, dry ,NO rashes,NO purpura ,NO petechiae.  HEENT:  Pupils were equal and reactive to light and accomodation, conjunctivae noninjected, no pterygium, normal extraocular movements, normal visual acuity.   NECK:  Supple with good range of motion; no thyromegaly or masses, no JVD or bruits, no cervical adenopathies.No carotid artery pain, no carotid abnormal pulsation , NO arterial dance.  LYMPHATICS:  No cervical, NO supraclavicular, NO axillary,NO epitrochlear , NO inguinal adenopathy.  CARDIAC   normal rate and regular rhythm, with mitral g 2/6 systolic murmur,NO rubs NO S3 NO S4 right or left . Normal femoral, popliteal, pedis, brachial and carotid pulses.  VASCULAR ARTERIAL: normal carotids,brachial,radial,femoral,popliteal, pedis pulses , no bruits.no paleness or cyanosis, no pain, no edema, no numbness, no gangrene.  VASCULAR VENOUS: no cyanosis, collateral circulation, varicosities, edema, palpable cords, pain, erythema.  ABDOMEN:  Soft, nontender with no hepatomegaly, no splenomegaly,no masses, no ascites, no collateral circulation,no distention,no [place] sign, no abdominal pain, no inguinal hernias,no umbilical hernia, no abdominal bruits. Normal bowel sounds.  GENITAL: Not  Performed.  EXTREMITIES  AND SPINE:  No clubbing, cyanosis or edema, no deformities or pain .No kyphosis, scoliosis, deformities or pain in spine, ribs or pelvic bone.  NEUROLOGICAL:  Patient was awake, alert, oriented to time, person and place.      Hematology WBC   Date Value Ref Range Status   02/12/2021 4.26 3.40 - 10.80 10*3/mm3 Final   02/03/2020 3.89 3.40 - 10.80 10*3/mm3 Final     RBC   Date Value Ref Range Status   02/12/2021 3.01 (L) 3.77 - 5.28 10*6/mm3 Final   02/03/2020 3.08 (L) 3.77 - 5.28 10*6/mm3 Final      Hemoglobin   Date Value Ref Range Status   02/12/2021 9.2 (L) 12.0 - 15.9 g/dL Final     Hematocrit   Date Value Ref Range Status   02/12/2021 28.5 (L) 34.0 - 46.6 % Final     Platelets   Date Value Ref Range Status   02/12/2021 216 140 - 450 10*3/mm3 Final        Interpretation Summary echo    · Calculated left ventricular EF = 69% Estimated left ventricular EF was in agreement with the calculated left ventricular EF. Left ventricular systolic function is normal. Normal left ventricular cavity size and wall thickness noted. All left ventricular wall segments contract normally. Left ventricular diastolic function is consistent with (grade I) impaired relaxation. Strain imaging could not be performed due to difficult apical windows.  · Mild to moderate mitral valve regurgitation is present.  · The aortic valve exhibits sclerosis. The aortic valve appears trileaflet. Trace aortic valve regurgitation is present. No hemodynamically significant aortic valve stenosis is present.             Assessment/Plan    .   1. Metastatic  Left breast cancer to multiple skeletal sites including cervical spine, sternum, lumbar spine and right femur.Since the previous visit, the patient has been taking her Femara and Kisqali correctly  .On eVisit on 04/10/2020 the patient states that her symptoms are very much quiet at this time. She has not developed any new sites of bone pain and she has not had any difficulty with her Kisqali and Femara that remain ongoing.       The patient was further reviewed on clinical grounds on 06/19/2020. Now she is experiencing a new pain in the pelvic bone bilaterally close to the sacroiliac joints and ala of the pelvic bone. Other than that she has not had any other new sites of pain.     I reviewed with the patient her bone scan that shows unequivocally increased uptake in the sacroiliac joints as well in the pelvic bone bilaterally posteriorly. There is a new uptake in the fibula on the right side  that is very minimal. This does not correspond to any site of pain clinically.       The patient was further reviewed on 07/24/2020. Since then she has initiated radiation treatment and she has completed 5 treatments today out of 10. She has seen some improvement in the intensity of pain in the sacroiliac joints where she has sites of metastasis.  Now she has developed a new bone pain in the right humerus. Reviewing the bone scan with her she had a hot spot there and I will talk with Sarita Baldwin MD, today about adding some radiation therapy to this anatomical site.     Given the fact that the radiation field is larger in the pelvis we will delay the initiation of any Halaven until the patient completes her treatment and goes through expected hematological toxicity.   The patient was further reviewed on 09/02/2020. Because the completion of radiation therapy happened close to a month ago and her white count is stable and improving, the hemoglobin is stable and her platelet count is fine, the patient achieved significant improvement in functionality related to increased pain in the right shoulder and pelvic bone due to affect and benefit of radiation therapy, we advised the patient today to proceed with Halaven. She understands that this infusion is very short; it is a push, and she understands that this medicine is going to be given to her on day 1 and day 8. Typically the treatment will be given to her every 3 weeks.     The patient was reviewed on clinical grounds on 11/04/2020. The patient states that she has been profoundly fatigued and she has been short of breath. Today her hemoglobin is 9.1. She has no jaundice and she has no nausea or GI blood loss or  blood loss. The patient is no longer taking anticoagulants.     The patient has tremendous degree of fatigue today and I think this is related in part to myelophthisis and in part also related to chemotherapy induced anemia and anemia of neoplastic  disease. She has nothing to suggest hemolytic anemia or blood loss.     Her pain is relatively well controlled.     Her tumor marker has substantially decreased and we have another level today pending. The last one was 52 close to 5 weeks ago.       The patient was reviewed on 01/25/2021 in the office. She is still undergoing radiation therapy to her right hip metastasis and she has found significant relief of the pain even after the 3rd treatment. She still has 3 more treatments to go this week.    In regard to the overall treatment of her breast cancer, I talked with her about doing low-dose Adriamycin 3 weeks out of 4. I discussed with her side effects of the medicine including anemia, leukopenia, thrombocytopenia, sores in her mouth as well as cardiomyopathy. I advised her that we will use a cardioprotective medicine, metoprolol in a very low dose in order to minimize cardiac toxicity. In preparation for the initiation of Adriamycin the patient will proceed with an echocardiogram.     Also, at this time the patient will require the continuation of her Xgeva for her bone metastasis and this medicine will be continued every 3 months.     In regard to pain management she believes that Aleve is enough along with topical Voltaren and I asked her to compliment with Tylenol if necessary. Obviously in order to minimize NSAIDs side-effects including gastric irritation, she will remain on proton pump inhibitor.     In regard to blood pressure control, it is satisfactory on lisinopril, hydrochlorothiazide and the lisinopril also will be cardioprotective to her.     The patient as we know has increased risk factors for cardiovascular disease given the use of anthracycline. Also the patient has had chemotherapy before. She has had multiple agents through the 10 years that we have been treating her metastatic breast cancer. This is the first time that I will use Adriamycin on her at this point.       The patient was further  reviewed when she came in on 02/22/2021 to proceed with her Adriamycin. I reviewed her echocardiogram that disclosed a normal left ventricular ejection fraction. She had mitral regurgitation and minimal aortic regurgitation with no other implications.     From the point of view of her white count, hemoglobin and platelets, her hemoglobin remains low. I think it is related to myelophthisis more than anything else. The white count is appropriate. The platelet count is appropriate.     We are pending to review a measurement of her CA 15-3 today in order to monitor the status of her malignancy after she will be completed today 3 cycles of Adriamycin.     RECOMMENDATIONS:   1. She will proceed with her Adriamycin today.   2. She will return for Adriamycin in 2, 3 and 4 weeks.   3. The patient will return for CBC, CMP, CA 15-3 and a magnesium and phosphorus in 4 weeks as well as Xgeva injection at that time.   4. I discussed echocardiogram findings with her. Nothing else to do from this point of view besides the continuation of her low-dose metoprolol and Lisinopril for cardiac protection.  5. I advised the patient in regard to the continuation of Aleve for control of her pain and be sure that she takes the proton pump inhibitor for gastric protection.     The patient questioned the previous CT scan documentation of a pulmonary embolus. That happened at the time that she had a DVT in the lower extremity on the left side and she was anticoagulated then. The anticoagulation was discontinued because she had GI bleeding. She never went back on anticoagulants as per my request.     Otherwise, the patient will remain ongoing for the treatment exactly like it is right now.        THIS IS AN  ILLNESS THAT POSES A THREAT TO LIFE AND BODY FUNCTION, REQUIRED REVIEW OF EXTERNAL DOCUMENT, AND REQUIRES INTENSIVE MONITORING OF A LAB TEST, A PHYSIOLOGIC TEST OR IMAGING FOR THERAPY DIAGNOSES  AND  TOXICITY.            I DISCUSSED WITH  PATIENT IN DETAIL FORMS TO DECREASE CHANCES OF CORONAVIRUS INFECTION INCLUDING ISOLATION, PROPER HAND HIGIENE, AVOID PUBLIC PLACES  WITH CROWDS, FOLLOW  CDC RECOMENDATIONS, AND KEEP PERSONAL AND SOCIAL RESPONSIBILITY, WARE A MASK IN PUBLIC PLACES.  PATIENT IS AWARE THIS INFECTION COULD HAVE SEVERE CONSEQUENCES TO PERSONAL HEALTH AND FAMILY RAMIFICATIONS OF THIS.      2/22/2021

## 2021-02-24 RX ORDER — LISINOPRIL AND HYDROCHLOROTHIAZIDE 12.5; 1 MG/1; MG/1
TABLET ORAL
Qty: 180 TABLET | Refills: 0 | Status: SHIPPED | OUTPATIENT
Start: 2021-02-24 | End: 2021-05-24

## 2021-03-05 ENCOUNTER — APPOINTMENT (OUTPATIENT)
Dept: ONCOLOGY | Facility: HOSPITAL | Age: 66
End: 2021-03-05

## 2021-03-08 ENCOUNTER — APPOINTMENT (OUTPATIENT)
Dept: ONCOLOGY | Facility: HOSPITAL | Age: 66
End: 2021-03-08

## 2021-03-08 ENCOUNTER — HOSPITAL ENCOUNTER (OUTPATIENT)
Dept: MAMMOGRAPHY | Facility: HOSPITAL | Age: 66
Discharge: HOME | End: 2021-03-08
Admitting: INTERNAL MEDICINE

## 2021-03-08 DIAGNOSIS — Z12.31 BREAST CANCER SCREENING BY MAMMOGRAM: ICD-10-CM

## 2021-03-08 PROCEDURE — 77063 BREAST TOMOSYNTHESIS BI: CPT

## 2021-03-08 PROCEDURE — 77067 SCR MAMMO BI INCL CAD: CPT

## 2021-03-11 DIAGNOSIS — C50.912 PRIMARY MALIGNANT NEOPLASM OF LEFT BREAST (HCC): ICD-10-CM

## 2021-03-11 DIAGNOSIS — C79.51 BONE METASTASIS: Primary | ICD-10-CM

## 2021-03-11 RX ORDER — PALONOSETRON 0.05 MG/ML
0.25 INJECTION, SOLUTION INTRAVENOUS ONCE
Status: CANCELLED | OUTPATIENT
Start: 2021-03-26

## 2021-03-11 RX ORDER — PALONOSETRON 0.05 MG/ML
0.25 INJECTION, SOLUTION INTRAVENOUS ONCE
Status: CANCELLED | OUTPATIENT
Start: 2021-03-12

## 2021-03-11 RX ORDER — DOXORUBICIN HYDROCHLORIDE 2 MG/ML
20 INJECTION, SOLUTION INTRAVENOUS ONCE
Status: CANCELLED | OUTPATIENT
Start: 2021-03-19

## 2021-03-11 RX ORDER — SODIUM CHLORIDE 9 MG/ML
250 INJECTION, SOLUTION INTRAVENOUS ONCE
Status: CANCELLED | OUTPATIENT
Start: 2021-03-12

## 2021-03-11 RX ORDER — DOXORUBICIN HYDROCHLORIDE 2 MG/ML
20 INJECTION, SOLUTION INTRAVENOUS ONCE
Status: CANCELLED | OUTPATIENT
Start: 2021-03-26

## 2021-03-11 RX ORDER — SODIUM CHLORIDE 9 MG/ML
250 INJECTION, SOLUTION INTRAVENOUS ONCE
Status: CANCELLED | OUTPATIENT
Start: 2021-03-26

## 2021-03-11 RX ORDER — PALONOSETRON 0.05 MG/ML
0.25 INJECTION, SOLUTION INTRAVENOUS ONCE
Status: CANCELLED | OUTPATIENT
Start: 2021-03-19

## 2021-03-11 RX ORDER — DOXORUBICIN HYDROCHLORIDE 2 MG/ML
20 INJECTION, SOLUTION INTRAVENOUS ONCE
Status: CANCELLED | OUTPATIENT
Start: 2021-03-12

## 2021-03-11 RX ORDER — SODIUM CHLORIDE 9 MG/ML
250 INJECTION, SOLUTION INTRAVENOUS ONCE
Status: CANCELLED | OUTPATIENT
Start: 2021-03-19

## 2021-03-12 ENCOUNTER — INFUSION (OUTPATIENT)
Dept: ONCOLOGY | Facility: HOSPITAL | Age: 66
End: 2021-03-12

## 2021-03-12 VITALS
DIASTOLIC BLOOD PRESSURE: 70 MMHG | WEIGHT: 210.2 LBS | BODY MASS INDEX: 35.89 KG/M2 | RESPIRATION RATE: 16 BRPM | TEMPERATURE: 97.1 F | OXYGEN SATURATION: 98 % | HEART RATE: 108 BPM | SYSTOLIC BLOOD PRESSURE: 109 MMHG

## 2021-03-12 DIAGNOSIS — Z45.2 FITTING AND ADJUSTMENT OF VASCULAR CATHETER: ICD-10-CM

## 2021-03-12 DIAGNOSIS — C79.51 BONE METASTASIS: Primary | ICD-10-CM

## 2021-03-12 DIAGNOSIS — C50.912 PRIMARY MALIGNANT NEOPLASM OF LEFT BREAST (HCC): ICD-10-CM

## 2021-03-12 LAB
ALBUMIN SERPL-MCNC: 4.1 G/DL (ref 3.5–5.2)
ALBUMIN/GLOB SERPL: 1.7 G/DL (ref 1.1–2.4)
ALP SERPL-CCNC: 112 U/L (ref 38–116)
ALT SERPL W P-5'-P-CCNC: 26 U/L (ref 0–33)
ANION GAP SERPL CALCULATED.3IONS-SCNC: 11.4 MMOL/L (ref 5–15)
AST SERPL-CCNC: 26 U/L (ref 0–32)
BASOPHILS # BLD AUTO: 0.04 10*3/MM3 (ref 0–0.2)
BASOPHILS NFR BLD AUTO: 0.9 % (ref 0–1.5)
BILIRUB SERPL-MCNC: 0.2 MG/DL (ref 0.2–1.2)
BUN SERPL-MCNC: 32 MG/DL (ref 6–20)
BUN/CREAT SERPL: 34 (ref 7.3–30)
CALCIUM SPEC-SCNC: 9.3 MG/DL (ref 8.5–10.2)
CHLORIDE SERPL-SCNC: 102 MMOL/L (ref 98–107)
CO2 SERPL-SCNC: 24.6 MMOL/L (ref 22–29)
CREAT SERPL-MCNC: 0.94 MG/DL (ref 0.6–1.1)
DEPRECATED RDW RBC AUTO: 63.8 FL (ref 37–54)
EOSINOPHIL # BLD AUTO: 0.15 10*3/MM3 (ref 0–0.4)
EOSINOPHIL NFR BLD AUTO: 3.2 % (ref 0.3–6.2)
ERYTHROCYTE [DISTWIDTH] IN BLOOD BY AUTOMATED COUNT: 18 % (ref 12.3–15.4)
GFR SERPL CREATININE-BSD FRML MDRD: 60 ML/MIN/1.73
GLOBULIN UR ELPH-MCNC: 2.4 GM/DL (ref 1.8–3.5)
GLUCOSE SERPL-MCNC: 99 MG/DL (ref 74–124)
HCT VFR BLD AUTO: 26.6 % (ref 34–46.6)
HGB BLD-MCNC: 8.6 G/DL (ref 12–15.9)
IMM GRANULOCYTES # BLD AUTO: 0.06 10*3/MM3 (ref 0–0.05)
IMM GRANULOCYTES NFR BLD AUTO: 1.3 % (ref 0–0.5)
LYMPHOCYTES # BLD AUTO: 0.65 10*3/MM3 (ref 0.7–3.1)
LYMPHOCYTES NFR BLD AUTO: 14.1 % (ref 19.6–45.3)
MAGNESIUM SERPL-MCNC: 1.7 MG/DL (ref 1.8–2.5)
MCH RBC QN AUTO: 31.9 PG (ref 26.6–33)
MCHC RBC AUTO-ENTMCNC: 32.3 G/DL (ref 31.5–35.7)
MCV RBC AUTO: 98.5 FL (ref 79–97)
MONOCYTES # BLD AUTO: 0.74 10*3/MM3 (ref 0.1–0.9)
MONOCYTES NFR BLD AUTO: 16 % (ref 5–12)
NEUTROPHILS NFR BLD AUTO: 2.98 10*3/MM3 (ref 1.7–7)
NEUTROPHILS NFR BLD AUTO: 64.5 % (ref 42.7–76)
NRBC BLD AUTO-RTO: 0.6 /100 WBC (ref 0–0.2)
PHOSPHATE SERPL-MCNC: 3.9 MG/DL (ref 2.5–4.5)
PLATELET # BLD AUTO: 175 10*3/MM3 (ref 140–450)
PMV BLD AUTO: 10 FL (ref 6–12)
POTASSIUM SERPL-SCNC: 4.2 MMOL/L (ref 3.5–4.7)
PROT SERPL-MCNC: 6.5 G/DL (ref 6.3–8)
RBC # BLD AUTO: 2.7 10*6/MM3 (ref 3.77–5.28)
SODIUM SERPL-SCNC: 138 MMOL/L (ref 134–145)
WBC # BLD AUTO: 4.62 10*3/MM3 (ref 3.4–10.8)

## 2021-03-12 PROCEDURE — 96409 CHEMO IV PUSH SNGL DRUG: CPT

## 2021-03-12 PROCEDURE — 25010000002 DENOSUMAB 120 MG/1.7ML SOLUTION: Performed by: INTERNAL MEDICINE

## 2021-03-12 PROCEDURE — 83735 ASSAY OF MAGNESIUM: CPT

## 2021-03-12 PROCEDURE — 85025 COMPLETE CBC W/AUTO DIFF WBC: CPT

## 2021-03-12 PROCEDURE — 80053 COMPREHEN METABOLIC PANEL: CPT

## 2021-03-12 PROCEDURE — 96375 TX/PRO/DX INJ NEW DRUG ADDON: CPT

## 2021-03-12 PROCEDURE — 25010000002 DEXAMETHASONE SODIUM PHOSPHATE 100 MG/10ML SOLUTION: Performed by: INTERNAL MEDICINE

## 2021-03-12 PROCEDURE — 96372 THER/PROPH/DIAG INJ SC/IM: CPT

## 2021-03-12 PROCEDURE — 25010000003 HEPARIN LOCK FLUSH PER 10 UNITS: Performed by: INTERNAL MEDICINE

## 2021-03-12 PROCEDURE — 25010000002 DOXORUBICIN PER 10 MG: Performed by: INTERNAL MEDICINE

## 2021-03-12 PROCEDURE — 25010000002 PALONOSETRON PER 25 MCG: Performed by: INTERNAL MEDICINE

## 2021-03-12 PROCEDURE — 84100 ASSAY OF PHOSPHORUS: CPT

## 2021-03-12 RX ORDER — SODIUM CHLORIDE 9 MG/ML
250 INJECTION, SOLUTION INTRAVENOUS ONCE
Status: CANCELLED | OUTPATIENT
Start: 2021-04-09

## 2021-03-12 RX ORDER — PALONOSETRON 0.05 MG/ML
0.25 INJECTION, SOLUTION INTRAVENOUS ONCE
Status: CANCELLED | OUTPATIENT
Start: 2021-04-16

## 2021-03-12 RX ORDER — SODIUM CHLORIDE 9 MG/ML
250 INJECTION, SOLUTION INTRAVENOUS ONCE
Status: COMPLETED | OUTPATIENT
Start: 2021-03-12 | End: 2021-03-12

## 2021-03-12 RX ORDER — DOXORUBICIN HYDROCHLORIDE 2 MG/ML
20 INJECTION, SOLUTION INTRAVENOUS ONCE
Status: CANCELLED | OUTPATIENT
Start: 2021-04-16

## 2021-03-12 RX ORDER — PALONOSETRON 0.05 MG/ML
0.25 INJECTION, SOLUTION INTRAVENOUS ONCE
Status: COMPLETED | OUTPATIENT
Start: 2021-03-12 | End: 2021-03-12

## 2021-03-12 RX ORDER — PALONOSETRON 0.05 MG/ML
0.25 INJECTION, SOLUTION INTRAVENOUS ONCE
Status: CANCELLED | OUTPATIENT
Start: 2021-04-23

## 2021-03-12 RX ORDER — DOXORUBICIN HYDROCHLORIDE 2 MG/ML
20 INJECTION, SOLUTION INTRAVENOUS ONCE
Status: CANCELLED | OUTPATIENT
Start: 2021-04-09

## 2021-03-12 RX ORDER — SODIUM CHLORIDE 9 MG/ML
250 INJECTION, SOLUTION INTRAVENOUS ONCE
Status: CANCELLED | OUTPATIENT
Start: 2021-04-23

## 2021-03-12 RX ORDER — SODIUM CHLORIDE 0.9 % (FLUSH) 0.9 %
10 SYRINGE (ML) INJECTION AS NEEDED
Status: CANCELLED | OUTPATIENT
Start: 2021-03-12

## 2021-03-12 RX ORDER — DOXORUBICIN HYDROCHLORIDE 2 MG/ML
20 INJECTION, SOLUTION INTRAVENOUS ONCE
Status: CANCELLED | OUTPATIENT
Start: 2021-04-23

## 2021-03-12 RX ORDER — HEPARIN SODIUM (PORCINE) LOCK FLUSH IV SOLN 100 UNIT/ML 100 UNIT/ML
500 SOLUTION INTRAVENOUS AS NEEDED
Status: CANCELLED | OUTPATIENT
Start: 2021-03-12

## 2021-03-12 RX ORDER — HEPARIN SODIUM (PORCINE) LOCK FLUSH IV SOLN 100 UNIT/ML 100 UNIT/ML
500 SOLUTION INTRAVENOUS AS NEEDED
Status: DISCONTINUED | OUTPATIENT
Start: 2021-03-12 | End: 2021-03-12 | Stop reason: HOSPADM

## 2021-03-12 RX ORDER — SODIUM CHLORIDE 9 MG/ML
250 INJECTION, SOLUTION INTRAVENOUS ONCE
Status: CANCELLED | OUTPATIENT
Start: 2021-04-16

## 2021-03-12 RX ORDER — DOXORUBICIN HYDROCHLORIDE 2 MG/ML
20 INJECTION, SOLUTION INTRAVENOUS ONCE
Status: COMPLETED | OUTPATIENT
Start: 2021-03-12 | End: 2021-03-12

## 2021-03-12 RX ORDER — PALONOSETRON 0.05 MG/ML
0.25 INJECTION, SOLUTION INTRAVENOUS ONCE
Status: CANCELLED | OUTPATIENT
Start: 2021-04-09

## 2021-03-12 RX ADMIN — PALONOSETRON 0.25 MG: 0.05 INJECTION, SOLUTION INTRAVENOUS at 14:17

## 2021-03-12 RX ADMIN — DEXAMETHASONE SODIUM PHOSPHATE 12 MG: 10 INJECTION, SOLUTION INTRAMUSCULAR; INTRAVENOUS at 14:17

## 2021-03-12 RX ADMIN — Medication 500 UNITS: at 15:04

## 2021-03-12 RX ADMIN — DENOSUMAB 120 MG: 120 INJECTION SUBCUTANEOUS at 15:07

## 2021-03-12 RX ADMIN — DOXORUBICIN HYDROCHLORIDE 40 MG: 2 INJECTION, SOLUTION INTRAVENOUS at 14:48

## 2021-03-12 RX ADMIN — SODIUM CHLORIDE 250 ML: 9 INJECTION, SOLUTION INTRAVENOUS at 14:17

## 2021-03-16 ENCOUNTER — BULK ORDERING (OUTPATIENT)
Dept: CASE MANAGEMENT | Facility: OTHER | Age: 66
End: 2021-03-16

## 2021-03-16 DIAGNOSIS — Z23 IMMUNIZATION DUE: ICD-10-CM

## 2021-03-17 ENCOUNTER — IMMUNIZATION (OUTPATIENT)
Dept: VACCINE CLINIC | Facility: HOSPITAL | Age: 66
End: 2021-03-17

## 2021-03-17 PROCEDURE — 91300 HC SARSCOV02 VAC 30MCG/0.3ML IM: CPT | Performed by: INTERNAL MEDICINE

## 2021-03-17 PROCEDURE — 0001A: CPT | Performed by: INTERNAL MEDICINE

## 2021-03-19 ENCOUNTER — INFUSION (OUTPATIENT)
Dept: ONCOLOGY | Facility: HOSPITAL | Age: 66
End: 2021-03-19

## 2021-03-19 VITALS
DIASTOLIC BLOOD PRESSURE: 63 MMHG | TEMPERATURE: 97.3 F | RESPIRATION RATE: 16 BRPM | BODY MASS INDEX: 35.78 KG/M2 | HEART RATE: 113 BPM | SYSTOLIC BLOOD PRESSURE: 145 MMHG | WEIGHT: 209.6 LBS | OXYGEN SATURATION: 95 %

## 2021-03-19 DIAGNOSIS — M85.89 OSTEOPENIA OF MULTIPLE SITES: ICD-10-CM

## 2021-03-19 DIAGNOSIS — C79.51 BONE METASTASIS: Primary | ICD-10-CM

## 2021-03-19 DIAGNOSIS — C50.912 PRIMARY MALIGNANT NEOPLASM OF LEFT BREAST (HCC): ICD-10-CM

## 2021-03-19 DIAGNOSIS — Z45.2 FITTING AND ADJUSTMENT OF VASCULAR CATHETER: ICD-10-CM

## 2021-03-19 LAB
BASOPHILS # BLD AUTO: 0.03 10*3/MM3 (ref 0–0.2)
BASOPHILS NFR BLD AUTO: 0.7 % (ref 0–1.5)
DEPRECATED RDW RBC AUTO: 66.2 FL (ref 37–54)
EOSINOPHIL # BLD AUTO: 0.19 10*3/MM3 (ref 0–0.4)
EOSINOPHIL NFR BLD AUTO: 4.2 % (ref 0.3–6.2)
ERYTHROCYTE [DISTWIDTH] IN BLOOD BY AUTOMATED COUNT: 18.4 % (ref 12.3–15.4)
HCT VFR BLD AUTO: 27.5 % (ref 34–46.6)
HGB BLD-MCNC: 9.1 G/DL (ref 12–15.9)
IMM GRANULOCYTES # BLD AUTO: 0.07 10*3/MM3 (ref 0–0.05)
IMM GRANULOCYTES NFR BLD AUTO: 1.6 % (ref 0–0.5)
LYMPHOCYTES # BLD AUTO: 0.61 10*3/MM3 (ref 0.7–3.1)
LYMPHOCYTES NFR BLD AUTO: 13.6 % (ref 19.6–45.3)
MCH RBC QN AUTO: 33.1 PG (ref 26.6–33)
MCHC RBC AUTO-ENTMCNC: 33.1 G/DL (ref 31.5–35.7)
MCV RBC AUTO: 100 FL (ref 79–97)
MONOCYTES # BLD AUTO: 0.39 10*3/MM3 (ref 0.1–0.9)
MONOCYTES NFR BLD AUTO: 8.7 % (ref 5–12)
NEUTROPHILS NFR BLD AUTO: 3.2 10*3/MM3 (ref 1.7–7)
NEUTROPHILS NFR BLD AUTO: 71.2 % (ref 42.7–76)
NRBC BLD AUTO-RTO: 0.7 /100 WBC (ref 0–0.2)
PLATELET # BLD AUTO: 219 10*3/MM3 (ref 140–450)
PMV BLD AUTO: 10.3 FL (ref 6–12)
RBC # BLD AUTO: 2.75 10*6/MM3 (ref 3.77–5.28)
WBC # BLD AUTO: 4.49 10*3/MM3 (ref 3.4–10.8)

## 2021-03-19 PROCEDURE — 25010000002 DEXAMETHASONE SODIUM PHOSPHATE 100 MG/10ML SOLUTION: Performed by: INTERNAL MEDICINE

## 2021-03-19 PROCEDURE — 85025 COMPLETE CBC W/AUTO DIFF WBC: CPT

## 2021-03-19 PROCEDURE — 25010000003 HEPARIN LOCK FLUSH PER 10 UNITS: Performed by: INTERNAL MEDICINE

## 2021-03-19 PROCEDURE — 25010000002 DOXORUBICIN PER 10 MG: Performed by: INTERNAL MEDICINE

## 2021-03-19 PROCEDURE — 96409 CHEMO IV PUSH SNGL DRUG: CPT

## 2021-03-19 PROCEDURE — 25010000002 PALONOSETRON PER 25 MCG: Performed by: INTERNAL MEDICINE

## 2021-03-19 PROCEDURE — 96375 TX/PRO/DX INJ NEW DRUG ADDON: CPT

## 2021-03-19 RX ORDER — SODIUM CHLORIDE 0.9 % (FLUSH) 0.9 %
10 SYRINGE (ML) INJECTION AS NEEDED
Status: DISCONTINUED | OUTPATIENT
Start: 2021-03-19 | End: 2021-03-19 | Stop reason: HOSPADM

## 2021-03-19 RX ORDER — HEPARIN SODIUM (PORCINE) LOCK FLUSH IV SOLN 100 UNIT/ML 100 UNIT/ML
500 SOLUTION INTRAVENOUS AS NEEDED
Status: DISCONTINUED | OUTPATIENT
Start: 2021-03-19 | End: 2021-03-19 | Stop reason: HOSPADM

## 2021-03-19 RX ORDER — SODIUM CHLORIDE 9 MG/ML
250 INJECTION, SOLUTION INTRAVENOUS ONCE
Status: COMPLETED | OUTPATIENT
Start: 2021-03-19 | End: 2021-03-19

## 2021-03-19 RX ORDER — HEPARIN SODIUM (PORCINE) LOCK FLUSH IV SOLN 100 UNIT/ML 100 UNIT/ML
500 SOLUTION INTRAVENOUS AS NEEDED
Status: CANCELLED | OUTPATIENT
Start: 2021-03-19

## 2021-03-19 RX ORDER — PALONOSETRON 0.05 MG/ML
0.25 INJECTION, SOLUTION INTRAVENOUS ONCE
Status: COMPLETED | OUTPATIENT
Start: 2021-03-19 | End: 2021-03-19

## 2021-03-19 RX ORDER — DOXORUBICIN HYDROCHLORIDE 2 MG/ML
20 INJECTION, SOLUTION INTRAVENOUS ONCE
Status: COMPLETED | OUTPATIENT
Start: 2021-03-19 | End: 2021-03-19

## 2021-03-19 RX ORDER — SODIUM CHLORIDE 0.9 % (FLUSH) 0.9 %
10 SYRINGE (ML) INJECTION AS NEEDED
Status: CANCELLED | OUTPATIENT
Start: 2021-03-19

## 2021-03-19 RX ADMIN — Medication 500 UNITS: at 15:08

## 2021-03-19 RX ADMIN — DOXORUBICIN HYDROCHLORIDE 40 MG: 2 INJECTION, SOLUTION INTRAVENOUS at 14:54

## 2021-03-19 RX ADMIN — SODIUM CHLORIDE, PRESERVATIVE FREE 10 ML: 5 INJECTION INTRAVENOUS at 15:08

## 2021-03-19 RX ADMIN — DEXAMETHASONE SODIUM PHOSPHATE 12 MG: 10 INJECTION, SOLUTION INTRAMUSCULAR; INTRAVENOUS at 14:17

## 2021-03-19 RX ADMIN — PALONOSETRON 0.25 MG: 0.05 INJECTION, SOLUTION INTRAVENOUS at 14:15

## 2021-03-19 RX ADMIN — SODIUM CHLORIDE 250 ML: 9 INJECTION, SOLUTION INTRAVENOUS at 14:15

## 2021-03-26 ENCOUNTER — INFUSION (OUTPATIENT)
Dept: ONCOLOGY | Facility: HOSPITAL | Age: 66
End: 2021-03-26

## 2021-03-26 ENCOUNTER — OFFICE VISIT (OUTPATIENT)
Dept: ONCOLOGY | Facility: CLINIC | Age: 66
End: 2021-03-26

## 2021-03-26 VITALS
BODY MASS INDEX: 36.07 KG/M2 | SYSTOLIC BLOOD PRESSURE: 120 MMHG | HEART RATE: 105 BPM | OXYGEN SATURATION: 98 % | HEIGHT: 64 IN | TEMPERATURE: 97.3 F | RESPIRATION RATE: 16 BRPM | DIASTOLIC BLOOD PRESSURE: 69 MMHG | WEIGHT: 211.3 LBS

## 2021-03-26 DIAGNOSIS — D51.0 VITAMIN B12 DEFICIENCY ANEMIA DUE TO INTRINSIC FACTOR DEFICIENCY: ICD-10-CM

## 2021-03-26 DIAGNOSIS — C50.912 PRIMARY MALIGNANT NEOPLASM OF LEFT BREAST (HCC): ICD-10-CM

## 2021-03-26 DIAGNOSIS — C79.51 BONE METASTASIS: ICD-10-CM

## 2021-03-26 DIAGNOSIS — Z45.2 FITTING AND ADJUSTMENT OF VASCULAR CATHETER: ICD-10-CM

## 2021-03-26 DIAGNOSIS — D50.9 IRON DEFICIENCY ANEMIA, UNSPECIFIED IRON DEFICIENCY ANEMIA TYPE: ICD-10-CM

## 2021-03-26 DIAGNOSIS — C79.51 BONE METASTASIS: Primary | ICD-10-CM

## 2021-03-26 DIAGNOSIS — C50.912 PRIMARY MALIGNANT NEOPLASM OF LEFT BREAST (HCC): Primary | ICD-10-CM

## 2021-03-26 DIAGNOSIS — D68.59 THROMBOPHILIA (HCC): ICD-10-CM

## 2021-03-26 LAB
ALBUMIN SERPL-MCNC: 3.9 G/DL (ref 3.5–5.2)
ALBUMIN/GLOB SERPL: 1.7 G/DL (ref 1.1–2.4)
ALP SERPL-CCNC: 92 U/L (ref 38–116)
ALT SERPL W P-5'-P-CCNC: 19 U/L (ref 0–33)
ANION GAP SERPL CALCULATED.3IONS-SCNC: 10.6 MMOL/L (ref 5–15)
AST SERPL-CCNC: 17 U/L (ref 0–32)
BASOPHILS # BLD AUTO: 0.03 10*3/MM3 (ref 0–0.2)
BASOPHILS NFR BLD AUTO: 0.7 % (ref 0–1.5)
BILIRUB SERPL-MCNC: 0.2 MG/DL (ref 0.2–1.2)
BUN SERPL-MCNC: 32 MG/DL (ref 6–20)
BUN/CREAT SERPL: 36 (ref 7.3–30)
CALCIUM SPEC-SCNC: 9.4 MG/DL (ref 8.5–10.2)
CANCER AG15-3 SERPL-ACNC: 60.3 U/ML
CHLORIDE SERPL-SCNC: 103 MMOL/L (ref 98–107)
CO2 SERPL-SCNC: 23.4 MMOL/L (ref 22–29)
CREAT SERPL-MCNC: 0.89 MG/DL (ref 0.6–1.1)
DEPRECATED RDW RBC AUTO: 66.8 FL (ref 37–54)
EOSINOPHIL # BLD AUTO: 0.22 10*3/MM3 (ref 0–0.4)
EOSINOPHIL NFR BLD AUTO: 5.3 % (ref 0.3–6.2)
ERYTHROCYTE [DISTWIDTH] IN BLOOD BY AUTOMATED COUNT: 18.6 % (ref 12.3–15.4)
FERRITIN SERPL-MCNC: 777.1 NG/ML (ref 13–150)
GFR SERPL CREATININE-BSD FRML MDRD: 64 ML/MIN/1.73
GLOBULIN UR ELPH-MCNC: 2.3 GM/DL (ref 1.8–3.5)
GLUCOSE SERPL-MCNC: 172 MG/DL (ref 74–124)
HCT VFR BLD AUTO: 26.3 % (ref 34–46.6)
HGB BLD-MCNC: 8.9 G/DL (ref 12–15.9)
IMM GRANULOCYTES # BLD AUTO: 0.03 10*3/MM3 (ref 0–0.05)
IMM GRANULOCYTES NFR BLD AUTO: 0.7 % (ref 0–0.5)
IRON 24H UR-MRATE: 81 MCG/DL (ref 37–145)
IRON SATN MFR SERPL: 24 % (ref 14–48)
LYMPHOCYTES # BLD AUTO: 0.47 10*3/MM3 (ref 0.7–3.1)
LYMPHOCYTES NFR BLD AUTO: 11.2 % (ref 19.6–45.3)
MCH RBC QN AUTO: 33.7 PG (ref 26.6–33)
MCHC RBC AUTO-ENTMCNC: 33.8 G/DL (ref 31.5–35.7)
MCV RBC AUTO: 99.6 FL (ref 79–97)
MONOCYTES # BLD AUTO: 0.35 10*3/MM3 (ref 0.1–0.9)
MONOCYTES NFR BLD AUTO: 8.4 % (ref 5–12)
NEUTROPHILS NFR BLD AUTO: 3.09 10*3/MM3 (ref 1.7–7)
NEUTROPHILS NFR BLD AUTO: 73.7 % (ref 42.7–76)
NRBC BLD AUTO-RTO: 0 /100 WBC (ref 0–0.2)
PLATELET # BLD AUTO: 187 10*3/MM3 (ref 140–450)
PMV BLD AUTO: 10.2 FL (ref 6–12)
POTASSIUM SERPL-SCNC: 4.3 MMOL/L (ref 3.5–4.7)
PROT SERPL-MCNC: 6.2 G/DL (ref 6.3–8)
RBC # BLD AUTO: 2.64 10*6/MM3 (ref 3.77–5.28)
SODIUM SERPL-SCNC: 137 MMOL/L (ref 134–145)
TIBC SERPL-MCNC: 344 MCG/DL (ref 249–505)
TRANSFERRIN SERPL-MCNC: 246 MG/DL (ref 200–360)
WBC # BLD AUTO: 4.19 10*3/MM3 (ref 3.4–10.8)

## 2021-03-26 PROCEDURE — 85025 COMPLETE CBC W/AUTO DIFF WBC: CPT

## 2021-03-26 PROCEDURE — 84466 ASSAY OF TRANSFERRIN: CPT | Performed by: NURSE PRACTITIONER

## 2021-03-26 PROCEDURE — 82728 ASSAY OF FERRITIN: CPT

## 2021-03-26 PROCEDURE — 25010000002 DOXORUBICIN PER 10 MG: Performed by: INTERNAL MEDICINE

## 2021-03-26 PROCEDURE — 83540 ASSAY OF IRON: CPT | Performed by: NURSE PRACTITIONER

## 2021-03-26 PROCEDURE — 25010000002 PALONOSETRON PER 25 MCG: Performed by: INTERNAL MEDICINE

## 2021-03-26 PROCEDURE — 25010000002 DEXAMETHASONE SODIUM PHOSPHATE 100 MG/10ML SOLUTION: Performed by: INTERNAL MEDICINE

## 2021-03-26 PROCEDURE — 80053 COMPREHEN METABOLIC PANEL: CPT

## 2021-03-26 PROCEDURE — 96409 CHEMO IV PUSH SNGL DRUG: CPT

## 2021-03-26 PROCEDURE — 25010000003 HEPARIN LOCK FLUSH PER 10 UNITS: Performed by: INTERNAL MEDICINE

## 2021-03-26 PROCEDURE — 96375 TX/PRO/DX INJ NEW DRUG ADDON: CPT

## 2021-03-26 PROCEDURE — 86300 IMMUNOASSAY TUMOR CA 15-3: CPT | Performed by: INTERNAL MEDICINE

## 2021-03-26 PROCEDURE — 99214 OFFICE O/P EST MOD 30 MIN: CPT | Performed by: NURSE PRACTITIONER

## 2021-03-26 RX ORDER — SODIUM CHLORIDE 9 MG/ML
250 INJECTION, SOLUTION INTRAVENOUS ONCE
Status: COMPLETED | OUTPATIENT
Start: 2021-03-26 | End: 2021-03-26

## 2021-03-26 RX ORDER — SODIUM CHLORIDE 0.9 % (FLUSH) 0.9 %
10 SYRINGE (ML) INJECTION AS NEEDED
Status: DISCONTINUED | OUTPATIENT
Start: 2021-03-26 | End: 2021-03-26 | Stop reason: HOSPADM

## 2021-03-26 RX ORDER — HEPARIN SODIUM (PORCINE) LOCK FLUSH IV SOLN 100 UNIT/ML 100 UNIT/ML
500 SOLUTION INTRAVENOUS AS NEEDED
Status: CANCELLED | OUTPATIENT
Start: 2021-03-26

## 2021-03-26 RX ORDER — HEPARIN SODIUM (PORCINE) LOCK FLUSH IV SOLN 100 UNIT/ML 100 UNIT/ML
500 SOLUTION INTRAVENOUS AS NEEDED
Status: DISCONTINUED | OUTPATIENT
Start: 2021-03-26 | End: 2021-03-26 | Stop reason: HOSPADM

## 2021-03-26 RX ORDER — DOXORUBICIN HYDROCHLORIDE 2 MG/ML
20 INJECTION, SOLUTION INTRAVENOUS ONCE
Status: COMPLETED | OUTPATIENT
Start: 2021-03-26 | End: 2021-03-26

## 2021-03-26 RX ORDER — PALONOSETRON 0.05 MG/ML
0.25 INJECTION, SOLUTION INTRAVENOUS ONCE
Status: COMPLETED | OUTPATIENT
Start: 2021-03-26 | End: 2021-03-26

## 2021-03-26 RX ORDER — SODIUM CHLORIDE 0.9 % (FLUSH) 0.9 %
10 SYRINGE (ML) INJECTION AS NEEDED
Status: CANCELLED | OUTPATIENT
Start: 2021-03-26

## 2021-03-26 RX ADMIN — PALONOSETRON 0.25 MG: 0.05 INJECTION, SOLUTION INTRAVENOUS at 15:49

## 2021-03-26 RX ADMIN — SODIUM CHLORIDE 250 ML: 9 INJECTION, SOLUTION INTRAVENOUS at 15:49

## 2021-03-26 RX ADMIN — DEXAMETHASONE SODIUM PHOSPHATE 12 MG: 10 INJECTION, SOLUTION INTRAMUSCULAR; INTRAVENOUS at 15:49

## 2021-03-26 RX ADMIN — Medication 500 UNITS: at 16:12

## 2021-03-26 RX ADMIN — DOXORUBICIN HYDROCHLORIDE 40 MG: 2 INJECTION, SOLUTION INTRAVENOUS at 16:04

## 2021-03-26 RX ADMIN — SODIUM CHLORIDE, PRESERVATIVE FREE 10 ML: 5 INJECTION INTRAVENOUS at 16:12

## 2021-03-26 NOTE — PROGRESS NOTES
"                                                                                                                                                                                                                                                                                                                                             Subjective    REASONS FOR FOLLOWUP:   1. Metastatic breast cancer to multiple skeletal sites including cervical spine, sternum, lumbar spine and right femur. Previously treated with Abraxane, Ute Lee, now continuing on Halaven.  2.Iron deficiency anemia du to GI blood loss, Xarelto stopped months ago, Iron initiated, Pepcid along with Aleve for gastric protection.      History of Present Illness    The patient is a 65 y.o. female with the above mentioned history who returns to the office today in anticipation of cycle 2-day 15 single agent Adriamycin.  She reports today she does tolerate Adriamycin very well.  Her energy is adequate, she is able to accomplish activities that she desires.  She denies mouth sores.  She denies nausea or vomiting.  She denies any new pain, in fact she reports her pain is overall improved.  Her bowels are moving normally.  She denies signs or symptoms of bleeding, reporting her bowels are normal in appearance.  She does continue on a prenatal vitamin though is not on additional iron supplementation.    Objective      Vitals:    03/26/21 1530   BP: 120/69   Pulse: 105   Resp: 16   Temp: 97.3 °F (36.3 °C)   SpO2: 98%   Weight: 95.8 kg (211 lb 4.8 oz)   Height: 163 cm (64.17\")   PainSc:   2   PainLoc: Comment: knees     Current Status 3/26/2021   ECOG score 0     EXAM  GENERAL:  Well-developed, well-nourished in no acute distress.   SKIN:  Warm, dry without rashes, purpura or petechiae.  HEAD:  Normocephalic.  EYES:  Pupils equal, round.  EOMs intact.  Conjunctivae normal.  EARS:  Hearing intact.  NOSE:  Septum midline.  No excoriations or nasal " discharge.  CHEST:  Lungs clear to auscultation. Good airflow.  CARDIAC:  Regular rate and rhythm without murmurs. Normal S1,S2.  ABDOMEN:  Soft, nontender with no organomegaly or masses. Bowel sounds present  EXTREMITIES:  No clubbing, cyanosis or edema.  NEUROLOGICAL: No focal neurological deficits.  PSYCHIATRIC: Patient is very tearful today    RESULTS REVIEWED:  Results from last 7 days   Lab Units 03/26/21  1449   WBC 10*3/mm3 4.19   NEUTROS ABS 10*3/mm3 3.09   HEMOGLOBIN g/dL 8.9*   HEMATOCRIT % 26.3*   PLATELETS 10*3/mm3 187     Results from last 7 days   Lab Units 03/26/21  1449   SODIUM mmol/L 137   POTASSIUM mmol/L 4.3   CHLORIDE mmol/L 103   CO2 mmol/L 23.4   BUN mg/dL 32*   CREATININE mg/dL 0.89   CALCIUM mg/dL 9.4   ALBUMIN g/dL 3.90   BILIRUBIN mg/dL 0.2   ALK PHOS U/L 92   ALT (SGPT) U/L 19   AST (SGOT) U/L 17   GLUCOSE mg/dL 172*   FERRITIN ng/mL 777.10*   IRON mcg/dL 81   TIBC mcg/dL 344           Assessment/Plan    .     1. Metastatic left breast cancer to multiple skeletal sites including cervical spine, sternum, lumbar spine and right femur.  · Previously treated with Abraxane Xgeva  · Documented radiographic progression by bone scan 3/15/2017, treated with Femara Kisqali, Xgeva every 3 months  · Progression of bony disease June 2020.  Femara Kisqali discontinued.  Transition to Halaven  · Palliative skeletal radiation to the right shoulder and pelvis September 2020  · Treatment holiday from Halaven 11/4/2020 due to progressive fatigue  · CA 15-3 has been declining while on Halaven, most recently 50.9.   · 12/2/2020, CA 15-3 55.8, increased to 69.1 2/22/2021  · Bone scan 10/28/2020 with increased uptake in the right scapula, no other significant change  · CT scans 12/28/2020 with extensive bony disease, new hypodense left hepatic lesion in the liver  · Radiation to the hip complete 1/26/2021  · Single agent Adriamycin initiated 2/5/2021 to be given 3 to 4 weeks  · Proceed today with cycle 2-day  15  · Following the completion of 2 cycles of Adriamycin, we will obtain CT of the abdomen pelvis as well as bone scan.  CA 15-3 is pending today.    2.  Bony metastasis  · Now receiving Xgeva every 3 months, last given 3/12/2021    3.  Anemia, multifactorial  · Previously with iron deficiency due to GI blood loss.  Anticoagulation has been discontinued  · Progressive anemia secondary to Halaven and neoplastic disease of the bones  · Status post transfusion 11/6/2020  · Hemoglobin today of 8.9.  Repeat ferritin and iron profile without iron deficiency    4.  Hypomagnesemia  · Continuing on magnesium replacement twice daily  · Most recent magnesium of 1.7    5.  Prophylaxis  · While receiving Adriamycin, she continues on metoprolol to minimize cardiac toxicity  · Baseline echocardiogram 1/29/2021 with ejection fraction of 69%    PLAN:    1. Proceed today with cycle 2-day 15 single agent Adriamycin  2. Xgeva is continued every 3 months, last given 3/12/2021  3. CA 15-3 pending today  4. In 1 week, the patient will undergo CT of the abdomen pelvis as well as bone scan to evaluate the state of her malignancy  5. MD follow-up in 2 weeks for review of imaging, CA 15-3, possible cycle 3 Adriamycin pending scan review    Patient is on high risk medication requiring close monitoring for toxicity.  Today's plan was reviewed with Dr. Saez who is in agreement    Mary Lou Garcia, JESSICA  03/26/2021

## 2021-03-30 ENCOUNTER — TELEPHONE (OUTPATIENT)
Dept: ONCOLOGY | Facility: CLINIC | Age: 66
End: 2021-03-30

## 2021-03-30 NOTE — TELEPHONE ENCOUNTER
----- Message from Mary Lou Wild sent at 3/30/2021  7:34 AM EDT -----  Regarding: cancel port access  Please cancel her port flush on 4/6/21.  She is going to have her CT scans at the hospital since she is having a bone scan.

## 2021-04-06 ENCOUNTER — HOSPITAL ENCOUNTER (OUTPATIENT)
Dept: CT IMAGING | Facility: HOSPITAL | Age: 66
Discharge: HOME | End: 2021-04-06
Admitting: NURSE PRACTITIONER

## 2021-04-06 ENCOUNTER — APPOINTMENT (OUTPATIENT)
Dept: ONCOLOGY | Facility: HOSPITAL | Age: 66
End: 2021-04-06

## 2021-04-06 ENCOUNTER — HOSPITAL ENCOUNTER (OUTPATIENT)
Dept: NUCLEAR MEDICINE | Facility: HOSPITAL | Age: 66
Discharge: HOME | End: 2021-04-06

## 2021-04-06 DIAGNOSIS — C79.51 BONE METASTASIS: ICD-10-CM

## 2021-04-06 DIAGNOSIS — C50.912 PRIMARY MALIGNANT NEOPLASM OF LEFT BREAST (HCC): ICD-10-CM

## 2021-04-06 LAB — CREAT BLDA-MCNC: 1 MG/DL (ref 0.6–1.3)

## 2021-04-06 PROCEDURE — 0 TECHNETIUM MEDRONATE KIT: Performed by: NURSE PRACTITIONER

## 2021-04-06 PROCEDURE — A9503 TC99M MEDRONATE: HCPCS | Performed by: NURSE PRACTITIONER

## 2021-04-06 PROCEDURE — 74177 CT ABD & PELVIS W/CONTRAST: CPT

## 2021-04-06 PROCEDURE — 78306 BONE IMAGING WHOLE BODY: CPT

## 2021-04-06 PROCEDURE — 25010000002 IOPAMIDOL 61 % SOLUTION: Performed by: NURSE PRACTITIONER

## 2021-04-06 PROCEDURE — 82565 ASSAY OF CREATININE: CPT

## 2021-04-06 RX ORDER — TC 99M MEDRONATE 20 MG/10ML
22.2 INJECTION, POWDER, LYOPHILIZED, FOR SOLUTION INTRAVENOUS
Status: COMPLETED | OUTPATIENT
Start: 2021-04-06 | End: 2021-04-06

## 2021-04-06 RX ADMIN — IOPAMIDOL 100 ML: 612 INJECTION, SOLUTION INTRAVENOUS at 10:42

## 2021-04-06 RX ADMIN — Medication 22.2 MILLICURIE: at 07:49

## 2021-04-07 ENCOUNTER — IMMUNIZATION (OUTPATIENT)
Dept: VACCINE CLINIC | Facility: HOSPITAL | Age: 66
End: 2021-04-07

## 2021-04-07 PROCEDURE — 0002A: CPT | Performed by: INTERNAL MEDICINE

## 2021-04-07 PROCEDURE — 91300 HC SARSCOV02 VAC 30MCG/0.3ML IM: CPT | Performed by: INTERNAL MEDICINE

## 2021-04-09 ENCOUNTER — INFUSION (OUTPATIENT)
Dept: ONCOLOGY | Facility: HOSPITAL | Age: 66
End: 2021-04-09

## 2021-04-09 ENCOUNTER — APPOINTMENT (OUTPATIENT)
Dept: ONCOLOGY | Facility: HOSPITAL | Age: 66
End: 2021-04-09

## 2021-04-09 ENCOUNTER — OFFICE VISIT (OUTPATIENT)
Dept: ONCOLOGY | Facility: CLINIC | Age: 66
End: 2021-04-09

## 2021-04-09 ENCOUNTER — TRANSCRIBE ORDERS (OUTPATIENT)
Dept: ADMINISTRATIVE | Facility: HOSPITAL | Age: 66
End: 2021-04-09

## 2021-04-09 VITALS
WEIGHT: 210.5 LBS | HEIGHT: 64 IN | OXYGEN SATURATION: 96 % | RESPIRATION RATE: 20 BRPM | BODY MASS INDEX: 35.94 KG/M2 | SYSTOLIC BLOOD PRESSURE: 127 MMHG | DIASTOLIC BLOOD PRESSURE: 66 MMHG | TEMPERATURE: 97.5 F | HEART RATE: 98 BPM

## 2021-04-09 DIAGNOSIS — Z01.812 ENCOUNTER FOR PREPROCEDURE SCREENING LABORATORY TESTING FOR COVID-19: Primary | ICD-10-CM

## 2021-04-09 DIAGNOSIS — C79.51 BONE METASTASIS: ICD-10-CM

## 2021-04-09 DIAGNOSIS — R01.1 HEART MURMUR, SYSTOLIC: ICD-10-CM

## 2021-04-09 DIAGNOSIS — Z45.2 FITTING AND ADJUSTMENT OF VASCULAR CATHETER: ICD-10-CM

## 2021-04-09 DIAGNOSIS — D51.0 VITAMIN B12 DEFICIENCY ANEMIA DUE TO INTRINSIC FACTOR DEFICIENCY: ICD-10-CM

## 2021-04-09 DIAGNOSIS — Z20.822 ENCOUNTER FOR PREPROCEDURE SCREENING LABORATORY TESTING FOR COVID-19: Primary | ICD-10-CM

## 2021-04-09 DIAGNOSIS — D68.59 THROMBOPHILIA (HCC): ICD-10-CM

## 2021-04-09 DIAGNOSIS — Z45.2 FITTING AND ADJUSTMENT OF VASCULAR CATHETER: Primary | ICD-10-CM

## 2021-04-09 DIAGNOSIS — D63.0 ANEMIA IN NEOPLASTIC DISEASE: ICD-10-CM

## 2021-04-09 DIAGNOSIS — D70.1 LEUKOPENIA DUE TO ANTINEOPLASTIC CHEMOTHERAPY (HCC): ICD-10-CM

## 2021-04-09 DIAGNOSIS — C78.7 LIVER METASTASIS: ICD-10-CM

## 2021-04-09 DIAGNOSIS — T45.1X5A LEUKOPENIA DUE TO ANTINEOPLASTIC CHEMOTHERAPY (HCC): ICD-10-CM

## 2021-04-09 DIAGNOSIS — C50.912 PRIMARY MALIGNANT NEOPLASM OF LEFT BREAST (HCC): Primary | ICD-10-CM

## 2021-04-09 DIAGNOSIS — E83.42 HYPOMAGNESEMIA: ICD-10-CM

## 2021-04-09 DIAGNOSIS — C50.912 PRIMARY MALIGNANT NEOPLASM OF LEFT BREAST (HCC): ICD-10-CM

## 2021-04-09 LAB
ALBUMIN SERPL-MCNC: 4.1 G/DL (ref 3.5–5.2)
ALBUMIN/GLOB SERPL: 1.6 G/DL (ref 1.1–2.4)
ALP SERPL-CCNC: 94 U/L (ref 38–116)
ALT SERPL W P-5'-P-CCNC: 24 U/L (ref 0–33)
ANION GAP SERPL CALCULATED.3IONS-SCNC: 11 MMOL/L (ref 5–15)
AST SERPL-CCNC: 22 U/L (ref 0–32)
BASOPHILS # BLD AUTO: 0.04 10*3/MM3 (ref 0–0.2)
BASOPHILS NFR BLD AUTO: 1.2 % (ref 0–1.5)
BILIRUB SERPL-MCNC: 0.2 MG/DL (ref 0.2–1.2)
BUN SERPL-MCNC: 31 MG/DL (ref 6–20)
BUN/CREAT SERPL: 35.6 (ref 7.3–30)
CALCIUM SPEC-SCNC: 9.6 MG/DL (ref 8.5–10.2)
CHLORIDE SERPL-SCNC: 102 MMOL/L (ref 98–107)
CO2 SERPL-SCNC: 24 MMOL/L (ref 22–29)
CREAT SERPL-MCNC: 0.87 MG/DL (ref 0.6–1.1)
DEPRECATED RDW RBC AUTO: 69 FL (ref 37–54)
EOSINOPHIL # BLD AUTO: 0.13 10*3/MM3 (ref 0–0.4)
EOSINOPHIL NFR BLD AUTO: 3.8 % (ref 0.3–6.2)
ERYTHROCYTE [DISTWIDTH] IN BLOOD BY AUTOMATED COUNT: 18.9 % (ref 12.3–15.4)
GFR SERPL CREATININE-BSD FRML MDRD: 66 ML/MIN/1.73
GLOBULIN UR ELPH-MCNC: 2.5 GM/DL (ref 1.8–3.5)
GLUCOSE SERPL-MCNC: 100 MG/DL (ref 74–124)
HCT VFR BLD AUTO: 27.4 % (ref 34–46.6)
HGB BLD-MCNC: 9 G/DL (ref 12–15.9)
IMM GRANULOCYTES # BLD AUTO: 0.04 10*3/MM3 (ref 0–0.05)
IMM GRANULOCYTES NFR BLD AUTO: 1.2 % (ref 0–0.5)
LYMPHOCYTES # BLD AUTO: 0.61 10*3/MM3 (ref 0.7–3.1)
LYMPHOCYTES NFR BLD AUTO: 18 % (ref 19.6–45.3)
MAGNESIUM SERPL-MCNC: 1.7 MG/DL (ref 1.8–2.5)
MCH RBC QN AUTO: 32.7 PG (ref 26.6–33)
MCHC RBC AUTO-ENTMCNC: 32.8 G/DL (ref 31.5–35.7)
MCV RBC AUTO: 99.6 FL (ref 79–97)
MONOCYTES # BLD AUTO: 0.77 10*3/MM3 (ref 0.1–0.9)
MONOCYTES NFR BLD AUTO: 22.8 % (ref 5–12)
NEUTROPHILS NFR BLD AUTO: 1.79 10*3/MM3 (ref 1.7–7)
NEUTROPHILS NFR BLD AUTO: 53 % (ref 42.7–76)
NRBC BLD AUTO-RTO: 0.9 /100 WBC (ref 0–0.2)
PHOSPHATE SERPL-MCNC: 3.1 MG/DL (ref 2.5–4.5)
PLATELET # BLD AUTO: 192 10*3/MM3 (ref 140–450)
PMV BLD AUTO: 10.4 FL (ref 6–12)
POTASSIUM SERPL-SCNC: 4.4 MMOL/L (ref 3.5–4.7)
PROT SERPL-MCNC: 6.6 G/DL (ref 6.3–8)
RBC # BLD AUTO: 2.75 10*6/MM3 (ref 3.77–5.28)
SODIUM SERPL-SCNC: 137 MMOL/L (ref 134–145)
WBC # BLD AUTO: 3.38 10*3/MM3 (ref 3.4–10.8)

## 2021-04-09 PROCEDURE — 25010000003 HEPARIN LOCK FLUSH PER 10 UNITS: Performed by: INTERNAL MEDICINE

## 2021-04-09 PROCEDURE — 36591 DRAW BLOOD OFF VENOUS DEVICE: CPT

## 2021-04-09 PROCEDURE — 36415 COLL VENOUS BLD VENIPUNCTURE: CPT

## 2021-04-09 PROCEDURE — 99214 OFFICE O/P EST MOD 30 MIN: CPT | Performed by: INTERNAL MEDICINE

## 2021-04-09 PROCEDURE — 83735 ASSAY OF MAGNESIUM: CPT

## 2021-04-09 PROCEDURE — 80053 COMPREHEN METABOLIC PANEL: CPT

## 2021-04-09 PROCEDURE — 85025 COMPLETE CBC W/AUTO DIFF WBC: CPT

## 2021-04-09 PROCEDURE — 84100 ASSAY OF PHOSPHORUS: CPT

## 2021-04-09 RX ORDER — HEPARIN SODIUM (PORCINE) LOCK FLUSH IV SOLN 100 UNIT/ML 100 UNIT/ML
500 SOLUTION INTRAVENOUS AS NEEDED
Status: DISCONTINUED | OUTPATIENT
Start: 2021-04-09 | End: 2021-04-09 | Stop reason: HOSPADM

## 2021-04-09 RX ORDER — SODIUM CHLORIDE 0.9 % (FLUSH) 0.9 %
10 SYRINGE (ML) INJECTION AS NEEDED
Status: CANCELLED | OUTPATIENT
Start: 2021-04-09

## 2021-04-09 RX ORDER — HEPARIN SODIUM (PORCINE) LOCK FLUSH IV SOLN 100 UNIT/ML 100 UNIT/ML
500 SOLUTION INTRAVENOUS AS NEEDED
Status: CANCELLED | OUTPATIENT
Start: 2021-04-09

## 2021-04-09 RX ADMIN — SODIUM CHLORIDE, PRESERVATIVE FREE 500 UNITS: 5 INJECTION INTRAVENOUS at 12:40

## 2021-04-09 NOTE — PROGRESS NOTES
Subjective  REASONS FOR FOLLOWUP: metastatic breast cancer to bones and liver, progressive liver metastasis on 4/21, adriamycin stopped.Proceed with liver biopsy to investigate tumor  mutations      History of Present Illness     PATIENT WAS CALLED THE DAY BEFORE BY THE OFFICE TO ASK FOR SYMPTOMS THAT COULD BE CONSISTENT WITH CORONAVIRUS INFECTION, AND BEING NEGATIVE WAS SCHEDULED TO BE SEEN IN THE OFFICE TODAY. SIMILAR QUESTIONING TODAY INCLUDING, CHILLS, FEVER, NEW COUGH, SHORTNESS OF BREATH, DIARRHEA,DIFFUSE BODY ACHES  AND CHANGES IN SMELL OR TASTE WERE NEGATIVE.THE PATIENT DENIED ANY CONTACT WITH PERSONS WHO WERE POSITIVE FOR COVID, AND PATIENT IS NOT IN CATEGORY OF HIGH RISK BEHAVIOR TO ACQUIRE COVID.    DURING THE VISIT WITH THE PATIENT TODAY , PATIENT HAD FACE MASK, MY MEDICAL ASSISTANT AND I  HAD PROPPER PROTECTIVE EQUIPMENT, AND I DID HAND HYGIENE WITH SOAP AND WATER BEFORE AND AFTER THE VISIT.    This patient returns today to the office for follow up after she has undergone a recent bone scan and CT scan of the abdomen and pelvis in order to review the status of her disease now that she is receiving Adriamycin 3 weeks out of 4 on a weekly basis. Overall the patient has been feeling relatively well but she still has significant fatigue for minimal activity and achiness throughout her body that changes day in and day out. She continues using Aleve as a main pain medication using 3-4 tablets of Aleve a day. She has not had any GI side effects of this medicine. Her appetite remains good, her weight is stable. She has not developed any nausea or vomiting, no jaundice, normal  bowel activity, normal urination. She has not had any cough, sputum production or shortness of breath. No neurological symptomatology. Otherwise the patient has not had any fever or infection. She will complete her COVID vaccination very soon.                    Past Medical History:   Diagnosis Date   • Abnormal mammogram    • Abnormal menstrual cycle    • Arthritis    • Bone metastasis (CMS/HCC)    • Breast cancer (CMS/HCC) 2000    Left breast   • Drug therapy 2001    breast cancer   • Drug therapy 2017    right femur/associated with breast cancer   • DVT (deep venous thrombosis) (CMS/HCC)    • H/O Heart murmur    • History of colon polyps    • Hx of radiation therapy 2000    breast cancer   • Hx of radiation therapy 2015    bone cancer right femur/ associated with breast cancer   • Hypercholesterolemia    • Hyperlipidemia    • Hypertension    • Multiple benign polyps of large intestine    • Reflux esophagitis    • Shingles      Social History     Socioeconomic History   • Marital status: Single     Spouse name: Not on file   • Number of children: Not on file   • Years of education: Not on file   • Highest education level: Not on file   Tobacco Use   • Smoking status: Never Smoker   • Smokeless tobacco: Never Used   Substance and Sexual Activity   • Alcohol use: No   • Drug use: No   • Sexual activity: Defer     Family History   Problem Relation Age of Onset   • Hypertension Mother    • Diabetes Mother    • Macular degeneration Mother    • Thyroid disease Mother    • Heart disease Father    • Stroke Father    • Kidney disease Father    • Glaucoma Brother    • Diabetes Brother    • Hypertension Brother    • Colon cancer Paternal Grandmother    • Thyroid disease Other    • Kidney disease Other    • Glaucoma Other    • Cancer Other    • Diabetes Other      Current Outpatient Medications on File Prior to Visit   Medication Sig Dispense Refill   • atorvastatin (LIPITOR) 20 MG tablet Take 1 tablet by mouth once daily 90  tablet 0   • Cyanocobalamin (B-12 PO) Take 1,000 mcg by mouth Daily.     • denosumab (XGEVA) 120 MG/1.7ML solution injection Inject  under the skin 1 (one) time.     • Diclofenac Sodium (VOLTAREN) 1 % gel gel APPLY 2 TO 4 GRAMS TOPICALLY TO AFFECTED AREA TWICE DAILY 100 g 2   • HYDROcodone-acetaminophen (NORCO) 5-325 MG per tablet Take 1 tablet by mouth Every 6 (Six) Hours As Needed.     • lisinopril-hydrochlorothiazide (PRINZIDE,ZESTORETIC) 10-12.5 MG per tablet Take 1 tablet by mouth twice daily 180 tablet 0   • magnesium chloride ER (Mag64) 64 MG DR tablet Take 1 tablet by mouth 2 (two) times a day. 180 tablet 3   • metoprolol tartrate (LOPRESSOR) 25 MG tablet Take 0.5 tablets by mouth 2 (Two) Times a Day. 30 tablet 6   • Naproxen Sod-Diphenhydramine (ALEVE PM PO) Take  by mouth as needed.     • Naproxen Sodium (ALEVE PO) Take  by mouth.     • Omega-3 Fatty Acids (FISH OIL) 645 MG capsule Take  by mouth.     • OMEPRAZOLE PO Take  by mouth Daily.     • ondansetron (ZOFRAN) 8 MG tablet Take 1 tablet by mouth 3 (Three) Times a Day As Needed for Nausea or Vomiting. 30 tablet 5     No current facility-administered medications on file prior to visit.     Active Ambulatory Problems     Diagnosis Date Noted   • Benign essential HTN 02/18/2016   • Primary malignant neoplasm of left breast (CMS/HCC) 02/18/2016   • Thrombophilia (CMS/HCC) 02/18/2016   • Hyperlipidemia 02/18/2016   • IFG (impaired fasting glucose) 02/18/2016   • Osteopenia 02/18/2016   • History of colon polyps 03/08/2016   • Bone metastasis (CMS/HCC) 05/20/2016   • Anemia in neoplastic disease 05/20/2016   • Heart murmur, systolic 10/25/2016   • Fitting and adjustment of vascular catheter 03/13/2017   • Vitamin B12 deficiency anemia due to intrinsic factor deficiency 06/13/2017   • Leukopenia due to antineoplastic chemotherapy (CMS/HCC) 09/06/2017   • Hypomagnesemia 09/30/2020   • History of pulmonary embolism 02/19/2021   • Liver metastasis (CMS/HCC)  04/09/2021     Resolved Ambulatory Problems     Diagnosis Date Noted   • No Resolved Ambulatory Problems     Past Medical History:   Diagnosis Date   • Abnormal mammogram    • Abnormal menstrual cycle    • Arthritis    • Breast cancer (CMS/[facility]) 2000   • Drug therapy 2001   • Drug therapy 2017   • DVT (deep venous thrombosis) (CMS/[facility])    • H/O Heart murmur    • Hx of radiation therapy 2000   • Hx of radiation therapy 2015   • Hypercholesterolemia    • Hypertension    • Multiple benign polyps of large intestine    • Reflux esophagitis    • Shingles       Past Surgical History:   Procedure Laterality Date   • BREAST BIOPSY Left 2000    breast cancer   • BREAST SURGERY  2000    lumpectomy, mastectomy, revision   • COLONOSCOPY  2012   • FEMUR FRACTURE SURGERY      secondary to metastatic breast cancer 7/2014, with revision in 9/2015   • MASTECTOMY Left 2000    transflap in 2003           .          ALLERGIES:    Allergies   Allergen Reactions   • Codeine Unknown - Low Severity   • Docetaxel Unknown - Low Severity   • Paclitaxel Unknown - Low Severity       Objective        EXAM:    I HAVE PERSONALLY REVIEWED THE HISTORY OF THE PRESENT ILLNESS, PAST MEDICAL HISTORY, FAMILY HISTORY, SOCIAL HISTORY, ALLERGIES, MEDICATIONS STATED ABOVE IN THE OFFICE NOTE FROM TODAY.        GENERAL:  Well-developed, well-nourished  Patient  in no acute distress.   SKIN:  Warm, dry ,NO rashes,NO purpura ,NO petechiae.  HEENT:  Pupils were equal and reactive to light and accomodation, conjunctivae noninjected, no pterygium, normal extraocular movements, normal visual acuity.   NECK:  Supple with good range of motion; no thyromegaly or masses, no JVD or bruits, no cervical adenopathies.No carotid artery pain, no carotid abnormal pulsation , NO arterial dance.  LYMPHATICS:  No cervical, NO supraclavicular, NO axillary,NO epitrochlear , NO inguinal adenopathy.  CARDIAC   normal rate and regular rhythm, with systolic basal 3/6  murmur,NO rubs NO  S3 NO S4 right or left . Normal femoral, popliteal, pedis, brachial and carotid pulses.  INSPECTION of  breast documented symmetry of the tissue per se and location and size of the nipple,no retractions or inversion of the nipple, normal skin without lesions, no erythema or nodules,no peau d'orange, no prominence of superficial veins or chest wall collateral circulation.PALPATION of the breast documented normal skin turgor, no induration, alteration in local temperature, or pain, no palpable masses or nodules, normal mobility of the tissues,no fixation of the tissue or parenchyma to the chest wall, no alteration at the tail of the breasts or axillas, no adenopathies. Surgical site was well healed.No lymphedema in either extremity.  VASCULAR ARTERIAL: normal carotids,brachial,radial,femoral,popliteal, pedis pulses , no bruits.no paleness or cyanosis, no pain, no edema, no numbness, no gangrene.  VASCULAR VENOUS: no cyanosis, collateral circulation, varicosities, edema, palpable cords, pain, erythema.  ABDOMEN:  Soft, nontender with no hepatomegaly, no splenomegaly,no masses, no ascites, no collateral circulation,no distention,no Randall sign, no abdominal pain, no inguinal hernias,no umbilical hernia, no abdominal bruits. Normal bowel sounds.  GENITAL: Not  Performed.  EXTREMITIES  AND SPINE:  No clubbing, cyanosis or edema, no deformities or pain . kyphosis,and scoliosis, no deformities or pain in spine, ribs or pelvic bone.  NEUROLOGICAL:  Patient was awake, alert, oriented to time, person and place.          Hematology WBC   Date Value Ref Range Status   04/09/2021 3.38 (L) 3.40 - 10.80 10*3/mm3 Final   02/03/2020 3.89 3.40 - 10.80 10*3/mm3 Final     RBC   Date Value Ref Range Status   04/09/2021 2.75 (L) 3.77 - 5.28 10*6/mm3 Final   02/03/2020 3.08 (L) 3.77 - 5.28 10*6/mm3 Final     Hemoglobin   Date Value Ref Range Status   04/09/2021 9.0 (L) 12.0 - 15.9 g/dL Final     Hematocrit   Date Value Ref Range Status    04/09/2021 27.4 (L) 34.0 - 46.6 % Final     Platelets   Date Value Ref Range Status   04/09/2021 192 140 - 450 10*3/mm3 Final      NUCLEAR MEDICINE WHOLE BODY BONE SCAN     HISTORY: Breast cancer. Follow-up bony metastatic disease.     TECHNIQUE:  22.2 mCi of 99m technetium MDP was administered IV followed  by 3 hour delayed phase whole body imaging with selected spot views.     COMPARISON: CT abdomen and pelvis 04/06/2021, whole body bone scan  10/28/2020.     FINDINGS:  There is multifocal abnormal uptake demonstrated throughout  the axial and appendicular skeleton.     Areas of increased uptake are present involving the calvarium, humeri,  femora, spine, ribs, pelvis. Overall, there is no evidence for  significant change compared to the previous exam. Both kidneys and  urinary bladder are visualized.     IMPRESSION;  Multifocal osseous metastatic disease without evidence for significant  change when compared to previous whole body bone scan 10/28/2020.     This report was finalized on 4/7/2021 9:20 AM by Dr. Jayme Busby M.D.     CT ABDOMEN AND PELVIS WITH IV CONTRAST     HISTORY: Metastatic breast cancer post 2 cycles of chemotherapy.  Follow-up exam.     TECHNIQUE: CT abdomen and pelvis through the trochanters with  intravenous and oral contrast.     COMPARISON: CT abdomen and pelvis with IV contrast 12/28/2020,  11/20/2015.     FINDINGS: Within the lateral segment left lobe of the liver there is a  low-density lesion that was demonstrated to be new on prior exam of  12/28/2020 and measures 2.1 x 1.6 cm which is slightly increased from 2  x 1.5 cm. This is associated with overlying capsular retraction, as  before. There have developed several new hepatic hypodense lesions. At  least 10 lesions are present and these are consistent with metastases.  The largest is the subcapsular lesion lateral segment left lobe of the  liver. The second largest is within the anterolateral right lobe of  liver measuring 19  mm. The low-density partially exophytic lesion within  the medial segment left lobe of liver measures 1.6 cm and this is  similar in size to the previous exam.     Calcified pulmonary embolus in the right lower lobe is without evidence  for change. When compared to 12/28/2020 there is chronic scarring or  atelectasis in the posterior medial right lower lobe. Adrenal glands,  spleen, pancreas appear within normal limits. Small renal cysts are  present. There is no hydronephrosis. There is no node enlargement in  abdomen or pelvis. Sigmoid diverticulosis is present without evidence  for diverticulitis. There is diastasis of the rectus sheath and there is  a right anterolateral abdominal wall hernia that contains a loop of  colon without evidence for obstruction or interval change.  There is  diffuse osteosclerotic metastatic disease without evidence for change.     IMPRESSION:  1. Multiple hepatic lesions have developed consistent with hepatic  metastatic disease. The largest lesion is the subcapsular lesion lateral  segment left lobe that was demonstrated previously and has minimally  increased in size compared to the previous exam of 12/28/2020.  2. Chronic partially calcified right lower lobe pulmonary embolus  without change.  3. Diffuse osteosclerotic metastatic disease without change.  4. Sigmoid diverticulosis. Small hiatal hernia.  5. Right anterolateral abdominal wall hernia contains a loop of colon  without change or evidence for obstruction.     Radiation dose reduction techniques were utilized, including automated  exposure control and exposure modulation based on body size.     This report was finalized on 4/6/2021 12:26 PM by Dr. Jayme Busby M.D.                Assessment/Plan    .   1.This patient has been treated for metastatic breast cancer to the bone only for almost 7 years. Only in the last few months the patient has developed liver metastasis and she has gone over 7 years with multiple  medications including anthracycline, Taxane, Kisqali, Femara, Halaven besides Xgeva. So far the patient is now clinically remains relatively stable, pain relatively well controlled using Aleve only and she has had multiple sites of radiation therapy given to her in the cervical spine, pelvic bone, right hip and many other sites. Now on the most recent bone scan her bone disease seems to be relatively stable radiologically speaking but her liver metastasis are getting larger and more numerous and many of them are very accessible for biopsy. I think for picking so many medicines for the last 7 years the patient has run out of options in regard to picking other medicine out of the box. Theoretically we could give her Gemzar or Navelbine for example and see how things go on these medicines. The resistance to the chemotherapy medicine administration that she has had leads me to believe that maybe she has a different  mutation and I wonder if the patient could have a HER/2 positive cancer at this point that could be amenable to a different treatment for example. I do not know if the patient's disease is still ER/NY positive like it was originally in 2014 at the time of her metastasis to the femur diagnosis. At that time the tissue was HER/2 negative.    I reviewed with the patient in the PAC system the CT scan. I showed her the pulmonary anatomy being normal, heart anatomy normal and new and progressive liver metastasis that are on the surface of the liver and very amenable for radiological intervention in regard biopsy. I do believe that this is probably the best way to go and the patient agrees with this. In preparation for this the patient will require a ride to be brought to the hospital and taken back home because she has no family member here and I know that the radiologists nowadays for these interventions are doing low dose IV sedation. Therefore under these circumstances the patient will require a ride.      I would like to review her back in the office in 3 weeks to go through the report of this and I am planning not only to review the pathology done at [facility] doing regular pathological analysis ER/HI and HER/2/sarah but also I am going to send the tissue for [facility]. Hopefully this will allow me to identify  mutations that could be amenable to intervention.    In the meantime the patient will remain on her Aleve for pain control. She has no need for using narcotic besides very sporadically.    I advised her to proceed with this.    I will review her back in 3 weeks with a CBC, CMP and a CA 15-3.    From the point of view of her Xgeva this medicine will remain ongoing for the time being with the same frequency.           THIS IS AN  ILLNESS THAT POSES A THREAT TO LIFE AND BODY FUNCTION, REQUIRED REVIEW OF EXTERNAL DOCUMENT, AND REQUIRES INTENSIVE MONITORING OF A LAB TEST, A PHYSIOLOGIC TEST OR IMAGING FOR THERAPY DIAGNOSES  AND  TOXICITY.           4/9/2021

## 2021-04-09 NOTE — H&P (VIEW-ONLY)
Subjective  REASONS FOR FOLLOWUP: metastatic breast cancer to bones and liver, progressive liver metastasis on 4/21, adriamycin stopped.Proceed with liver biopsy to investigate tumor  mutations      History of Present Illness     PATIENT WAS CALLED THE DAY BEFORE BY THE OFFICE TO ASK FOR SYMPTOMS THAT COULD BE CONSISTENT WITH CORONAVIRUS INFECTION, AND BEING NEGATIVE WAS SCHEDULED TO BE SEEN IN THE OFFICE TODAY. SIMILAR QUESTIONING TODAY INCLUDING, CHILLS, FEVER, NEW COUGH, SHORTNESS OF BREATH, DIARRHEA,DIFFUSE BODY ACHES  AND CHANGES IN SMELL OR TASTE WERE NEGATIVE.THE PATIENT DENIED ANY CONTACT WITH PERSONS WHO WERE POSITIVE FOR COVID, AND PATIENT IS NOT IN CATEGORY OF HIGH RISK BEHAVIOR TO ACQUIRE COVID.    DURING THE VISIT WITH THE PATIENT TODAY , PATIENT HAD FACE MASK, MY MEDICAL ASSISTANT AND I  HAD PROPPER PROTECTIVE EQUIPMENT, AND I DID HAND HYGIENE WITH SOAP AND WATER BEFORE AND AFTER THE VISIT.    This patient returns today to the office for follow up after she has undergone a recent bone scan and CT scan of the abdomen and pelvis in order to review the status of her disease now that she is receiving Adriamycin 3 weeks out of 4 on a weekly basis. Overall the patient has been feeling relatively well but she still has significant fatigue for minimal activity and achiness throughout her body that changes day in and day out. She continues using Aleve as a main pain medication using 3-4 tablets of Aleve a day. She has not had any GI side effects of this medicine. Her appetite remains good, her weight is stable. She has not developed any nausea or vomiting, no jaundice, normal  bowel activity, normal urination. She has not had any cough, sputum production or shortness of breath. No neurological symptomatology. Otherwise the patient has not had any fever or infection. She will complete her COVID vaccination very soon.                    Past Medical History:   Diagnosis Date   • Abnormal mammogram    • Abnormal menstrual cycle    • Arthritis    • Bone metastasis (CMS/HCC)    • Breast cancer (CMS/HCC) 2000    Left breast   • Drug therapy 2001    breast cancer   • Drug therapy 2017    right femur/associated with breast cancer   • DVT (deep venous thrombosis) (CMS/HCC)    • H/O Heart murmur    • History of colon polyps    • Hx of radiation therapy 2000    breast cancer   • Hx of radiation therapy 2015    bone cancer right femur/ associated with breast cancer   • Hypercholesterolemia    • Hyperlipidemia    • Hypertension    • Multiple benign polyps of large intestine    • Reflux esophagitis    • Shingles      Social History     Socioeconomic History   • Marital status: Single     Spouse name: Not on file   • Number of children: Not on file   • Years of education: Not on file   • Highest education level: Not on file   Tobacco Use   • Smoking status: Never Smoker   • Smokeless tobacco: Never Used   Substance and Sexual Activity   • Alcohol use: No   • Drug use: No   • Sexual activity: Defer     Family History   Problem Relation Age of Onset   • Hypertension Mother    • Diabetes Mother    • Macular degeneration Mother    • Thyroid disease Mother    • Heart disease Father    • Stroke Father    • Kidney disease Father    • Glaucoma Brother    • Diabetes Brother    • Hypertension Brother    • Colon cancer Paternal Grandmother    • Thyroid disease Other    • Kidney disease Other    • Glaucoma Other    • Cancer Other    • Diabetes Other      Current Outpatient Medications on File Prior to Visit   Medication Sig Dispense Refill   • atorvastatin (LIPITOR) 20 MG tablet Take 1 tablet by mouth once daily 90  tablet 0   • Cyanocobalamin (B-12 PO) Take 1,000 mcg by mouth Daily.     • denosumab (XGEVA) 120 MG/1.7ML solution injection Inject  under the skin 1 (one) time.     • Diclofenac Sodium (VOLTAREN) 1 % gel gel APPLY 2 TO 4 GRAMS TOPICALLY TO AFFECTED AREA TWICE DAILY 100 g 2   • HYDROcodone-acetaminophen (NORCO) 5-325 MG per tablet Take 1 tablet by mouth Every 6 (Six) Hours As Needed.     • lisinopril-hydrochlorothiazide (PRINZIDE,ZESTORETIC) 10-12.5 MG per tablet Take 1 tablet by mouth twice daily 180 tablet 0   • magnesium chloride ER (Mag64) 64 MG DR tablet Take 1 tablet by mouth 2 (two) times a day. 180 tablet 3   • metoprolol tartrate (LOPRESSOR) 25 MG tablet Take 0.5 tablets by mouth 2 (Two) Times a Day. 30 tablet 6   • Naproxen Sod-Diphenhydramine (ALEVE PM PO) Take  by mouth as needed.     • Naproxen Sodium (ALEVE PO) Take  by mouth.     • Omega-3 Fatty Acids (FISH OIL) 645 MG capsule Take  by mouth.     • OMEPRAZOLE PO Take  by mouth Daily.     • ondansetron (ZOFRAN) 8 MG tablet Take 1 tablet by mouth 3 (Three) Times a Day As Needed for Nausea or Vomiting. 30 tablet 5     No current facility-administered medications on file prior to visit.     Active Ambulatory Problems     Diagnosis Date Noted   • Benign essential HTN 02/18/2016   • Primary malignant neoplasm of left breast (CMS/HCC) 02/18/2016   • Thrombophilia (CMS/HCC) 02/18/2016   • Hyperlipidemia 02/18/2016   • IFG (impaired fasting glucose) 02/18/2016   • Osteopenia 02/18/2016   • History of colon polyps 03/08/2016   • Bone metastasis (CMS/HCC) 05/20/2016   • Anemia in neoplastic disease 05/20/2016   • Heart murmur, systolic 10/25/2016   • Fitting and adjustment of vascular catheter 03/13/2017   • Vitamin B12 deficiency anemia due to intrinsic factor deficiency 06/13/2017   • Leukopenia due to antineoplastic chemotherapy (CMS/HCC) 09/06/2017   • Hypomagnesemia 09/30/2020   • History of pulmonary embolism 02/19/2021   • Liver metastasis (CMS/HCC)  04/09/2021     Resolved Ambulatory Problems     Diagnosis Date Noted   • No Resolved Ambulatory Problems     Past Medical History:   Diagnosis Date   • Abnormal mammogram    • Abnormal menstrual cycle    • Arthritis    • Breast cancer (CMS/[facility]) 2000   • Drug therapy 2001   • Drug therapy 2017   • DVT (deep venous thrombosis) (CMS/[facility])    • H/O Heart murmur    • Hx of radiation therapy 2000   • Hx of radiation therapy 2015   • Hypercholesterolemia    • Hypertension    • Multiple benign polyps of large intestine    • Reflux esophagitis    • Shingles       Past Surgical History:   Procedure Laterality Date   • BREAST BIOPSY Left 2000    breast cancer   • BREAST SURGERY  2000    lumpectomy, mastectomy, revision   • COLONOSCOPY  2012   • FEMUR FRACTURE SURGERY      secondary to metastatic breast cancer 7/2014, with revision in 9/2015   • MASTECTOMY Left 2000    transflap in 2003           .          ALLERGIES:    Allergies   Allergen Reactions   • Codeine Unknown - Low Severity   • Docetaxel Unknown - Low Severity   • Paclitaxel Unknown - Low Severity       Objective        EXAM:    I HAVE PERSONALLY REVIEWED THE HISTORY OF THE PRESENT ILLNESS, PAST MEDICAL HISTORY, FAMILY HISTORY, SOCIAL HISTORY, ALLERGIES, MEDICATIONS STATED ABOVE IN THE OFFICE NOTE FROM TODAY.        GENERAL:  Well-developed, well-nourished  Patient  in no acute distress.   SKIN:  Warm, dry ,NO rashes,NO purpura ,NO petechiae.  HEENT:  Pupils were equal and reactive to light and accomodation, conjunctivae noninjected, no pterygium, normal extraocular movements, normal visual acuity.   NECK:  Supple with good range of motion; no thyromegaly or masses, no JVD or bruits, no cervical adenopathies.No carotid artery pain, no carotid abnormal pulsation , NO arterial dance.  LYMPHATICS:  No cervical, NO supraclavicular, NO axillary,NO epitrochlear , NO inguinal adenopathy.  CARDIAC   normal rate and regular rhythm, with systolic basal 3/6  murmur,NO rubs NO  S3 NO S4 right or left . Normal femoral, popliteal, pedis, brachial and carotid pulses.  INSPECTION of  breast documented symmetry of the tissue per se and location and size of the nipple,no retractions or inversion of the nipple, normal skin without lesions, no erythema or nodules,no peau d'orange, no prominence of superficial veins or chest wall collateral circulation.PALPATION of the breast documented normal skin turgor, no induration, alteration in local temperature, or pain, no palpable masses or nodules, normal mobility of the tissues,no fixation of the tissue or parenchyma to the chest wall, no alteration at the tail of the breasts or axillas, no adenopathies. Surgical site was well healed.No lymphedema in either extremity.  VASCULAR ARTERIAL: normal carotids,brachial,radial,femoral,popliteal, pedis pulses , no bruits.no paleness or cyanosis, no pain, no edema, no numbness, no gangrene.  VASCULAR VENOUS: no cyanosis, collateral circulation, varicosities, edema, palpable cords, pain, erythema.  ABDOMEN:  Soft, nontender with no hepatomegaly, no splenomegaly,no masses, no ascites, no collateral circulation,no distention,no Randall sign, no abdominal pain, no inguinal hernias,no umbilical hernia, no abdominal bruits. Normal bowel sounds.  GENITAL: Not  Performed.  EXTREMITIES  AND SPINE:  No clubbing, cyanosis or edema, no deformities or pain . kyphosis,and scoliosis, no deformities or pain in spine, ribs or pelvic bone.  NEUROLOGICAL:  Patient was awake, alert, oriented to time, person and place.          Hematology WBC   Date Value Ref Range Status   04/09/2021 3.38 (L) 3.40 - 10.80 10*3/mm3 Final   02/03/2020 3.89 3.40 - 10.80 10*3/mm3 Final     RBC   Date Value Ref Range Status   04/09/2021 2.75 (L) 3.77 - 5.28 10*6/mm3 Final   02/03/2020 3.08 (L) 3.77 - 5.28 10*6/mm3 Final     Hemoglobin   Date Value Ref Range Status   04/09/2021 9.0 (L) 12.0 - 15.9 g/dL Final     Hematocrit   Date Value Ref Range Status    04/09/2021 27.4 (L) 34.0 - 46.6 % Final     Platelets   Date Value Ref Range Status   04/09/2021 192 140 - 450 10*3/mm3 Final      NUCLEAR MEDICINE WHOLE BODY BONE SCAN     HISTORY: Breast cancer. Follow-up bony metastatic disease.     TECHNIQUE:  22.2 mCi of 99m technetium MDP was administered IV followed  by 3 hour delayed phase whole body imaging with selected spot views.     COMPARISON: CT abdomen and pelvis 04/06/2021, whole body bone scan  10/28/2020.     FINDINGS:  There is multifocal abnormal uptake demonstrated throughout  the axial and appendicular skeleton.     Areas of increased uptake are present involving the calvarium, humeri,  femora, spine, ribs, pelvis. Overall, there is no evidence for  significant change compared to the previous exam. Both kidneys and  urinary bladder are visualized.     IMPRESSION;  Multifocal osseous metastatic disease without evidence for significant  change when compared to previous whole body bone scan 10/28/2020.     This report was finalized on 4/7/2021 9:20 AM by Dr. Jayme Busby M.D.     CT ABDOMEN AND PELVIS WITH IV CONTRAST     HISTORY: Metastatic breast cancer post 2 cycles of chemotherapy.  Follow-up exam.     TECHNIQUE: CT abdomen and pelvis through the trochanters with  intravenous and oral contrast.     COMPARISON: CT abdomen and pelvis with IV contrast 12/28/2020,  11/20/2015.     FINDINGS: Within the lateral segment left lobe of the liver there is a  low-density lesion that was demonstrated to be new on prior exam of  12/28/2020 and measures 2.1 x 1.6 cm which is slightly increased from 2  x 1.5 cm. This is associated with overlying capsular retraction, as  before. There have developed several new hepatic hypodense lesions. At  least 10 lesions are present and these are consistent with metastases.  The largest is the subcapsular lesion lateral segment left lobe of the  liver. The second largest is within the anterolateral right lobe of  liver measuring 19  mm. The low-density partially exophytic lesion within  the medial segment left lobe of liver measures 1.6 cm and this is  similar in size to the previous exam.     Calcified pulmonary embolus in the right lower lobe is without evidence  for change. When compared to 12/28/2020 there is chronic scarring or  atelectasis in the posterior medial right lower lobe. Adrenal glands,  spleen, pancreas appear within normal limits. Small renal cysts are  present. There is no hydronephrosis. There is no node enlargement in  abdomen or pelvis. Sigmoid diverticulosis is present without evidence  for diverticulitis. There is diastasis of the rectus sheath and there is  a right anterolateral abdominal wall hernia that contains a loop of  colon without evidence for obstruction or interval change.  There is  diffuse osteosclerotic metastatic disease without evidence for change.     IMPRESSION:  1. Multiple hepatic lesions have developed consistent with hepatic  metastatic disease. The largest lesion is the subcapsular lesion lateral  segment left lobe that was demonstrated previously and has minimally  increased in size compared to the previous exam of 12/28/2020.  2. Chronic partially calcified right lower lobe pulmonary embolus  without change.  3. Diffuse osteosclerotic metastatic disease without change.  4. Sigmoid diverticulosis. Small hiatal hernia.  5. Right anterolateral abdominal wall hernia contains a loop of colon  without change or evidence for obstruction.     Radiation dose reduction techniques were utilized, including automated  exposure control and exposure modulation based on body size.     This report was finalized on 4/6/2021 12:26 PM by Dr. Jayme Busby M.D.                Assessment/Plan    .   1.This patient has been treated for metastatic breast cancer to the bone only for almost 7 years. Only in the last few months the patient has developed liver metastasis and she has gone over 7 years with multiple  medications including anthracycline, Taxane, Kisqali, Femara, Halaven besides Xgeva. So far the patient is now clinically remains relatively stable, pain relatively well controlled using Aleve only and she has had multiple sites of radiation therapy given to her in the cervical spine, pelvic bone, right hip and many other sites. Now on the most recent bone scan her bone disease seems to be relatively stable radiologically speaking but her liver metastasis are getting larger and more numerous and many of them are very accessible for biopsy. I think for picking so many medicines for the last 7 years the patient has run out of options in regard to picking other medicine out of the box. Theoretically we could give her Gemzar or Navelbine for example and see how things go on these medicines. The resistance to the chemotherapy medicine administration that she has had leads me to believe that maybe she has a different  mutation and I wonder if the patient could have a HER/2 positive cancer at this point that could be amenable to a different treatment for example. I do not know if the patient's disease is still ER/[place] positive like it was originally in 2014 at the time of her metastasis to the femur diagnosis. At that time the tissue was HER/2 negative.    I reviewed with the patient in the PAC system the CT scan. I showed her the pulmonary anatomy being normal, heart anatomy normal and new and progressive liver metastasis that are on the surface of the liver and very amenable for radiological intervention in regard biopsy. I do believe that this is probably the best way to go and the patient agrees with this. In preparation for this the patient will require a ride to be brought to the hospital and taken back home because she has no family member here and I know that the radiologists nowadays for these interventions are doing low dose IV sedation. Therefore under these circumstances the patient will require a ride.      I would like to review her back in the office in 3 weeks to go through the report of this and I am planning not only to review the pathology done at [facility] doing regular pathological analysis ER/NV and HER/2/sarah but also I am going to send the tissue for [facility]. Hopefully this will allow me to identify  mutations that could be amenable to intervention.    In the meantime the patient will remain on her Aleve for pain control. She has no need for using narcotic besides very sporadically.    I advised her to proceed with this.    I will review her back in 3 weeks with a CBC, CMP and a CA 15-3.    From the point of view of her Xgeva this medicine will remain ongoing for the time being with the same frequency.           THIS IS AN  ILLNESS THAT POSES A THREAT TO LIFE AND BODY FUNCTION, REQUIRED REVIEW OF EXTERNAL DOCUMENT, AND REQUIRES INTENSIVE MONITORING OF A LAB TEST, A PHYSIOLOGIC TEST OR IMAGING FOR THERAPY DIAGNOSES  AND  TOXICITY.           4/9/2021

## 2021-04-10 NOTE — PROGRESS NOTES
04/14/21 0000   Pre-Procedure Phone Call   Procedure Time Verified Yes   Arrival Time 0630   Procedure Location Verified Yes   Medical History Reviewed No   NPO Status Reinforced Yes   Ride and Caregiver Arranged Yes   Phone Number for Ride/Caregiver instructed pt to take any heart or b/p meds before coming to procedure. Pt voiced understanding.   Patient Knows to Bring Current Medications No   Bring Outside Films Requested No

## 2021-04-12 ENCOUNTER — LAB (OUTPATIENT)
Dept: LAB | Facility: HOSPITAL | Age: 66
End: 2021-04-12

## 2021-04-12 ENCOUNTER — TELEPHONE (OUTPATIENT)
Dept: ONCOLOGY | Facility: CLINIC | Age: 66
End: 2021-04-12

## 2021-04-12 DIAGNOSIS — Z01.812 ENCOUNTER FOR PREPROCEDURE SCREENING LABORATORY TESTING FOR COVID-19: ICD-10-CM

## 2021-04-12 DIAGNOSIS — Z20.822 ENCOUNTER FOR PREPROCEDURE SCREENING LABORATORY TESTING FOR COVID-19: ICD-10-CM

## 2021-04-12 PROCEDURE — C9803 HOPD COVID-19 SPEC COLLECT: HCPCS

## 2021-04-12 PROCEDURE — U0004 COV-19 TEST NON-CDC HGH THRU: HCPCS

## 2021-04-12 NOTE — TELEPHONE ENCOUNTER
CSW contacted the patient to follow up on transportation needs.  The patient stated she was able to secure transportation for her biopsy, and currently does not have any transportation needs.  This is good news, since we would not be able to use [employer] for an appointment in which the patient has been sedated.      CSW provided the patient with contact information in case she has future transportation needs.      The patient had a few questions about enrolling in Medicare since she is nearing the age of 66, and CSW answered those questions to her satisfaction.      The patient also indicated she has a few questions about one of her medications.  CSW sent a message to RN asking her to call the patient to discuss.      CSW remains available to provide support/resource as needed.

## 2021-04-12 NOTE — TELEPHONE ENCOUNTER
Attempted to contact patient after receiving a message that she had medication questions. Left vm with my direct line to call back.

## 2021-04-12 NOTE — TELEPHONE ENCOUNTER
Patient called me back to let me know that her questions were answered by looking into her MyChart. No further questions.

## 2021-04-13 LAB — SARS-COV-2 RNA RESP QL NAA+PROBE: NOT DETECTED

## 2021-04-14 ENCOUNTER — HOSPITAL ENCOUNTER (OUTPATIENT)
Dept: CT IMAGING | Facility: HOSPITAL | Age: 66
Discharge: HOME | End: 2021-04-14
Admitting: INTERNAL MEDICINE

## 2021-04-14 VITALS
DIASTOLIC BLOOD PRESSURE: 64 MMHG | TEMPERATURE: 98 F | RESPIRATION RATE: 13 BRPM | WEIGHT: 210 LBS | SYSTOLIC BLOOD PRESSURE: 124 MMHG | OXYGEN SATURATION: 99 % | HEIGHT: 64 IN | HEART RATE: 81 BPM | BODY MASS INDEX: 35.85 KG/M2

## 2021-04-14 DIAGNOSIS — Z45.2 FITTING AND ADJUSTMENT OF VASCULAR CATHETER: ICD-10-CM

## 2021-04-14 DIAGNOSIS — C78.7 LIVER METASTASIS: Primary | ICD-10-CM

## 2021-04-14 LAB
INR PPP: 1.1 (ref 0.8–1.2)
PROTHROMBIN TIME: 13.7 SECONDS (ref 12.8–15.2)

## 2021-04-14 PROCEDURE — 25010000003 LIDOCAINE 1 % SOLUTION: Performed by: RADIOLOGY

## 2021-04-14 PROCEDURE — 25010000002 HEPARIN LOCK FLUSH PER 10 UNITS: Performed by: INTERNAL MEDICINE

## 2021-04-14 PROCEDURE — 25010000002 FENTANYL CITRATE (PF) 100 MCG/2ML SOLUTION: Performed by: RADIOLOGY

## 2021-04-14 PROCEDURE — 85610 PROTHROMBIN TIME: CPT

## 2021-04-14 PROCEDURE — 88341 IMHCHEM/IMCYTCHM EA ADD ANTB: CPT | Performed by: INTERNAL MEDICINE

## 2021-04-14 PROCEDURE — 88307 TISSUE EXAM BY PATHOLOGIST: CPT | Performed by: INTERNAL MEDICINE

## 2021-04-14 PROCEDURE — 25010000002 MIDAZOLAM PER 1 MG: Performed by: RADIOLOGY

## 2021-04-14 PROCEDURE — 77012 CT SCAN FOR NEEDLE BIOPSY: CPT

## 2021-04-14 PROCEDURE — 88342 IMHCHEM/IMCYTCHM 1ST ANTB: CPT | Performed by: INTERNAL MEDICINE

## 2021-04-14 PROCEDURE — 88360 TUMOR IMMUNOHISTOCHEM/MANUAL: CPT | Performed by: INTERNAL MEDICINE

## 2021-04-14 RX ORDER — HEPARIN SODIUM (PORCINE) LOCK FLUSH IV SOLN 100 UNIT/ML 100 UNIT/ML
500 SOLUTION INTRAVENOUS AS NEEDED
Status: DISCONTINUED | OUTPATIENT
Start: 2021-04-14 | End: 2021-04-15 | Stop reason: HOSPADM

## 2021-04-14 RX ORDER — FENTANYL CITRATE 50 UG/ML
INJECTION, SOLUTION INTRAMUSCULAR; INTRAVENOUS
Status: COMPLETED | OUTPATIENT
Start: 2021-04-14 | End: 2021-04-14

## 2021-04-14 RX ORDER — SODIUM CHLORIDE 0.9 % (FLUSH) 0.9 %
3 SYRINGE (ML) INJECTION EVERY 12 HOURS SCHEDULED
Status: DISCONTINUED | OUTPATIENT
Start: 2021-04-14 | End: 2021-04-15 | Stop reason: HOSPADM

## 2021-04-14 RX ORDER — SODIUM CHLORIDE 0.9 % (FLUSH) 0.9 %
10 SYRINGE (ML) INJECTION AS NEEDED
Status: DISCONTINUED | OUTPATIENT
Start: 2021-04-14 | End: 2021-04-15 | Stop reason: HOSPADM

## 2021-04-14 RX ORDER — LIDOCAINE HYDROCHLORIDE 10 MG/ML
20 INJECTION, SOLUTION INFILTRATION; PERINEURAL ONCE
Status: COMPLETED | OUTPATIENT
Start: 2021-04-14 | End: 2021-04-14

## 2021-04-14 RX ORDER — SODIUM CHLORIDE 9 MG/ML
25 INJECTION, SOLUTION INTRAVENOUS ONCE
Status: COMPLETED | OUTPATIENT
Start: 2021-04-14 | End: 2021-04-14

## 2021-04-14 RX ORDER — HEPARIN SODIUM (PORCINE) LOCK FLUSH IV SOLN 100 UNIT/ML 100 UNIT/ML
500 SOLUTION INTRAVENOUS AS NEEDED
Status: CANCELLED | OUTPATIENT
Start: 2021-04-14

## 2021-04-14 RX ORDER — SODIUM CHLORIDE 0.9 % (FLUSH) 0.9 %
10 SYRINGE (ML) INJECTION AS NEEDED
Status: CANCELLED | OUTPATIENT
Start: 2021-04-14

## 2021-04-14 RX ORDER — MIDAZOLAM HYDROCHLORIDE 1 MG/ML
INJECTION INTRAMUSCULAR; INTRAVENOUS
Status: COMPLETED | OUTPATIENT
Start: 2021-04-14 | End: 2021-04-14

## 2021-04-14 RX ADMIN — FENTANYL CITRATE 25 MCG: 50 INJECTION INTRAMUSCULAR; INTRAVENOUS at 08:33

## 2021-04-14 RX ADMIN — FENTANYL CITRATE 25 MCG: 50 INJECTION INTRAMUSCULAR; INTRAVENOUS at 08:39

## 2021-04-14 RX ADMIN — Medication 10 ML: at 10:51

## 2021-04-14 RX ADMIN — GELATIN ABSORBABLE SPONGE 12-7 MM: 12-7 MISC at 08:38

## 2021-04-14 RX ADMIN — Medication 500 UNITS: at 10:51

## 2021-04-14 RX ADMIN — SODIUM CHLORIDE 25 ML/HR: 9 INJECTION, SOLUTION INTRAVENOUS at 08:17

## 2021-04-14 RX ADMIN — LIDOCAINE HYDROCHLORIDE 20 ML: 10 INJECTION, SOLUTION INFILTRATION; PERINEURAL at 08:31

## 2021-04-14 RX ADMIN — MIDAZOLAM 1 MG: 1 INJECTION INTRAMUSCULAR; INTRAVENOUS at 08:33

## 2021-04-14 NOTE — DISCHARGE INSTRUCTIONS
EDUCATION /DISCHARGE INSTRUCTIONS  CT/US guided biopsy:  A biopsy is a procedure done to remove tissue for further analysis.  Before images are taken to locate the target area.  Images can be obtained using ultrasound, CT or MRI.  A physician will clean your skin with antiseptic soap, place a sterile towel around the site and administer a local anesthetic to numb the area.  The physician will then insert a special needle.  Sometimes images are taken of the needle after it is inserted to ensure the needle is in the correct area to be biopsied.   A sample is obtained and sent to the laboratory for study.  Occasionally the laboratory is unable to make a diagnosis from the sample and the procedure may need to be repeated.  Within a week the radiologist will send a report to your physician.  A pathologist will also examine the tissue and send a report.    Risks of the procedure include but are not limited to:   *  Bleeding    *  Infection   *  Puncture of surrounding organs *  Death     *  Lung collapse if the biopsy is near the chest which may require insertion of a      chest tube to re-inflate the lung if severe.    Benefits of the procedure:  Using x-ray helps to locate the area that requires a biopsy. The procedure is less invasive than a surgical procedure, there are no large incisions and it does not require anesthesia.    Alternatives to the procedure:  A biopsy can be performed surgically.  Risks of a surgical biopsy include exposure to anesthesia, infection, excessive bleeding and injury to abdominal organs.  A benefit of surgical biopsy is the ability to see the area to be biopsied and remove of a larger piece of tissue.    THIS EDUCATION INFORMATION WAS REVIEWED PRIOR TO PROCEDURE AND CONSENT. Patient initials__________________Time____0735_______________    Post Procedure:    *  Expect the biopsy site may be tender up to one week.    *  Rest today (no pushing pulling or straining).   *  Slowly increase  activity tomorrow.    *  If you received sedation do not drive for 24 hours.   *  Keep dressing clean and dry.   *  Leave dressing on puncture site for 24 hours.    *  You may shower when dressing removed.  Call your doctor if experiencing:   *  Signs of infection such as redness, swelling, excessive pain and / or foul        smelling drainage from the puncture site.   *  Chills or fever over 101 degrees (by mouth).   *  Unrelieved pain.   *  Any new or severe symptoms.   *  If experiencing sudden / severe shortness of breath or chest pain go to the       nearest emergency room.   Following the procedure:     Follow-up with the ordering physician as directed.    Continue to take other medications as directed by your physician unless    otherwise instructed.   If applicable, resume taking your blood thinners or Aspirin on April 15, 2021 after 9:00 AM___________.    If you have any concerns please call the Radiology Nurses Desk at [phone].  You are the most important factor in your recovery.  Follow the above instructions carefully.

## 2021-04-14 NOTE — NURSING NOTE
Pt in xray triage for liver bx  Pt wearing mask; RN wearing mask and eye protection for all interactions

## 2021-04-15 ENCOUNTER — TELEPHONE (OUTPATIENT)
Dept: INTERVENTIONAL RADIOLOGY/VASCULAR | Facility: HOSPITAL | Age: 66
End: 2021-04-15

## 2021-04-30 ENCOUNTER — OFFICE VISIT (OUTPATIENT)
Dept: ONCOLOGY | Facility: CLINIC | Age: 66
End: 2021-04-30

## 2021-04-30 ENCOUNTER — INFUSION (OUTPATIENT)
Dept: ONCOLOGY | Facility: HOSPITAL | Age: 66
End: 2021-04-30

## 2021-04-30 VITALS
DIASTOLIC BLOOD PRESSURE: 70 MMHG | WEIGHT: 210 LBS | SYSTOLIC BLOOD PRESSURE: 130 MMHG | BODY MASS INDEX: 35.85 KG/M2 | RESPIRATION RATE: 20 BRPM | TEMPERATURE: 97 F | HEIGHT: 64 IN | OXYGEN SATURATION: 97 % | HEART RATE: 106 BPM

## 2021-04-30 DIAGNOSIS — T45.1X5A LEUKOPENIA DUE TO ANTINEOPLASTIC CHEMOTHERAPY (HCC): ICD-10-CM

## 2021-04-30 DIAGNOSIS — R01.1 HEART MURMUR, SYSTOLIC: ICD-10-CM

## 2021-04-30 DIAGNOSIS — E83.42 HYPOMAGNESEMIA: ICD-10-CM

## 2021-04-30 DIAGNOSIS — Z45.2 FITTING AND ADJUSTMENT OF VASCULAR CATHETER: ICD-10-CM

## 2021-04-30 DIAGNOSIS — D68.59 THROMBOPHILIA (HCC): ICD-10-CM

## 2021-04-30 DIAGNOSIS — C79.51 BONE METASTASIS: ICD-10-CM

## 2021-04-30 DIAGNOSIS — Z45.2 FITTING AND ADJUSTMENT OF VASCULAR CATHETER: Primary | ICD-10-CM

## 2021-04-30 DIAGNOSIS — D63.0 ANEMIA IN NEOPLASTIC DISEASE: ICD-10-CM

## 2021-04-30 DIAGNOSIS — C50.912 PRIMARY MALIGNANT NEOPLASM OF LEFT BREAST (HCC): Primary | ICD-10-CM

## 2021-04-30 DIAGNOSIS — D70.1 LEUKOPENIA DUE TO ANTINEOPLASTIC CHEMOTHERAPY (HCC): ICD-10-CM

## 2021-04-30 DIAGNOSIS — C78.7 LIVER METASTASIS: ICD-10-CM

## 2021-04-30 DIAGNOSIS — D63.0 ANEMIA IN NEOPLASTIC DISEASE: Primary | ICD-10-CM

## 2021-04-30 DIAGNOSIS — C50.912 PRIMARY MALIGNANT NEOPLASM OF LEFT BREAST (HCC): ICD-10-CM

## 2021-04-30 LAB
ABO GROUP BLD: NORMAL
ALBUMIN SERPL-MCNC: 3.8 G/DL (ref 3.5–5.2)
ALBUMIN/GLOB SERPL: 1.5 G/DL (ref 1.1–2.4)
ALP SERPL-CCNC: 129 U/L (ref 38–116)
ALT SERPL W P-5'-P-CCNC: 28 U/L (ref 0–33)
ANION GAP SERPL CALCULATED.3IONS-SCNC: 10.4 MMOL/L (ref 5–15)
AST SERPL-CCNC: 26 U/L (ref 0–32)
BASOPHILS # BLD AUTO: 0.03 10*3/MM3 (ref 0–0.2)
BASOPHILS NFR BLD AUTO: 0.5 % (ref 0–1.5)
BILIRUB SERPL-MCNC: 0.3 MG/DL (ref 0.2–1.2)
BLD GP AB SCN SERPL QL: NEGATIVE
BUN SERPL-MCNC: 28 MG/DL (ref 6–20)
BUN/CREAT SERPL: 33.3 (ref 7.3–30)
CALCIUM SPEC-SCNC: 9 MG/DL (ref 8.5–10.2)
CANCER AG15-3 SERPL-ACNC: 74 U/ML
CHLORIDE SERPL-SCNC: 104 MMOL/L (ref 98–107)
CO2 SERPL-SCNC: 23.6 MMOL/L (ref 22–29)
CREAT SERPL-MCNC: 0.84 MG/DL (ref 0.6–1.1)
DEPRECATED RDW RBC AUTO: 63.7 FL (ref 37–54)
EOSINOPHIL # BLD AUTO: 0.3 10*3/MM3 (ref 0–0.4)
EOSINOPHIL NFR BLD AUTO: 5.4 % (ref 0.3–6.2)
ERYTHROCYTE [DISTWIDTH] IN BLOOD BY AUTOMATED COUNT: 17.3 % (ref 12.3–15.4)
GFR SERPL CREATININE-BSD FRML MDRD: 68 ML/MIN/1.73
GLOBULIN UR ELPH-MCNC: 2.6 GM/DL (ref 1.8–3.5)
GLUCOSE SERPL-MCNC: 131 MG/DL (ref 74–124)
HCT VFR BLD AUTO: 26.3 % (ref 34–46.6)
HGB BLD-MCNC: 8.5 G/DL (ref 12–15.9)
IMM GRANULOCYTES # BLD AUTO: 0.09 10*3/MM3 (ref 0–0.05)
IMM GRANULOCYTES NFR BLD AUTO: 1.6 % (ref 0–0.5)
LYMPHOCYTES # BLD AUTO: 0.64 10*3/MM3 (ref 0.7–3.1)
LYMPHOCYTES NFR BLD AUTO: 11.5 % (ref 19.6–45.3)
MCH RBC QN AUTO: 32.3 PG (ref 26.6–33)
MCHC RBC AUTO-ENTMCNC: 32.3 G/DL (ref 31.5–35.7)
MCV RBC AUTO: 100 FL (ref 79–97)
MONOCYTES # BLD AUTO: 0.67 10*3/MM3 (ref 0.1–0.9)
MONOCYTES NFR BLD AUTO: 12.1 % (ref 5–12)
NEUTROPHILS NFR BLD AUTO: 3.83 10*3/MM3 (ref 1.7–7)
NEUTROPHILS NFR BLD AUTO: 68.9 % (ref 42.7–76)
NRBC BLD AUTO-RTO: 0.5 /100 WBC (ref 0–0.2)
PLATELET # BLD AUTO: 181 10*3/MM3 (ref 140–450)
PMV BLD AUTO: 10 FL (ref 6–12)
POTASSIUM SERPL-SCNC: 4.2 MMOL/L (ref 3.5–4.7)
PROT SERPL-MCNC: 6.4 G/DL (ref 6.3–8)
RBC # BLD AUTO: 2.63 10*6/MM3 (ref 3.77–5.28)
RH BLD: NEGATIVE
RH BLD: NEGATIVE
SODIUM SERPL-SCNC: 138 MMOL/L (ref 134–145)
T&S EXPIRATION DATE: NORMAL
WBC # BLD AUTO: 5.56 10*3/MM3 (ref 3.4–10.8)

## 2021-04-30 PROCEDURE — 25010000002 HEPARIN LOCK FLUSH PER 10 UNITS: Performed by: INTERNAL MEDICINE

## 2021-04-30 PROCEDURE — 99214 OFFICE O/P EST MOD 30 MIN: CPT | Performed by: INTERNAL MEDICINE

## 2021-04-30 PROCEDURE — 36591 DRAW BLOOD OFF VENOUS DEVICE: CPT

## 2021-04-30 PROCEDURE — 86900 BLOOD TYPING SEROLOGIC ABO: CPT

## 2021-04-30 PROCEDURE — 86850 RBC ANTIBODY SCREEN: CPT

## 2021-04-30 PROCEDURE — 80053 COMPREHEN METABOLIC PANEL: CPT

## 2021-04-30 PROCEDURE — 86920 COMPATIBILITY TEST SPIN: CPT

## 2021-04-30 PROCEDURE — 85025 COMPLETE CBC W/AUTO DIFF WBC: CPT

## 2021-04-30 PROCEDURE — 36415 COLL VENOUS BLD VENIPUNCTURE: CPT

## 2021-04-30 PROCEDURE — 86300 IMMUNOASSAY TUMOR CA 15-3: CPT | Performed by: INTERNAL MEDICINE

## 2021-04-30 PROCEDURE — 86901 BLOOD TYPING SEROLOGIC RH(D): CPT

## 2021-04-30 RX ORDER — SODIUM CHLORIDE 0.9 % (FLUSH) 0.9 %
10 SYRINGE (ML) INJECTION AS NEEDED
Status: DISCONTINUED | OUTPATIENT
Start: 2021-04-30 | End: 2021-04-30 | Stop reason: HOSPADM

## 2021-04-30 RX ORDER — ACETAMINOPHEN 325 MG/1
650 TABLET ORAL ONCE
Status: CANCELLED | OUTPATIENT
Start: 2021-05-02 | End: 2021-04-30

## 2021-04-30 RX ORDER — DIPHENHYDRAMINE HCL 25 MG
25 CAPSULE ORAL ONCE
Status: CANCELLED | OUTPATIENT
Start: 2021-05-02 | End: 2021-04-30

## 2021-04-30 RX ORDER — SODIUM CHLORIDE 0.9 % (FLUSH) 0.9 %
10 SYRINGE (ML) INJECTION AS NEEDED
Status: CANCELLED | OUTPATIENT
Start: 2021-04-30

## 2021-04-30 RX ORDER — HEPARIN SODIUM (PORCINE) LOCK FLUSH IV SOLN 100 UNIT/ML 100 UNIT/ML
500 SOLUTION INTRAVENOUS AS NEEDED
Status: DISCONTINUED | OUTPATIENT
Start: 2021-04-30 | End: 2021-04-30 | Stop reason: HOSPADM

## 2021-04-30 RX ORDER — HEPARIN SODIUM (PORCINE) LOCK FLUSH IV SOLN 100 UNIT/ML 100 UNIT/ML
500 SOLUTION INTRAVENOUS AS NEEDED
Status: CANCELLED | OUTPATIENT
Start: 2021-04-30

## 2021-04-30 RX ORDER — SODIUM CHLORIDE 9 MG/ML
250 INJECTION, SOLUTION INTRAVENOUS AS NEEDED
Status: CANCELLED | OUTPATIENT
Start: 2021-05-02

## 2021-04-30 RX ADMIN — Medication 500 UNITS: at 07:46

## 2021-04-30 RX ADMIN — SODIUM CHLORIDE, PRESERVATIVE FREE 10 ML: 5 INJECTION INTRAVENOUS at 07:46

## 2021-04-30 NOTE — PROGRESS NOTES
Subjective  REASONS FOR FOLLOWUP: metastatic breast cancer to bones and liver, progressive liver metastasis on 4/21, adriamycin stopped.Proceed with liver biopsy to investigate tumor  mutations      History of Present Illness       DURING THE VISIT WITH THE PATIENT TODAY , PATIENT HAD FACE MASK, MY MEDICAL ASSISTANT AND I  HAD PROPPER PROTECTIVE EQUIPMENT, AND I DID HAND HYGIENE WITH SOAP AND WATER BEFORE AND AFTER THE VISIT.    This patient returns today to the office for follow up of her metastatic breast cancer to the bones and liver. She has undergone a liver biopsy almost 3 weeks ago in order to review the pathology of her breast cancer and to be sure that there are no new mutations that could open the door for new avenues for therapy. She has been treated now for 7 years for metastatic breast cancer. Overall the patient feels very fatigued and tired and she has some shortness of breath upon exercise. She is not having any clinical bleeding. She has not had any fever, chills or infection. She had significant pain at the biopsy site of the liver for a couple of days, she took some Lortab that made her sick in the stomach but relieved her pain. She is no longer having this pain. The rest of her bone pain associated with her disease is manageable with Aleve and topical Voltaren. Her bowel activity is normal. There is no passage of blood in the stools. Urination is normal. She has some shortness of breath upon exercise. No palpitations. She has no edema in her lower extremities. No neurological symptomatology. She remains functional and living independently.                       Past Medical History:   Diagnosis Date   • Abnormal mammogram    • Abnormal menstrual cycle    • Arthritis    • Bone metastasis (CMS/HCC)    • Breast cancer (CMS/HCC) 2000    Left breast   • Drug therapy 2001    breast cancer   • Drug therapy 2017    right femur/associated with breast cancer   • DVT (deep venous thrombosis) (CMS/HCC)    • H/O Heart murmur    • History of colon polyps    • Hx of radiation therapy 2000    breast cancer   • Hx of radiation therapy 2015    bone cancer right femur/ associated with breast cancer   • Hypercholesterolemia    • Hyperlipidemia    • Hypertension    • Multiple benign polyps of large intestine    • Reflux esophagitis    • Shingles      Social History     Socioeconomic History   • Marital status: Single     Spouse name: Not on file   • Number of children: Not on file   • Years of education: Not on file   • Highest education level: Not on file   Tobacco Use   • Smoking status: Never Smoker   • Smokeless tobacco: Never Used   Substance and Sexual Activity   • Alcohol use: No   • Drug use: No   • Sexual activity: Defer     Family History   Problem Relation Age of Onset   • Hypertension Mother    • Diabetes Mother    • Macular degeneration Mother    • Thyroid disease Mother    • Heart disease Father    • Stroke Father    • Kidney disease Father    • Glaucoma Brother    • Diabetes Brother    • Hypertension Brother    • Colon cancer Paternal Grandmother    • Thyroid disease Other    • Kidney disease Other    • Glaucoma Other    • Cancer Other    • Diabetes Other      Current Outpatient Medications on File Prior to Visit   Medication Sig Dispense Refill   • atorvastatin (LIPITOR) 20 MG tablet Take 1 tablet by mouth once daily 90 tablet 0   • Cyanocobalamin (B-12 PO) Take 1,000 mcg by mouth Daily.     • denosumab (XGEVA) 120 MG/1.7ML solution injection Inject  under the skin 1 (one) time.     • Diclofenac Sodium (VOLTAREN) 1 % gel gel APPLY 2 TO 4 GRAMS TOPICALLY TO  AFFECTED AREA TWICE DAILY 100 g 2   • HYDROcodone-acetaminophen (NORCO) 5-325 MG per tablet Take 1 tablet by mouth Every 6 (Six) Hours As Needed.     • lisinopril-hydrochlorothiazide (PRINZIDE,ZESTORETIC) 10-12.5 MG per tablet Take 1 tablet by mouth twice daily 180 tablet 0   • magnesium chloride ER (Mag64) 64 MG DR tablet Take 1 tablet by mouth 2 (two) times a day. 180 tablet 3   • metoprolol tartrate (LOPRESSOR) 25 MG tablet Take 0.5 tablets by mouth 2 (Two) Times a Day. 30 tablet 6   • Naproxen Sod-Diphenhydramine (ALEVE PM PO) Take  by mouth as needed.     • Naproxen Sodium (ALEVE PO) Take  by mouth.     • Omega-3 Fatty Acids (FISH OIL) 645 MG capsule Take  by mouth.     • OMEPRAZOLE PO Take  by mouth Daily.       No current facility-administered medications on file prior to visit.     Active Ambulatory Problems     Diagnosis Date Noted   • Benign essential HTN 02/18/2016   • Primary malignant neoplasm of left breast (CMS/HCC) 02/18/2016   • Thrombophilia (CMS/HCC) 02/18/2016   • Hyperlipidemia 02/18/2016   • IFG (impaired fasting glucose) 02/18/2016   • Osteopenia 02/18/2016   • History of colon polyps 03/08/2016   • Bone metastasis (CMS/HCC) 05/20/2016   • Anemia in neoplastic disease 05/20/2016   • Heart murmur, systolic 10/25/2016   • Fitting and adjustment of vascular catheter 03/13/2017   • Vitamin B12 deficiency anemia due to intrinsic factor deficiency 06/13/2017   • Leukopenia due to antineoplastic chemotherapy (CMS/HCC) 09/06/2017   • Hypomagnesemia 09/30/2020   • History of pulmonary embolism 02/19/2021   • Liver metastasis (CMS/HCC) 04/09/2021     Resolved Ambulatory Problems     Diagnosis Date Noted   • No Resolved Ambulatory Problems     Past Medical History:   Diagnosis Date   • Abnormal mammogram    • Abnormal menstrual cycle    • Arthritis    • Breast cancer (CMS/HCC) 2000   • Drug therapy 2001   • Drug therapy 2017   • DVT (deep venous thrombosis) (CMS/HCC)    • H/O Heart murmur    • Hx of  radiation therapy 2000   • Hx of radiation therapy 2015   • Hypercholesterolemia    • Hypertension    • Multiple benign polyps of large intestine    • Reflux esophagitis    • Shingles       Past Surgical History:   Procedure Laterality Date   • BREAST BIOPSY Left 2000    breast cancer   • BREAST SURGERY  2000    lumpectomy, mastectomy, revision   • COLONOSCOPY  2012   • FEMUR FRACTURE SURGERY      secondary to metastatic breast cancer 7/2014, with revision in 9/2015   • MASTECTOMY Left 2000    transflap in 2003           .          ALLERGIES:    Allergies   Allergen Reactions   • Codeine Unknown - Low Severity   • Docetaxel Unknown - Low Severity   • Paclitaxel Unknown - Low Severity       Objective        EXAM:I HAVE PERSONALLY REVIEWED THE HISTORY OF THE PRESENT ILLNESS, PAST MEDICAL HISTORY, FAMILY HISTORY, SOCIAL HISTORY, ALLERGIES, MEDICATIONS STATED ABOVE IN THE OFFICE NOTE FROM TODAY.        GENERAL:  Well-developed, well-nourished  Patient  in no acute distress.   SKIN:  Warm, dry ,NO rashes,NO purpura ,NO petechiae.  HEENT:  Pupils were equal and reactive to light and accomodation, conjunctivae noninjected, no pterygium, normal extraocular movements, normal visual acuity.   NECK:  Supple with good range of motion; no thyromegaly or masses, no JVD or bruits, no cervical adenopathies.No carotid artery pain, no carotid abnormal pulsation , NO arterial dance.  LYMPHATICS:  No cervical, NO supraclavicular, NO axillary,NO epitrochlear , NO inguinal adenopathy.  CARDIAC   normal rate and regular rhythm, with SYSTOLIC GRADE 3/6 BASAL murmur,NO rubs NO S3 NO S4 right or left . Normal femoral, popliteal, pedis, brachial and carotid pulses.  VASCULAR ARTERIAL: normal carotids,brachial,radial,femoral,popliteal, pedis pulses , no bruits.no paleness or cyanosis, no pain, no edema, no numbness, no gangrene.  VASCULAR VENOUS: no cyanosis, collateral circulation, varicosities, edema, palpable cords, pain,  erythema.  ABDOMEN:  Soft, nontender with no hepatomegaly, no splenomegaly,no masses, no ascites, no collateral circulation,no distention,no [place] sign, no abdominal pain, no inguinal hernias,no umbilical hernia, no abdominal bruits. Normal bowel sounds.  GENITAL: Not  Performed.  EXTREMITIES  AND SPINE:  No clubbing, cyanosis or edema, no deformities or pain .No kyphosis, scoliosis, deformities , MINIMAL pain in spine, ribs  pelvic bone R FEMUR  NEUROLOGICAL:  Patient was awake, alert, oriented to time, person and place.              Hematology WBC   Date Value Ref Range Status   04/30/2021 5.56 3.40 - 10.80 10*3/mm3 Final   02/03/2020 3.89 3.40 - 10.80 10*3/mm3 Final     RBC   Date Value Ref Range Status   04/30/2021 2.63 (L) 3.77 - 5.28 10*6/mm3 Final   02/03/2020 3.08 (L) 3.77 - 5.28 10*6/mm3 Final     Hemoglobin   Date Value Ref Range Status   04/30/2021 8.5 (L) 12.0 - 15.9 g/dL Final     Hematocrit   Date Value Ref Range Status   04/30/2021 26.3 (L) 34.0 - 46.6 % Final     Platelets   Date Value Ref Range Status   04/30/2021 181 140 - 450 10*3/mm3 Final        Tissue Pathology Exam: [id number]  Order: [id number]  Status:  Edited Result - FINAL   Visible to patient:  Yes (MyChart)   Next appt:  03/04/2022 at 08:00 AM in Internal Medicine ([facility])   Dx:  Liver metastasis (CMS/HCC)  Specimen Information: Liver; Tissue        Component    Addendum   Please see the completely scanned HER-2/sarah FISH report from [facility] below.    Addendum electronically signed by Jodi Haro MD on 4/21/2021 at 1054   Case Report   Surgical Pathology Report                         Case: [id number]                                   Authorizing Provider:  Nader Saez MD        Collected:           04/14/2021 08:37 AM           Ordering Location:     [facility]  Received:            04/14/2021 10:20 AM                                  CT                                                                             Pathologist:           Jodi Haro MD                                                           Specimen:    Liver, Ct guided liver bx                                                                  Clinical Information    Liver metastasis  Send to [facility] for tumor profiling   Final Diagnosis   1. Liver, CT-Guided Core Needle Biopsy:                A. METASTATIC ADENOCARCINOMA, CONSISTENT WITH BREAST ORIGIN (SEE COMMENT).      jab/brb    Electronically signed by Jodi Haro MD on 4/16/2021 at 1324   Synoptic Checklist   Breast Biomarker Reporting Template  Protocol posted: 2/26/2020  BREAST: BIOMARKER REPORTING TEMPLATE - All Specimens  Test(s) Performed     Estrogen Receptor (ER) Status  Positive (greater than 10% of cells demonstrate nuclear positivity)    Percentage of Cells with Nuclear Positivity  %    Average Intensity of Staining  Strong    Test Type  Food and Drug Administration (FDA) cleared (test / vendor): [place]    Primary Antibody  SP1    Scoring System  Mike    Proportion Score  5    Intensity Score  3    Total Mike Score  8    Test(s) Performed     Progesterone Receptor (PgR) Status  Positive    Percentage of Cells with Nuclear Positivity  71-80%    Average Intensity of Staining  Strong    Test Type  Food and Drug Administration (FDA) cleared (test / vendor): [place]    Primary Antibody  1E2    Scoring System  Mike    Proportion Score  5    Intensity Score  3    Total Mike Score  8    Test(s) Performed     HER2 by Immunohistochemistry  Equivocal (Score 2+)    Percentage of Cells with Uniform Intense Complete Membrane Staining  0 %   Test Type  Food and Drug Administration (FDA) cleared (test / vendor): [place]    Primary Antibody  4B5    Cold Ischemia and Fixation Times  Meet requirements specified in latest version of the ASCO / CAP Guidelines    Cold Ischemia Time (minutes)  10 min   Fixation Time (hours)  11 hours   Testing Performed on Block Number(s)  1A     METHODS   Fixative  Formalin    Image Analysis  Not performed    .      Comment    The patient's history of breast carcinoma is noted.  Immunohistochemical studies were performed on block 1A for tumor with the following results:      CK7:                  Strong and diffusely positive.  ETTA-3:            Strong and diffusely positive.     All controls reacted appropriately.     Overall, the histomorphology and immunohistochemical profile are in keeping with metastatic adenocarcinoma of breast origin.  See biomarker template for results of hormone receptor studies.      Given the equivocal HER-2 immunohistochemical results, unstained slides will be forwarded to [facility] for HER-2 FISH with those results to follow separately as an addendum.     Additionally, the block will be forwarded to [employer], per clinical request, with those results to follow separately as an addendum.                Tissue Pathology Exam [[id number]] (Order [id number])  Order  Date: 4/14/2021 Department: [facility] CT Released By: Phuc Fishman (auto-released) Authorizing: Nader Saez MD   Reprint Order Requisition    Tissue Pathology Exam (Order #[id number]) on 4/14/21       Tissue Pathology Exam: [id number]  Order: [id number]  Status:  Edited Result - FINAL   Visible to patient:  Yes (MyChart)   Next appt:  03/04/2022 at 08:00 AM in Internal Medicine ([facility])   Dx:  Liver metastasis (CMS/HCC)  Specimen Information: Liver; Tissue        Component    Addendum   Please see the completely scanned HER-2/sarah FISH report from [facility] below.    Addendum electronically signed by Jodi Haro MD on 4/21/2021 at 1054   Case Report   Surgical Pathology Report                         Case: [id number]                                   Authorizing Provider:  Nader Saez MD        Collected:           04/14/2021 08:37 AM           Ordering Location:     [facility]  Received:             04/14/2021 10:20 AM                                  CT                                                                            Pathologist:           Jodi Haro MD                                                           Specimen:    Liver, Ct guided liver bx                                                                  Clinical Information    Liver metastasis  Send to [facility] for tumor profiling   Final Diagnosis   1. Liver, CT-Guided Core Needle Biopsy:                A. METASTATIC ADENOCARCINOMA, CONSISTENT WITH BREAST ORIGIN (SEE COMMENT).      jab/brb    Electronically signed by Jodi Haro MD on 4/16/2021 at 1324   Synoptic Checklist   Breast Biomarker Reporting Template  Protocol posted: 2/26/2020  BREAST: BIOMARKER REPORTING TEMPLATE - All Specimens  Test(s) Performed     Estrogen Receptor (ER) Status  Positive (greater than 10% of cells demonstrate nuclear positivity)    Percentage of Cells with Nuclear Positivity  %    Average Intensity of Staining  Strong    Test Type  Food and Drug Administration (FDA) cleared (test / vendor): Between    Primary Antibody  SP1    Scoring System  Mike    Proportion Score  5    Intensity Score  3    Total Mike Score  8    Test(s) Performed     Progesterone Receptor (PgR) Status  Positive    Percentage of Cells with Nuclear Positivity  71-80%    Average Intensity of Staining  Strong    Test Type  Food and Drug Administration (FDA) cleared (test / vendor): Between    Primary Antibody  1E2    Scoring System  Mike    Proportion Score  5    Intensity Score  3    Total Mike Score  8    Test(s) Performed     HER2 by Immunohistochemistry  Equivocal (Score 2+)    Percentage of Cells with Uniform Intense Complete Membrane Staining  0 %   Test Type  Food and Drug Administration (FDA) cleared (test / vendor): Between    Primary Antibody  4B5    Cold Ischemia and Fixation Times  Meet requirements specified in latest version of the ASCO / CAP Guidelines   "  Cold Ischemia Time (minutes)  10 min   Fixation Time (hours)  11 hours   Testing Performed on Block Number(s)  1A    METHODS   Fixative  Formalin    Image Analysis  Not performed    .      Comment    The patient's history of breast carcinoma is noted.  Immunohistochemical studies were performed on block 1A for tumor with the following results:      CK7:                  Strong and diffusely positive.  ETTA-3:            Strong and diffusely positive.     All controls reacted appropriately.     Overall, the histomorphology and immunohistochemical profile are in keeping with metastatic adenocarcinoma of breast origin.  See biomarker template for results of hormone receptor studies.      Given the equivocal HER-2 immunohistochemical results, unstained slides will be forwarded to [facility] for HER-2 FISH with those results to follow separately as an addendum.     Additionally, the block will be forwarded to [facility], per clinical request, with those results to follow separately as an addendum.       pietro/brb/castro   Intraoperative Consultation              Gross Description    1. Received in formalin labeled with the patient's name and designated \"CT guided liver biopsy\".  The specimen consists of multiple tan to gray white core fragments of soft tissue measuring 2.5 x 0.3 x 0.1 cm in total aggregate dimension.  The specimen is filtered and submitted in cassette 1A.  Total blocks this case: 1.       mb/uso/michael/brb      Special Stains    ER / RI Scoring Guide:  Nuclear staining of tumor cells equal to or greater than 1%, of any intensity, is considered a positive result with less than 1% considered negative, when controls are adequate.  ER/RI Disclaimer: All breast markers (Confirm anti-estrogen receptor clone SP1 and Confirm anti-progesterone receptor clone 1E2) are performed utilizing the ultra-View DAB kit on the Benchmark Ultra platform, all manufactured by [employer], [place], [place].  Reagents " utilized are FDA approved for in vitro diagnostic use.  These tests are not intended as a confirmation of the original diagnosis.  They should only be used in conjunction with other established risk factors, clinical and diagnostic procedures.       [facility]         Specimen Collected: 04/14/21 08:37   Last Resulted: 04/21/21 10:57       Order Details     View Encounter     Lab and Collection Details     Routing     Result History           Scans on Order [id number]    Scan on 4/21/2021 1055 by Dasia Fisher: HER-2/GIGI FISH - [facility]      Document on 4/21/2021 1057 by Jodi Haro MD                   Assessment/Plan    .   1.This patient has been treated for metastatic breast cancer to the bone only for almost 7 years. Only in the last few months the patient has developed liver metastasis and she has gone over 7 years with multiple medications including anthracycline, Taxane, Kisqali, Femara, Halaven besides Xgeva. So far the patient is now clinically remains relatively stable, pain relatively well controlled using Aleve only and she has had multiple sites of radiation therapy given to her in the cervical spine, pelvic bone, right hip and many other sites. Now on the most recent bone scan her bone disease seems to be relatively stable radiologically speaking but her liver metastasis are getting larger and more numerous and many of them are very accessible for biopsy. I think for picking so many medicines for the last 7 years the patient has run out of options in regard to picking other medicine out of the box. Theoretically we could give her Gemzar or Navelbine for example and see how things go on these medicines. The resistance to the chemotherapy medicine administration that she has had leads me to believe that maybe she has a different  mutation and I wonder if the patient could have a HER/2 positive cancer at this point that could be amenable to a different treatment for example.  I do not know if the patient's disease is still ER/RI positive like it was originally in 2014 at the time of her metastasis to the femur diagnosis. At that time the tissue was HER/2 negative.    I reviewed with the patient in the PAC system the CT scan. I showed her the pulmonary anatomy being normal, heart anatomy normal and new and progressive liver metastasis that are on the surface of the liver and very amenable for radiological intervention in regard biopsy. I do believe that this is probably the best way to go and the patient agrees with this. In preparation for this the patient will require a ride to be brought to the hospital and taken back home because she has no family member here and I know that the radiologists nowadays for these interventions are doing low dose IV sedation. Therefore under these circumstances the patient will require a ride.     The patient was further reviewed in the office on 04/30/2021. In the interim she had the liver biopsy, she had significant pain at that site for a couple of days requiring some Lortab, made her sick in the stomach but it took care of the pain. She is no longer having pain there.     Her clinical examination today is no different than before including mild degree of tenderness in multiple sites of her skeleton.    Her hemoglobin is low at 8.5. Her reticulocyte count is low and this patient's anemia represents lack of production of red cells associated with extensive bone metastasis. I will guess if we did a bone marrow test on her we will have bone marrow replaced by malignancy in multiple sites.     The pathological analysis of the liver specimen documents a tumor that is consistent with metastatic breast cancer. The tumor is still ER/RI positive, HER/2/sarah equivocal by immunohistochemistry. We did analysis for FISH on this and it was negative.     Unfortunately the report of the test for Caris Target Now is still not available and that is the most important  piece of information that I need in order to make a decision about how to continue her plan of treatment for her metastatic disease.     Therefore my advice to her is as follows:  1. For her pain management she remains on Aleve and topical Voltaren here and there depending on the circumstances. She only will use hydrocodone along with nausea medicine in case of severe pain. Fortunately that has not been the case in regard to pain in the bones for her.   2. Given her anemia associated with myelophthisis the patient will be transfused with 2 units of red cells. This will be scheduled as soon as we can during the weekend because she remains at work actively and she cannot take a weekday off for transfusion. She will have a type and cross match today for this.  3. I will wait and share with the patient the analysis of Caris Target Now once that it becomes available hopefully first thing next week in order to decide what will be the next therapy available for her to treat her condition. Hopefully this will give us a light in regard how to proceed thereafter.     I discussed all of these facts with the patient and shared with her the reports of her laboratory testing and the pathological analysis and I explained to her in simple terms what all of this means. She was ready to proceed.     I have not prescribed any new medicines for her today neither made any further plans for chemotherapy for palliation. Eventually as we discussed before she will remain on her Xgeva.           THIS IS AN  ILLNESS THAT POSES A THREAT TO LIFE AND BODY FUNCTION, REQUIRED REVIEW OF EXTERNAL DOCUMENT, AND REQUIRES INTENSIVE MONITORING OF A LAB TEST, A PHYSIOLOGIC TEST OR IMAGING FOR THERAPY DIAGNOSES  AND  TOXICITY.           4/30/2021

## 2021-05-02 ENCOUNTER — INFUSION (OUTPATIENT)
Dept: ONCOLOGY | Facility: HOSPITAL | Age: 66
End: 2021-05-02

## 2021-05-02 VITALS
DIASTOLIC BLOOD PRESSURE: 63 MMHG | SYSTOLIC BLOOD PRESSURE: 109 MMHG | RESPIRATION RATE: 20 BRPM | TEMPERATURE: 96.6 F | HEART RATE: 65 BPM

## 2021-05-02 DIAGNOSIS — D70.1 LEUKOPENIA DUE TO ANTINEOPLASTIC CHEMOTHERAPY (HCC): ICD-10-CM

## 2021-05-02 DIAGNOSIS — C79.51 BONE METASTASIS: ICD-10-CM

## 2021-05-02 DIAGNOSIS — C78.7 LIVER METASTASIS: ICD-10-CM

## 2021-05-02 DIAGNOSIS — E83.42 HYPOMAGNESEMIA: ICD-10-CM

## 2021-05-02 DIAGNOSIS — D63.0 ANEMIA IN NEOPLASTIC DISEASE: ICD-10-CM

## 2021-05-02 DIAGNOSIS — C50.912 PRIMARY MALIGNANT NEOPLASM OF LEFT BREAST (HCC): Primary | ICD-10-CM

## 2021-05-02 DIAGNOSIS — Z45.2 FITTING AND ADJUSTMENT OF VASCULAR CATHETER: ICD-10-CM

## 2021-05-02 DIAGNOSIS — T45.1X5A LEUKOPENIA DUE TO ANTINEOPLASTIC CHEMOTHERAPY (HCC): ICD-10-CM

## 2021-05-02 PROCEDURE — 36430 TRANSFUSION BLD/BLD COMPNT: CPT

## 2021-05-02 PROCEDURE — 25010000002 HEPARIN LOCK FLUSH PER 10 UNITS: Performed by: INTERNAL MEDICINE

## 2021-05-02 PROCEDURE — P9016 RBC LEUKOCYTES REDUCED: HCPCS

## 2021-05-02 PROCEDURE — 63710000001 DIPHENHYDRAMINE PER 50 MG: Performed by: INTERNAL MEDICINE

## 2021-05-02 PROCEDURE — 86900 BLOOD TYPING SEROLOGIC ABO: CPT

## 2021-05-02 RX ORDER — SODIUM CHLORIDE 0.9 % (FLUSH) 0.9 %
10 SYRINGE (ML) INJECTION AS NEEDED
Status: DISCONTINUED | OUTPATIENT
Start: 2021-05-02 | End: 2021-05-02 | Stop reason: HOSPADM

## 2021-05-02 RX ORDER — ACETAMINOPHEN 325 MG/1
650 TABLET ORAL ONCE
Status: COMPLETED | OUTPATIENT
Start: 2021-05-02 | End: 2021-05-02

## 2021-05-02 RX ORDER — HEPARIN SODIUM (PORCINE) LOCK FLUSH IV SOLN 100 UNIT/ML 100 UNIT/ML
500 SOLUTION INTRAVENOUS AS NEEDED
Status: DISCONTINUED | OUTPATIENT
Start: 2021-05-02 | End: 2021-05-02 | Stop reason: HOSPADM

## 2021-05-02 RX ORDER — DIPHENHYDRAMINE HCL 25 MG
25 CAPSULE ORAL ONCE
Status: COMPLETED | OUTPATIENT
Start: 2021-05-02 | End: 2021-05-02

## 2021-05-02 RX ORDER — SODIUM CHLORIDE 0.9 % (FLUSH) 0.9 %
10 SYRINGE (ML) INJECTION AS NEEDED
Status: CANCELLED | OUTPATIENT
Start: 2021-05-02

## 2021-05-02 RX ORDER — SODIUM CHLORIDE 9 MG/ML
250 INJECTION, SOLUTION INTRAVENOUS AS NEEDED
Status: DISCONTINUED | OUTPATIENT
Start: 2021-05-02 | End: 2021-05-02 | Stop reason: HOSPADM

## 2021-05-02 RX ORDER — HEPARIN SODIUM (PORCINE) LOCK FLUSH IV SOLN 100 UNIT/ML 100 UNIT/ML
500 SOLUTION INTRAVENOUS AS NEEDED
Status: CANCELLED | OUTPATIENT
Start: 2021-05-02

## 2021-05-02 RX ADMIN — Medication 500 UNITS: at 13:16

## 2021-05-02 RX ADMIN — SODIUM CHLORIDE, PRESERVATIVE FREE 10 ML: 5 INJECTION INTRAVENOUS at 13:16

## 2021-05-02 RX ADMIN — ACETAMINOPHEN 650 MG: 325 TABLET, FILM COATED ORAL at 08:43

## 2021-05-02 RX ADMIN — DIPHENHYDRAMINE HYDROCHLORIDE 25 MG: 25 CAPSULE ORAL at 08:44

## 2021-05-05 LAB
LAB AP CASE REPORT: NORMAL
LAB AP CLINICAL INFORMATION: NORMAL
LAB AP DIAGNOSIS COMMENT: NORMAL
LAB AP SPECIAL STAINS: NORMAL
LAB AP SYNOPTIC CHECKLIST: NORMAL
Lab: NORMAL
Lab: NORMAL
PATH REPORT.ADDENDUM SPEC: NORMAL
PATH REPORT.FINAL DX SPEC: NORMAL
PATH REPORT.GROSS SPEC: NORMAL

## 2021-05-05 NOTE — TELEPHONE ENCOUNTER
----- Message from Kiara Mena sent at 5/15/2019  3:37 PM EDT -----  Contact: [phone]  Pt is returning a call   Detail Level: Simple

## 2021-05-14 ENCOUNTER — TELEPHONE (OUTPATIENT)
Dept: ONCOLOGY | Facility: CLINIC | Age: 66
End: 2021-05-14

## 2021-05-14 NOTE — TELEPHONE ENCOUNTER
Caller: TIMUR     Relationship: SELF     Best call back number: [phone] FIRST THEN [phone] PLEASE    PATIENT HAD TESTS COMPLETED HERE  THAT WERE SENT TO A [place]  LAB FOR READING. PATIENT WAS WANTING TO KNOW THE RESULTS IF THEY WERE BACK  AND ADVISE.  PLEASE CALL AND ADVISE   THANK YOU

## 2021-05-14 NOTE — TELEPHONE ENCOUNTER
Returned pts call. Pt wanting liver biopsy results read over the phone. Unable to read results. Pt states she doesn't have any future appts scheduled with Dr. Saez to go over results. Sent Dr. Saez a message to give pt a call back. Pt v/u.

## 2021-05-17 RX ORDER — ATORVASTATIN CALCIUM 20 MG/1
TABLET, FILM COATED ORAL
Qty: 90 TABLET | Refills: 0 | Status: SHIPPED | OUTPATIENT
Start: 2021-05-17 | End: 2021-08-12

## 2021-05-19 ENCOUNTER — TELEPHONE (OUTPATIENT)
Dept: ONCOLOGY | Facility: CLINIC | Age: 66
End: 2021-05-19

## 2021-05-19 NOTE — TELEPHONE ENCOUNTER
Pts dental office calling to see if pt can have teeth cleaning. Pt not on active tx and last Xgeva was in March. Told them pt is fine for standard teeth cleaning. Dental staff v/u.

## 2021-05-24 ENCOUNTER — TELEPHONE (OUTPATIENT)
Dept: ONCOLOGY | Facility: CLINIC | Age: 66
End: 2021-05-24

## 2021-05-24 RX ORDER — LISINOPRIL AND HYDROCHLOROTHIAZIDE 12.5; 1 MG/1; MG/1
TABLET ORAL
Qty: 180 TABLET | Refills: 3 | Status: SHIPPED | OUTPATIENT
Start: 2021-05-24 | End: 2021-10-05

## 2021-05-24 NOTE — TELEPHONE ENCOUNTER
----- Message from Nader Saez MD sent at 5/24/2021  7:54 AM EDT -----   BRING HER FOR CHARITO 2 UNITS WITH ME TOMORROW

## 2021-05-25 ENCOUNTER — MEDICATION THERAPY MANAGEMENT (OUTPATIENT)
Dept: PHARMACY | Facility: HOSPITAL | Age: 66
End: 2021-05-25

## 2021-05-25 ENCOUNTER — INFUSION (OUTPATIENT)
Dept: ONCOLOGY | Facility: HOSPITAL | Age: 66
End: 2021-05-25

## 2021-05-25 ENCOUNTER — OFFICE VISIT (OUTPATIENT)
Dept: ONCOLOGY | Facility: CLINIC | Age: 66
End: 2021-05-25

## 2021-05-25 VITALS
HEART RATE: 107 BPM | DIASTOLIC BLOOD PRESSURE: 71 MMHG | RESPIRATION RATE: 16 BRPM | BODY MASS INDEX: 35.19 KG/M2 | WEIGHT: 206.1 LBS | SYSTOLIC BLOOD PRESSURE: 129 MMHG | TEMPERATURE: 97.1 F | HEIGHT: 64 IN | OXYGEN SATURATION: 95 %

## 2021-05-25 DIAGNOSIS — C50.912 PRIMARY MALIGNANT NEOPLASM OF LEFT BREAST (HCC): Primary | ICD-10-CM

## 2021-05-25 DIAGNOSIS — C50.912 PRIMARY MALIGNANT NEOPLASM OF LEFT BREAST (HCC): ICD-10-CM

## 2021-05-25 DIAGNOSIS — Z45.2 FITTING AND ADJUSTMENT OF VASCULAR CATHETER: ICD-10-CM

## 2021-05-25 DIAGNOSIS — C79.51 BONE METASTASIS: Primary | ICD-10-CM

## 2021-05-25 DIAGNOSIS — M85.89 OSTEOPENIA OF MULTIPLE SITES: ICD-10-CM

## 2021-05-25 DIAGNOSIS — C79.51 BONE METASTASIS: ICD-10-CM

## 2021-05-25 LAB
ALBUMIN SERPL-MCNC: 4.1 G/DL (ref 3.5–5.2)
ALBUMIN/GLOB SERPL: 1.6 G/DL (ref 1.1–2.4)
ALP SERPL-CCNC: 129 U/L (ref 38–116)
ALT SERPL W P-5'-P-CCNC: 33 U/L (ref 0–33)
ANION GAP SERPL CALCULATED.3IONS-SCNC: 12.7 MMOL/L (ref 5–15)
AST SERPL-CCNC: 31 U/L (ref 0–32)
BASOPHILS # BLD AUTO: 0.03 10*3/MM3 (ref 0–0.2)
BASOPHILS NFR BLD AUTO: 0.5 % (ref 0–1.5)
BILIRUB SERPL-MCNC: 0.3 MG/DL (ref 0.2–1.2)
BUN SERPL-MCNC: 37 MG/DL (ref 6–20)
BUN/CREAT SERPL: 39.4 (ref 7.3–30)
CALCIUM SPEC-SCNC: 9.7 MG/DL (ref 8.5–10.2)
CANCER AG15-3 SERPL-ACNC: 98.8 U/ML
CHLORIDE SERPL-SCNC: 101 MMOL/L (ref 98–107)
CO2 SERPL-SCNC: 23.3 MMOL/L (ref 22–29)
CREAT SERPL-MCNC: 0.94 MG/DL (ref 0.6–1.1)
DEPRECATED RDW RBC AUTO: 57.3 FL (ref 37–54)
EOSINOPHIL # BLD AUTO: 0.38 10*3/MM3 (ref 0–0.4)
EOSINOPHIL NFR BLD AUTO: 6.3 % (ref 0.3–6.2)
ERYTHROCYTE [DISTWIDTH] IN BLOOD BY AUTOMATED COUNT: 16.5 % (ref 12.3–15.4)
GFR SERPL CREATININE-BSD FRML MDRD: 60 ML/MIN/1.73
GLOBULIN UR ELPH-MCNC: 2.6 GM/DL (ref 1.8–3.5)
GLUCOSE SERPL-MCNC: 140 MG/DL (ref 74–124)
HCT VFR BLD AUTO: 32.5 % (ref 34–46.6)
HGB BLD-MCNC: 11 G/DL (ref 12–15.9)
IMM GRANULOCYTES # BLD AUTO: 0.08 10*3/MM3 (ref 0–0.05)
IMM GRANULOCYTES NFR BLD AUTO: 1.3 % (ref 0–0.5)
LYMPHOCYTES # BLD AUTO: 0.9 10*3/MM3 (ref 0.7–3.1)
LYMPHOCYTES NFR BLD AUTO: 15 % (ref 19.6–45.3)
MCH RBC QN AUTO: 31.7 PG (ref 26.6–33)
MCHC RBC AUTO-ENTMCNC: 33.8 G/DL (ref 31.5–35.7)
MCV RBC AUTO: 93.7 FL (ref 79–97)
MONOCYTES # BLD AUTO: 0.61 10*3/MM3 (ref 0.1–0.9)
MONOCYTES NFR BLD AUTO: 10.2 % (ref 5–12)
NEUTROPHILS NFR BLD AUTO: 4 10*3/MM3 (ref 1.7–7)
NEUTROPHILS NFR BLD AUTO: 66.7 % (ref 42.7–76)
NRBC BLD AUTO-RTO: 0 /100 WBC (ref 0–0.2)
PLATELET # BLD AUTO: 203 10*3/MM3 (ref 140–450)
PMV BLD AUTO: 10.1 FL (ref 6–12)
POTASSIUM SERPL-SCNC: 4.3 MMOL/L (ref 3.5–4.7)
PROT SERPL-MCNC: 6.7 G/DL (ref 6.3–8)
RBC # BLD AUTO: 3.47 10*6/MM3 (ref 3.77–5.28)
SODIUM SERPL-SCNC: 137 MMOL/L (ref 134–145)
WBC # BLD AUTO: 6 10*3/MM3 (ref 3.4–10.8)

## 2021-05-25 PROCEDURE — 36591 DRAW BLOOD OFF VENOUS DEVICE: CPT

## 2021-05-25 PROCEDURE — 25010000002 HEPARIN LOCK FLUSH PER 10 UNITS: Performed by: INTERNAL MEDICINE

## 2021-05-25 PROCEDURE — 80053 COMPREHEN METABOLIC PANEL: CPT

## 2021-05-25 PROCEDURE — 99215 OFFICE O/P EST HI 40 MIN: CPT | Performed by: INTERNAL MEDICINE

## 2021-05-25 PROCEDURE — 85025 COMPLETE CBC W/AUTO DIFF WBC: CPT

## 2021-05-25 PROCEDURE — 86300 IMMUNOASSAY TUMOR CA 15-3: CPT | Performed by: INTERNAL MEDICINE

## 2021-05-25 RX ORDER — HEPARIN SODIUM (PORCINE) LOCK FLUSH IV SOLN 100 UNIT/ML 100 UNIT/ML
500 SOLUTION INTRAVENOUS AS NEEDED
Status: DISCONTINUED | OUTPATIENT
Start: 2021-05-25 | End: 2021-05-25 | Stop reason: HOSPADM

## 2021-05-25 RX ORDER — EVEROLIMUS 5 MG/1
5 TABLET ORAL DAILY
Qty: 28 TABLET | Refills: 6 | Status: SHIPPED | OUTPATIENT
Start: 2021-05-25 | End: 2021-09-17

## 2021-05-25 RX ORDER — SODIUM CHLORIDE 0.9 % (FLUSH) 0.9 %
10 SYRINGE (ML) INJECTION AS NEEDED
Status: CANCELLED | OUTPATIENT
Start: 2021-05-25

## 2021-05-25 RX ORDER — SODIUM CHLORIDE 0.9 % (FLUSH) 0.9 %
10 SYRINGE (ML) INJECTION AS NEEDED
Status: DISCONTINUED | OUTPATIENT
Start: 2021-05-25 | End: 2021-05-25 | Stop reason: HOSPADM

## 2021-05-25 RX ORDER — HEPARIN SODIUM (PORCINE) LOCK FLUSH IV SOLN 100 UNIT/ML 100 UNIT/ML
500 SOLUTION INTRAVENOUS AS NEEDED
Status: CANCELLED | OUTPATIENT
Start: 2021-05-25

## 2021-05-25 RX ORDER — EXEMESTANE 25 MG/1
25 TABLET ORAL DAILY
Qty: 30 TABLET | Refills: 6 | Status: SHIPPED | OUTPATIENT
Start: 2021-05-25 | End: 2021-09-17

## 2021-05-25 RX ORDER — EVEROLIMUS 5 MG/1
5 TABLET ORAL DAILY
Qty: 28 TABLET | Refills: 6 | Status: SHIPPED | OUTPATIENT
Start: 2021-05-25 | End: 2021-05-25 | Stop reason: SDUPTHER

## 2021-05-25 RX ORDER — EVEROLIMUS 5 MG/1
5 TABLET ORAL DAILY
Qty: 30 TABLET | Refills: 6 | Status: CANCELLED | OUTPATIENT
Start: 2021-05-25

## 2021-05-25 RX ADMIN — Medication 500 UNITS: at 09:00

## 2021-05-25 RX ADMIN — SODIUM CHLORIDE, PRESERVATIVE FREE 10 ML: 5 INJECTION INTRAVENOUS at 09:00

## 2021-05-25 NOTE — PROGRESS NOTES
MTM encounter re initiation of Afinitor    PA submitted to [place] and has been approved Case [id number]  Coverage starts 5/25/21 and ends on 5/25/22.

## 2021-06-04 ENCOUNTER — INFUSION (OUTPATIENT)
Dept: ONCOLOGY | Facility: HOSPITAL | Age: 66
End: 2021-06-04

## 2021-06-04 ENCOUNTER — LAB (OUTPATIENT)
Dept: LAB | Facility: HOSPITAL | Age: 66
End: 2021-06-04

## 2021-06-04 ENCOUNTER — SPECIALTY PHARMACY (OUTPATIENT)
Dept: ONCOLOGY | Facility: HOSPITAL | Age: 66
End: 2021-06-04

## 2021-06-04 DIAGNOSIS — M85.89 OSTEOPENIA OF MULTIPLE SITES: ICD-10-CM

## 2021-06-04 DIAGNOSIS — C50.912 PRIMARY MALIGNANT NEOPLASM OF LEFT BREAST (HCC): ICD-10-CM

## 2021-06-04 DIAGNOSIS — C79.51 BONE METASTASIS: Primary | ICD-10-CM

## 2021-06-04 LAB
ALBUMIN SERPL-MCNC: 4.2 G/DL (ref 3.5–5.2)
ALBUMIN/GLOB SERPL: 1.7 G/DL (ref 1.1–2.4)
ALP SERPL-CCNC: 151 U/L (ref 38–116)
ALT SERPL W P-5'-P-CCNC: 37 U/L (ref 0–33)
ANION GAP SERPL CALCULATED.3IONS-SCNC: 12.1 MMOL/L (ref 5–15)
AST SERPL-CCNC: 29 U/L (ref 0–32)
BASOPHILS # BLD AUTO: 0.03 10*3/MM3 (ref 0–0.2)
BASOPHILS NFR BLD AUTO: 0.6 % (ref 0–1.5)
BILIRUB SERPL-MCNC: 0.2 MG/DL (ref 0.2–1.2)
BUN SERPL-MCNC: 28 MG/DL (ref 6–20)
BUN/CREAT SERPL: 30.8 (ref 7.3–30)
CALCIUM SPEC-SCNC: 9.2 MG/DL (ref 8.5–10.2)
CANCER AG15-3 SERPL-ACNC: 87.9 U/ML
CHLORIDE SERPL-SCNC: 101 MMOL/L (ref 98–107)
CO2 SERPL-SCNC: 23.9 MMOL/L (ref 22–29)
CREAT SERPL-MCNC: 0.91 MG/DL (ref 0.6–1.1)
DEPRECATED RDW RBC AUTO: 58.1 FL (ref 37–54)
EOSINOPHIL # BLD AUTO: 0.32 10*3/MM3 (ref 0–0.4)
EOSINOPHIL NFR BLD AUTO: 6.5 % (ref 0.3–6.2)
ERYTHROCYTE [DISTWIDTH] IN BLOOD BY AUTOMATED COUNT: 16.6 % (ref 12.3–15.4)
GFR SERPL CREATININE-BSD FRML MDRD: 62 ML/MIN/1.73
GLOBULIN UR ELPH-MCNC: 2.5 GM/DL (ref 1.8–3.5)
GLUCOSE SERPL-MCNC: 145 MG/DL (ref 74–124)
HCT VFR BLD AUTO: 31 % (ref 34–46.6)
HGB BLD-MCNC: 10.5 G/DL (ref 12–15.9)
IMM GRANULOCYTES # BLD AUTO: 0.06 10*3/MM3 (ref 0–0.05)
IMM GRANULOCYTES NFR BLD AUTO: 1.2 % (ref 0–0.5)
LYMPHOCYTES # BLD AUTO: 0.74 10*3/MM3 (ref 0.7–3.1)
LYMPHOCYTES NFR BLD AUTO: 15 % (ref 19.6–45.3)
MAGNESIUM SERPL-MCNC: 1.8 MG/DL (ref 1.8–2.5)
MCH RBC QN AUTO: 32.1 PG (ref 26.6–33)
MCHC RBC AUTO-ENTMCNC: 33.9 G/DL (ref 31.5–35.7)
MCV RBC AUTO: 94.8 FL (ref 79–97)
MONOCYTES # BLD AUTO: 0.56 10*3/MM3 (ref 0.1–0.9)
MONOCYTES NFR BLD AUTO: 11.4 % (ref 5–12)
NEUTROPHILS NFR BLD AUTO: 3.22 10*3/MM3 (ref 1.7–7)
NEUTROPHILS NFR BLD AUTO: 65.3 % (ref 42.7–76)
NRBC BLD AUTO-RTO: 0.4 /100 WBC (ref 0–0.2)
PHOSPHATE SERPL-MCNC: 3.6 MG/DL (ref 2.5–4.5)
PLATELET # BLD AUTO: 176 10*3/MM3 (ref 140–450)
PMV BLD AUTO: 10 FL (ref 6–12)
POTASSIUM SERPL-SCNC: 4.4 MMOL/L (ref 3.5–4.7)
PROT SERPL-MCNC: 6.7 G/DL (ref 6.3–8)
RBC # BLD AUTO: 3.27 10*6/MM3 (ref 3.77–5.28)
SODIUM SERPL-SCNC: 137 MMOL/L (ref 134–145)
WBC # BLD AUTO: 4.93 10*3/MM3 (ref 3.4–10.8)

## 2021-06-04 PROCEDURE — 83735 ASSAY OF MAGNESIUM: CPT

## 2021-06-04 PROCEDURE — 25010000002 DENOSUMAB 120 MG/1.7ML SOLUTION: Performed by: INTERNAL MEDICINE

## 2021-06-04 PROCEDURE — 80053 COMPREHEN METABOLIC PANEL: CPT

## 2021-06-04 PROCEDURE — 36415 COLL VENOUS BLD VENIPUNCTURE: CPT

## 2021-06-04 PROCEDURE — 86300 IMMUNOASSAY TUMOR CA 15-3: CPT | Performed by: INTERNAL MEDICINE

## 2021-06-04 PROCEDURE — 85025 COMPLETE CBC W/AUTO DIFF WBC: CPT

## 2021-06-04 PROCEDURE — 96372 THER/PROPH/DIAG INJ SC/IM: CPT

## 2021-06-04 PROCEDURE — 84100 ASSAY OF PHOSPHORUS: CPT

## 2021-06-04 RX ORDER — DEXAMETHASONE 0.5 MG/5ML
0.5 SOLUTION ORAL 4 TIMES DAILY
Qty: 500 ML | Refills: 3 | Status: SHIPPED | OUTPATIENT
Start: 2021-06-04

## 2021-06-04 RX ORDER — ONDANSETRON HYDROCHLORIDE 8 MG/1
8 TABLET, FILM COATED ORAL 3 TIMES DAILY PRN
Qty: 30 TABLET | Refills: 5 | Status: SHIPPED | OUTPATIENT
Start: 2021-06-04

## 2021-06-04 RX ADMIN — DENOSUMAB 120 MG: 120 INJECTION SUBCUTANEOUS at 15:48

## 2021-06-04 NOTE — PROGRESS NOTES
Oral Chemotherapy Teaching      Patient Name/:  Maggie Suero   1955  Oral Chemotherapy Regimen:  Afinitor 5 mg po daily and Exemestane 25 mg po daily  Date Started Medication: 21  In person education session  Initial Teaching Follow Up Comments     Safety     Storage instructions (away from children; away from heat/cold, sunlight, or moisture), handling - use of gloves (caregivers), washing hands after touching pills, managing waste     “How are you storing your medications?”, reminders on storage, proper handling (caregivers using gloves, washing hands, away from children, managing waste, etc.), disposal of medication with D/C or dosage change    Storage at room temp in a dry location, protected from light and secured from children and pet.  We discussed the need for caregivers to wear gloves for administration.  She also was counseled to return unused drug to the clinic for safe disposal by pharmacy per SOP.      Adherence      patient and/or caregiver on how to take medication, take with/without food, assess their adherence potential, stress importance of adherence, ways to manage adherence (pill boxes, phone reminders, calendars), what to do if miss a dose   “How are you taking your medication?” “How are you remembering to take your medication?”, “How many doses have you missed?”, determine reasons for non-adherence (not remembering, side effects, etc), ways to improve, overadherence? Remind patient of ways to improve/maintain adherence   Afinitor 5 mg po daily may be taken with or without food ( choose one) at the same time daily.  We discussed the avoidance of grapefruit.  Only take a missed dose if it has been less than six hours since due, then take the next dose at the regularly scheduled time.  Do not take 2 doses at once.    Exemestane 25 mg po daily may be taken with or without food at the same time each day.  If a dose is missed, do not take an extra dose or 2 doses at once.  Simply  take the next dose at the regularly scheduled time.      Side Effects/Adverse Reactions      patient on potential side effects, s/s, ways to manage, when to call MD/seek help     Determine if patient experiencing side effects, ways to manage  Afinitor  Anemia and its associated symptoms such as SOA, fatigue, dizziness and palpitations was reviewed.  We discussed the potential for elevated lipid panel, and she was counseled to advise her PCP that she takes this medication so that those labs could be monitored.  Other potential lab changes include hyperglycemia, low calcium, phosphate and albumin. We discussed the need for monitoring of LFTS and renal function. Potential for mouth sores and management with baking soda and salt water rinses pc+hs as well as dexamethasone mouth rinse qid were discussed.  Dexamethasone was escribed to [facility] for her to .  Rash/ itchy skin was discussed.  She was counseled to moisturize daily and to use SPF 30 sunscreen.  Platelet reduction and bleeding precautions were reviewed.  She was counseled to call the office for excessive bruising, nosebleeds, bleeding with oral care or blood in urine/stool. Low WBCs and infection control precautions such as handwashing, wearing a mask, and avoiding crowds was discussed.  She has had her COVID 19 vaccines. She was counseled to call the office for a temp exceeding 100.4.  Diarrhea and its management with Imodium was reviewed. Fatigue and its management with rest breaks was discussed.  Fluid retention and the need to monitor for hypertension was discussed.  Abdominal pain, HA and muscle/joint pain reviewed.  Dr Saez has indicated in his dictation that she can continue using Aleve.  Nausea and its management with ondansetron discussed- ondansetron was escribed to [facility] for  as well.  We discussed the potential for lung and breathing problems.  She was counseled to call the office for SOA , cough, wheezing.  We discussed  decreased appetite and weight loss potential and the management of eating small, frequent meals.  She denies selam history of TB  Aromasin  We discussed hot flashes and management by dressing in layers and avoiding triggers. We discussed muscle and joint pain ( see above). We also discussed decrease in bone density.  Dr Saez had her stop taking Calcium and Vitamin D in the past ( she had hypercalcemia) and she currently receives Xgeva.      Miscellaneous     Food interactions, DDIs, financial issues Determine if patient started any new medications since being placed on oral chemo (analyze for DDI) DDI per Up to Date  Afinitor+Xgeva- level C monitor for immunosuppression ( CBCs are ordered), Afinitor+zestoretic- level c can increase risk of angioedema- she was counseled to monitor for eye and lip swelling. Afinitor+Lipitor level c-cna increase lipitor side effects she was counseled to report muscle pain and LFTS are ordered.      Additional Notes:  Ren delivered Afinitor to her work today.  She will start taking it Tuesday 6/8.  She has been taking Aromasin for 1 week now.  She was given the opportunity to ask questions, and then CCA and consents were signed.

## 2021-06-11 ENCOUNTER — MEDICATION THERAPY MANAGEMENT (OUTPATIENT)
Dept: PHARMACY | Facility: HOSPITAL | Age: 66
End: 2021-06-11

## 2021-06-11 NOTE — PROGRESS NOTES
MTM telephone encounter re adherence and side effects ( Afinitor+Aromasin)    Ms Suero initiated Afinitor dosing on 6/8.  She has not missed any doses thus far and is taking it in the evening, as well as taking Claritin daily.   She reported a mild headache on the first day, but it has not been a lingering problem.  She had no other side effects to report.  Pharmacy will continue to follow.

## 2021-06-18 ENCOUNTER — MEDICATION THERAPY MANAGEMENT (OUTPATIENT)
Dept: PHARMACY | Facility: HOSPITAL | Age: 66
End: 2021-06-18

## 2021-06-18 NOTE — PROGRESS NOTES
MTM telephone encounter re adherence and side effects ( Afinitor+Aromasin)    Ms Suero reported adherence to Afinitor 5 mg po daily and Aromasin 25 mg po daily continues to be appropriate, and she has not missed any doses.  She reports fatigue and a mild reduction in appetite, particularly a reduction in ability to tolerate fried foods.  She denies mouth sores or rash.  She has some arthralgias and myalgias for which she is using Aleve successfully.  She had no questions or concerns for the MTM office today.

## 2021-07-01 ENCOUNTER — OFFICE VISIT (OUTPATIENT)
Dept: ONCOLOGY | Facility: CLINIC | Age: 66
End: 2021-07-01

## 2021-07-01 ENCOUNTER — INFUSION (OUTPATIENT)
Dept: ONCOLOGY | Facility: HOSPITAL | Age: 66
End: 2021-07-01

## 2021-07-01 VITALS
RESPIRATION RATE: 18 BRPM | BODY MASS INDEX: 35.83 KG/M2 | HEART RATE: 103 BPM | OXYGEN SATURATION: 97 % | WEIGHT: 209.9 LBS | DIASTOLIC BLOOD PRESSURE: 76 MMHG | TEMPERATURE: 97.3 F | HEIGHT: 64 IN | SYSTOLIC BLOOD PRESSURE: 150 MMHG

## 2021-07-01 DIAGNOSIS — C50.912 PRIMARY MALIGNANT NEOPLASM OF LEFT BREAST (HCC): Primary | ICD-10-CM

## 2021-07-01 DIAGNOSIS — Z45.2 FITTING AND ADJUSTMENT OF VASCULAR CATHETER: ICD-10-CM

## 2021-07-01 DIAGNOSIS — C78.7 LIVER METASTASIS: ICD-10-CM

## 2021-07-01 DIAGNOSIS — Z17.0 MALIGNANT NEOPLASM OF LEFT BREAST IN FEMALE, ESTROGEN RECEPTOR POSITIVE, UNSPECIFIED SITE OF BREAST (HCC): Primary | ICD-10-CM

## 2021-07-01 DIAGNOSIS — D63.0 ANEMIA IN NEOPLASTIC DISEASE: ICD-10-CM

## 2021-07-01 DIAGNOSIS — Z79.899 HIGH RISK MEDICATION USE: ICD-10-CM

## 2021-07-01 DIAGNOSIS — C50.912 MALIGNANT NEOPLASM OF LEFT BREAST IN FEMALE, ESTROGEN RECEPTOR POSITIVE, UNSPECIFIED SITE OF BREAST (HCC): Primary | ICD-10-CM

## 2021-07-01 LAB
ALBUMIN SERPL-MCNC: 4.1 G/DL (ref 3.5–5.2)
ALBUMIN/GLOB SERPL: 1.6 G/DL (ref 1.1–2.4)
ALP SERPL-CCNC: 142 U/L (ref 38–116)
ALT SERPL W P-5'-P-CCNC: 27 U/L (ref 0–33)
ANION GAP SERPL CALCULATED.3IONS-SCNC: 11 MMOL/L (ref 5–15)
AST SERPL-CCNC: 28 U/L (ref 0–32)
BASOPHILS # BLD AUTO: 0.02 10*3/MM3 (ref 0–0.2)
BASOPHILS NFR BLD AUTO: 0.6 % (ref 0–1.5)
BILIRUB SERPL-MCNC: 0.2 MG/DL (ref 0.2–1.2)
BUN SERPL-MCNC: 34 MG/DL (ref 6–20)
BUN/CREAT SERPL: 30.9 (ref 7.3–30)
CALCIUM SPEC-SCNC: 9.3 MG/DL (ref 8.5–10.2)
CANCER AG15-3 SERPL-ACNC: 75 U/ML
CHLORIDE SERPL-SCNC: 106 MMOL/L (ref 98–107)
CO2 SERPL-SCNC: 23 MMOL/L (ref 22–29)
CREAT SERPL-MCNC: 1.1 MG/DL (ref 0.6–1.1)
DEPRECATED RDW RBC AUTO: 56.9 FL (ref 37–54)
EOSINOPHIL # BLD AUTO: 0.17 10*3/MM3 (ref 0–0.4)
EOSINOPHIL NFR BLD AUTO: 5.3 % (ref 0.3–6.2)
ERYTHROCYTE [DISTWIDTH] IN BLOOD BY AUTOMATED COUNT: 16 % (ref 12.3–15.4)
GFR SERPL CREATININE-BSD FRML MDRD: 50 ML/MIN/1.73
GLOBULIN UR ELPH-MCNC: 2.5 GM/DL (ref 1.8–3.5)
GLUCOSE SERPL-MCNC: 160 MG/DL (ref 74–124)
HCT VFR BLD AUTO: 28.5 % (ref 34–46.6)
HGB BLD-MCNC: 9.2 G/DL (ref 12–15.9)
IMM GRANULOCYTES # BLD AUTO: 0.02 10*3/MM3 (ref 0–0.05)
IMM GRANULOCYTES NFR BLD AUTO: 0.6 % (ref 0–0.5)
LYMPHOCYTES # BLD AUTO: 0.57 10*3/MM3 (ref 0.7–3.1)
LYMPHOCYTES NFR BLD AUTO: 17.7 % (ref 19.6–45.3)
MCH RBC QN AUTO: 31.1 PG (ref 26.6–33)
MCHC RBC AUTO-ENTMCNC: 32.3 G/DL (ref 31.5–35.7)
MCV RBC AUTO: 96.3 FL (ref 79–97)
MONOCYTES # BLD AUTO: 0.4 10*3/MM3 (ref 0.1–0.9)
MONOCYTES NFR BLD AUTO: 12.4 % (ref 5–12)
NEUTROPHILS NFR BLD AUTO: 2.04 10*3/MM3 (ref 1.7–7)
NEUTROPHILS NFR BLD AUTO: 63.4 % (ref 42.7–76)
NRBC BLD AUTO-RTO: 0 /100 WBC (ref 0–0.2)
PLATELET # BLD AUTO: 182 10*3/MM3 (ref 140–450)
PMV BLD AUTO: 9.9 FL (ref 6–12)
POTASSIUM SERPL-SCNC: 4.3 MMOL/L (ref 3.5–4.7)
PROT SERPL-MCNC: 6.6 G/DL (ref 6.3–8)
RBC # BLD AUTO: 2.96 10*6/MM3 (ref 3.77–5.28)
SODIUM SERPL-SCNC: 140 MMOL/L (ref 134–145)
WBC # BLD AUTO: 3.22 10*3/MM3 (ref 3.4–10.8)

## 2021-07-01 PROCEDURE — 25010000002 ALTEPLASE 2 MG RECONSTITUTED SOLUTION

## 2021-07-01 PROCEDURE — 36593 DECLOT VASCULAR DEVICE: CPT

## 2021-07-01 PROCEDURE — 85025 COMPLETE CBC W/AUTO DIFF WBC: CPT

## 2021-07-01 PROCEDURE — 99214 OFFICE O/P EST MOD 30 MIN: CPT | Performed by: NURSE PRACTITIONER

## 2021-07-01 PROCEDURE — 80053 COMPREHEN METABOLIC PANEL: CPT

## 2021-07-01 PROCEDURE — 25010000002 HEPARIN LOCK FLUSH PER 10 UNITS: Performed by: INTERNAL MEDICINE

## 2021-07-01 PROCEDURE — 86300 IMMUNOASSAY TUMOR CA 15-3: CPT | Performed by: INTERNAL MEDICINE

## 2021-07-01 PROCEDURE — 36591 DRAW BLOOD OFF VENOUS DEVICE: CPT

## 2021-07-01 PROCEDURE — 96523 IRRIG DRUG DELIVERY DEVICE: CPT

## 2021-07-01 RX ORDER — HEPARIN SODIUM (PORCINE) LOCK FLUSH IV SOLN 100 UNIT/ML 100 UNIT/ML
500 SOLUTION INTRAVENOUS AS NEEDED
Status: DISCONTINUED | OUTPATIENT
Start: 2021-07-01 | End: 2021-07-01 | Stop reason: HOSPADM

## 2021-07-01 RX ORDER — HEPARIN SODIUM (PORCINE) LOCK FLUSH IV SOLN 100 UNIT/ML 100 UNIT/ML
500 SOLUTION INTRAVENOUS AS NEEDED
Status: CANCELLED | OUTPATIENT
Start: 2021-07-01

## 2021-07-01 RX ORDER — SODIUM CHLORIDE 0.9 % (FLUSH) 0.9 %
10 SYRINGE (ML) INJECTION AS NEEDED
Status: DISCONTINUED | OUTPATIENT
Start: 2021-07-01 | End: 2021-07-01 | Stop reason: HOSPADM

## 2021-07-01 RX ORDER — SODIUM CHLORIDE 0.9 % (FLUSH) 0.9 %
10 SYRINGE (ML) INJECTION AS NEEDED
Status: CANCELLED | OUTPATIENT
Start: 2021-07-01

## 2021-07-01 RX ADMIN — SODIUM CHLORIDE, PRESERVATIVE FREE 10 ML: 5 INJECTION INTRAVENOUS at 14:37

## 2021-07-01 RX ADMIN — Medication 500 UNITS: at 14:37

## 2021-07-01 RX ADMIN — ALTEPLASE: 2.2 INJECTION, POWDER, LYOPHILIZED, FOR SOLUTION INTRAVENOUS at 14:51

## 2021-07-01 NOTE — PROGRESS NOTES
Subjective    REASONS FOR FOLLOWUP:   1. Metastatic breast cancer to multiple skeletal sites including cervical spine, sternum, lumbar spine and right femur. Previously treated with Abraxane, Kisqali Femara, Halaven, Adriamycin, now Afinitor and Aromasin  2.Iron deficiency anemia du to GI blood loss, Xarelto stopped months ago, Iron initiated, Pepcid along with Aleve for gastric protection.      History of Present Illness    The patient is a 66 y.o. female with the above mentioned history, who returns to the office today for 1 month follow-up and lab review.  She is currently continuing on Afinitor 5 mg daily along with Aromasin 25 mg daily.  She reports she is overall tolerated this well.  She has not had any sores in her mouth.  She does note some itching of her neck and questions a rash.  She is noted to have fairly significant dry skin on her neck and chest.  She denies diarrhea.  She reports her pain is well controlled with current regimen which includes Aleve.  She does have hydrocodone to use as needed though is hesitant to take this.  She reports some insomnia related to drinking fluids late into the evening.  She has been taking her Afinitor at night to prevent side effects.  However, she is drinking large amount of fluids after taking the Afinitor which is resulting in nocturia.  We discussed moving the Afinitor to late afternoon, approximately 4 PM to allow for better sleep.  She did have a right chest Mediport flush today, unable to obtain blood return.  There is Activase presently in the port.  She reports some fatigue though is able to accomplish activities that she desires.   "She does continue to work.  She denies fevers or chills, signs or symptoms of infection.  She denies signs or symptoms of bleeding.    Objective      Vitals:    07/01/21 1439   BP: 150/76   Pulse: 103   Resp: 18   Temp: 97.3 °F (36.3 °C)   TempSrc: Temporal   SpO2: 97%   Weight: 95.2 kg (209 lb 14.4 oz)   Height: 163 cm (64.17\")   PainSc:   3   PainLoc: Hip  Comment: Bilat Hip     Current Status 7/1/2021   ECOG score 0     EXAM  GENERAL:  Well-developed, well-nourished in no acute distress.   SKIN:  Warm, dry without rashes, purpura or petechiae.  Significant dryness of the neck and upper chest particularly on the left  HEAD:  Normocephalic.  EYES:  Pupils equal, round.  EOMs intact.  Conjunctivae normal.  EARS:  Hearing intact.  NOSE:  Septum midline.  No excoriations or nasal discharge.  CHEST:  Lungs clear to auscultation. Good airflow.  CARDIAC:  Regular rate and rhythm without murmurs. Normal S1,S2.  ABDOMEN:  Soft, nontender with no organomegaly or masses. Bowel sounds present  EXTREMITIES:  No clubbing, cyanosis or edema.  NEUROLOGICAL: No focal neurological deficits.  I have reexamined the patient and the results are consistent with the previously documented exam. Mary Lou Garcia, JESSICA       RESULTS REVIEWED:  Results from last 7 days   Lab Units 07/01/21  1433   WBC 10*3/mm3 3.22*   NEUTROS ABS 10*3/mm3 2.04   HEMOGLOBIN g/dL 9.2*   HEMATOCRIT % 28.5*   PLATELETS 10*3/mm3 182     Results from last 7 days   Lab Units 07/01/21  1432   SODIUM mmol/L 140   POTASSIUM mmol/L 4.3   CHLORIDE mmol/L 106   CO2 mmol/L 23.0   BUN mg/dL 34*   CREATININE mg/dL 1.10   CALCIUM mg/dL 9.3   ALBUMIN g/dL 4.10   BILIRUBIN mg/dL 0.2   ALK PHOS U/L 142*   ALT (SGPT) U/L 27   AST (SGOT) U/L 28   GLUCOSE mg/dL 160*           Assessment/Plan    1. Metastatic left breast cancer to multiple skeletal sites including cervical spine, sternum, lumbar spine and right femur.  · Previously treated with Abraxane Xgeva  · Documented " radiographic progression by bone scan 3/15/2017, treated with Femara Kisqali, Xgeva every 3 months  · Progression of bony disease June 2020.  Femara Kisqali discontinued.  Transition to Halaven  · Palliative skeletal radiation to the right shoulder and pelvis September 2020  · Treatment holiday from Halaven 11/4/2020 due to progressive fatigue  · CA 15-3 has been declining while on Halaven, most recently 50.9.   · 12/2/2020, CA 15-3 55.8, increased to 69.1 2/22/2021  · Bone scan 10/28/2020 with increased uptake in the right scapula, no other significant change  · CT scans 12/28/2020 with extensive bony disease, new hypodense left hepatic lesion in the liver  · Radiation to the hip complete 1/26/2021  · Single agent Adriamycin initiated 2/5/2021 to be given 3 to 4 weeks  · CT scans and bone scan 4/6/2021 where multiple hepatic lesions developing consistent with hepatic metastasis.  Diffuse bone lesions without change  · Plans for liver biopsy to further evaluate Caris Target   · CT guided biopsy 4/14/2021.  Pathology consistent with metastatic adenocarcinoma of the breast  · Caris Target documents ER positive, NC positive, HER-2 negative.  MSI stable, low mutation burden.  Androgen receptor positivity and no actionable mutations otherwise.  · Transition to Aromasin 25 mg daily along with Afinitor 5 mg daily.  Afinitor initiated 6/8/2021  · At the initiation of Aromasin and Afinitor, baseline CA 15-3 6/4/2021 87.9.  CA 15-3 pending today.  · Review of ophthalmology notes.  The patient was noted to have a lesion in the right eye, questionable metastatic deposit.  However, this has responded to Afinitor.  She will continue to follow-up with ophthalmology every 3 months    2.  Bony metastasis  · Now receiving Xgeva every 3 months, last given 6/4/2021.  She will receive Xgeva at scheduled follow-up with Dr. Saez in August    3.  Anemia, multifactorial  · Previously with iron deficiency due to GI blood loss.   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  While she is not in need of transfusion, we will repeat labs in 2 weeks to monitor closely.    4.  Hypomagnesemia  · Continuing on magnesium replacement twice daily  · Most recent magnesium of 1.7    5.  Venous access  · The patient does have a right chest Mediport which we will continue to flush monthly.  Activase currently instilled    6.  Cancer related pain  · Hydrocodone/acetaminophen 5/325 every 6 hours as needed.  Patient does not like taking narcotics.  She does utilize Voltaren cream and Aleve which provides good control of her pain.    PLAN:  1. Continue Aromasin 25 mg daily  2. Continue Afinitor 5 mg daily  3. Continue Aleve as needed for cancer related pain  4. Continue hydrocodone/acetaminophen 5/325 every 6 hours as needed for cancer related pain  5. We discussed initiation of a hydrating lotion.  The patient is currently without a rash secondary to Afinitor  though does have quite dry skin.  6. In 2 weeks, return for CBC with RN review to determine the need for possible transfusion  7. Return in 1 month for CBC, CMP, nurse practitioner follow-up, Mediport flush  8. MD follow-up in 2 months, at which time the patient is due for Xgeva  9. Continue to follow-up with ophthalmology, scheduled for 3-month follow-up September 2021    Patient is on high risk medication requiring close monitoring for toxicity.     Mary Lou Garcia, APRN  07/01/2021

## 2021-07-02 ENCOUNTER — MEDICATION THERAPY MANAGEMENT (OUTPATIENT)
Dept: PHARMACY | Facility: HOSPITAL | Age: 66
End: 2021-07-02

## 2021-07-02 NOTE — PROGRESS NOTES
MTM encounter (labs) -- Afinitor     Ref. Range 7/1/2021 14:33   WBC Latest Ref Range: 3.40 - 10.80 10*3/mm3 3.22 (L)   RBC Latest Ref Range: 3.77 - 5.28 10*6/mm3 2.96 (L)   Hemoglobin Latest Ref Range: 12.0 - 15.9 g/dL 9.2 (L)   Hematocrit Latest Ref Range: 34.0 - 46.6 % 28.5 (L)   RDW Latest Ref Range: 12.3 - 15.4 % 16.0 (H)   MCV Latest Ref Range: 79.0 - 97.0 fL 96.3   MCH Latest Ref Range: 26.6 - 33.0 pg 31.1   MCHC Latest Ref Range: 31.5 - 35.7 g/dL 32.3   MPV Latest Ref Range: 6.0 - 12.0 fL 9.9   Platelets Latest Ref Range: 140 - 450 10*3/mm3 182   RDW-SD Latest Ref Range: 37.0 - 54.0 fl 56.9 (H)   Neutrophil Rel % Latest Ref Range: 42.7 - 76.0 % 63.4   Lymphocyte Rel % Latest Ref Range: 19.6 - 45.3 % 17.7 (L)   Monocyte Rel % Latest Ref Range: 5.0 - 12.0 % 12.4 (H)   Eosinophil Rel % Latest Ref Range: 0.3 - 6.2 % 5.3   Basophil Rel % Latest Ref Range: 0.0 - 1.5 % 0.6   Immature Granulocyte Rel % Latest Ref Range: 0.0 - 0.5 % 0.6 (H)   Neutrophils Absolute Latest Ref Range: 1.70 - 7.00 10*3/mm3 2.04   Lymphocytes Absolute Latest Ref Range: 0.70 - 3.10 10*3/mm3 0.57 (L)   Monocytes Absolute Latest Ref Range: 0.10 - 0.90 10*3/mm3 0.40   Eosinophils Absolute Latest Ref Range: 0.00 - 0.40 10*3/mm3 0.17   Basophils Absolute Latest Ref Range: 0.00 - 0.20 10*3/mm3 0.02   Immature Grans, Absolute Latest Ref Range: 0.00 - 0.05 10*3/mm3 0.02   nRBC Latest Ref Range: 0.0 - 0.2 /100 WBC 0.0     APRN dictation noted. Of note Hgb = 9.2. Labs will be repeated in two weeks to see if transfusion necessary. Patient tolerating therapy with complaints of mild fatigue, insomnia, and rash. Patient instructed to move dose of Afinitor up to 4 pm to see if that helps with insomnia and to keep back of neck moisturized. Pharmacy will continue to monitor.     Thanks,  Amanda Powers, Pharmacy Intern

## 2021-07-06 ENCOUNTER — MEDICATION THERAPY MANAGEMENT (OUTPATIENT)
Dept: PHARMACY | Facility: HOSPITAL | Age: 66
End: 2021-07-06

## 2021-07-06 NOTE — PROGRESS NOTES
[facility] telephone encounter re adherence and side effects ( Afinitor and Aromasin)    Ms Suero recognized the [facility] office number- she stated her boss had just walked into the room and could not talk, but appreciated the call.  She stated quickly that she is doing well and had no current concerns.

## 2021-07-15 ENCOUNTER — CLINICAL SUPPORT (OUTPATIENT)
Dept: ONCOLOGY | Facility: HOSPITAL | Age: 66
End: 2021-07-15

## 2021-07-15 ENCOUNTER — LAB (OUTPATIENT)
Dept: LAB | Facility: HOSPITAL | Age: 66
End: 2021-07-15

## 2021-07-15 DIAGNOSIS — D68.59 THROMBOPHILIA (HCC): ICD-10-CM

## 2021-07-15 DIAGNOSIS — E83.42 HYPOMAGNESEMIA: ICD-10-CM

## 2021-07-15 DIAGNOSIS — C50.912 PRIMARY MALIGNANT NEOPLASM OF LEFT BREAST (HCC): ICD-10-CM

## 2021-07-15 DIAGNOSIS — C79.51 BONE METASTASIS: ICD-10-CM

## 2021-07-15 DIAGNOSIS — D70.1 LEUKOPENIA DUE TO ANTINEOPLASTIC CHEMOTHERAPY (HCC): ICD-10-CM

## 2021-07-15 DIAGNOSIS — D63.0 ANEMIA IN NEOPLASTIC DISEASE: ICD-10-CM

## 2021-07-15 DIAGNOSIS — T45.1X5A LEUKOPENIA DUE TO ANTINEOPLASTIC CHEMOTHERAPY (HCC): ICD-10-CM

## 2021-07-15 DIAGNOSIS — D51.0 VITAMIN B12 DEFICIENCY ANEMIA DUE TO INTRINSIC FACTOR DEFICIENCY: ICD-10-CM

## 2021-07-15 LAB
BASOPHILS # BLD AUTO: 0.02 10*3/MM3 (ref 0–0.2)
BASOPHILS NFR BLD AUTO: 0.4 % (ref 0–1.5)
DEPRECATED RDW RBC AUTO: 53.1 FL (ref 37–54)
EOSINOPHIL # BLD AUTO: 0.15 10*3/MM3 (ref 0–0.4)
EOSINOPHIL NFR BLD AUTO: 3.3 % (ref 0.3–6.2)
ERYTHROCYTE [DISTWIDTH] IN BLOOD BY AUTOMATED COUNT: 15.9 % (ref 12.3–15.4)
HCT VFR BLD AUTO: 25.6 % (ref 34–46.6)
HGB BLD-MCNC: 8.6 G/DL (ref 12–15.9)
IMM GRANULOCYTES # BLD AUTO: 0.05 10*3/MM3 (ref 0–0.05)
IMM GRANULOCYTES NFR BLD AUTO: 1.1 % (ref 0–0.5)
LYMPHOCYTES # BLD AUTO: 0.76 10*3/MM3 (ref 0.7–3.1)
LYMPHOCYTES NFR BLD AUTO: 16.5 % (ref 19.6–45.3)
MCH RBC QN AUTO: 30.9 PG (ref 26.6–33)
MCHC RBC AUTO-ENTMCNC: 33.6 G/DL (ref 31.5–35.7)
MCV RBC AUTO: 92.1 FL (ref 79–97)
MONOCYTES # BLD AUTO: 0.66 10*3/MM3 (ref 0.1–0.9)
MONOCYTES NFR BLD AUTO: 14.3 % (ref 5–12)
NEUTROPHILS NFR BLD AUTO: 2.97 10*3/MM3 (ref 1.7–7)
NEUTROPHILS NFR BLD AUTO: 64.4 % (ref 42.7–76)
NRBC BLD AUTO-RTO: 0 /100 WBC (ref 0–0.2)
PLATELET # BLD AUTO: 160 10*3/MM3 (ref 140–450)
PMV BLD AUTO: 10.2 FL (ref 6–12)
RBC # BLD AUTO: 2.78 10*6/MM3 (ref 3.77–5.28)
WBC # BLD AUTO: 4.61 10*3/MM3 (ref 3.4–10.8)

## 2021-07-15 PROCEDURE — 36415 COLL VENOUS BLD VENIPUNCTURE: CPT

## 2021-07-15 PROCEDURE — 85025 COMPLETE CBC W/AUTO DIFF WBC: CPT

## 2021-07-15 NOTE — NURSING NOTE
Pt is here for lab with RN review.  CBC reviewed with pt, counts are stable for this pt at this time. Hgb 8.6, down from 9.2 two weeks ago. Pt has c/o SOA, more so than her baseline and fatigue. S/w Mary Lou Garcia NP, pt will return in two weeks for labs and to see her, we will wait to see where her counts are then and decide if we need to transfuse. Pt instructed to call our office with any worsening symptoms. Copy of labs given to pt and f/u appt reviewed. Pt v/u.

## 2021-07-16 ENCOUNTER — MEDICATION THERAPY MANAGEMENT (OUTPATIENT)
Dept: PHARMACY | Facility: HOSPITAL | Age: 66
End: 2021-07-16

## 2021-07-16 NOTE — PROGRESS NOTES
[facility] lab review ( Afinitor+Exemestane)        7/15/2021   WBC 3.40 - 10.80 10*3/mm3 4.61   Neutrophils Absolute 1.70 - 7.00 10*3/mm3 2.97   Hemoglobin 12.0 - 15.9 g/dL 8.6 (A)   Hematocrit 34.0 - 46.6 % 25.6 (A)   Platelets 140 - 450 10*3/mm3 160     Continues on Afinitor 5 mg po daily and Exemestane 25 mg po daily.

## 2021-07-28 NOTE — TELEPHONE ENCOUNTER
Metoprolol refill request rec electronically from [facility]. This was discontinued on 7/1/2021 by Mary Lou RIOS NP and marked as therapy completed. Request denied.    The original prescription was discontinued on 7/1/2021 by Mary Lou Garcia APRN for the following reason: *Therapy completed. Renewing this prescription may not be appropriate.

## 2021-07-29 ENCOUNTER — OFFICE VISIT (OUTPATIENT)
Dept: ONCOLOGY | Facility: CLINIC | Age: 66
End: 2021-07-29

## 2021-07-29 ENCOUNTER — INFUSION (OUTPATIENT)
Dept: ONCOLOGY | Facility: HOSPITAL | Age: 66
End: 2021-07-29

## 2021-07-29 VITALS
DIASTOLIC BLOOD PRESSURE: 78 MMHG | SYSTOLIC BLOOD PRESSURE: 144 MMHG | HEART RATE: 100 BPM | HEIGHT: 64 IN | RESPIRATION RATE: 20 BRPM | TEMPERATURE: 98.4 F | WEIGHT: 136.8 LBS | BODY MASS INDEX: 23.35 KG/M2 | OXYGEN SATURATION: 97 %

## 2021-07-29 VITALS
OXYGEN SATURATION: 97 % | HEART RATE: 123 BPM | SYSTOLIC BLOOD PRESSURE: 109 MMHG | BODY MASS INDEX: 35.32 KG/M2 | RESPIRATION RATE: 18 BRPM | HEIGHT: 64 IN | WEIGHT: 206.9 LBS | DIASTOLIC BLOOD PRESSURE: 58 MMHG | TEMPERATURE: 97.1 F

## 2021-07-29 DIAGNOSIS — Z45.2 FITTING AND ADJUSTMENT OF VASCULAR CATHETER: ICD-10-CM

## 2021-07-29 DIAGNOSIS — D63.0 ANEMIA IN NEOPLASTIC DISEASE: ICD-10-CM

## 2021-07-29 DIAGNOSIS — C50.912 MALIGNANT NEOPLASM OF LEFT BREAST IN FEMALE, ESTROGEN RECEPTOR POSITIVE, UNSPECIFIED SITE OF BREAST (HCC): ICD-10-CM

## 2021-07-29 DIAGNOSIS — C78.7 LIVER METASTASIS: ICD-10-CM

## 2021-07-29 DIAGNOSIS — C50.912 MALIGNANT NEOPLASM OF LEFT BREAST IN FEMALE, ESTROGEN RECEPTOR POSITIVE, UNSPECIFIED SITE OF BREAST (HCC): Primary | ICD-10-CM

## 2021-07-29 DIAGNOSIS — C50.912 PRIMARY MALIGNANT NEOPLASM OF LEFT BREAST (HCC): Primary | ICD-10-CM

## 2021-07-29 DIAGNOSIS — Z79.899 HIGH RISK MEDICATION USE: ICD-10-CM

## 2021-07-29 DIAGNOSIS — Z17.0 MALIGNANT NEOPLASM OF LEFT BREAST IN FEMALE, ESTROGEN RECEPTOR POSITIVE, UNSPECIFIED SITE OF BREAST (HCC): ICD-10-CM

## 2021-07-29 DIAGNOSIS — Z17.0 MALIGNANT NEOPLASM OF LEFT BREAST IN FEMALE, ESTROGEN RECEPTOR POSITIVE, UNSPECIFIED SITE OF BREAST (HCC): Primary | ICD-10-CM

## 2021-07-29 LAB
ABO GROUP BLD: NORMAL
ALBUMIN SERPL-MCNC: 4 G/DL (ref 3.5–5.2)
ALBUMIN/GLOB SERPL: 1.4 G/DL (ref 1.1–2.4)
ALP SERPL-CCNC: 138 U/L (ref 38–116)
ALT SERPL W P-5'-P-CCNC: 29 U/L (ref 0–33)
ANION GAP SERPL CALCULATED.3IONS-SCNC: 14.4 MMOL/L (ref 5–15)
AST SERPL-CCNC: 35 U/L (ref 0–32)
BASOPHILS # BLD AUTO: 0.02 10*3/MM3 (ref 0–0.2)
BASOPHILS NFR BLD AUTO: 0.5 % (ref 0–1.5)
BILIRUB SERPL-MCNC: 0.2 MG/DL (ref 0.2–1.2)
BLD GP AB SCN SERPL QL: NEGATIVE
BUN SERPL-MCNC: 41 MG/DL (ref 6–20)
BUN/CREAT SERPL: 28.7 (ref 7.3–30)
CALCIUM SPEC-SCNC: 9.3 MG/DL (ref 8.5–10.2)
CANCER AG15-3 SERPL-ACNC: 91.8 U/ML
CHLORIDE SERPL-SCNC: 101 MMOL/L (ref 98–107)
CO2 SERPL-SCNC: 20.6 MMOL/L (ref 22–29)
CREAT SERPL-MCNC: 1.43 MG/DL (ref 0.6–1.1)
DEPRECATED RDW RBC AUTO: 54.9 FL (ref 37–54)
EOSINOPHIL # BLD AUTO: 0.12 10*3/MM3 (ref 0–0.4)
EOSINOPHIL NFR BLD AUTO: 2.8 % (ref 0.3–6.2)
ERYTHROCYTE [DISTWIDTH] IN BLOOD BY AUTOMATED COUNT: 16.1 % (ref 12.3–15.4)
GFR SERPL CREATININE-BSD FRML MDRD: 37 ML/MIN/1.73
GLOBULIN UR ELPH-MCNC: 2.8 GM/DL (ref 1.8–3.5)
GLUCOSE SERPL-MCNC: 139 MG/DL (ref 74–124)
HCT VFR BLD AUTO: 25 % (ref 34–46.6)
HGB BLD-MCNC: 8.3 G/DL (ref 12–15.9)
IMM GRANULOCYTES # BLD AUTO: 0.04 10*3/MM3 (ref 0–0.05)
IMM GRANULOCYTES NFR BLD AUTO: 0.9 % (ref 0–0.5)
LYMPHOCYTES # BLD AUTO: 0.69 10*3/MM3 (ref 0.7–3.1)
LYMPHOCYTES NFR BLD AUTO: 16.1 % (ref 19.6–45.3)
MAGNESIUM SERPL-MCNC: 2 MG/DL (ref 1.8–2.5)
MCH RBC QN AUTO: 30.5 PG (ref 26.6–33)
MCHC RBC AUTO-ENTMCNC: 33.2 G/DL (ref 31.5–35.7)
MCV RBC AUTO: 91.9 FL (ref 79–97)
MONOCYTES # BLD AUTO: 0.65 10*3/MM3 (ref 0.1–0.9)
MONOCYTES NFR BLD AUTO: 15.2 % (ref 5–12)
NEUTROPHILS NFR BLD AUTO: 2.76 10*3/MM3 (ref 1.7–7)
NEUTROPHILS NFR BLD AUTO: 64.5 % (ref 42.7–76)
NRBC BLD AUTO-RTO: 0.5 /100 WBC (ref 0–0.2)
PLATELET # BLD AUTO: 167 10*3/MM3 (ref 140–450)
PMV BLD AUTO: 10.1 FL (ref 6–12)
POTASSIUM SERPL-SCNC: 4.5 MMOL/L (ref 3.5–4.7)
PROT SERPL-MCNC: 6.8 G/DL (ref 6.3–8)
RBC # BLD AUTO: 2.72 10*6/MM3 (ref 3.77–5.28)
RH BLD: NEGATIVE
RH BLD: NORMAL
SODIUM SERPL-SCNC: 136 MMOL/L (ref 134–145)
T&S EXPIRATION DATE: NORMAL
WBC # BLD AUTO: 4.28 10*3/MM3 (ref 3.4–10.8)

## 2021-07-29 PROCEDURE — 86900 BLOOD TYPING SEROLOGIC ABO: CPT

## 2021-07-29 PROCEDURE — 86920 COMPATIBILITY TEST SPIN: CPT

## 2021-07-29 PROCEDURE — 36415 COLL VENOUS BLD VENIPUNCTURE: CPT

## 2021-07-29 PROCEDURE — 25010000002 HEPARIN LOCK FLUSH PER 10 UNITS: Performed by: INTERNAL MEDICINE

## 2021-07-29 PROCEDURE — 80053 COMPREHEN METABOLIC PANEL: CPT

## 2021-07-29 PROCEDURE — 86850 RBC ANTIBODY SCREEN: CPT

## 2021-07-29 PROCEDURE — 86901 BLOOD TYPING SEROLOGIC RH(D): CPT

## 2021-07-29 PROCEDURE — 83735 ASSAY OF MAGNESIUM: CPT

## 2021-07-29 PROCEDURE — 85025 COMPLETE CBC W/AUTO DIFF WBC: CPT

## 2021-07-29 PROCEDURE — 36591 DRAW BLOOD OFF VENOUS DEVICE: CPT

## 2021-07-29 PROCEDURE — 99214 OFFICE O/P EST MOD 30 MIN: CPT | Performed by: NURSE PRACTITIONER

## 2021-07-29 PROCEDURE — 86300 IMMUNOASSAY TUMOR CA 15-3: CPT | Performed by: NURSE PRACTITIONER

## 2021-07-29 RX ORDER — ACETAMINOPHEN 325 MG/1
650 TABLET ORAL ONCE
Status: CANCELLED | OUTPATIENT
Start: 2021-07-29 | End: 2021-07-29

## 2021-07-29 RX ORDER — SODIUM CHLORIDE 0.9 % (FLUSH) 0.9 %
10 SYRINGE (ML) INJECTION AS NEEDED
Status: CANCELLED | OUTPATIENT
Start: 2021-07-29

## 2021-07-29 RX ORDER — SODIUM CHLORIDE 9 MG/ML
250 INJECTION, SOLUTION INTRAVENOUS AS NEEDED
Status: CANCELLED | OUTPATIENT
Start: 2021-07-29

## 2021-07-29 RX ORDER — HEPARIN SODIUM (PORCINE) LOCK FLUSH IV SOLN 100 UNIT/ML 100 UNIT/ML
500 SOLUTION INTRAVENOUS AS NEEDED
Status: CANCELLED | OUTPATIENT
Start: 2021-07-29

## 2021-07-29 RX ORDER — HEPARIN SODIUM (PORCINE) LOCK FLUSH IV SOLN 100 UNIT/ML 100 UNIT/ML
500 SOLUTION INTRAVENOUS AS NEEDED
Status: DISCONTINUED | OUTPATIENT
Start: 2021-07-29 | End: 2021-07-29 | Stop reason: HOSPADM

## 2021-07-29 RX ORDER — DIPHENHYDRAMINE HCL 25 MG
25 CAPSULE ORAL ONCE
Status: CANCELLED | OUTPATIENT
Start: 2021-07-29 | End: 2021-07-29

## 2021-07-29 RX ORDER — SODIUM CHLORIDE 0.9 % (FLUSH) 0.9 %
10 SYRINGE (ML) INJECTION AS NEEDED
Status: DISCONTINUED | OUTPATIENT
Start: 2021-07-29 | End: 2021-07-29 | Stop reason: HOSPADM

## 2021-07-29 RX ADMIN — Medication 500 UNITS: at 14:44

## 2021-07-29 RX ADMIN — SODIUM CHLORIDE, PRESERVATIVE FREE 10 ML: 5 INJECTION INTRAVENOUS at 14:44

## 2021-07-29 NOTE — PROGRESS NOTES
"                                                                                                                                                                                                                                                                                                                                             Subjective    REASONS FOR FOLLOWUP:   1. Metastatic breast cancer to multiple skeletal sites including cervical spine, sternum, lumbar spine and right femur. Previously treated with Abraxane, Kisqali Femara, Halaven, Adriamycin, now Afinitor and Aromasin  2.Iron deficiency anemia du to GI blood loss, Xarelto stopped months ago, Iron initiated, Pepcid along with Aleve for gastric protection.      History of Present Illness    The patient is a 66 y.o. female with the above mentioned history, who returns to the office today for 1 month follow-up and lab review.  She is currently continuing on Afinitor 5 mg daily along with Aromasin 25 mg daily.  She continues to tolerate therapy well.  She denies mouth sores.  Her bowels move daily.  She does report some intermittent muscle cramping particularly in her right lower leg extending up into her head.  She reports her pain is well controlled with Aleve.  She also utilizes Voltaren cream.  She is without skin rash.  She denies fevers or chills, signs or symptoms of infection.  She denies shortness of breath or chest pain.  She is noted to be slightly tachycardic today though is not symptomatic without palpitations.      Objective      Vitals:    07/29/21 1456   BP: 109/58   Pulse: (!) 123   Resp: 18   Temp: 97.1 °F (36.2 °C)   TempSrc: Temporal   SpO2: 97%   Weight: 93.8 kg (206 lb 14.4 oz)   Height: 163 cm (64.17\")   PainSc: 0-No pain     Current Status 7/29/2021   ECOG score 2     EXAM  GENERAL:  Well-developed, well-nourished in no acute distress.   SKIN:  Warm, dry without rashes, purpura or petechiae.  Significant dryness of the neck and upper " chest particularly on the left  HEAD:  Normocephalic.  EYES:  Pupils equal, round.  EOMs intact.  Conjunctivae normal.  EARS:  Hearing intact.  NOSE:  Septum midline.  No excoriations or nasal discharge.  CHEST:  Lungs clear to auscultation. Good airflow.  CARDIAC: Tachycardic rate and rhythm without murmurs. Normal S1,S2.  ABDOMEN:  Soft, nontender with no organomegaly or masses. Bowel sounds present  EXTREMITIES:  No clubbing, cyanosis or edema.  NEUROLOGICAL: No focal neurological deficits.  I have reexamined the patient and the results are consistent with the previously documented exam. MaryL ou Garcia, APRN       RESULTS REVIEWED:  Results from last 7 days   Lab Units 07/29/21  1445   WBC 10*3/mm3 4.28   NEUTROS ABS 10*3/mm3 2.76   HEMOGLOBIN g/dL 8.3*   HEMATOCRIT % 25.0*   PLATELETS 10*3/mm3 167     Results from last 7 days   Lab Units 07/29/21  1522 07/29/21  1445   SODIUM mmol/L  --  136   POTASSIUM mmol/L  --  4.5   CHLORIDE mmol/L  --  101   CO2 mmol/L  --  20.6*   BUN mg/dL  --  41*   CREATININE mg/dL  --  1.43*   CALCIUM mg/dL  --  9.3   ALBUMIN g/dL  --  4.00   BILIRUBIN mg/dL  --  0.2   ALK PHOS U/L  --  138*   ALT (SGPT) U/L  --  29   AST (SGOT) U/L  --  35*   GLUCOSE mg/dL  --  139*   MAGNESIUM mg/dL 2.0  --            Assessment/Plan    1. Metastatic left breast cancer to multiple skeletal sites including cervical spine, sternum, lumbar spine and right femur.  · Previously treated with Abraxane Xgeva  · Documented radiographic progression by bone scan 3/15/2017, treated with Femara Kisqali, Xgeva every 3 months  · Progression of bony disease June 2020.  Femara Kisqali discontinued.  Transition to Halaven  · Palliative skeletal radiation to the right shoulder and pelvis September 2020  · Treatment holiday from Halaven 11/4/2020 due to progressive fatigue  · CA 15-3 has been declining while on Halaven, most recently 50.9.   · 12/2/2020, CA 15-3 55.8, increased to 69.1 2/22/2021  · Bone scan  10/28/2020 with increased uptake in the right scapula, no other significant change  · CT scans 12/28/2020 with extensive bony disease, new hypodense left hepatic lesion in the liver  · Radiation to the hip complete 1/26/2021  · Single agent Adriamycin initiated 2/5/2021 to be given 3 to 4 weeks  · CT scans and bone scan 4/6/2021 where multiple hepatic lesions developing consistent with hepatic metastasis.  Diffuse bone lesions without change  · Plans for liver biopsy to further evaluate Caris Target   · CT guided biopsy 4/14/2021.  Pathology consistent with metastatic adenocarcinoma of the breast  · Caris Target documents ER positive, [place] positive, HER-2 negative.  MSI stable, low mutation burden.  Androgen receptor positivity and no actionable mutations otherwise.  · Transition to Aromasin 25 mg daily along with Afinitor 5 mg daily.  Afinitor initiated 6/8/2021  · At the initiation of Aromasin and Afinitor, baseline CA 15-3 6/4/2021 87.9.  CA 15-3 decreased to 75 7/1/2021.  · Review of ophthalmology notes.  The patient was noted to have a lesion in the right eye, questionable metastatic deposit.  However, this has responded to Afinitor.  She will continue to follow-up with ophthalmology every 3 months  · Continue Aromasin 25 mg and Afinitor 5 mg daily    2.  Bony metastasis  · Now receiving Xgeva every 3 months, last given 6/4/2021.  She will receive Xgeva at scheduled follow-up with Dr. Saez in August    3.  Anemia, multifactorial  · Previously with iron deficiency due to GI blood loss.  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  The patient is symptomatic with fatigue and dyspnea.  I suspect there is an element of hemoconcentration as well as she is clinically dehydrated.  We will proceed  with transfusion of 2 units packed red blood cells    4.  Hypomagnesemia  · Continuing on magnesium replacement twice daily    5.  Venous access  · The patient does have a right chest Mediport which we will continue to flush monthly.  Activase currently instilled    6.  Cancer related pain  · Hydrocodone/acetaminophen 5/325 every 6 hours as needed.  Patient does not like taking narcotics.  She does utilize Voltaren cream and Aleve which provides good control of her pain.    7.  Muscle cramping  · With the patient's borderline hypotension and tachycardia I suspect this is related to dehydration.  The patient was encouraged to increase her oral intake.   · Creatinine reviewed elevated at 1.4 which confirms dehydration.  The patient will increase oral intake    PLAN:  1. Continue Aromasin 25 mg daily  2. Continue Afinitor 5 mg daily  3. Continue Aleve as needed for cancer related pain  4. Proceed with transfusion of 2 units packed red blood cells  5. We discussed increasing oral intake  6. Continue hydrocodone/acetaminophen 5/325 every 6 hours as needed for cancer related pain  7. MD follow-up in 1 month, at which time the patient is due for Xgeva  8. CA 15-3 pending today  9. Continue to follow-up with ophthalmology, scheduled for 3-month follow-up September 2021    Patient is on high risk medication requiring close monitoring for toxicity.     Mary Lou Garcia, APRN  07/29/2021

## 2021-07-30 ENCOUNTER — MEDICATION THERAPY MANAGEMENT (OUTPATIENT)
Dept: PHARMACY | Facility: HOSPITAL | Age: 66
End: 2021-07-30

## 2021-07-30 NOTE — PROGRESS NOTES
[facility] lab review ( Afinitor and Aromasin)          7/29/2021   WBC 3.40 - 10.80 10*3/mm3 4.28   Neutrophils Absolute 1.70 - 7.00 10*3/mm3 2.76   Hemoglobin 12.0 - 15.9 g/dL 8.3 (A)   Hematocrit 34.0 - 46.6 % 25.0 (A)   Platelets 140 - 450 10*3/mm3 167   Creatinine 0.60 - 1.10 mg/dL 1.43 (A)   eGFR Non African Am >60 mL/min/1.73 37 (A)   BUN 6 - 20 mg/dL 41 (A)   Sodium 134 - 145 mmol/L 136   Potassium 3.5 - 4.7 mmol/L 4.5   Glucose 74 - 124 mg/dL 139 (A)   Magnesium 1.8 - 2.5 mg/dL 2.0   Calcium 8.5 - 10.2 mg/dL 9.3   Albumin 3.50 - 5.20 g/dL 4.00   Total Protein 6.3 - 8.0 g/dL 6.8   AST (SGOT) 0 - 32 U/L 35 (A)   ALT (SGPT) 0 - 33 U/L 29   Alkaline Phosphatase 38 - 116 U/L 138 (A)   Total Bilirubin 0.2 - 1.2 mg/dL 0.2     APRN dictation is noted.  Continue Afinitor 5 mg po daily and Aromasin 25 mg po daily, noted additional order for prbcs as well as encouragement to hydrate.

## 2021-08-01 ENCOUNTER — INFUSION (OUTPATIENT)
Dept: ONCOLOGY | Facility: HOSPITAL | Age: 66
End: 2021-08-01

## 2021-08-01 VITALS
HEART RATE: 93 BPM | TEMPERATURE: 97 F | SYSTOLIC BLOOD PRESSURE: 129 MMHG | DIASTOLIC BLOOD PRESSURE: 71 MMHG | RESPIRATION RATE: 16 BRPM | OXYGEN SATURATION: 100 %

## 2021-08-01 DIAGNOSIS — D63.0 ANEMIA IN NEOPLASTIC DISEASE: ICD-10-CM

## 2021-08-01 DIAGNOSIS — Z17.0 MALIGNANT NEOPLASM OF LEFT BREAST IN FEMALE, ESTROGEN RECEPTOR POSITIVE, UNSPECIFIED SITE OF BREAST (HCC): ICD-10-CM

## 2021-08-01 DIAGNOSIS — C50.912 MALIGNANT NEOPLASM OF LEFT BREAST IN FEMALE, ESTROGEN RECEPTOR POSITIVE, UNSPECIFIED SITE OF BREAST (HCC): ICD-10-CM

## 2021-08-01 PROCEDURE — 36430 TRANSFUSION BLD/BLD COMPNT: CPT

## 2021-08-01 PROCEDURE — 86900 BLOOD TYPING SEROLOGIC ABO: CPT

## 2021-08-01 PROCEDURE — P9016 RBC LEUKOCYTES REDUCED: HCPCS

## 2021-08-01 PROCEDURE — 63710000001 DIPHENHYDRAMINE PER 50 MG: Performed by: NURSE PRACTITIONER

## 2021-08-01 RX ORDER — DIPHENHYDRAMINE HCL 25 MG
25 CAPSULE ORAL ONCE
Status: COMPLETED | OUTPATIENT
Start: 2021-08-01 | End: 2021-08-01

## 2021-08-01 RX ORDER — SODIUM CHLORIDE 9 MG/ML
250 INJECTION, SOLUTION INTRAVENOUS AS NEEDED
Status: DISCONTINUED | OUTPATIENT
Start: 2021-08-01 | End: 2021-08-01 | Stop reason: HOSPADM

## 2021-08-01 RX ORDER — ACETAMINOPHEN 325 MG/1
650 TABLET ORAL ONCE
Status: COMPLETED | OUTPATIENT
Start: 2021-08-01 | End: 2021-08-01

## 2021-08-01 RX ADMIN — ACETAMINOPHEN 650 MG: 325 TABLET, FILM COATED ORAL at 10:23

## 2021-08-01 RX ADMIN — DIPHENHYDRAMINE HYDROCHLORIDE 25 MG: 25 CAPSULE ORAL at 10:23

## 2021-08-01 RX ADMIN — ACETAMINOPHEN 650 MG: 325 TABLET, FILM COATED ORAL at 15:35

## 2021-08-01 NOTE — CODE DOCUMENTATION
Call placed to on call MD, pt has c/o slight HA and temp increased to 99.8 after 2nd unit of PRBCs. Dr. Roque does not suspect transfusion reaction. Orders given for Tyleon 650 Po x 1 and to monitor patient for 30 mins after Tylenol given.       Pt informed and V/U.

## 2021-08-06 ENCOUNTER — MEDICATION THERAPY MANAGEMENT (OUTPATIENT)
Dept: PHARMACY | Facility: HOSPITAL | Age: 66
End: 2021-08-06

## 2021-08-06 NOTE — PROGRESS NOTES
[facility] telephone encounter re adherence and side effects ( Afinitor+Aromasin)    Ms Nimo reports that she was busy at work this week and forgot the first dose ever of her Afinitor 5 mg po daily; otherwise she has never missed a dose.  She stated that she is feeling increase energy after her blood transfusion last Sunday, and she has been trying to hydrate well with extra fluids and Gatorade as requested by the APRN.  She has some taste reduction, but does not consider this problematic.  We discussed trying to add some spices such as cinnamon and [place]er[place] to her foods to see if that might be helpful.  Pharmacy will continue to follow.

## 2021-08-10 NOTE — PROGRESS NOTES
[facility] is requesting a Kisqali rx for pt. Apparently [facility] is not longer able to dispense the Kisqali due to insurance plan changes. I have escribed a new rx to [facility].  
no

## 2021-08-12 RX ORDER — ATORVASTATIN CALCIUM 20 MG/1
TABLET, FILM COATED ORAL
Qty: 90 TABLET | Refills: 0 | Status: SHIPPED | OUTPATIENT
Start: 2021-08-12 | End: 2021-09-17

## 2021-08-26 ENCOUNTER — OFFICE VISIT (OUTPATIENT)
Dept: ONCOLOGY | Facility: CLINIC | Age: 66
End: 2021-08-26

## 2021-08-26 ENCOUNTER — INFUSION (OUTPATIENT)
Dept: ONCOLOGY | Facility: HOSPITAL | Age: 66
End: 2021-08-26

## 2021-08-26 VITALS
WEIGHT: 201.2 LBS | HEIGHT: 64 IN | TEMPERATURE: 97.3 F | BODY MASS INDEX: 34.35 KG/M2 | RESPIRATION RATE: 20 BRPM | HEART RATE: 118 BPM | DIASTOLIC BLOOD PRESSURE: 65 MMHG | SYSTOLIC BLOOD PRESSURE: 112 MMHG | OXYGEN SATURATION: 96 %

## 2021-08-26 DIAGNOSIS — C78.7 LIVER METASTASIS: ICD-10-CM

## 2021-08-26 DIAGNOSIS — Z45.2 FITTING AND ADJUSTMENT OF VASCULAR CATHETER: Primary | ICD-10-CM

## 2021-08-26 DIAGNOSIS — T45.1X5A LEUKOPENIA DUE TO ANTINEOPLASTIC CHEMOTHERAPY (HCC): ICD-10-CM

## 2021-08-26 DIAGNOSIS — C50.912 PRIMARY MALIGNANT NEOPLASM OF LEFT BREAST (HCC): ICD-10-CM

## 2021-08-26 DIAGNOSIS — C79.51 BONE METASTASIS: ICD-10-CM

## 2021-08-26 DIAGNOSIS — D70.1 LEUKOPENIA DUE TO ANTINEOPLASTIC CHEMOTHERAPY (HCC): ICD-10-CM

## 2021-08-26 DIAGNOSIS — D68.59 THROMBOPHILIA (HCC): ICD-10-CM

## 2021-08-26 DIAGNOSIS — Z45.2 FITTING AND ADJUSTMENT OF VASCULAR CATHETER: ICD-10-CM

## 2021-08-26 DIAGNOSIS — E83.42 HYPOMAGNESEMIA: ICD-10-CM

## 2021-08-26 DIAGNOSIS — C50.912 PRIMARY MALIGNANT NEOPLASM OF LEFT BREAST (HCC): Primary | ICD-10-CM

## 2021-08-26 DIAGNOSIS — M85.89 OSTEOPENIA OF MULTIPLE SITES: Primary | ICD-10-CM

## 2021-08-26 DIAGNOSIS — D63.0 ANEMIA IN NEOPLASTIC DISEASE: ICD-10-CM

## 2021-08-26 LAB
ALBUMIN SERPL-MCNC: 4.1 G/DL (ref 3.5–5.2)
ALBUMIN/GLOB SERPL: 1.5 G/DL (ref 1.1–2.4)
ALP SERPL-CCNC: 169 U/L (ref 38–116)
ALT SERPL W P-5'-P-CCNC: 36 U/L (ref 0–33)
ANION GAP SERPL CALCULATED.3IONS-SCNC: 16.4 MMOL/L (ref 5–15)
AST SERPL-CCNC: 44 U/L (ref 0–32)
BASOPHILS # BLD AUTO: 0.01 10*3/MM3 (ref 0–0.2)
BASOPHILS NFR BLD AUTO: 0.2 % (ref 0–1.5)
BILIRUB SERPL-MCNC: 0.3 MG/DL (ref 0.2–1.2)
BUN SERPL-MCNC: 45 MG/DL (ref 6–20)
BUN/CREAT SERPL: 19.3 (ref 7.3–30)
CALCIUM SPEC-SCNC: 9.4 MG/DL (ref 8.5–10.2)
CANCER AG15-3 SERPL-ACNC: 114.5 U/ML
CHLORIDE SERPL-SCNC: 100 MMOL/L (ref 98–107)
CO2 SERPL-SCNC: 19.6 MMOL/L (ref 22–29)
CREAT SERPL-MCNC: 2.33 MG/DL (ref 0.6–1.1)
DEPRECATED RDW RBC AUTO: 48.7 FL (ref 37–54)
EOSINOPHIL # BLD AUTO: 0.14 10*3/MM3 (ref 0–0.4)
EOSINOPHIL NFR BLD AUTO: 3.1 % (ref 0.3–6.2)
ERYTHROCYTE [DISTWIDTH] IN BLOOD BY AUTOMATED COUNT: 15.3 % (ref 12.3–15.4)
GFR SERPL CREATININE-BSD FRML MDRD: 21 ML/MIN/1.73
GLOBULIN UR ELPH-MCNC: 2.8 GM/DL (ref 1.8–3.5)
GLUCOSE SERPL-MCNC: 167 MG/DL (ref 74–124)
HCT VFR BLD AUTO: 28.8 % (ref 34–46.6)
HGB BLD-MCNC: 9.6 G/DL (ref 12–15.9)
IMM GRANULOCYTES # BLD AUTO: 0.04 10*3/MM3 (ref 0–0.05)
IMM GRANULOCYTES NFR BLD AUTO: 0.9 % (ref 0–0.5)
LYMPHOCYTES # BLD AUTO: 0.62 10*3/MM3 (ref 0.7–3.1)
LYMPHOCYTES NFR BLD AUTO: 13.7 % (ref 19.6–45.3)
MAGNESIUM SERPL-MCNC: 1.8 MG/DL (ref 1.8–2.5)
MCH RBC QN AUTO: 28.9 PG (ref 26.6–33)
MCHC RBC AUTO-ENTMCNC: 33.3 G/DL (ref 31.5–35.7)
MCV RBC AUTO: 86.7 FL (ref 79–97)
MONOCYTES # BLD AUTO: 0.65 10*3/MM3 (ref 0.1–0.9)
MONOCYTES NFR BLD AUTO: 14.3 % (ref 5–12)
NEUTROPHILS NFR BLD AUTO: 3.07 10*3/MM3 (ref 1.7–7)
NEUTROPHILS NFR BLD AUTO: 67.8 % (ref 42.7–76)
NRBC BLD AUTO-RTO: 0 /100 WBC (ref 0–0.2)
PHOSPHATE SERPL-MCNC: 3.6 MG/DL (ref 2.5–4.5)
PLATELET # BLD AUTO: 161 10*3/MM3 (ref 140–450)
PMV BLD AUTO: 9.8 FL (ref 6–12)
POTASSIUM SERPL-SCNC: 4.4 MMOL/L (ref 3.5–4.7)
PROT SERPL-MCNC: 6.9 G/DL (ref 6.3–8)
RBC # BLD AUTO: 3.32 10*6/MM3 (ref 3.77–5.28)
SODIUM SERPL-SCNC: 136 MMOL/L (ref 134–145)
WBC # BLD AUTO: 4.53 10*3/MM3 (ref 3.4–10.8)

## 2021-08-26 PROCEDURE — 84100 ASSAY OF PHOSPHORUS: CPT

## 2021-08-26 PROCEDURE — 99215 OFFICE O/P EST HI 40 MIN: CPT | Performed by: INTERNAL MEDICINE

## 2021-08-26 PROCEDURE — 96372 THER/PROPH/DIAG INJ SC/IM: CPT

## 2021-08-26 PROCEDURE — 25010000002 HEPARIN LOCK FLUSH PER 10 UNITS: Performed by: INTERNAL MEDICINE

## 2021-08-26 PROCEDURE — 25010000002 DENOSUMAB 120 MG/1.7ML SOLUTION: Performed by: INTERNAL MEDICINE

## 2021-08-26 PROCEDURE — 85025 COMPLETE CBC W/AUTO DIFF WBC: CPT

## 2021-08-26 PROCEDURE — 83735 ASSAY OF MAGNESIUM: CPT

## 2021-08-26 PROCEDURE — 86300 IMMUNOASSAY TUMOR CA 15-3: CPT | Performed by: INTERNAL MEDICINE

## 2021-08-26 PROCEDURE — 80053 COMPREHEN METABOLIC PANEL: CPT

## 2021-08-26 RX ORDER — HEPARIN SODIUM (PORCINE) LOCK FLUSH IV SOLN 100 UNIT/ML 100 UNIT/ML
500 SOLUTION INTRAVENOUS AS NEEDED
Status: CANCELLED | OUTPATIENT
Start: 2021-08-26

## 2021-08-26 RX ORDER — SODIUM CHLORIDE 0.9 % (FLUSH) 0.9 %
10 SYRINGE (ML) INJECTION AS NEEDED
Status: DISCONTINUED | OUTPATIENT
Start: 2021-08-26 | End: 2021-08-26 | Stop reason: HOSPADM

## 2021-08-26 RX ORDER — HEPARIN SODIUM (PORCINE) LOCK FLUSH IV SOLN 100 UNIT/ML 100 UNIT/ML
500 SOLUTION INTRAVENOUS AS NEEDED
Status: DISCONTINUED | OUTPATIENT
Start: 2021-08-26 | End: 2021-08-26 | Stop reason: HOSPADM

## 2021-08-26 RX ORDER — TRIAMCINOLONE ACETONIDE 0.25 MG/G
0.25 CREAM TOPICAL 2 TIMES DAILY
COMMUNITY

## 2021-08-26 RX ORDER — SODIUM CHLORIDE 0.9 % (FLUSH) 0.9 %
10 SYRINGE (ML) INJECTION AS NEEDED
Status: CANCELLED | OUTPATIENT
Start: 2021-08-26

## 2021-08-26 RX ADMIN — Medication 500 UNITS: at 14:45

## 2021-08-26 RX ADMIN — DENOSUMAB 120 MG: 120 INJECTION SUBCUTANEOUS at 15:51

## 2021-08-26 RX ADMIN — Medication 10 ML: at 14:45

## 2021-08-26 NOTE — PROGRESS NOTES
Subjective    REASONS FOR FOLLOWUP:   1. Metastatic breast cancer to multiple skeletal sites including cervical spine, sternum, lumbar spine and right femur. Previously treated with Abraxane, Kisqali Femara, Halaven, Adriamycin, now Afinitor and Aromasin  2.Iron deficiency anemia du to GI blood loss, Xarelto stopped months ago, Iron initiated, Pepcid along with Aleve for gastric protection.      History of Present Illness   DURING THE VISIT WITH THE PATIENT TODAY , PATIENT HAD FACE MASK, I HAD PROPER PROTECTIVE EQUIPMENT, AND I DID HAND HYGIENE WITH SOAP AND WATER BEFORE AND AFTER THE VISIT.    This patient returns today to the office for followup of the above diagnosis. She is here stating that she will see her ophthalmologist again in 09/2021. She has not noticed any major difficulties with her visual acuity and she has not had any hemianopsia or quadrantanopia. No obvious visual field defect. She has no pain or proptosis. No erythema or any other alteration in her eyes besides the fact that she will need to have changes of glasses in her prescription. Her appetite is not good. She has not too much taste for anything at this point probably related to her medicines. She has not noticed any alteration in the movement of the tongue or alteration in the swallowing or numbness in her mouth or alteration in the temperature discrimination in her mouth. Her bowel activity remains normal. Urination is normal in volume. She is not drinking enough liquids though. She has not had any cardiovascular or respiratory issues. The patient denies any modification in the amount of pain that she is  experiencing in the right femur that is an intensity of 3 out of 10. She has no pain at any part of her skeleton at this time including the rib cage, the spine, the head, upper and lower extremities. No pain in the right shoulder. She continues applying topical Voltaren to these anatomical sites and taking ibuprofen or Aleve once or twice a day.     She remains functional, able to go to work, drive her car, and take care of herself.     She has developed a sore inside of the cheek on the right side that is slowly healing and probably related to the Afinitor. She is doing the mouth rinse with dexamethasone only once a day.      Past Medical History:   Diagnosis Date   • Abnormal mammogram    • Abnormal menstrual cycle    • Arthritis    • Bone metastasis (CMS/HCC)    • Breast cancer (CMS/HCC) 2000    Left breast   • Drug therapy 2001    breast cancer   • Drug therapy 2017    right femur/associated with breast cancer   • DVT (deep venous thrombosis) (CMS/HCC)    • H/O Heart murmur    • History of colon polyps    • Hx of radiation therapy 2000    breast cancer   • Hx of radiation therapy 2015    bone cancer right femur/ associated with breast cancer   • Hypercholesterolemia    • Hyperlipidemia    • Hypertension    • Multiple benign polyps of large intestine    • Reflux esophagitis    • Shingles      Past Surgical History:   Procedure Laterality Date   • BREAST BIOPSY Left 2000    breast cancer   • BREAST SURGERY  2000    lumpectomy, mastectomy, revision   • COLONOSCOPY  2012   • FEMUR FRACTURE SURGERY      secondary to metastatic breast cancer 7/2014, with revision in 9/2015   • MASTECTOMY Left 2000    transflap in 2003     Family History   Problem Relation Age of Onset   • Hypertension Mother    • Diabetes Mother    • Macular degeneration Mother    • Thyroid disease Mother    • Heart disease Father    • Stroke Father    • Kidney disease Father    • Glaucoma Brother    • Diabetes Brother    • Hypertension Brother    •  Colon cancer Paternal Grandmother    • Thyroid disease Other    • Kidney disease Other    • Glaucoma Other    • Cancer Other    • Diabetes Other      Social History     Socioeconomic History   • Marital status: Single     Spouse name: Not on file   • Number of children: Not on file   • Years of education: Not on file   • Highest education level: Not on file   Tobacco Use   • Smoking status: Never Smoker   • Smokeless tobacco: Never Used   Substance and Sexual Activity   • Alcohol use: No   • Drug use: No   • Sexual activity: Defer     Current Outpatient Medications on File Prior to Visit   Medication Sig Dispense Refill   • atorvastatin (LIPITOR) 20 MG tablet Take 1 tablet by mouth once daily 90 tablet 0   • Cyanocobalamin (B-12 PO) Take 1,000 mcg by mouth 2 (Two) Times a Week.     • denosumab (XGEVA) 120 MG/1.7ML solution injection Inject  under the skin 1 (one) time.     • dexamethasone 0.5 MG/5ML solution Take 5 mL by mouth 4 (Four) Times a Day. Swish for 2 minutes 4 times per day, do not eat or drink for 1 hour after. 500 mL 3   • Diclofenac Sodium (VOLTAREN) 1 % gel gel APPLY 2 TO 4 GRAMS TOPICALLY TWICE DAILY TO AFFECTED AREA 100 g 5   • everolimus (AFINITOR) 5 MG tablet Take 1 tablet by mouth Daily. 28 tablet 6   • exemestane (Aromasin) 25 MG chemo tablet Take 1 tablet by mouth Daily. 30 tablet 6   • HYDROcodone-acetaminophen (NORCO) 5-325 MG per tablet Take 1 tablet by mouth Every 6 (Six) Hours As Needed.     • lisinopril-hydrochlorothiazide (PRINZIDE,ZESTORETIC) 10-12.5 MG per tablet Take 1 tablet by mouth twice daily 180 tablet 3   • magnesium chloride ER (Mag64) 64 MG DR tablet Take 1 tablet by mouth 2 (two) times a day. 180 tablet 3   • Multiple Vitamins-Minerals (OCUVITE ADULT FORMULA PO) Take 1 tablet by mouth Daily.     • Naproxen Sod-Diphenhydramine (ALEVE PM PO) Take  by mouth as needed.     • Naproxen Sodium (ALEVE PO) Take  by mouth.     • Omega-3 Fatty Acids (FISH OIL) 645 MG capsule Take  by  "mouth.     • OMEPRAZOLE PO Take  by mouth Daily.     • ondansetron (ZOFRAN) 8 MG tablet Take 1 tablet by mouth 3 (Three) Times a Day As Needed for Nausea or Vomiting. 30 tablet 5   • Prenatal MV-Min-Fe Fum-FA-DHA (PRENATAL 1 PO) Take 1 tablet by mouth Daily.     • triamcinolone (KENALOG) 0.025 % cream Apply 0.25 application topically to the appropriate area as directed 2 (Two) Times a Day.       Current Facility-Administered Medications on File Prior to Visit   Medication Dose Route Frequency Provider Last Rate Last Admin   • alteplase (CATHFLO/ACTIVASE) injection 2 mg  2 mg Intravenous Once Nader Saez MD         Allergies   Allergen Reactions   • Codeine Unknown - Low Severity   • Docetaxel Unknown - Low Severity   • Paclitaxel Unknown - Low Severity     Objective      Vitals:    08/26/21 1444   BP: 112/65   Pulse: 118   Resp: 20   Temp: 97.3 °F (36.3 °C)   TempSrc: Temporal   SpO2: 96%   Weight: 91.3 kg (201 lb 3.2 oz)   Height: 163 cm (64.17\")   PainSc:   2   PainLoc: Shoulder  Comment: Rt shoulder     Current Status 8/26/2021   ECOG score 0     EXAM    I HAVE PERSONALLY REVIEWED THE HISTORY OF THE PRESENT ILLNESS, PAST MEDICAL HISTORY, FAMILY HISTORY, SOCIAL HISTORY, ALLERGIES, MEDICATIONS STATED ABOVE IN THE  NOTE FROM TODAY.        GENERAL:  Well-developed, well-nourished  Patient  in no acute distress.   SKIN:  Warm, dry ,NO rashes,NO purpura ,NO petechiae.  HEENT:  Pupils were equal and reactive to light and accomodation, conjunctivae noninjected, no pterygium, normal extraocular movements, normal visual acuity. Ulcer r check mucosa  NECK:  Supple with good range of motion; no thyromegaly , no other masses, no JVD or bruits, no cervical adenopathies.No carotid artery pain, no carotid abnormal pulsation , NO arterial dance.  LYMPHATICS:  No cervical, NO supraclavicular, NO axillary,NO epitrochlear , NO inguinal adenopathy.  CARDIAC   normal rate and regular rhythm, with systolic grade 2/6 aortic  " murmur,NO rubs NO S3 NO S4 right or left .  LUNGS: normal breath sounds bilateral, no wheezing, rhonchi, crackles or rubs.  VASCULAR VENOUS: no cyanosis, collateral circulation, varicosities, edema, palpable cords, pain, erythema.  ABDOMEN:  Soft, nontender with no hepatomegaly, no splenomegaly,no masses, no ascites, no collateral circulation,no distention,no [place] sign.  EXTREMITIES  AND SPINE:  No clubbing, cyanosis or edema, no deformities , r femoral  pain .No kyphosis, scoliosis, no other deformities, no pain in spine, no pain in ribs , no pain in pelvic bone.  NEUROLOGICAL:  Patient was awake, alert, oriented to time, person and place.CRANIAL NERVES: PUPILS ARE EQUAL AND REACTIVE TO  LIGHT AND ACOMODATION, EXTRAOCULAR MOVEMENTS WERE NORMAL, NO DIPLOPIA OR NYSTAGMUS.  TASTE AND TEMPERATURE DISCRIMINATION IN MOUTH WAS NORMAL. FACE WAS SYMMETRIC, NORMAL SYMMETRIC SENSORY MODALITIES FOR 3 BRANCHES OF TRIGEMINAL NERVE.NORMAL GESTICULATION OF THE FACE MUSCULATURE. NORMAL ELEVATION OF UPPER LIDS. NORMAL VISION.confrontational visual filed exam: no defects.  MOTOR EXAM: SYMMETRIC PROXIMAL AND DISTAL STRENGTH IN UPPER AND LOWER EXTREMITIES. STANDING FROM CHAIR ONCE NO HESITATION.  LONG TRACK:TOES DOWN GOING, NO BABINSKI  REFLEXES: BICIPITAL, TRICIPITAL, PATELLAR AND ACHILLES SYMMETRIC AND 2+.  MUSCLE TONE: NO RIGIDITY , NO SPASTICITY, NO COGWHEEL.  SENSORY MODALITIES: TOUCH, THERMAL, POINT DISCRIMINATION , VIBRATORY SENSATION NORMAL BILATERALLY.  BALANCE AND COORDINATION: ROMBERG TEST NEGATIVE, FINGER TO NOSE SYMMETRIC WITH BOTH UPPER EXTREMITIES.  ABNORMAL MOVEMENTS: NO TREMOR, NO SEIZURES, NO TICS, NO FASCICULATIONS.  MENINGES: NO KERNING, NO BRUDZINSKI, SOFT SUPPLE NECK.      RESULTS REVIEWED:  Results from last 7 days   Lab Units 08/26/21  1445   WBC 10*3/mm3 4.53   NEUTROS ABS 10*3/mm3 3.07   HEMOGLOBIN g/dL 9.6*   HEMATOCRIT % 28.8*   PLATELETS 10*3/mm3 161               Assessment/Plan    1. Metastatic left  breast cancer to multiple skeletal sites including cervical spine, sternum, lumbar spine and right femur.  · Previously treated with Abraxane Xgeva  · Documented radiographic progression by bone scan 3/15/2017, treated with Femara Kisqali, Xgeva every 3 months  · Progression of bony disease June 2020.  Femara Kisqali discontinued.  Transition to Halaven  · Palliative skeletal radiation to the right shoulder and pelvis September 2020  · Treatment holiday from Halaven 11/4/2020 due to progressive fatigue  · CA 15-3 has been declining while on Halaven, most recently 50.9.   · 12/2/2020, CA 15-3 55.8, increased to 69.1 2/22/2021  · Bone scan 10/28/2020 with increased uptake in the right scapula, no other significant change  · CT scans 12/28/2020 with extensive bony disease, new hypodense left hepatic lesion in the liver  · Radiation to the hip complete 1/26/2021  · Single agent Adriamycin initiated 2/5/2021 to be given 3 to 4 weeks  · CT scans and bone scan 4/6/2021 where multiple hepatic lesions developing consistent with hepatic metastasis.  Diffuse bone lesions without change  · Plans for liver biopsy to further evaluate Caris Target   · CT guided biopsy 4/14/2021.  Pathology consistent with metastatic adenocarcinoma of the breast  · Caris Target documents ER positive, UT positive, HER-2 negative.  MSI stable, low mutation burden.  Androgen receptor positivity and no actionable mutations otherwise.  · Transition to Aromasin 25 mg daily along with Afinitor 5 mg daily.  Afinitor initiated 6/8/2021  · At the initiation of Aromasin and Afinitor, baseline CA 15-3 6/4/2021 87.9.  CA 15-3 decreased to 75 7/1/2021.  · Review of ophthalmology notes.  The patient was noted to have a lesion in the right eye, questionable metastatic deposit.  However, this has responded to Afinitor.  She will continue to follow-up with ophthalmology every 3 months  · Continue Aromasin 25 mg and Afinitor 5 mg daily  The patient was reviewed on  08/26/2021. In spite of having in [facility] cancer marker, the patient’s clinical status looks relatively stable. She has not developed any new sites of bone pain and the pain intensity in the right femur is no different than before requiring pain medicine once or twice a day. She is not requiring any narcotic medication. I advised her to continue her medicines, Afinitor and Aromasin at the same dosing and I advised her to proceed with radiological assessment including a bone scan and a CT scan of the abdomen and pelvis. This will be performed in 2 weeks and we will review her back in 3 weeks.    ·   2.  Bony metastasis  · Now receiving Xgeva every 3 months, last given 6/4/2021.  She will receive Xgeva at scheduled follow-up with Dr. Saez in August  As stated above the bone pain is not changing too much in intensity. This will be watched. She will continue the same dosing of medicines and she will proceed with her Xgeva today, 08/26/2021. Next dose on November 26th.    ·   3.  Anemia, multifactorial  · Previously with iron deficiency due to GI blood loss.  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  The patient is symptomatic with fatigue and dyspnea.  I suspect there is an element of hemoconcentration as well as she is clinically dehydrated.  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 She was transfused a few weeks ago and her hemoglobin is 9.6 today. There is nothing else I can do to stimulate production of red cells at this point. This will be watched and I would not be surprised if she needs to be transfused again in a few weeks. She has no notion of blood loss. We have checked  ferritin, iron, TIBC, B12 and folic acid levels, all of then normal.    ·   4.  Hypomagnesemia  · Continuing on magnesium replacement twice daily  She will continue her oral magnesium supplementation.    ·   5.  Venous access  · The patient does have a right chest Mediport which we will continue to flush monthly.  Activase currently instilled  Her port remains functional and it has been flushed and maintained appropriately.    ·   6.  Cancer related pain  · Hydrocodone/acetaminophen 5/325 every 6 hours as needed.  Patient does not like taking narcotics.  She does utilize Voltaren cream and Aleve which provides good control of her pain.  Her pain medicine, anti-inflammatory and naproxen will remain ongoing for the time being. We need to be sure that she is not going to  toxicity from the point of view kidney dysfunction or GI toxicity. She remains on proton pump inhibitor for gastric protection.    ·   7.  Muscle cramping  · With the patient's borderline hypotension and tachycardia I suspect this is related to dehydration.  The patient was encouraged to increase her oral intake.   · Creatinine reviewed elevated at 1.4 which confirms dehydration.  The patient will increase oral intake  This issue is much better when she drinks more liquids.    ·   PLAN:    1. Her Afinitor and Aromasin will be continued at the same dose.   2. I insisted in regard to the use of dexamethasone oral rinse to minimize sores related to Afinitor use. She has 1 big one in the cheek on the right side that requires topical therapy as well with this solution.   3. Her pain medicines will remain the same using Aleve and using hydrocodone on p.r.n. basis.   4. Her hemoglobin will be watched. I would not be surprised if she needs to be transfused again due to myelophthisis in a few weeks.   5. In regard to issues pertinent to possible metastasis in the right eye, things are no different today than how they were weeks ago. I have performed a  visual field examination today that discloses no defects, specifically no hemianopsias and no peripheral vision loss. For that reason, I have advised her to  continue driving her car. I have not performed a funduscopic exam today. Her ophthalmologist will perform this in 09/2021.   6. In regard to her radiological assessment, I went ahead and ordered a bone scan and CT scan of the abdomen and pelvis with no IV contrast. We will avoid the contrast given the fact that her creatinine has been all over the place recently and probably related to volume contraction.    I will monitor creatinine given the fact that she has now an element of chronic kidney disease. I would not be surprised if we have to modify her blood pressure medicines or discontinue hydrochlorothiazide for example.   7. She will proceed with her Xgeva today and the next dose will be repeated in 11/2021, the last week close to [facility].   8. I will review her back in 3 weeks. She requested no radiological assessment next week because of issues pertinent to her work environment.   9. Given the ageusia or hypogeusia that she has, probably related to her treatment, I advised her to check on the sensitivity of her mouth in regard to taste and flavors in both sides of the tongue. I want to be sure that there is not going to be compromise of this area. At least by functional status and motility there is no alteration that I can tell and she has no difficulty with discrimination of temperatures in the mouth. In order to improve flavor, I advised her to use foods that contain cinnamon, garlic, [place], mint and rosemary to see if this makes in difference in the long run in regard to proper nutrition.   10. She will remain on PPI for gastric protection.   11. She will remain on B12 supplementation.   12. She will remain on magnesium supplementation as well.      Patient is on high risk medication requiring close monitoring for toxicity.     Nader Saez,  MD  08/26/2021

## 2021-08-27 ENCOUNTER — TELEPHONE (OUTPATIENT)
Dept: ONCOLOGY | Facility: CLINIC | Age: 66
End: 2021-08-27

## 2021-08-27 ENCOUNTER — MEDICATION THERAPY MANAGEMENT (OUTPATIENT)
Dept: PHARMACY | Facility: HOSPITAL | Age: 66
End: 2021-08-27

## 2021-08-27 ENCOUNTER — INFUSION (OUTPATIENT)
Dept: ONCOLOGY | Facility: HOSPITAL | Age: 66
End: 2021-08-27

## 2021-08-27 VITALS — RESPIRATION RATE: 18 BRPM | TEMPERATURE: 96.9 F | HEIGHT: 64 IN | BODY MASS INDEX: 34.54 KG/M2

## 2021-08-27 DIAGNOSIS — Z45.2 FITTING AND ADJUSTMENT OF VASCULAR CATHETER: ICD-10-CM

## 2021-08-27 DIAGNOSIS — C50.912 PRIMARY MALIGNANT NEOPLASM OF LEFT BREAST (HCC): Primary | ICD-10-CM

## 2021-08-27 LAB
ANION GAP SERPL CALCULATED.3IONS-SCNC: 15.2 MMOL/L (ref 5–15)
BUN SERPL-MCNC: 49 MG/DL (ref 8–23)
BUN/CREAT SERPL: 28.2 (ref 7–25)
CALCIUM SPEC-SCNC: 9.5 MG/DL (ref 8.6–10.5)
CHLORIDE SERPL-SCNC: 103 MMOL/L (ref 98–107)
CO2 SERPL-SCNC: 18.8 MMOL/L (ref 22–29)
CREAT SERPL-MCNC: 1.74 MG/DL (ref 0.57–1)
GFR SERPL CREATININE-BSD FRML MDRD: 29 ML/MIN/1.73
GLUCOSE SERPL-MCNC: 125 MG/DL (ref 65–99)
POTASSIUM SERPL-SCNC: 4.6 MMOL/L (ref 3.5–5.2)
SODIUM SERPL-SCNC: 137 MMOL/L (ref 136–145)

## 2021-08-27 PROCEDURE — 25010000002 HEPARIN LOCK FLUSH PER 10 UNITS: Performed by: INTERNAL MEDICINE

## 2021-08-27 PROCEDURE — 96360 HYDRATION IV INFUSION INIT: CPT

## 2021-08-27 PROCEDURE — 96361 HYDRATE IV INFUSION ADD-ON: CPT

## 2021-08-27 PROCEDURE — 80048 BASIC METABOLIC PNL TOTAL CA: CPT | Performed by: INTERNAL MEDICINE

## 2021-08-27 RX ORDER — SODIUM CHLORIDE 0.9 % (FLUSH) 0.9 %
10 SYRINGE (ML) INJECTION AS NEEDED
Status: DISCONTINUED | OUTPATIENT
Start: 2021-08-27 | End: 2021-08-27 | Stop reason: HOSPADM

## 2021-08-27 RX ORDER — HEPARIN SODIUM (PORCINE) LOCK FLUSH IV SOLN 100 UNIT/ML 100 UNIT/ML
500 SOLUTION INTRAVENOUS AS NEEDED
Status: CANCELLED | OUTPATIENT
Start: 2021-08-27

## 2021-08-27 RX ORDER — HEPARIN SODIUM (PORCINE) LOCK FLUSH IV SOLN 100 UNIT/ML 100 UNIT/ML
500 SOLUTION INTRAVENOUS AS NEEDED
Status: DISCONTINUED | OUTPATIENT
Start: 2021-08-27 | End: 2021-08-27 | Stop reason: HOSPADM

## 2021-08-27 RX ORDER — ACETAMINOPHEN 325 MG/1
650 TABLET ORAL ONCE
Status: COMPLETED | OUTPATIENT
Start: 2021-08-27 | End: 2021-08-27

## 2021-08-27 RX ORDER — SODIUM CHLORIDE 0.9 % (FLUSH) 0.9 %
10 SYRINGE (ML) INJECTION AS NEEDED
Status: CANCELLED | OUTPATIENT
Start: 2021-08-27

## 2021-08-27 RX ADMIN — SODIUM CHLORIDE 1000 ML: 9 INJECTION, SOLUTION INTRAVENOUS at 14:38

## 2021-08-27 RX ADMIN — Medication 500 UNITS: at 16:11

## 2021-08-27 RX ADMIN — ACETAMINOPHEN 650 MG: 325 TABLET ORAL at 16:08

## 2021-08-27 RX ADMIN — SODIUM CHLORIDE, PRESERVATIVE FREE 10 ML: 5 INJECTION INTRAVENOUS at 16:11

## 2021-08-27 NOTE — PROGRESS NOTES
Per Dr. Saez, called pt re: yesterday afternoon's lab results. She will come in for IVF today with a stat BMP at [facility]. Also instructed her to stop her aleve per Dr. Saez as this may be damaging her kidneys. Pt v/u of this and the plan to return on Monday for IVF and STAT BMP at either [facility] or . Supportive plan for hydration and stat labs placed.     29 yo M w/ no pertinent PMHx BIBEMS presents with AMS and multiple abrasions/ lacerations. As per EMS, pt was found wandering around, covered in blood and admitted to EtOH use. In the ED, pt states he went for a run and fell; pt was unable to provide further history. Denies pain, N/V and no trauma or bowel/urinary incontinence. No drug use reported. 29 yo M w/ no pertinent PMHx BIBEMS presents with AMS and multiple abrasions/superficial lacerations. As per EMS, pt was found wandering around, covered in blood and admitted to EtOH use. In the ED, pt states he went for a run and fell; pt was unable to provide further history. Denies pain, N/V and no trauma or bowel/urinary incontinence. No drug use reported.

## 2021-08-27 NOTE — PROGRESS NOTES
[facility] lab review ( Afinitor/Exemestane)        8/26/2021   WBC 3.40 - 10.80 10*3/mm3 4.53   Neutrophils Absolute 1.70 - 7.00 10*3/mm3 3.07   Hemoglobin 12.0 - 15.9 g/dL 9.6 (A)   Hematocrit 34.0 - 46.6 % 28.8 (A)   Platelets 140 - 450 10*3/mm3 161   Creatinine 0.60 - 1.10 mg/dL 2.33 (A)   eGFR Non African Am >60 mL/min/1.73 21 (A)   BUN 6 - 20 mg/dL 45 (A)   Sodium 134 - 145 mmol/L 136   Potassium 3.5 - 4.7 mmol/L 4.4   Glucose 74 - 124 mg/dL 167 (A)   Magnesium 1.8 - 2.5 mg/dL 1.8   Calcium 8.5 - 10.2 mg/dL 9.4   Albumin 3.50 - 5.20 g/dL 4.10   Total Protein 6.3 - 8.0 g/dL 6.9   AST (SGOT) 0 - 32 U/L 44 (A)   ALT (SGPT) 0 - 33 U/L 36 (A)   Alkaline Phosphatase 38 - 116 U/L 169 (A)   Total Bilirubin 0.2 - 1.2 mg/dL 0.3   Phosphorus 2.5 - 4.5 mg/dL 3.6     Dr Saez' dictation is noted.  Continues Afinitor 5 mg po daily and Exemestane 25 mg po daily.  Using dexamethasone mouth rinse for an ulceration in her mouth.

## 2021-08-30 ENCOUNTER — INFUSION (OUTPATIENT)
Dept: ONCOLOGY | Facility: HOSPITAL | Age: 66
End: 2021-08-30

## 2021-08-30 VITALS
WEIGHT: 197.8 LBS | HEART RATE: 99 BPM | SYSTOLIC BLOOD PRESSURE: 114 MMHG | DIASTOLIC BLOOD PRESSURE: 72 MMHG | BODY MASS INDEX: 33.77 KG/M2 | TEMPERATURE: 96.9 F | RESPIRATION RATE: 18 BRPM | HEIGHT: 64 IN | OXYGEN SATURATION: 100 %

## 2021-08-30 DIAGNOSIS — C50.912 PRIMARY MALIGNANT NEOPLASM OF LEFT BREAST (HCC): Primary | ICD-10-CM

## 2021-08-30 DIAGNOSIS — Z45.2 FITTING AND ADJUSTMENT OF VASCULAR CATHETER: ICD-10-CM

## 2021-08-30 DIAGNOSIS — G89.3 CANCER ASSOCIATED PAIN: Primary | ICD-10-CM

## 2021-08-30 LAB
ANION GAP SERPL CALCULATED.3IONS-SCNC: 14 MMOL/L (ref 5–15)
BUN SERPL-MCNC: 35 MG/DL (ref 8–23)
BUN/CREAT SERPL: 15.6 (ref 7–25)
CALCIUM SPEC-SCNC: 9.5 MG/DL (ref 8.6–10.5)
CHLORIDE SERPL-SCNC: 102 MMOL/L (ref 98–107)
CO2 SERPL-SCNC: 20 MMOL/L (ref 22–29)
CREAT SERPL-MCNC: 2.24 MG/DL (ref 0.57–1)
GFR SERPL CREATININE-BSD FRML MDRD: 22 ML/MIN/1.73
GLUCOSE SERPL-MCNC: 172 MG/DL (ref 65–99)
POTASSIUM SERPL-SCNC: 4.4 MMOL/L (ref 3.5–5.2)
SODIUM SERPL-SCNC: 136 MMOL/L (ref 136–145)

## 2021-08-30 PROCEDURE — 96361 HYDRATE IV INFUSION ADD-ON: CPT

## 2021-08-30 PROCEDURE — 96360 HYDRATION IV INFUSION INIT: CPT

## 2021-08-30 PROCEDURE — 25010000002 HEPARIN LOCK FLUSH PER 10 UNITS: Performed by: NURSE PRACTITIONER

## 2021-08-30 PROCEDURE — 80048 BASIC METABOLIC PNL TOTAL CA: CPT | Performed by: NURSE PRACTITIONER

## 2021-08-30 RX ORDER — HEPARIN SODIUM (PORCINE) LOCK FLUSH IV SOLN 100 UNIT/ML 100 UNIT/ML
500 SOLUTION INTRAVENOUS AS NEEDED
Status: DISCONTINUED | OUTPATIENT
Start: 2021-08-30 | End: 2021-08-30 | Stop reason: HOSPADM

## 2021-08-30 RX ORDER — HYDROCODONE BITARTRATE AND ACETAMINOPHEN 5; 325 MG/1; MG/1
1 TABLET ORAL EVERY 6 HOURS PRN
Qty: 60 TABLET | Refills: 0 | OUTPATIENT
Start: 2021-08-30

## 2021-08-30 RX ORDER — SODIUM CHLORIDE 0.9 % (FLUSH) 0.9 %
10 SYRINGE (ML) INJECTION AS NEEDED
Status: CANCELLED | OUTPATIENT
Start: 2021-08-30

## 2021-08-30 RX ORDER — HEPARIN SODIUM (PORCINE) LOCK FLUSH IV SOLN 100 UNIT/ML 100 UNIT/ML
500 SOLUTION INTRAVENOUS AS NEEDED
Status: CANCELLED | OUTPATIENT
Start: 2021-08-30

## 2021-08-30 RX ORDER — SODIUM CHLORIDE 0.9 % (FLUSH) 0.9 %
10 SYRINGE (ML) INJECTION AS NEEDED
Status: DISCONTINUED | OUTPATIENT
Start: 2021-08-30 | End: 2021-08-30 | Stop reason: HOSPADM

## 2021-08-30 RX ORDER — HYDROCODONE BITARTRATE AND ACETAMINOPHEN 5; 325 MG/1; MG/1
1 TABLET ORAL EVERY 6 HOURS PRN
Qty: 60 TABLET | Refills: 0 | Status: SHIPPED | OUTPATIENT
Start: 2021-08-30

## 2021-08-30 RX ADMIN — Medication 500 UNITS: at 11:15

## 2021-08-30 RX ADMIN — SODIUM CHLORIDE 1000 ML: 9 INJECTION, SOLUTION INTRAVENOUS at 08:26

## 2021-08-30 RX ADMIN — SODIUM CHLORIDE, PRESERVATIVE FREE 10 ML: 5 INJECTION INTRAVENOUS at 11:15

## 2021-08-30 NOTE — NURSING NOTE
Discussion with patient regarding pain medication and lab results.  NEVAEH LOPEZ at bedside.  Recommends that patient try a half of hydrocodone every 6 hours to try and get pain controlled and stay off the Aleve and tylenol. Also, to return on Thursday to recheck BMP and give 1LNS due to creatinine elevation.  Pt agreeable and all questions answered to her satisfaction prior to discharge.

## 2021-09-02 ENCOUNTER — INFUSION (OUTPATIENT)
Dept: ONCOLOGY | Facility: HOSPITAL | Age: 66
End: 2021-09-02

## 2021-09-02 VITALS
HEART RATE: 105 BPM | TEMPERATURE: 98.4 F | BODY MASS INDEX: 34.09 KG/M2 | OXYGEN SATURATION: 95 % | WEIGHT: 198.6 LBS | DIASTOLIC BLOOD PRESSURE: 70 MMHG | SYSTOLIC BLOOD PRESSURE: 114 MMHG

## 2021-09-02 DIAGNOSIS — C50.912 PRIMARY MALIGNANT NEOPLASM OF LEFT BREAST (HCC): Primary | ICD-10-CM

## 2021-09-02 DIAGNOSIS — Z45.2 FITTING AND ADJUSTMENT OF VASCULAR CATHETER: ICD-10-CM

## 2021-09-02 DIAGNOSIS — M85.89 OSTEOPENIA OF MULTIPLE SITES: ICD-10-CM

## 2021-09-02 LAB
ANION GAP SERPL CALCULATED.3IONS-SCNC: 12.5 MMOL/L (ref 5–15)
BUN SERPL-MCNC: 20 MG/DL (ref 8–23)
BUN/CREAT SERPL: 17.1 (ref 7–25)
CALCIUM SPEC-SCNC: 9.1 MG/DL (ref 8.6–10.5)
CHLORIDE SERPL-SCNC: 101 MMOL/L (ref 98–107)
CO2 SERPL-SCNC: 21.5 MMOL/L (ref 22–29)
CREAT SERPL-MCNC: 1.17 MG/DL (ref 0.57–1)
GFR SERPL CREATININE-BSD FRML MDRD: 46 ML/MIN/1.73
GLUCOSE SERPL-MCNC: 118 MG/DL (ref 65–99)
PHOSPHATE SERPL-MCNC: 2.3 MG/DL (ref 2.5–4.5)
POTASSIUM SERPL-SCNC: 4 MMOL/L (ref 3.5–5.2)
SODIUM SERPL-SCNC: 135 MMOL/L (ref 136–145)

## 2021-09-02 PROCEDURE — 25010000002 HEPARIN LOCK FLUSH PER 10 UNITS: Performed by: NURSE PRACTITIONER

## 2021-09-02 PROCEDURE — 84100 ASSAY OF PHOSPHORUS: CPT | Performed by: INTERNAL MEDICINE

## 2021-09-02 PROCEDURE — 96361 HYDRATE IV INFUSION ADD-ON: CPT

## 2021-09-02 PROCEDURE — 80048 BASIC METABOLIC PNL TOTAL CA: CPT | Performed by: NURSE PRACTITIONER

## 2021-09-02 PROCEDURE — 96360 HYDRATION IV INFUSION INIT: CPT

## 2021-09-02 RX ORDER — HEPARIN SODIUM (PORCINE) LOCK FLUSH IV SOLN 100 UNIT/ML 100 UNIT/ML
500 SOLUTION INTRAVENOUS AS NEEDED
Status: CANCELLED | OUTPATIENT
Start: 2021-09-02

## 2021-09-02 RX ORDER — HEPARIN SODIUM (PORCINE) LOCK FLUSH IV SOLN 100 UNIT/ML 100 UNIT/ML
500 SOLUTION INTRAVENOUS AS NEEDED
Status: DISCONTINUED | OUTPATIENT
Start: 2021-09-02 | End: 2021-09-02 | Stop reason: HOSPADM

## 2021-09-02 RX ORDER — SODIUM CHLORIDE 9 MG/ML
1000 INJECTION, SOLUTION INTRAVENOUS ONCE
Status: COMPLETED | OUTPATIENT
Start: 2021-09-02 | End: 2021-09-02

## 2021-09-02 RX ORDER — SODIUM CHLORIDE 0.9 % (FLUSH) 0.9 %
10 SYRINGE (ML) INJECTION AS NEEDED
Status: DISCONTINUED | OUTPATIENT
Start: 2021-09-02 | End: 2021-09-02 | Stop reason: HOSPADM

## 2021-09-02 RX ORDER — SODIUM CHLORIDE 0.9 % (FLUSH) 0.9 %
10 SYRINGE (ML) INJECTION AS NEEDED
Status: CANCELLED | OUTPATIENT
Start: 2021-09-02

## 2021-09-02 RX ADMIN — SODIUM CHLORIDE, PRESERVATIVE FREE 10 ML: 5 INJECTION INTRAVENOUS at 15:49

## 2021-09-02 RX ADMIN — SODIUM CHLORIDE 1000 ML: 9 INJECTION, SOLUTION INTRAVENOUS at 13:39

## 2021-09-02 RX ADMIN — Medication 500 UNITS: at 15:49

## 2021-09-02 NOTE — NURSING NOTE
Pt here for IVF and labs. Creatinine level improved to 1.17 from 2.24 from last visit and labs were drawn prior to IVFs. Reviewed labs with noted phos level 2.3  with Patrizia LOPEZ with instructions to inform pt to increase milk intake. Pt informed and vu

## 2021-09-09 ENCOUNTER — INFUSION (OUTPATIENT)
Dept: ONCOLOGY | Facility: HOSPITAL | Age: 66
End: 2021-09-09

## 2021-09-09 ENCOUNTER — OFFICE VISIT (OUTPATIENT)
Dept: ONCOLOGY | Facility: CLINIC | Age: 66
End: 2021-09-09

## 2021-09-09 ENCOUNTER — TELEPHONE (OUTPATIENT)
Dept: ONCOLOGY | Facility: CLINIC | Age: 66
End: 2021-09-09

## 2021-09-09 VITALS
DIASTOLIC BLOOD PRESSURE: 72 MMHG | BODY MASS INDEX: 33.22 KG/M2 | OXYGEN SATURATION: 94 % | RESPIRATION RATE: 16 BRPM | TEMPERATURE: 97.7 F | HEART RATE: 95 BPM | HEIGHT: 64 IN | SYSTOLIC BLOOD PRESSURE: 117 MMHG | WEIGHT: 194.6 LBS

## 2021-09-09 DIAGNOSIS — C50.912 PRIMARY MALIGNANT NEOPLASM OF LEFT BREAST (HCC): ICD-10-CM

## 2021-09-09 DIAGNOSIS — Z79.899 HIGH RISK MEDICATION USE: ICD-10-CM

## 2021-09-09 DIAGNOSIS — G89.3 CANCER ASSOCIATED PAIN: ICD-10-CM

## 2021-09-09 DIAGNOSIS — Z45.2 FITTING AND ADJUSTMENT OF VASCULAR CATHETER: Primary | ICD-10-CM

## 2021-09-09 DIAGNOSIS — R47.89 WORD FINDING DIFFICULTY: ICD-10-CM

## 2021-09-09 DIAGNOSIS — C79.51 BONE METASTASIS: ICD-10-CM

## 2021-09-09 DIAGNOSIS — D64.9 SYMPTOMATIC ANEMIA: Primary | ICD-10-CM

## 2021-09-09 DIAGNOSIS — C79.51 BONE METASTASIS: Primary | ICD-10-CM

## 2021-09-09 DIAGNOSIS — C50.919 METASTATIC BREAST CANCER: ICD-10-CM

## 2021-09-09 LAB
ALBUMIN SERPL-MCNC: 3.8 G/DL (ref 3.5–5.2)
ALBUMIN/GLOB SERPL: 1.1 G/DL
ALP SERPL-CCNC: 194 U/L (ref 39–117)
ALT SERPL W P-5'-P-CCNC: 53 U/L (ref 1–33)
ANION GAP SERPL CALCULATED.3IONS-SCNC: 14.4 MMOL/L (ref 5–15)
AST SERPL-CCNC: 50 U/L (ref 1–32)
BASOPHILS # BLD AUTO: 0.02 10*3/MM3 (ref 0–0.2)
BASOPHILS NFR BLD AUTO: 0.4 % (ref 0–1.5)
BILIRUB SERPL-MCNC: 0.3 MG/DL (ref 0–1.2)
BUN SERPL-MCNC: 21 MG/DL (ref 8–23)
BUN/CREAT SERPL: 13.5 (ref 7–25)
CALCIUM SPEC-SCNC: 9.3 MG/DL (ref 8.6–10.5)
CHLORIDE SERPL-SCNC: 98 MMOL/L (ref 98–107)
CO2 SERPL-SCNC: 21.6 MMOL/L (ref 22–29)
CREAT SERPL-MCNC: 1.55 MG/DL (ref 0.57–1)
DEPRECATED RDW RBC AUTO: 49.2 FL (ref 37–54)
EOSINOPHIL # BLD AUTO: 0.08 10*3/MM3 (ref 0–0.4)
EOSINOPHIL NFR BLD AUTO: 1.5 % (ref 0.3–6.2)
ERYTHROCYTE [DISTWIDTH] IN BLOOD BY AUTOMATED COUNT: 15.7 % (ref 12.3–15.4)
GFR SERPL CREATININE-BSD FRML MDRD: 34 ML/MIN/1.73
GLOBULIN UR ELPH-MCNC: 3.4 GM/DL
GLUCOSE SERPL-MCNC: 129 MG/DL (ref 65–99)
HCT VFR BLD AUTO: 25.5 % (ref 34–46.6)
HGB BLD-MCNC: 8.3 G/DL (ref 12–15.9)
IMM GRANULOCYTES # BLD AUTO: 0.08 10*3/MM3 (ref 0–0.05)
IMM GRANULOCYTES NFR BLD AUTO: 1.5 % (ref 0–0.5)
LYMPHOCYTES # BLD AUTO: 0.58 10*3/MM3 (ref 0.7–3.1)
LYMPHOCYTES NFR BLD AUTO: 11.2 % (ref 19.6–45.3)
MCH RBC QN AUTO: 28.1 PG (ref 26.6–33)
MCHC RBC AUTO-ENTMCNC: 32.5 G/DL (ref 31.5–35.7)
MCV RBC AUTO: 86.4 FL (ref 79–97)
MONOCYTES # BLD AUTO: 0.63 10*3/MM3 (ref 0.1–0.9)
MONOCYTES NFR BLD AUTO: 12.1 % (ref 5–12)
NEUTROPHILS NFR BLD AUTO: 3.81 10*3/MM3 (ref 1.7–7)
NEUTROPHILS NFR BLD AUTO: 73.3 % (ref 42.7–76)
NRBC BLD AUTO-RTO: 0.4 /100 WBC (ref 0–0.2)
PLATELET # BLD AUTO: 209 10*3/MM3 (ref 140–450)
PMV BLD AUTO: 9.4 FL (ref 6–12)
POTASSIUM SERPL-SCNC: 3.8 MMOL/L (ref 3.5–5.2)
PROT SERPL-MCNC: 7.2 G/DL (ref 6–8.5)
RBC # BLD AUTO: 2.95 10*6/MM3 (ref 3.77–5.28)
SODIUM SERPL-SCNC: 134 MMOL/L (ref 136–145)
WBC # BLD AUTO: 5.2 10*3/MM3 (ref 3.4–10.8)

## 2021-09-09 PROCEDURE — 80053 COMPREHEN METABOLIC PANEL: CPT | Performed by: NURSE PRACTITIONER

## 2021-09-09 PROCEDURE — 96360 HYDRATION IV INFUSION INIT: CPT

## 2021-09-09 PROCEDURE — 85025 COMPLETE CBC W/AUTO DIFF WBC: CPT | Performed by: NURSE PRACTITIONER

## 2021-09-09 PROCEDURE — 25010000002 HEPARIN LOCK FLUSH PER 10 UNITS: Performed by: NURSE PRACTITIONER

## 2021-09-09 PROCEDURE — 99215 OFFICE O/P EST HI 40 MIN: CPT | Performed by: NURSE PRACTITIONER

## 2021-09-09 PROCEDURE — 96361 HYDRATE IV INFUSION ADD-ON: CPT

## 2021-09-09 RX ORDER — DIPHENHYDRAMINE HCL 25 MG
25 CAPSULE ORAL ONCE
Status: CANCELLED | OUTPATIENT
Start: 2021-09-10 | End: 2021-09-09

## 2021-09-09 RX ORDER — SODIUM CHLORIDE 9 MG/ML
1000 INJECTION, SOLUTION INTRAVENOUS ONCE
Status: COMPLETED | OUTPATIENT
Start: 2021-09-09 | End: 2021-09-09

## 2021-09-09 RX ORDER — HEPARIN SODIUM (PORCINE) LOCK FLUSH IV SOLN 100 UNIT/ML 100 UNIT/ML
500 SOLUTION INTRAVENOUS AS NEEDED
Status: CANCELLED | OUTPATIENT
Start: 2021-09-09

## 2021-09-09 RX ORDER — SODIUM CHLORIDE 0.9 % (FLUSH) 0.9 %
10 SYRINGE (ML) INJECTION AS NEEDED
Status: CANCELLED | OUTPATIENT
Start: 2021-09-09

## 2021-09-09 RX ORDER — ACETAMINOPHEN 325 MG/1
650 TABLET ORAL ONCE
Status: CANCELLED | OUTPATIENT
Start: 2021-09-10 | End: 2021-09-09

## 2021-09-09 RX ORDER — HEPARIN SODIUM (PORCINE) LOCK FLUSH IV SOLN 100 UNIT/ML 100 UNIT/ML
500 SOLUTION INTRAVENOUS AS NEEDED
Status: DISCONTINUED | OUTPATIENT
Start: 2021-09-09 | End: 2021-09-09 | Stop reason: HOSPADM

## 2021-09-09 RX ORDER — SODIUM CHLORIDE 9 MG/ML
250 INJECTION, SOLUTION INTRAVENOUS AS NEEDED
Status: CANCELLED | OUTPATIENT
Start: 2021-09-10

## 2021-09-09 RX ORDER — SODIUM CHLORIDE 0.9 % (FLUSH) 0.9 %
10 SYRINGE (ML) INJECTION AS NEEDED
Status: DISCONTINUED | OUTPATIENT
Start: 2021-09-09 | End: 2021-09-09 | Stop reason: HOSPADM

## 2021-09-09 RX ADMIN — SODIUM CHLORIDE, PRESERVATIVE FREE 10 ML: 5 INJECTION INTRAVENOUS at 13:06

## 2021-09-09 RX ADMIN — SODIUM CHLORIDE 1000 ML: 9 INJECTION, SOLUTION INTRAVENOUS at 10:52

## 2021-09-09 RX ADMIN — Medication 500 UNITS: at 13:06

## 2021-09-09 NOTE — PROGRESS NOTES
Subjective    REASONS FOR FOLLOWUP:   1. Metastatic breast cancer to multiple skeletal sites including cervical spine, sternum, lumbar spine and right femur. Previously treated with Abraxane, Kisqali Femara, Halaven, Adriamycin, now Afinitor and Aromasin  2.Iron deficiency anemia du to GI blood loss, Xarelto stopped months ago, Iron initiated, Pepcid along with Aleve for gastric protection.      History of Present Illness   The patient is a 66 y.o. female with the above-mentioned history, here today for triage visit.  On Wednesday, she was at the grocery store and experienced significant weakness prompting her to have to sit down and gather herself, then leave the grocery.  Since then, she has been experiencing significant fatigue and weakness, spending most of the day in bed.  She is drinking approximately 316 ounce omalley daily.  She is experiencing intermittent nausea that resolves without the use of medication.  She was taking oxycodone for pain control, and was experiencing constipation.  After her near syncopal episode she switched to taking Tylenol instead of oxycodone, and her constipation resolved.  She has been taking 1000 mg of Tylenol every 6 hours for pain, and feels this is adequately controlling her pain.  She reports that her visual changes have remained stable, not worsening.    On exam today she is slow to respond, having difficulty finding words.  She denies vomiting.  She denies fever or chills.  She denies any worsening shortness of breath.    Past Medical History:   Diagnosis Date   • Abnormal mammogram    • Abnormal menstrual cycle    • Arthritis    • Bone metastasis (CMS/HCC)    • Breast cancer (CMS/HCC) 2000    Left breast   • Drug therapy 2001    breast cancer   • Drug therapy 2017    right femur/associated with breast cancer   • DVT (deep venous thrombosis) (CMS/HCC)    • H/O Heart murmur    • History of colon polyps    • Hx of radiation therapy 2000    breast  cancer   • Hx of radiation therapy 2015    bone cancer right femur/ associated with breast cancer   • Hypercholesterolemia    • Hyperlipidemia    • Hypertension    • Multiple benign polyps of large intestine    • Reflux esophagitis    • Shingles      Past Surgical History:   Procedure Laterality Date   • BREAST BIOPSY Left 2000    breast cancer   • BREAST SURGERY  2000    lumpectomy, mastectomy, revision   • COLONOSCOPY  2012   • FEMUR FRACTURE SURGERY      secondary to metastatic breast cancer 7/2014, with revision in 9/2015   • MASTECTOMY Left 2000    transflap in 2003     Family History   Problem Relation Age of Onset   • Hypertension Mother    • Diabetes Mother    • Macular degeneration Mother    • Thyroid disease Mother    • Heart disease Father    • Stroke Father    • Kidney disease Father    • Glaucoma Brother    • Diabetes Brother    • Hypertension Brother    • Colon cancer Paternal Grandmother    • Thyroid disease Other    • Kidney disease Other    • Glaucoma Other    • Cancer Other    • Diabetes Other      Social History     Socioeconomic History   • Marital status: Single     Spouse name: Not on file   • Number of children: Not on file   • Years of education: Not on file   • Highest education level: Not on file   Tobacco Use   • Smoking status: Never Smoker   • Smokeless tobacco: Never Used   Substance and Sexual Activity   • Alcohol use: No   • Drug use: No   • Sexual activity: Defer     Current Outpatient Medications on File Prior to Visit   Medication Sig Dispense Refill   • atorvastatin (LIPITOR) 20 MG tablet Take 1 tablet by mouth once daily 90 tablet 0   • Cyanocobalamin (B-12 PO) Take 1,000 mcg by mouth 2 (Two) Times a Week.     • denosumab (XGEVA) 120 MG/1.7ML solution injection Inject  under the skin 1 (one) time.     • dexamethasone 0.5 MG/5ML solution Take 5 mL by mouth 4 (Four) Times a Day. Swish for 2 minutes 4 times per day, do not eat or drink for 1 hour after. 500 mL 3   • Diclofenac  "Sodium (VOLTAREN) 1 % gel gel APPLY 2 TO 4 GRAMS TOPICALLY TWICE DAILY TO AFFECTED AREA 100 g 5   • everolimus (AFINITOR) 5 MG tablet Take 1 tablet by mouth Daily. 28 tablet 6   • exemestane (Aromasin) 25 MG chemo tablet Take 1 tablet by mouth Daily. 30 tablet 6   • HYDROcodone-acetaminophen (NORCO) 5-325 MG per tablet Take 1 tablet by mouth Every 6 (Six) Hours As Needed for Moderate Pain . 60 tablet 0   • lisinopril-hydrochlorothiazide (PRINZIDE,ZESTORETIC) 10-12.5 MG per tablet Take 1 tablet by mouth twice daily 180 tablet 3   • magnesium chloride ER (Mag64) 64 MG DR tablet Take 1 tablet by mouth 2 (two) times a day. 180 tablet 3   • Multiple Vitamins-Minerals (OCUVITE ADULT FORMULA PO) Take 1 tablet by mouth Daily.     • Naproxen Sod-Diphenhydramine (ALEVE PM PO) Take  by mouth as needed.     • Naproxen Sodium (ALEVE PO) Take  by mouth.     • Omega-3 Fatty Acids (FISH OIL) 645 MG capsule Take  by mouth.     • OMEPRAZOLE PO Take  by mouth Daily.     • ondansetron (ZOFRAN) 8 MG tablet Take 1 tablet by mouth 3 (Three) Times a Day As Needed for Nausea or Vomiting. 30 tablet 5   • Prenatal MV-Min-Fe Fum-FA-DHA (PRENATAL 1 PO) Take 1 tablet by mouth Daily.     • triamcinolone (KENALOG) 0.025 % cream Apply 0.25 application topically to the appropriate area as directed 2 (Two) Times a Day.       Current Facility-Administered Medications on File Prior to Visit   Medication Dose Route Frequency Provider Last Rate Last Admin   • alteplase (CATHFLO/ACTIVASE) injection 2 mg  2 mg Intravenous Once Nader Saez MD         Allergies   Allergen Reactions   • Codeine Unknown - Low Severity   • Docetaxel Unknown - Low Severity   • Paclitaxel Unknown - Low Severity     Objective      Vitals:    09/09/21 1015   BP: 117/72   Pulse: 95   Resp: 16   Temp: 97.7 °F (36.5 °C)   TempSrc: Temporal   SpO2: 94%   Weight: 88.3 kg (194 lb 9.6 oz)   Height: 162.6 cm (64.02\")   PainSc: 0-No pain     Current Status 9/2/2021   ECOG score 2 "     Physical Exam  Constitutional:       General: She is not in acute distress.     Appearance: She is not ill-appearing.   HENT:      Head: Normocephalic.      Nose: Nose normal. No congestion.      Mouth/Throat:      Mouth: Mucous membranes are moist.      Pharynx: Oropharynx is clear. No oropharyngeal exudate.   Eyes:      Extraocular Movements: Extraocular movements intact.      Conjunctiva/sclera: Conjunctivae normal.      Pupils: Pupils are equal, round, and reactive to light.   Cardiovascular:      Rate and Rhythm: Normal rate and regular rhythm.      Heart sounds: Normal heart sounds. No murmur heard.   No gallop.    Pulmonary:      Effort: Pulmonary effort is normal. No respiratory distress.      Breath sounds: Normal breath sounds. No wheezing or rales.   Abdominal:      General: Abdomen is flat. Bowel sounds are normal. There is no distension.      Tenderness: There is no abdominal tenderness.   Musculoskeletal:         General: Normal range of motion.      Cervical back: Normal range of motion and neck supple.   Lymphadenopathy:      Cervical: No cervical adenopathy.   Skin:     General: Skin is warm and dry.      Findings: No rash.   Neurological:      Mental Status: She is alert and oriented to person, place, and time.      Motor: Weakness present.   Psychiatric:         Behavior: Behavior normal.           RESULTS REVIEWED:  Results from last 7 days   Lab Units 09/09/21  0955   WBC 10*3/mm3 5.20   NEUTROS ABS 10*3/mm3 3.81   HEMOGLOBIN g/dL 8.3*   HEMATOCRIT % 25.5*   PLATELETS 10*3/mm3 209     Results from last 7 days   Lab Units 09/09/21  1004 09/02/21  1402   SODIUM mmol/L 134* 135*   POTASSIUM mmol/L 3.8 4.0   CHLORIDE mmol/L 98 101   CO2 mmol/L 21.6* 21.5*   BUN mg/dL 21 20   CREATININE mg/dL 1.55* 1.17*   CALCIUM mg/dL 9.3 9.1   ALBUMIN g/dL 3.80  --    BILIRUBIN mg/dL 0.3  --    ALK PHOS U/L 194*  --    ALT (SGPT) U/L 53*  --    AST (SGOT) U/L 50*  --    GLUCOSE mg/dL 129* 118*            Assessment/Plan    1. Metastatic left breast cancer to multiple skeletal sites including cervical spine, sternum, lumbar spine and right femur.  · Previously treated with Abraxane Xgeva  · Documented radiographic progression by bone scan 3/15/2017, treated with Femara Kisqali, Xgeva every 3 months  · Progression of bony disease June 2020.  Femara Kisqali discontinued.  Transition to Halaven  · Palliative skeletal radiation to the right shoulder and pelvis September 2020  · Treatment holiday from Halaven 11/4/2020 due to progressive fatigue  · CA 15-3 has been declining while on Halaven, most recently 50.9.   · 12/2/2020, CA 15-3 55.8, increased to 69.1 2/22/2021  · Bone scan 10/28/2020 with increased uptake in the right scapula, no other significant change  · CT scans 12/28/2020 with extensive bony disease, new hypodense left hepatic lesion in the liver  · Radiation to the hip complete 1/26/2021  · Single agent Adriamycin initiated 2/5/2021 to be given 3 to 4 weeks  · CT scans and bone scan 4/6/2021 where multiple hepatic lesions developing consistent with hepatic metastasis.  Diffuse bone lesions without change  · Plans for liver biopsy to further evaluate Caris Target   · CT guided biopsy 4/14/2021.  Pathology consistent with metastatic adenocarcinoma of the breast  · Caris Target documents ER positive, SC positive, HER-2 negative.  MSI stable, low mutation burden.  Androgen receptor positivity and no actionable mutations otherwise.  · Transition to Aromasin 25 mg daily along with Afinitor 5 mg daily.  Afinitor initiated 6/8/2021  · At the initiation of Aromasin and Afinitor, baseline CA 15-3 6/4/2021 87.9.  CA 15-3 decreased to 75 7/1/2021.  · Review of ophthalmology notes.  The patient was noted to have a lesion in the right eye, questionable metastatic deposit.  However, this has responded to Afinitor.  She will continue to follow-up with ophthalmology every 3 months  · Continue Aromasin 25 mg and Afinitor  5 mg daily  The patient was reviewed on 08/26/2021. In spite of having in [facility], the patient’s clinical status looks relatively stable. She has not developed any new sites of bone pain and the pain intensity in the right femur is no different than before requiring pain medicine once or twice a day. She is not requiring any narcotic medication. Dr. Saez advised her to continue her medicines, Afinitor and Aromasin at the same dosing and advised her to proceed with radiological assessment including a bone scan and a CT scan of the abdomen and pelvis. This will be performed in 2 weeks and we will review her back in 3 weeks.  9/9/2021, the patient is seen today for triage visit.  She has been experiencing significant weakness and fatigue, stating on Wednesday she had to leave the grocery store as she almost passed out.  Since then, she has been experiencing weakness and fatigue causing her to not be able to get out of bed.  She continues on Aromasin and Afinitor.  She reports not drinking adequately, drinking approximately 3 16 ounce bottles of water daily.  During her visit today, she was slow to respond, though responding appropriately.  She was also having difficulty finding words.  This was discussed with Dr. Saez, patient will receive 1 L IV normal saline in clinic today along with 2 units packed red blood cells (hemoglobin 8.3 but patient is symptomatic).  We will also set the patient up for MRI of the brain with her upcoming imaging on Wednesday.    2.  Bony metastasis  · Now receiving Xgeva every 3 months, last given 6/4/2021.  She will receive Xgeva at scheduled follow-up with Dr. Saez in August  As stated above the bone pain is not changing too much in intensity. This will be watched. She will continue the same dosing of medicines and she will proceed with her Xgeva today, 08/26/2021. Next dose on November 26th.    3.  Anemia, multifactorial  · Previously with iron deficiency due to GI  blood loss.  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  The patient is symptomatic with fatigue and dyspnea.  I suspect there is an element of hemoconcentration as well as she is clinically dehydrated.  We will proceed with transfusion of 2 units packed red blood cells  Her anemia remains multifactorial but most of it is related myelophthisis. She was transfused a few weeks ago and her hemoglobin is 9.6 8/26/2021. There is nothing else we can do to stimulate production of red cells at this point.  We have checked ferritin, iron, TIBC, B12 and folic acid levels, all of then normal.  9/9/2021, hemoglobin today 8.3.  Patient is experiencing significant weakness and fatigue.  As the patient is symptomatic, we will proceed with 2 unit packed red blood cells.    4.  Hypomagnesemia  · Continuing on magnesium replacement twice daily  She will continue her oral magnesium supplementation.    5.  Venous access  · The patient does have a right chest Mediport which we will continue to flush monthly.  Activase currently instilled  Her port remains functional and it has been flushed and maintained appropriately.    6.  Cancer related pain  · Hydrocodone/acetaminophen 5/325 every 6 hours as needed.  Patient does not like taking narcotics.  She does utilize Voltaren cream and Aleve which provides good control of her pain.  Her pain medicine, anti-inflammatory and naproxen will remain ongoing for the time being. We need to be sure that she is not going to  toxicity from the point of view kidney dysfunction or GI toxicity. She remains on proton pump inhibitor for gastric protection.  Due to elevated creatinine, patient was asked to discontinue anti-inflammatories.  9/9/2021, patient was taking narcotic pain  medicine, however after experiencing a syncopal episode she switched to taking 1000 mg of Tylenol every 6 hours.  She feels this is controlling her pain.  Her liver function studies have began to rise, AST 50, ALT 53 today.  Discussed with the patient that syncopal episodes could be related to dehydration or anemia.  We will replace her with IV fluids and 2 units packed red blood cells.  I have asked her to try restarting Norco 5/325 and see if she is able to tolerate since she was tolerating it fine before.    7.  Muscle cramping  · With the patient's borderline hypotension and tachycardia I suspect this is related to dehydration.  The patient was encouraged to increase her oral intake.   · Creatinine reviewed elevated at 1.4 which confirms dehydration.  The patient will increase oral intake  This issue is much better when she drinks more liquids.  No complaints today.    PLAN:  · 1 L IV normal saline in clinic today.  · 2 units packed red blood cells ordered today.  · MRI of the brain to be performed with upcoming imaging (CT scans and bone scans) on Wednesday.  · I have asked patient to discontinue Tylenol and restart Norco 5/325.  She is not in need of a refill today.  · Her Afinitor and Aromasin will be continued at the same dose.   · Continue dexamethasone oral rinse to minimize sores related to Afinitor use.   · Continue Xgeva, next due 11/20/2021.  · Return in 1 week for MD follow-up to review scans.  · Continue PPI for gastric protection.   · Continue B12 supplementation.   · Continue magnesium supplementation as well.    Patient is on high risk medication requiring close monitoring for toxicity. The patient is on high risk medication requiring close monitoring for toxicity.    I spent 50 minutes caring for Maggie on this date of service. This time includes time spent by me in the following activities: preparing for the visit, reviewing tests, obtaining and/or reviewing a separately obtained history,  performing a medically appropriate examination and/or evaluation, counseling and educating the patient/family/caregiver, ordering medications, tests, or procedures, documenting information in the medical record and care coordination.     JESSICA Fan  09/09/2021     ADDENDUM: MRI of the brain showing that there are extensive enhancing osseous metastatic lesions involving  the calvarium, skull base and visualized upper cervical spine. Deep to  the calvarial lesions there is relatively smooth dural thickening and  enhancement. This may be dural irritation by the calvarial metastases.  Thicker areas may have dural extension of the osseous tumor. In  particular deep to the left parietal bone metastasis the dura measures  up to 2-3 mm in thickness and there could be some dural extension.  This was discussed with Dr. Saez, we will start the patient on dex 4 mg daily in the AM.  She is already on omeprazole for GI protection with dex.  We will also place a stat referral to radiation oncology.

## 2021-09-09 NOTE — TELEPHONE ENCOUNTER
Returned pts call. Pt reports no stamina, very low energy, and fatigue. Denies CP, SOA, and dizziness. Pt reports 2 days ago she felt faint and almost passed out while shopping. She assumed this episode was contributed to taking hydrocodone. Last note from MD reads pt should stop taking tylenol and aleve and utilize hydrocodone for pain. Pt reports resuming 1000 mg of tylenol every 6 hours and stopping hydrocodone d/t the near syncope episode. Pt states while getting ready this morning she had to constantly take breaks and felt unsafe to drive to work. Pt slow to speaking and reports she has tried drinking more fluids. S/w Lynda NP who wants pt seen at [facility]. Called Patrizia NP who will see pt at 10 am. Added pt on for labs, NP visit, and possible fluids.

## 2021-09-09 NOTE — NURSING NOTE
Pt seen per JESSICA Fan. Per Patrizia, pt's liver enzymes elevated and pt has been taking tylenol routinely. Pt to stop taking Tylenol and to take Norco for pain. Relayed this information to pt and encouraged her to take Norco with food and start a stool softener, hydrate well. Pt kennedy.

## 2021-09-10 ENCOUNTER — HOSPITAL ENCOUNTER (OUTPATIENT)
Dept: MRI IMAGING | Facility: HOSPITAL | Age: 66
Discharge: HOME | End: 2021-09-10
Admitting: NURSE PRACTITIONER

## 2021-09-10 DIAGNOSIS — R47.89 WORD FINDING DIFFICULTY: ICD-10-CM

## 2021-09-10 DIAGNOSIS — C50.919 METASTATIC BREAST CANCER: ICD-10-CM

## 2021-09-10 PROCEDURE — 0 GADOBENATE DIMEGLUMINE 529 MG/ML SOLUTION: Performed by: NURSE PRACTITIONER

## 2021-09-10 PROCEDURE — 70553 MRI BRAIN STEM W/O & W/DYE: CPT

## 2021-09-10 PROCEDURE — A9577 INJ MULTIHANCE: HCPCS | Performed by: NURSE PRACTITIONER

## 2021-09-10 RX ORDER — OMEPRAZOLE 40 MG/1
40 CAPSULE, DELAYED RELEASE ORAL DAILY
Qty: 30 CAPSULE | Refills: 3 | Status: SHIPPED | OUTPATIENT
Start: 2021-09-10

## 2021-09-10 RX ORDER — DEXAMETHASONE 4 MG/1
4 TABLET ORAL DAILY
Qty: 30 TABLET | Refills: 0 | Status: SHIPPED | OUTPATIENT
Start: 2021-09-10

## 2021-09-10 RX ADMIN — GADOBENATE DIMEGLUMINE 18 ML: 529 INJECTION, SOLUTION INTRAVENOUS at 10:30

## 2021-09-12 ENCOUNTER — INFUSION (OUTPATIENT)
Dept: ONCOLOGY | Facility: HOSPITAL | Age: 66
End: 2021-09-12

## 2021-09-12 VITALS
SYSTOLIC BLOOD PRESSURE: 136 MMHG | HEART RATE: 72 BPM | TEMPERATURE: 97 F | OXYGEN SATURATION: 98 % | DIASTOLIC BLOOD PRESSURE: 76 MMHG | RESPIRATION RATE: 18 BRPM

## 2021-09-12 DIAGNOSIS — D64.9 SYMPTOMATIC ANEMIA: Primary | ICD-10-CM

## 2021-09-12 DIAGNOSIS — Z45.2 FITTING AND ADJUSTMENT OF VASCULAR CATHETER: ICD-10-CM

## 2021-09-12 LAB
ABO GROUP BLD: NORMAL
BLD GP AB SCN SERPL QL: NEGATIVE
RH BLD: NEGATIVE
RH BLD: NEGATIVE
T&S EXPIRATION DATE: NORMAL

## 2021-09-12 PROCEDURE — 86900 BLOOD TYPING SEROLOGIC ABO: CPT

## 2021-09-12 PROCEDURE — 36415 COLL VENOUS BLD VENIPUNCTURE: CPT

## 2021-09-12 PROCEDURE — 86901 BLOOD TYPING SEROLOGIC RH(D): CPT

## 2021-09-12 PROCEDURE — 25010000002 HEPARIN LOCK FLUSH PER 10 UNITS: Performed by: NURSE PRACTITIONER

## 2021-09-12 PROCEDURE — 36430 TRANSFUSION BLD/BLD COMPNT: CPT

## 2021-09-12 PROCEDURE — 86920 COMPATIBILITY TEST SPIN: CPT

## 2021-09-12 PROCEDURE — 63710000001 DIPHENHYDRAMINE PER 50 MG: Performed by: NURSE PRACTITIONER

## 2021-09-12 PROCEDURE — 86850 RBC ANTIBODY SCREEN: CPT

## 2021-09-12 PROCEDURE — P9016 RBC LEUKOCYTES REDUCED: HCPCS

## 2021-09-12 RX ORDER — HEPARIN SODIUM (PORCINE) LOCK FLUSH IV SOLN 100 UNIT/ML 100 UNIT/ML
500 SOLUTION INTRAVENOUS AS NEEDED
Status: DISCONTINUED | OUTPATIENT
Start: 2021-09-12 | End: 2021-09-12 | Stop reason: HOSPADM

## 2021-09-12 RX ORDER — SODIUM CHLORIDE 9 MG/ML
250 INJECTION, SOLUTION INTRAVENOUS AS NEEDED
Status: DISCONTINUED | OUTPATIENT
Start: 2021-09-12 | End: 2021-09-12 | Stop reason: HOSPADM

## 2021-09-12 RX ORDER — SODIUM CHLORIDE 0.9 % (FLUSH) 0.9 %
10 SYRINGE (ML) INJECTION AS NEEDED
Status: CANCELLED | OUTPATIENT
Start: 2021-09-12

## 2021-09-12 RX ORDER — HEPARIN SODIUM (PORCINE) LOCK FLUSH IV SOLN 100 UNIT/ML 100 UNIT/ML
500 SOLUTION INTRAVENOUS AS NEEDED
Status: CANCELLED | OUTPATIENT
Start: 2021-09-12

## 2021-09-12 RX ORDER — SODIUM CHLORIDE 0.9 % (FLUSH) 0.9 %
10 SYRINGE (ML) INJECTION AS NEEDED
Status: DISCONTINUED | OUTPATIENT
Start: 2021-09-12 | End: 2021-09-12 | Stop reason: HOSPADM

## 2021-09-12 RX ORDER — ACETAMINOPHEN 325 MG/1
650 TABLET ORAL ONCE
Status: COMPLETED | OUTPATIENT
Start: 2021-09-12 | End: 2021-09-12

## 2021-09-12 RX ORDER — DIPHENHYDRAMINE HCL 25 MG
25 CAPSULE ORAL ONCE
Status: COMPLETED | OUTPATIENT
Start: 2021-09-12 | End: 2021-09-12

## 2021-09-12 RX ADMIN — ACETAMINOPHEN 650 MG: 325 TABLET, FILM COATED ORAL at 08:35

## 2021-09-12 RX ADMIN — SODIUM CHLORIDE, PRESERVATIVE FREE 10 ML: 5 INJECTION INTRAVENOUS at 14:29

## 2021-09-12 RX ADMIN — Medication 500 UNITS: at 14:29

## 2021-09-12 RX ADMIN — DIPHENHYDRAMINE HYDROCHLORIDE 25 MG: 25 CAPSULE ORAL at 08:35

## 2021-09-14 ENCOUNTER — CONSULT (OUTPATIENT)
Dept: RADIATION ONCOLOGY | Facility: HOSPITAL | Age: 66
End: 2021-09-14

## 2021-09-14 ENCOUNTER — APPOINTMENT (OUTPATIENT)
Dept: RADIATION ONCOLOGY | Facility: HOSPITAL | Age: 66
End: 2021-09-14

## 2021-09-14 VITALS
SYSTOLIC BLOOD PRESSURE: 145 MMHG | HEART RATE: 64 BPM | DIASTOLIC BLOOD PRESSURE: 81 MMHG | BODY MASS INDEX: 33.28 KG/M2 | WEIGHT: 194 LBS | OXYGEN SATURATION: 99 %

## 2021-09-14 DIAGNOSIS — C79.51 BONE METASTASIS: Primary | ICD-10-CM

## 2021-09-14 DIAGNOSIS — C50.912 PRIMARY MALIGNANT NEOPLASM OF LEFT BREAST (HCC): ICD-10-CM

## 2021-09-14 PROCEDURE — 77290 THER RAD SIMULAJ FIELD CPLX: CPT | Performed by: RADIOLOGY

## 2021-09-14 PROCEDURE — 99215 OFFICE O/P EST HI 40 MIN: CPT | Performed by: RADIOLOGY

## 2021-09-14 PROCEDURE — 77334 RADIATION TREATMENT AID(S): CPT | Performed by: RADIOLOGY

## 2021-09-14 PROCEDURE — 77263 THER RADIOLOGY TX PLNG CPLX: CPT | Performed by: RADIOLOGY

## 2021-09-14 NOTE — PROGRESS NOTES
Subjective     JESSICA Fan     Diagnosis Plan   1. Bone metastasis (CMS/HCC)     2. Primary malignant neoplasm of left breast (CMS/HCC)       Chief complaint: Breast cancer metastatic to bone  I'm seeing the patient today at the request of Dr. Nader Saez to evaluate her for radiation therapy in treatment of widespread calvarial metastases.                         Ms. Suero is a very pleasant 66-year-old white female, who was diagnosed with breast cancer in March 2001.  She currently has widespread bone metastases as well as liver mets, and is being followed by Dr. Saez at the [facility].  She recently developed some symptoms which included some vague numbness in the right side of her face on her right eye, as well as some subjective word finding issues.    MRI of the brain on 9/10/2021 described the brain parenchyma as normal save for some tiny foci consistent with mild small vessel disease.  There was however described extensive bone mets in the calvarium, skull base and visualized upper cervical spine.  There was evidence for possible dural extension of the osseous tumor in several areas, and other involved regions included the right sphenoid wing and anterior aspect of the right middle cranial fossa, lateral right orbit, right cavernous sinus and Meckel's cave.    Ms. Suero states that she has no visual issues at this time.  Her visual fields focus range of motion are all intact.  We are asked to evaluate her for palliative radiation therapy in this setting.      Review of Systems   Constitutional: Positive for appetite change, fatigue and unexpected weight change.   HENT: Negative.    Eyes: Negative for visual disturbance.   Respiratory: Positive for shortness of breath.    Cardiovascular: Negative.    Gastrointestinal: Negative.    Genitourinary: Negative.    Musculoskeletal: Negative.    Neurological: Positive for dizziness.         Past Medical History:   Diagnosis Date   • Abnormal mammogram     • Abnormal menstrual cycle    • Arthritis    • Bone metastasis (CMS/HCC)    • Breast cancer (CMS/HCC) 2000    Left breast   • Drug therapy 2001    breast cancer   • Drug therapy 2017    right femur/associated with breast cancer   • DVT (deep venous thrombosis) (CMS/HCC)    • H/O Heart murmur    • History of colon polyps    • Hx of radiation therapy 2000    breast cancer   • Hx of radiation therapy 2015    bone cancer right femur/ associated with breast cancer   • Hypercholesterolemia    • Hyperlipidemia    • Hypertension    • Multiple benign polyps of large intestine    • Reflux esophagitis    • Shingles          Past Surgical History:   Procedure Laterality Date   • BREAST BIOPSY Left 2000    breast cancer   • BREAST SURGERY  2000    lumpectomy, mastectomy, revision   • COLONOSCOPY  2012   • FEMUR FRACTURE SURGERY      secondary to metastatic breast cancer 7/2014, with revision in 9/2015   • MASTECTOMY Left 2000    transflap in 2003         Social History     Socioeconomic History   • Marital status: Single     Spouse name: Not on file   • Number of children: Not on file   • Years of education: Not on file   • Highest education level: Not on file   Tobacco Use   • Smoking status: Never Smoker   • Smokeless tobacco: Never Used   Substance and Sexual Activity   • Alcohol use: No   • Drug use: No   • Sexual activity: Defer         Family History   Problem Relation Age of Onset   • Hypertension Mother    • Diabetes Mother    • Macular degeneration Mother    • Thyroid disease Mother    • Heart disease Father    • Stroke Father    • Kidney disease Father    • Glaucoma Brother    • Diabetes Brother    • Hypertension Brother    • Colon cancer Paternal Grandmother    • Thyroid disease Other    • Kidney disease Other    • Glaucoma Other    • Cancer Other    • Diabetes Other           Objective    Physical Exam   Awake, alert, spontaneous, no apparent distress.  Affect and thought content appropriate speech is intact no  evidence of confusion.  Station and gait within normal limits.    Current Outpatient Medications on File Prior to Visit   Medication Sig Dispense Refill   • atorvastatin (LIPITOR) 20 MG tablet Take 1 tablet by mouth once daily 90 tablet 0   • Cyanocobalamin (B-12 PO) Take 1,000 mcg by mouth 2 (Two) Times a Week.     • denosumab (XGEVA) 120 MG/1.7ML solution injection Inject  under the skin 1 (one) time.     • dexamethasone (DECADRON) 4 MG tablet Take 1 tablet by mouth Daily. 30 tablet 0   • dexamethasone 0.5 MG/5ML solution Take 5 mL by mouth 4 (Four) Times a Day. Swish for 2 minutes 4 times per day, do not eat or drink for 1 hour after. 500 mL 3   • Diclofenac Sodium (VOLTAREN) 1 % gel gel APPLY 2 TO 4 GRAMS TOPICALLY TWICE DAILY TO AFFECTED AREA 100 g 5   • everolimus (AFINITOR) 5 MG tablet Take 1 tablet by mouth Daily. 28 tablet 6   • exemestane (Aromasin) 25 MG chemo tablet Take 1 tablet by mouth Daily. 30 tablet 6   • HYDROcodone-acetaminophen (NORCO) 5-325 MG per tablet Take 1 tablet by mouth Every 6 (Six) Hours As Needed for Moderate Pain . 60 tablet 0   • lisinopril-hydrochlorothiazide (PRINZIDE,ZESTORETIC) 10-12.5 MG per tablet Take 1 tablet by mouth twice daily 180 tablet 3   • magnesium chloride ER (Mag64) 64 MG DR tablet Take 1 tablet by mouth 2 (two) times a day. 180 tablet 3   • Multiple Vitamins-Minerals (OCUVITE ADULT FORMULA PO) Take 1 tablet by mouth Daily.     • Naproxen Sod-Diphenhydramine (ALEVE PM PO) Take  by mouth as needed.     • Omega-3 Fatty Acids (FISH OIL) 645 MG capsule Take  by mouth.     • omeprazole (priLOSEC) 40 MG capsule Take 1 capsule by mouth Daily. 30 capsule 3   • ondansetron (ZOFRAN) 8 MG tablet Take 1 tablet by mouth 3 (Three) Times a Day As Needed for Nausea or Vomiting. 30 tablet 5   • Prenatal MV-Min-Fe Fum-FA-DHA (PRENATAL 1 PO) Take 1 tablet by mouth Daily.     • triamcinolone (KENALOG) 0.025 % cream Apply 0.25 application topically to the appropriate area as directed 2  (Two) Times a Day.       Current Facility-Administered Medications on File Prior to Visit   Medication Dose Route Frequency Provider Last Rate Last Admin   • alteplase (CATHFLO/ACTIVASE) injection 2 mg  2 mg Intravenous Once Nader Saez MD           ALLERGIES:    Allergies   Allergen Reactions   • Codeine Unknown - Low Severity   • Docetaxel Unknown - Low Severity   • Paclitaxel Unknown - Low Severity       /81   Pulse 64   Wt 88 kg (194 lb)   LMP  (LMP Unknown)   SpO2 99%   BMI 33.28 kg/m²      Current Status 9/2/2021   ECOG score 2         Assessment/Plan    Widespread  calvarial, skull base, and upper C-spine metastases.  I went over the details of a course of whole brain radiation therapy with the patient.  She had many good questions which I believe were answered to her satisfaction and informed consent was obtained.  We started the planning process with mask construction and CT simulation in our department this afternoon and would anticipate getting her course of therapy started in the next few days.  Anticipated dose will be 30 Gray in 10 fractions.      [facility] Onc ECOG Status: (0) Fully active, able to carry on all predisease performance without restriction       I spent greater than 40 minutes face-to-face time with the patient to include simulation and 30 minutes of the time was spent in counseling and coordination of care to include discussion of indications, goals, logistics, alternatives, risks both common and rare, as well as surveillance and potential outcomes.      Omar Jesus MD

## 2021-09-15 ENCOUNTER — HOSPITAL ENCOUNTER (OUTPATIENT)
Dept: NUCLEAR MEDICINE | Facility: HOSPITAL | Age: 66
Discharge: HOME | End: 2021-09-15

## 2021-09-15 ENCOUNTER — HOSPITAL ENCOUNTER (OUTPATIENT)
Dept: CT IMAGING | Facility: HOSPITAL | Age: 66
Discharge: HOME | End: 2021-09-15
Admitting: INTERNAL MEDICINE

## 2021-09-15 DIAGNOSIS — D68.59 THROMBOPHILIA (HCC): ICD-10-CM

## 2021-09-15 DIAGNOSIS — D63.0 ANEMIA IN NEOPLASTIC DISEASE: ICD-10-CM

## 2021-09-15 DIAGNOSIS — Z45.2 FITTING AND ADJUSTMENT OF VASCULAR CATHETER: ICD-10-CM

## 2021-09-15 DIAGNOSIS — C78.7 LIVER METASTASIS: ICD-10-CM

## 2021-09-15 DIAGNOSIS — C79.51 BONE METASTASIS: ICD-10-CM

## 2021-09-15 DIAGNOSIS — E83.42 HYPOMAGNESEMIA: ICD-10-CM

## 2021-09-15 DIAGNOSIS — D70.1 LEUKOPENIA DUE TO ANTINEOPLASTIC CHEMOTHERAPY (HCC): ICD-10-CM

## 2021-09-15 DIAGNOSIS — T45.1X5A LEUKOPENIA DUE TO ANTINEOPLASTIC CHEMOTHERAPY (HCC): ICD-10-CM

## 2021-09-15 DIAGNOSIS — C50.912 PRIMARY MALIGNANT NEOPLASM OF LEFT BREAST (HCC): ICD-10-CM

## 2021-09-15 PROCEDURE — 0 TECHNETIUM MEDRONATE KIT: Performed by: INTERNAL MEDICINE

## 2021-09-15 PROCEDURE — 77300 RADIATION THERAPY DOSE PLAN: CPT | Performed by: RADIOLOGY

## 2021-09-15 PROCEDURE — 77295 3-D RADIOTHERAPY PLAN: CPT | Performed by: RADIOLOGY

## 2021-09-15 PROCEDURE — 77334 RADIATION TREATMENT AID(S): CPT | Performed by: RADIOLOGY

## 2021-09-15 PROCEDURE — 77301 RADIOTHERAPY DOSE PLAN IMRT: CPT | Performed by: RADIOLOGY

## 2021-09-15 PROCEDURE — A9503 TC99M MEDRONATE: HCPCS | Performed by: INTERNAL MEDICINE

## 2021-09-15 PROCEDURE — 74176 CT ABD & PELVIS W/O CONTRAST: CPT

## 2021-09-15 PROCEDURE — 78306 BONE IMAGING WHOLE BODY: CPT

## 2021-09-15 RX ORDER — TC 99M MEDRONATE 20 MG/10ML
22.2 INJECTION, POWDER, LYOPHILIZED, FOR SOLUTION INTRAVENOUS
Status: COMPLETED | OUTPATIENT
Start: 2021-09-15 | End: 2021-09-15

## 2021-09-15 RX ADMIN — Medication 22.2 MILLICURIE: at 10:45

## 2021-09-16 ENCOUNTER — LAB (OUTPATIENT)
Dept: LAB | Facility: HOSPITAL | Age: 66
End: 2021-09-16

## 2021-09-16 ENCOUNTER — TELEPHONE (OUTPATIENT)
Dept: ONCOLOGY | Facility: CLINIC | Age: 66
End: 2021-09-16

## 2021-09-16 DIAGNOSIS — R93.89 ABNORMAL X-RAY: Primary | ICD-10-CM

## 2021-09-16 DIAGNOSIS — R93.89 ABNORMAL X-RAY: ICD-10-CM

## 2021-09-16 LAB
B PARAPERT DNA SPEC QL NAA+PROBE: NOT DETECTED
B PERT DNA SPEC QL NAA+PROBE: NOT DETECTED
C PNEUM DNA NPH QL NAA+NON-PROBE: NOT DETECTED
FLUAV SUBTYP SPEC NAA+PROBE: NOT DETECTED
FLUBV RNA ISLT QL NAA+PROBE: NOT DETECTED
HADV DNA SPEC NAA+PROBE: NOT DETECTED
HCOV 229E RNA SPEC QL NAA+PROBE: NOT DETECTED
HCOV HKU1 RNA SPEC QL NAA+PROBE: NOT DETECTED
HCOV NL63 RNA SPEC QL NAA+PROBE: NOT DETECTED
HCOV OC43 RNA SPEC QL NAA+PROBE: NOT DETECTED
HMPV RNA NPH QL NAA+NON-PROBE: NOT DETECTED
HPIV1 RNA SPEC QL NAA+PROBE: NOT DETECTED
HPIV2 RNA SPEC QL NAA+PROBE: NOT DETECTED
HPIV3 RNA NPH QL NAA+PROBE: NOT DETECTED
HPIV4 P GENE NPH QL NAA+PROBE: NOT DETECTED
M PNEUMO IGG SER IA-ACNC: NOT DETECTED
RHINOVIRUS RNA SPEC NAA+PROBE: NOT DETECTED
RSV RNA NPH QL NAA+NON-PROBE: NOT DETECTED
SARS-COV-2 RNA NPH QL NAA+NON-PROBE: NOT DETECTED

## 2021-09-16 PROCEDURE — C9803 HOPD COVID-19 SPEC COLLECT: HCPCS

## 2021-09-16 PROCEDURE — 77427 RADIATION TX MANAGEMENT X5: CPT | Performed by: RADIOLOGY

## 2021-09-16 PROCEDURE — 77412 RADIATION TX DELIVERY LVL 3: CPT | Performed by: RADIOLOGY

## 2021-09-16 PROCEDURE — 0202U NFCT DS 22 TRGT SARS-COV-2: CPT

## 2021-09-16 NOTE — TELEPHONE ENCOUNTER
Called the patient to let her know that her Xray suggested COVID. I let her know that if she was able to get to the outpatient lab we should have results by the time her Radiation was supposed to be this afternoon at 4pm. Patient stated she was at work and would have to call her boss. Patient is going to outpatient lab now. Patient v/u.

## 2021-09-17 ENCOUNTER — OFFICE VISIT (OUTPATIENT)
Dept: ONCOLOGY | Facility: CLINIC | Age: 66
End: 2021-09-17

## 2021-09-17 ENCOUNTER — INFUSION (OUTPATIENT)
Dept: ONCOLOGY | Facility: HOSPITAL | Age: 66
End: 2021-09-17

## 2021-09-17 VITALS
TEMPERATURE: 98.1 F | HEIGHT: 64 IN | SYSTOLIC BLOOD PRESSURE: 150 MMHG | OXYGEN SATURATION: 95 % | HEART RATE: 97 BPM | DIASTOLIC BLOOD PRESSURE: 71 MMHG | WEIGHT: 194 LBS | BODY MASS INDEX: 33.12 KG/M2 | RESPIRATION RATE: 18 BRPM

## 2021-09-17 DIAGNOSIS — T45.1X5A LEUKOPENIA DUE TO ANTINEOPLASTIC CHEMOTHERAPY (HCC): ICD-10-CM

## 2021-09-17 DIAGNOSIS — C50.912 PRIMARY MALIGNANT NEOPLASM OF LEFT BREAST (HCC): Primary | ICD-10-CM

## 2021-09-17 DIAGNOSIS — C78.7 LIVER METASTASIS: ICD-10-CM

## 2021-09-17 DIAGNOSIS — Z45.2 FITTING AND ADJUSTMENT OF VASCULAR CATHETER: ICD-10-CM

## 2021-09-17 DIAGNOSIS — D63.0 ANEMIA IN NEOPLASTIC DISEASE: ICD-10-CM

## 2021-09-17 DIAGNOSIS — E83.42 HYPOMAGNESEMIA: ICD-10-CM

## 2021-09-17 DIAGNOSIS — D68.59 THROMBOPHILIA (HCC): ICD-10-CM

## 2021-09-17 DIAGNOSIS — C79.51 BONE METASTASIS: ICD-10-CM

## 2021-09-17 DIAGNOSIS — D70.1 LEUKOPENIA DUE TO ANTINEOPLASTIC CHEMOTHERAPY (HCC): ICD-10-CM

## 2021-09-17 LAB
ALBUMIN SERPL-MCNC: 3.7 G/DL (ref 3.5–5.2)
ALBUMIN/GLOB SERPL: 1.2 G/DL (ref 1.1–2.4)
ALP SERPL-CCNC: 201 U/L (ref 38–116)
ALT SERPL W P-5'-P-CCNC: 65 U/L (ref 0–33)
ANION GAP SERPL CALCULATED.3IONS-SCNC: 13.4 MMOL/L (ref 5–15)
AST SERPL-CCNC: 55 U/L (ref 0–32)
BASOPHILS # BLD AUTO: 0 10*3/MM3 (ref 0–0.2)
BASOPHILS NFR BLD AUTO: 0 % (ref 0–1.5)
BILIRUB SERPL-MCNC: 0.3 MG/DL (ref 0.2–1.2)
BUN SERPL-MCNC: 37 MG/DL (ref 6–20)
BUN/CREAT SERPL: 28 (ref 7.3–30)
CALCIUM SPEC-SCNC: 9.5 MG/DL (ref 8.5–10.2)
CANCER AG15-3 SERPL-ACNC: 179.8 U/ML
CHLORIDE SERPL-SCNC: 97 MMOL/L (ref 98–107)
CO2 SERPL-SCNC: 22.6 MMOL/L (ref 22–29)
CREAT SERPL-MCNC: 1.32 MG/DL (ref 0.6–1.1)
DEPRECATED RDW RBC AUTO: 53 FL (ref 37–54)
EOSINOPHIL # BLD AUTO: 0.03 10*3/MM3 (ref 0–0.4)
EOSINOPHIL NFR BLD AUTO: 0.5 % (ref 0.3–6.2)
ERYTHROCYTE [DISTWIDTH] IN BLOOD BY AUTOMATED COUNT: 17.1 % (ref 12.3–15.4)
GFR SERPL CREATININE-BSD FRML MDRD: 40 ML/MIN/1.73
GLOBULIN UR ELPH-MCNC: 3.1 GM/DL (ref 1.8–3.5)
GLUCOSE SERPL-MCNC: 154 MG/DL (ref 74–124)
HCT VFR BLD AUTO: 33.1 % (ref 34–46.6)
HGB BLD-MCNC: 11 G/DL (ref 12–15.9)
IMM GRANULOCYTES # BLD AUTO: 0.07 10*3/MM3 (ref 0–0.05)
IMM GRANULOCYTES NFR BLD AUTO: 1.2 % (ref 0–0.5)
LYMPHOCYTES # BLD AUTO: 0.43 10*3/MM3 (ref 0.7–3.1)
LYMPHOCYTES NFR BLD AUTO: 7.3 % (ref 19.6–45.3)
MCH RBC QN AUTO: 28.6 PG (ref 26.6–33)
MCHC RBC AUTO-ENTMCNC: 33.2 G/DL (ref 31.5–35.7)
MCV RBC AUTO: 86.2 FL (ref 79–97)
MONOCYTES # BLD AUTO: 0.72 10*3/MM3 (ref 0.1–0.9)
MONOCYTES NFR BLD AUTO: 12.2 % (ref 5–12)
NEUTROPHILS NFR BLD AUTO: 4.66 10*3/MM3 (ref 1.7–7)
NEUTROPHILS NFR BLD AUTO: 78.8 % (ref 42.7–76)
NRBC BLD AUTO-RTO: 0.3 /100 WBC (ref 0–0.2)
PLATELET # BLD AUTO: 140 10*3/MM3 (ref 140–450)
PMV BLD AUTO: 10 FL (ref 6–12)
POTASSIUM SERPL-SCNC: 4.2 MMOL/L (ref 3.5–4.7)
PROT SERPL-MCNC: 6.8 G/DL (ref 6.3–8)
RBC # BLD AUTO: 3.84 10*6/MM3 (ref 3.77–5.28)
SODIUM SERPL-SCNC: 133 MMOL/L (ref 134–145)
WBC # BLD AUTO: 5.91 10*3/MM3 (ref 3.4–10.8)

## 2021-09-17 PROCEDURE — 25010000002 HEPARIN LOCK FLUSH PER 10 UNITS: Performed by: NURSE PRACTITIONER

## 2021-09-17 PROCEDURE — 36591 DRAW BLOOD OFF VENOUS DEVICE: CPT

## 2021-09-17 PROCEDURE — 77412 RADIATION TX DELIVERY LVL 3: CPT | Performed by: RADIOLOGY

## 2021-09-17 PROCEDURE — 85025 COMPLETE CBC W/AUTO DIFF WBC: CPT

## 2021-09-17 PROCEDURE — 99215 OFFICE O/P EST HI 40 MIN: CPT | Performed by: INTERNAL MEDICINE

## 2021-09-17 PROCEDURE — 86300 IMMUNOASSAY TUMOR CA 15-3: CPT | Performed by: INTERNAL MEDICINE

## 2021-09-17 PROCEDURE — 36415 COLL VENOUS BLD VENIPUNCTURE: CPT | Performed by: INTERNAL MEDICINE

## 2021-09-17 PROCEDURE — 80053 COMPREHEN METABOLIC PANEL: CPT

## 2021-09-17 RX ORDER — HEPARIN SODIUM (PORCINE) LOCK FLUSH IV SOLN 100 UNIT/ML 100 UNIT/ML
500 SOLUTION INTRAVENOUS AS NEEDED
Status: CANCELLED | OUTPATIENT
Start: 2021-09-17

## 2021-09-17 RX ORDER — HEPARIN SODIUM (PORCINE) LOCK FLUSH IV SOLN 100 UNIT/ML 100 UNIT/ML
500 SOLUTION INTRAVENOUS AS NEEDED
Status: DISCONTINUED | OUTPATIENT
Start: 2021-09-17 | End: 2021-09-17 | Stop reason: HOSPADM

## 2021-09-17 RX ORDER — SODIUM CHLORIDE 0.9 % (FLUSH) 0.9 %
10 SYRINGE (ML) INJECTION AS NEEDED
Status: DISCONTINUED | OUTPATIENT
Start: 2021-09-17 | End: 2021-09-17 | Stop reason: HOSPADM

## 2021-09-17 RX ORDER — SODIUM CHLORIDE 0.9 % (FLUSH) 0.9 %
10 SYRINGE (ML) INJECTION AS NEEDED
Status: CANCELLED | OUTPATIENT
Start: 2021-09-17

## 2021-09-17 RX ADMIN — SODIUM CHLORIDE, PRESERVATIVE FREE 10 ML: 5 INJECTION INTRAVENOUS at 08:27

## 2021-09-17 RX ADMIN — Medication 500 UNITS: at 08:27

## 2021-09-17 NOTE — PROGRESS NOTES
Subjective    REASONS FOR FOLLOWUP:   1. Metastatic breast cancer to multiple skeletal sites including cervical spine, sternum, lumbar spine and right femur. Previously treated with Abraxane, Kisqali Femara, Halaven, Adriamycin, now Afinitor and Aromasin  2.Iron deficiency anemia du to GI blood loss, Xarelto stopped months ago, Iron initiated, Pepcid along with Aleve for gastric protection.      History of Present Illness   DURING THE VISIT WITH THE PATIENT TODAY , PATIENT HAD FACE MASK, I HAD PROPER PROTECTIVE EQUIPMENT, AND I DID HAND HYGIENE WITH SOAP AND WATER BEFORE AND AFTER THE VISIT.    This patient returns today to the office for followup after she has been seen by nurse practitioner a few days ago. She complained of numbness in the right side of the face, numb chin syndrome as well and we advised the patient to proceed with an MRI of the brain. This documented bone metastasis in the basal area of the cranial anatomy that was triggering alteration in cranial nerves. No brain metastases per se were seen. No typical meningeal carcinomatosis was found. The patient was scheduled to be seen by Radiation Oncology. She has started her 1st treatment yesterday. She was placed on a low-dose dexamethasone 4 mg a day. So far the patient has been doing relatively well. Issues pertinent to her neurological dysfunction are still ongoing but we expect that this will get better. She has an appointment to follow up with her ophthalmologist in a couple of weeks. She has not had any diplopia or paralysis of the eyes. Her appetite is acceptable. Because of modification in her pain regimen, the  narcotic medication does not agree with her and is making her goofy. She is not taking any Aleve because afraid of interactions between Aleve and dexamethasone in regard to gastric irritation. She has not had any nausea or vomiting, neither insomnia. Bowel activity with minimal sluggishness. Urination remains normal. No hematuria. No swelling in her lower extremities. She remains active, walking, moving and taking care of things. On the other hand she believes that work is becoming too complex for her and she would like for us to help her apply for disability.          Past Medical History:   Diagnosis Date   • Abnormal mammogram    • Abnormal menstrual cycle    • Arthritis    • Bone metastasis (CMS/HCC)    • Breast cancer (CMS/HCC) 2000    Left breast   • Drug therapy 2001    breast cancer   • Drug therapy 2017    right femur/associated with breast cancer   • DVT (deep venous thrombosis) (CMS/HCC)    • H/O Heart murmur    • History of colon polyps    • Hx of radiation therapy 2000    breast cancer   • Hx of radiation therapy 2015    bone cancer right femur/ associated with breast cancer   • Hypercholesterolemia    • Hyperlipidemia    • Hypertension    • Multiple benign polyps of large intestine    • Reflux esophagitis    • Shingles      Past Surgical History:   Procedure Laterality Date   • BREAST BIOPSY Left 2000    breast cancer   • BREAST SURGERY  2000    lumpectomy, mastectomy, revision   • COLONOSCOPY  2012   • FEMUR FRACTURE SURGERY      secondary to metastatic breast cancer 7/2014, with revision in 9/2015   • MASTECTOMY Left 2000    transflap in 2003     Family History   Problem Relation Age of Onset   • Hypertension Mother    • Diabetes Mother    • Macular degeneration Mother    • Thyroid disease Mother    • Heart disease Father    • Stroke Father    • Kidney disease Father    • Glaucoma Brother    • Diabetes Brother    • Hypertension Brother    • Colon cancer Paternal Grandmother    • Thyroid disease Other     • Kidney disease Other    • Glaucoma Other    • Cancer Other    • Diabetes Other      Social History     Socioeconomic History   • Marital status: Single     Spouse name: Not on file   • Number of children: Not on file   • Years of education: Not on file   • Highest education level: Not on file   Tobacco Use   • Smoking status: Never Smoker   • Smokeless tobacco: Never Used   Substance and Sexual Activity   • Alcohol use: No   • Drug use: No   • Sexual activity: Defer     Current Outpatient Medications on File Prior to Visit   Medication Sig Dispense Refill   • Cyanocobalamin (B-12 PO) Take 1,000 mcg by mouth 2 (Two) Times a Week.     • denosumab (XGEVA) 120 MG/1.7ML solution injection Inject  under the skin 1 (one) time.     • dexamethasone (DECADRON) 4 MG tablet Take 1 tablet by mouth Daily. 30 tablet 0   • dexamethasone 0.5 MG/5ML solution Take 5 mL by mouth 4 (Four) Times a Day. Swish for 2 minutes 4 times per day, do not eat or drink for 1 hour after. 500 mL 3   • Diclofenac Sodium (VOLTAREN) 1 % gel gel APPLY 2 TO 4 GRAMS TOPICALLY TWICE DAILY TO AFFECTED AREA 100 g 5   • HYDROcodone-acetaminophen (NORCO) 5-325 MG per tablet Take 1 tablet by mouth Every 6 (Six) Hours As Needed for Moderate Pain . 60 tablet 0   • lisinopril-hydrochlorothiazide (PRINZIDE,ZESTORETIC) 10-12.5 MG per tablet Take 1 tablet by mouth twice daily 180 tablet 3   • magnesium chloride ER (Mag64) 64 MG DR tablet Take 1 tablet by mouth 2 (two) times a day. 180 tablet 3   • Multiple Vitamins-Minerals (OCUVITE ADULT FORMULA PO) Take 1 tablet by mouth Daily.     • Naproxen Sod-Diphenhydramine (ALEVE PM PO) Take  by mouth as needed.     • omeprazole (priLOSEC) 40 MG capsule Take 1 capsule by mouth Daily. 30 capsule 3   • ondansetron (ZOFRAN) 8 MG tablet Take 1 tablet by mouth 3 (Three) Times a Day As Needed for Nausea or Vomiting. 30 tablet 5   • Prenatal MV-Min-Fe Fum-FA-DHA (PRENATAL 1 PO) Take 1 tablet by mouth Daily.     • triamcinolone  "(KENALOG) 0.025 % cream Apply 0.25 application topically to the appropriate area as directed 2 (Two) Times a Day.       Current Facility-Administered Medications on File Prior to Visit   Medication Dose Route Frequency Provider Last Rate Last Admin   • alteplase (CATHFLO/ACTIVASE) injection 2 mg  2 mg Intravenous Once Nader Saez MD         Allergies   Allergen Reactions   • Codeine Unknown - Low Severity   • Docetaxel Unknown - Low Severity   • Paclitaxel Unknown - Low Severity     Objective      Vitals:    09/17/21 0844   BP: 150/71   Pulse: 97   Resp: 18   Temp: 98.1 °F (36.7 °C)   TempSrc: Oral   SpO2: 95%   Weight: 88 kg (194 lb)   Height: 162.6 cm (64.02\")   PainSc:   4     Current Status 9/2/2021   ECOG score 2     EXAM    I HAVE PERSONALLY REVIEWED THE HISTORY OF THE PRESENT ILLNESS, PAST MEDICAL HISTORY, FAMILY HISTORY, SOCIAL HISTORY, ALLERGIES, MEDICATIONS STATED ABOVE IN THE  NOTE FROM TODAY.        GENERAL:  Well-developed, well-nourished  Patient  in no acute distress.   SKIN:  Warm, dry ,NO rashes,NO purpura ,NO petechiae.  HEENT:  Pupils were equal and reactive to light and accomodation, conjunctivae noninjected, no pterygium, normal extraocular movements, normal visual acuity.   NECK:  Supple with good range of motion; no thyromegaly , no other masses, no JVD or bruits, no cervical adenopathies.No carotid artery pain, no carotid abnormal pulsation , NO arterial dance.  LYMPHATICS:  No cervical, NO supraclavicular, NO axillary,NO epitrochlear , NO inguinal adenopathy.  CARDIAC   normal rate and regular rhythm, without murmur,NO rubs NO S3 NO S4 right or left .  LUNGS: normal breath sounds bilateral, no wheezing, rhonchi, crackles or rubs.  VASCULAR VENOUS: no cyanosis, collateral circulation, varicosities, edema, palpable cords, pain, erythema.  ABDOMEN:  Soft, nontender with no hepatomegaly, no splenomegaly,no masses, no ascites, no collateral circulation,no distention,no Randall " sign.  EXTREMITIES  AND SPINE:  No clubbing, cyanosis or edema, no deformities , R FEMUR   pain .No kyphosis, scoliosis, no other deformities, no pain in spine,  pain in ribs ,  pain in pelvic bone.  NEUROLOGICAL:  Patient was awake, alert, oriented to time, person and place.It is very obvious that the patient has minimal numbness in the middle aspect of the trigeminal nerve on the right side of the face and also numb chin syndrome on the right side. Protrusion of the tongue was normal. Pupils were equal and reactive. Extraocular movements were normal. Her neck was supple. She had no obvious tenderness in the cranium or tumoral lesions palpable in the cranial area. Her hearing was appropriate. Her minimal head tilt and balancing of her head remained unchanged. This is not a new change. This has been ongoing for the last 10 years and unchanged; it is a chronic issue.          RESULTS REVIEWED:  Results from last 7 days   Lab Units 09/17/21  0819   WBC 10*3/mm3 5.91   NEUTROS ABS 10*3/mm3 4.66   HEMOGLOBIN g/dL 11.0*   HEMATOCRIT % 33.1*   PLATELETS 10*3/mm3 140     Results from last 7 days   Lab Units 09/17/21  0819   SODIUM mmol/L 133*   POTASSIUM mmol/L 4.2   CHLORIDE mmol/L 97*   CO2 mmol/L 22.6   BUN mg/dL 37*   CREATININE mg/dL 1.32*   CALCIUM mg/dL 9.5   ALBUMIN g/dL 3.70   BILIRUBIN mg/dL 0.3   ALK PHOS U/L 201*   ALT (SGPT) U/L 65*   AST (SGOT) U/L 55*   GLUCOSE mg/dL 154*         OF THE BRAIN WITH AND WITHOUT CONTRAST 09/10/2021     CLINICAL HISTORY: Patient has a history of metastatic breast cancer and  has word finding difficulty, loss of words, facial numbness.  Evaluate  for metastases.     TECHNIQUE: Axial T1, FLAIR, fat-suppressed T2, axial diffusion and  gradient echo T2, sagittal T1 and postcontrast axial fat-suppressed T1,  sagittal and coronal T1, and thin cut 1.5 mm thick axial postcontrast  spoiled gradient echo T1-weighted images were obtained of the entire  head.     COMPARISON: There are no  prior MRIs of the brain for comparison. This is  correlated to only prior head CT which was on 07/12/2014. It is  correlated to a recent bone scan 04/06/2021, prior cervical spine CT  03/27/2015, and cervical spine MRI 06/26/2015.     FINDINGS: There is very minimal T2 high signal in the periventricular  white matter of the cerebral hemispheres and scattered tiny nodular foci  in the subcortical white matter most consistent with mild small vessel  disease. The remainder of the brain parenchyma is normal in signal  intensity. Specifically no diffusion weighted abnormality is seen with  no acute infarct identified. On the gradient echo T2-weighted images no  acute or old blood breakdown products are seen intracranially. Following  contrast no abnormal areas of enhancement are seen in the brain. There  is minimal bilateral ethmoid sinus mucosal thickening. Otherwise the  paranasal sinuses are clear. There is a small amount of fluid in the  right mastoid air cells and a tiny amount of fluid in the posterior left  mastoid air cells. Good flow voids are demonstrated in cerebral vessels  and the dural venous sinuses. There are extensive areas of marrow signal  abnormality in the calvarium, skull base and upper cervical spine  compatible with extensive osseous metastatic lesions and many of the  lesions enhance following contrast indicating active osseous metastatic  lesions. Large lesions in the anterior and lateral frontal bones,  lateral and posterior parietal bones as well as in the occipital bones,  and there is some underlying dural thickening and enhancement deep to  the calvarial regions. It is rather smooth. Maximal dural thickness is  deep to the 6 cm lesion in the lateral left parietal bone where the dura  deep to the osseous lesion measures up to 2 mm in thickness. There is a  lesion involving the right sphenoid wing and there is a band of dural  thickening and enhancement in the anterior aspect of the right  middle  cranial fossa that measures up to 3-4 mm in anterior posterior  thickness. It is likely some dural extension of the lesion. Furthermore,  anterior medial to the right sphenoid wing there is abnormal thickening  and enhancement extending into the superior and lateral extraconal fat  of the right orbit that contacts the lateral margin of the right lateral  rectus muscle and superior margin of the right superior rectus muscle.  Furthermore, there is an abnormal enhancement extending throughout the  right cavernous sinus. It measures up to 18 x 10 mm in anterior  posterior and medial lateral dimension on axial fat-sat post contrast  T1-weighted image 9 sequence 9, and 17 mm in craniocaudal dimension seen  on postcontrast coronal image 10 sequence 10. It is compatible with  metastatic tumor. It extends posteriorly into the right Meckel's cave  and then there is minimal extension posterior to the right Meckel's cave  where it extends adjacent to the anterior aspect of the cisternal  segment of the right cranial nerve V as it enters Meckel's cave and on  coronal image 12 sequence 10 it measured 6 x 4 mm in medial lateral and  craniocaudal dimension. It only measures maybe 2-3 mm in anterior  posterior dimension. There are multiple lesions in the cervical spine  from C1 to C4 replacing the majority of the cervical vertebrae and the  odontoid process.     IMPRESSION:  1. There is mild small vessel disease in the cerebral white matter.  Otherwise the brain itself is normal with no evidence of metastatic  disease to the brain.   2. There are extensive enhancing osseous metastatic lesions involving  the calvarium, skull base and visualized upper cervical spine. Deep to  the calvarial lesions there is relatively smooth dural thickening and  enhancement. This may be dural irritation by the calvarial metastases.  Thicker areas may have dural extension of the osseous tumor. In  particular deep to the left parietal bone  metastasis the dura measures  up to 2-3 mm in thickness and there could be some dural extension.  Furthermore, along the margins of the metastatic lesion in the lateral  aspect of the right sphenoid wing there is 4 mm thick dural thickening  and enhancement in the anterior aspect of the right middle cranial fossa  which is likely dural extension of tumor and then anterior medial to the  right sphenoid wing extending into superior and lateral extraconal fat  of the right orbit is a rind of soft tissue thickening and enhancement  that measures up to 4 mm in thickness and is consistent with  extraosseous extension from the lateral aspect of the right sphenoid  wing into the right orbit that contacts the lateral aspect of the right  lateral rectus muscle and superior aspect of the right superior rectus  muscle. Furthermore, there is tumor extending throughout the right  cavernous sinus and Meckel's cave and it bulges into the right  prepontine cistern and tracks along the distal cisternal segment of the  right cranial nerve V. The tumor in the lateral aspect of the right  cavernous sinus measures up to 18 x 10 x 17 mm and could affect the  right cranial nerves III-VI at this site. A thin cut orbital MRI could  be obtained to better determine the degree of orbital involvement if the  patient has right orbital symptoms. The results and recommendations from  this study were communicated to JESSICA Fan from oncology on  09/10/2021 at 11:45 AM.     This report was finalized on 9/10/2021 2:26 PM by Dr. Davis Bia M.D.     CT ABDOMEN AND PELVIS WITHOUT IV CONTRAST     HISTORY: 66-year-old female with metastatic breast cancer. Follow-up  liver metastases.     TECHNIQUE: Radiation dose reduction techniques were utilized, including  automated exposure control and exposure modulation based on body size.   3 mm images were obtained through the abdomen and pelvis without the  administration of IV contrast. Compared with  previous contrast enhanced  CT 04/06/2021.     FINDINGS: There is been marked increase in size of the previously seen  hepatic metastases and there are many new metastases as well. An  approximately 2.7 x 2.3 cm right hepatic lobe metastasis previously  measured 1.3 x 1.2 cm. One of the largest new metastases is at the right  hepatic lobe measuring 2.7 x 1.7 cm. Nodes at the corinne hepatis have  increased in size with one of the largest measuring 1.6 x 1.1 cm. The  gallbladder is contracted and there is new thickening of the gallbladder  wall. Noncontrasted adrenals, pancreas, spleen, and right kidney appear  unremarkable. Noncontrasted left kidney appears unremarkable other than  a single 4 mm nonobstructing stone at the lower pole. There is a right  anterior lateral abdominal wall hernia with herniation of fat as well as  a segment of ascending colon without obstruction or incarceration.  Stable appearance. No acute bowel abnormality is seen. Appendix appears  within normal limits. Uterus and adnexa appear unremarkable. No free  fluid. There are very extensive sclerotic bone metastases which appear  largely unchanged. There are new patchy groundglass opacities at the  visualized lower lobes and lingula.     IMPRESSION:  1. There is been extensive progression of the hepatic metastases.  2. There are new patchy groundglass opacities within the visualized  lower lung fields. The appearance can be seen with COVID.     This report was finalized on 9/15/2021 3:40 PM by Dr. Lisa Decker M.D.       Assessment/Plan    1. Metastatic left breast cancer to multiple skeletal sites including cervical spine, sternum, lumbar spine and right femur.  · Previously treated with Abraxane Xgeva  · Documented radiographic progression by bone scan 3/15/2017, treated with Femara Kisqali, Xgeva every 3 months  · Progression of bony disease June 2020.  Femara Kisqali discontinued.  Transition to Halaven  · Palliative skeletal radiation to the  right shoulder and pelvis September 2020  · Treatment holiday from Halaven 11/4/2020 due to progressive fatigue  · CA 15-3 has been declining while on Halaven, most recently 50.9.   · 12/2/2020, CA 15-3 55.8, increased to 69.1 2/22/2021  · Bone scan 10/28/2020 with increased uptake in the right scapula, no other significant change  · CT scans 12/28/2020 with extensive bony disease, new hypodense left hepatic lesion in the liver  · Radiation to the hip complete 1/26/2021  · Single agent Adriamycin initiated 2/5/2021 to be given 3 to 4 weeks  · CT scans and bone scan 4/6/2021 where multiple hepatic lesions developing consistent with hepatic metastasis.  Diffuse bone lesions without change  · Plans for liver biopsy to further evaluate Caris Target   · CT guided biopsy 4/14/2021.  Pathology consistent with metastatic adenocarcinoma of the breast  · Caris Target documents ER positive, KS positive, HER-2 negative.  MSI stable, low mutation burden.  Androgen receptor positivity and no actionable mutations otherwise.  · Transition to Aromasin 25 mg daily along with Afinitor 5 mg daily.  Afinitor initiated 6/8/2021  · At the initiation of Aromasin and Afinitor, baseline CA 15-3 6/4/2021 87.9.  CA 15-3 decreased to 75 7/1/2021.  · Review of ophthalmology notes.  The patient was noted to have a lesion in the right eye, questionable metastatic deposit.  However, this has responded to Afinitor.  She will continue to follow-up with ophthalmology every 3 months  · Continue Aromasin 25 mg and Afinitor 5 mg daily  The patient was reviewed on 08/26/2021. In spite of having in [facility] cancer marker, the patient’s clinical status looks relatively stable. She has not developed any new sites of bone pain and the pain intensity in the right femur is no different than before requiring pain medicine once or twice a day. She is not requiring any narcotic medication. I advised her to continue her medicines, Afinitor and Aromasin at the same  dosing and I advised her to proceed with radiological assessment including a bone scan and a CT scan of the abdomen and pelvis. This will be performed in 2 weeks and we will review her back in 3 weeks.  The patient was reviewed on 09/18/2021 and the radiological analysis of the CT scan of the abdomen shows obvious progression of her liver metastasis. The CT scan of the chest disclosed no pulmonary metastasis. The MRI of the head shows obvious basal cranial metastasis. This explains her neurological picture. The bone scan difficult to observe still shows significant progressive bone metastasis. In other words her metastatic malignancy mostly to the bones and now into the liver is progressive and I advised her to discontinue her Aromasin and her everolimus. The patient will continue with her radiation therapy to the bone and cranium and this will take a total of 10 sessions. I would like to review her back in a couple of weeks in order to decide how to proceed thereafter. The patient has taken multiple chemotherapy medicines before including taxane, Adriamycin, Xeloda, all sorts of hormonal therapies including Kisqali, Aromasin, Femara and now Aromasin along with everolimus. She has failed all these regimens but remind ourselves that she has been treated for metastatic bone cancer from the breast a total of 10 years. This is already an achievement.     I raised the question to the research team if the patient could still qualify for a clinical trial given the mutations found in her liver biopsy in 04/2021. They will give me the answer at some point in the next few days. They will see the qualifiers and the unqualifiers from the clinical study.     The patient never has received Gemzar or Navelbine. Those will be the next medicines in her treatment. She already has received Halaven as well.     I pointed out all these things to the patient.      ·   2.  Bony metastasis  · Now receiving Xgeva every 3 months, last given  6/4/2021.  She will receive Xgeva at scheduled follow-up with Dr. Saez in August  As stated above the bone pain is not changing too much in intensity. This will be watched. She will continue the same dosing of medicines and she will proceed with her Xgeva today, 08/26/2021. Next dose on November 26th.  The patient will require the continuation of Xgeva once a month from now on.      ·   3.  Anemia, multifactorial  · Previously with iron deficiency due to GI blood loss.  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  The patient is symptomatic with fatigue and dyspnea.  I suspect there is an element of hemoconcentration as well as she is clinically dehydrated.  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 She was transfused a few weeks ago and her hemoglobin is 9.6 today. There is nothing else I can do to stimulate production of red cells at this point. This will be watched and I would not be surprised if she needs to be transfused again in a few weeks. She has no notion of blood loss. We have checked ferritin, iron, TIBC, B12 and folic acid levels, all of then normal.  I think a lot of her anemia is myelophthisis more than effect of chemotherapy. She has been transfused. The hemoglobin today is stable and she has no symptomatology pertinent to the anemia.      ·   4.  Hypomagnesemia  · Continuing on magnesium replacement twice daily  She will continue her oral magnesium supplementation.  She remains on magnesium supplementation.      ·   5.  Venous access  · The patient does have a right chest Mediport which we will continue to flush monthly.  Activase currently instilled  Her port remains  functional and it has been flushed and maintained appropriately.  Her port has been maintained and flushed.      ·   6.  Cancer related pain  · Hydrocodone/acetaminophen 5/325 every 6 hours as needed.  Patient does not like taking narcotics.  She does utilize Voltaren cream and Aleve which provides good control of her pain.  Her pain medicine, anti-inflammatory and naproxen will remain ongoing for the time being. We need to be sure that she is not going to  toxicity from the point of view kidney dysfunction or GI toxicity. She remains on proton pump inhibitor for gastric protection.  The patient does not get along with narcotic medication. On 09/17/2021, I advised her to use the Lortab as the last resource and I advised her to go back into the Aleve that is her best friend in regard to pain control. The patient is taking proton pump inhibitor. She is taking a single dose of dexamethasone 4 mg a day and I think she will be able to handle the combination of dexamethasone that will be a transitory medicine for 2 weeks and the Aleve. Hopefully in 2 weeks we will be able to discontinue the dexamethasone altogether.     She will continue utilizing the Voltaren topical medicine that is of great benefit.      ·   7.  Muscle cramping  · With the patient's borderline hypotension and tachycardia I suspect this is related to dehydration.  The patient was encouraged to increase her oral intake.   · Creatinine reviewed elevated at 1.4 which confirms dehydration.  The patient will increase oral intake  This issue is much better when she drinks more liquids.  She has not had any muscle cramping since she has been on magnesium supplementation.    8.The patient has gotten to the point that she is barely able to get around on her own anymore given the aches and pains, the somnolence associated with narcotic use, the multiple testing requiring the back and forth to radiation therapy. I have advised her to be seen by our social  worker today and this has happened. The patient will apply for disability at this time. She is completely and permanently disabled for any activity. This is my personal opinion based on her diagnosis and her overall prognosis.      ·   PLAN:    The plan has been established by each one of the numerals discussed above. The patient will return to see us in a couple of weeks with a CBC, CMP. We have measured today her CA 15-3 as a baseline and she will receive Xgeva in 2 weeks. She will continue and complete her radiation therapy to her cranium.    We expect that we will discontinue the dexamethasone in 2 weeks.     She will go back to the [facility] to take care of her pain.     She will apply for disability as stated.      Nader Saez MD  09/17/2021

## 2021-09-20 ENCOUNTER — OFFICE VISIT (OUTPATIENT)
Dept: ONCOLOGY | Facility: CLINIC | Age: 66
End: 2021-09-20

## 2021-09-20 DIAGNOSIS — C50.912 PRIMARY MALIGNANT NEOPLASM OF LEFT BREAST (HCC): Primary | ICD-10-CM

## 2021-09-20 PROCEDURE — 77412 RADIATION TX DELIVERY LVL 3: CPT | Performed by: RADIOLOGY

## 2021-09-20 NOTE — PROGRESS NOTES
"Oncology Social Work  Case Management Consult    OSW received referral for pt from Dr. Saez. OSW met with pt. Pt awake, alert and oriented, ambulating somewhat slowly, but with independence and stability. Pt shares that she is doing well today. She spent much of the weekend resting, and feels that she is tolerating radiation well. Pt discussed her current work. Pt works in a very supportive environment, doing work which she considers to be relatively undemanding, however, she feels that the cumulative effect of her treatment and her disease progression has taken its toll and she would like to make plans for the future that would allow her to have leave from work. Pt reports that despite living alone, that she has a good support system, including her coworkers, as well as, four best friends, who provide a lot of help and support. Her mother lives in [place], [place]. Pt also feels well supported by her margie community at [facility] and shares that she has a connection to \"[employer]\", and states that she will be reaching out to them to make them aware of her growing need, to see what additional financial or community supports are available.     Interventions  1. OSW provided pt with thorough written instructions and education about how to apply for social security disability. OSW explained pt's eligibility for the compassionate allowances program and encouraged pt to make sure that this was made clear when she calls to schedule her interview.  OSW also provided pt with a thorough list of all the documentation needed to either complete the application online, or what she will need for her interview. Pt v.u. and says that she will call to schedule an interview and may go ahead with an online application, as well. Pt shares that knowing how to start this process, and getting it underway is \"a big relief\" for her.   2. OSW aided pt in starting applications for both [employer] and [place] [employer].  OSW " will get the needed signatures and submit.   3. OSW made herself available for additional emotional support and psychosocial resources that the pt may need. OSW also provided education about other financial resources available to pt. Pt agreed with OSW that she has plenty to get started on with the aforementioned SSD application, and OSW will remain in contact with pt to address any needs or concerns that arise through the process.    Pr denied any further needs at this time. OSW will follow up with pt next week.    SCOTTIE AvinaW  Oncology Social Worker

## 2021-09-21 ENCOUNTER — TELEPHONE (OUTPATIENT)
Dept: ONCOLOGY | Facility: CLINIC | Age: 66
End: 2021-09-21

## 2021-09-21 ENCOUNTER — RADIATION ONCOLOGY WEEKLY ASSESSMENT (OUTPATIENT)
Dept: RADIATION ONCOLOGY | Facility: HOSPITAL | Age: 66
End: 2021-09-21

## 2021-09-21 DIAGNOSIS — C79.51 BONE METASTASIS: Primary | ICD-10-CM

## 2021-09-21 PROCEDURE — 77412 RADIATION TX DELIVERY LVL 3: CPT | Performed by: RADIOLOGY

## 2021-09-21 PROCEDURE — 77336 RADIATION PHYSICS CONSULT: CPT | Performed by: RADIOLOGY

## 2021-09-21 NOTE — TELEPHONE ENCOUNTER
Called pt and advised he does not have any other openings, advised for her to call back and we can look at again a wk out or so, df

## 2021-09-21 NOTE — TELEPHONE ENCOUNTER
Caller: Maggie Suero    Relationship: Self    Best call back number: [phone]    What is the best time to reach you: ASAP    Who are you requesting to speak with (clinical staff, provider,  specific staff member):     Do you know the name of the person who called:     What was the call regarding: SCHEDULING    Do you require a callback: YES

## 2021-09-22 PROCEDURE — 77412 RADIATION TX DELIVERY LVL 3: CPT | Performed by: RADIOLOGY

## 2021-09-22 NOTE — PROGRESS NOTES
Physician Weekly Management Note    Diagnosis:     Diagnosis Plan   1. Bone metastasis (CMS/HCC)         RT Details:  Treatment #4/10       Whole brain    Notes on Treatment course, Films, Medical progress:  Demonstrating good tolerance.  No hair loss as yet, denies any neurological signs or symptoms.      [facility] ECOG Status: (0) Fully active, able to carry on all predisease performance without restriction      Weekly Management:  Medication reviewed?   Yes  New medications given?   No  Problemlist reviewed?   Yes  Problem added?   No      Technical aspects reviewed:  Weekly OBI approved?   Yes  Weekly port films approved?   Yes  Change requests noted on port film?   No  Patient setup and plan reviewed?   Yes    Chart Reviewed:  Continue current treatment plan?   Yes  Treatment plan change requested?   No  CBC reviewed?   No  Concurrent Chemo?   No    Objective     Toxicities:   As above     Review of Systems   As above    There were no vitals filed for this visit.    Current Status 9/2/2021   ECOG score 2       Physical Exam  As above      Problem Summary List    Diagnosis:     Diagnosis Plan   1. Bone metastasis (CMS/HCC)       Pathology:       Past Medical History:   Diagnosis Date   • Abnormal mammogram    • Abnormal menstrual cycle    • Arthritis    • Bone metastasis (CMS/HCC)    • Breast cancer (CMS/HCC) 2000    Left breast   • Drug therapy 2001    breast cancer   • Drug therapy 2017    right femur/associated with breast cancer   • DVT (deep venous thrombosis) (CMS/HCC)    • H/O Heart murmur    • History of colon polyps    • Hx of radiation therapy 2000    breast cancer   • Hx of radiation therapy 2015    bone cancer right femur/ associated with breast cancer   • Hypercholesterolemia    • Hyperlipidemia    • Hypertension    • Multiple benign polyps of large intestine    • Reflux esophagitis    • Shingles          Past Surgical History:   Procedure Laterality Date   • BREAST BIOPSY Left 2000     breast cancer   • BREAST SURGERY  2000    lumpectomy, mastectomy, revision   • COLONOSCOPY  2012   • FEMUR FRACTURE SURGERY      secondary to metastatic breast cancer 7/2014, with revision in 9/2015   • MASTECTOMY Left 2000    transflap in 2003         Current Outpatient Medications on File Prior to Visit   Medication Sig Dispense Refill   • Cyanocobalamin (B-12 PO) Take 1,000 mcg by mouth 2 (Two) Times a Week.     • denosumab (XGEVA) 120 MG/1.7ML solution injection Inject  under the skin 1 (one) time.     • dexamethasone (DECADRON) 4 MG tablet Take 1 tablet by mouth Daily. 30 tablet 0   • dexamethasone 0.5 MG/5ML solution Take 5 mL by mouth 4 (Four) Times a Day. Swish for 2 minutes 4 times per day, do not eat or drink for 1 hour after. 500 mL 3   • Diclofenac Sodium (VOLTAREN) 1 % gel gel APPLY 2 TO 4 GRAMS TOPICALLY TWICE DAILY TO AFFECTED AREA 100 g 5   • HYDROcodone-acetaminophen (NORCO) 5-325 MG per tablet Take 1 tablet by mouth Every 6 (Six) Hours As Needed for Moderate Pain . 60 tablet 0   • lisinopril-hydrochlorothiazide (PRINZIDE,ZESTORETIC) 10-12.5 MG per tablet Take 1 tablet by mouth twice daily 180 tablet 3   • magnesium chloride ER (Mag64) 64 MG DR tablet Take 1 tablet by mouth 2 (two) times a day. 180 tablet 3   • Multiple Vitamins-Minerals (OCUVITE ADULT FORMULA PO) Take 1 tablet by mouth Daily.     • Naproxen Sod-Diphenhydramine (ALEVE PM PO) Take  by mouth as needed.     • omeprazole (priLOSEC) 40 MG capsule Take 1 capsule by mouth Daily. 30 capsule 3   • ondansetron (ZOFRAN) 8 MG tablet Take 1 tablet by mouth 3 (Three) Times a Day As Needed for Nausea or Vomiting. 30 tablet 5   • Prenatal MV-Min-Fe Fum-FA-DHA (PRENATAL 1 PO) Take 1 tablet by mouth Daily.     • triamcinolone (KENALOG) 0.025 % cream Apply 0.25 application topically to the appropriate area as directed 2 (Two) Times a Day.       Current Facility-Administered Medications on File Prior to Visit   Medication Dose Route Frequency Provider  Last Rate Last Admin   • alteplase (CATHFLO/ACTIVASE) injection 2 mg  2 mg Intravenous Once Nader Saez MD           Allergies   Allergen Reactions   • Codeine Unknown - Low Severity   • Docetaxel Unknown - Low Severity   • Paclitaxel Unknown - Low Severity        Primary care MD:    Chetna Whalen MD    Oncologist:  Patient Care Team:  Nader Saez MD as Consulting Physician (Hematology and Oncology)  Ema Aguila MD as Referring Physician (General Surgery)  Omar Jesus MD as Consulting Physician (Radiation Oncology)       Seen and approved by:  Omar Jesus MD  09/21/2021

## 2021-09-23 PROCEDURE — 77412 RADIATION TX DELIVERY LVL 3: CPT | Performed by: RADIOLOGY

## 2021-09-23 PROCEDURE — 77417 THER RADIOLOGY PORT IMAGE(S): CPT | Performed by: RADIOLOGY

## 2021-09-23 PROCEDURE — 77427 RADIATION TX MANAGEMENT X5: CPT | Performed by: RADIOLOGY

## 2021-09-24 ENCOUNTER — DOCUMENTATION (OUTPATIENT)
Dept: ONCOLOGY | Facility: CLINIC | Age: 66
End: 2021-09-24

## 2021-09-24 PROCEDURE — 77412 RADIATION TX DELIVERY LVL 3: CPT | Performed by: RADIOLOGY

## 2021-09-24 NOTE — PROGRESS NOTES
Oncology Social Work    OSW submitted application to [facility] for gas cards. OSW will remain available for additional support.       JEIMY Avina  Oncology Social Worker

## 2021-09-27 PROCEDURE — 77412 RADIATION TX DELIVERY LVL 3: CPT | Performed by: RADIOLOGY

## 2021-09-28 ENCOUNTER — RADIATION ONCOLOGY WEEKLY ASSESSMENT (OUTPATIENT)
Dept: RADIATION ONCOLOGY | Facility: HOSPITAL | Age: 66
End: 2021-09-28

## 2021-09-28 DIAGNOSIS — C79.31 BRAIN METASTASES: Primary | ICD-10-CM

## 2021-09-28 DIAGNOSIS — C79.51 BONE METASTASIS: Primary | ICD-10-CM

## 2021-09-28 PROCEDURE — 77412 RADIATION TX DELIVERY LVL 3: CPT | Performed by: RADIOLOGY

## 2021-09-28 PROCEDURE — 77336 RADIATION PHYSICS CONSULT: CPT | Performed by: RADIOLOGY

## 2021-09-28 NOTE — PROGRESS NOTES
Physician Weekly Management Note    Diagnosis:     Diagnosis Plan   1. Bone metastasis (CMS/HCC)         RT Details:  Treatment #9/10    Metastatic breast CA     Whole brain    Notes on Treatment course, Films, Medical progress:  Demonstrating good tolerance.  Recently saw the ophthalmologist who reported a stable exam.  She is not requiring Norco now and takes Aleve twice daily.  I will leave her on the Decadron 4 mg daily for now.  I will plan on repeating the MRI of the brain in about 6 weeks.      [facility] ECOG Status: (1) Restricted in physically strenuous activity, ambulatory and able to do work of light nature      Weekly Management:  Medication reviewed?   Yes  New medications given?   No  Problemlist reviewed?   Yes  Problem added?   No      Technical aspects reviewed:  Weekly OBI approved?   Yes  Weekly port films approved?   Yes  Change requests noted on port film?   No  Patient setup and plan reviewed?   Yes    Chart Reviewed:  Continue current treatment plan?   Yes  Treatment plan change requested?   No  CBC reviewed?   No  Concurrent Chemo?   No    Objective     Toxicities:   As above     Review of Systems   As above    There were no vitals filed for this visit.    Current Status 9/2/2021   ECOG score 2       Physical Exam  As above      Problem Summary List    Diagnosis:     Diagnosis Plan   1. Bone metastasis (CMS/HCC)       Pathology:       Past Medical History:   Diagnosis Date   • Abnormal mammogram    • Abnormal menstrual cycle    • Arthritis    • Bone metastasis (CMS/HCC)    • Breast cancer (CMS/HCC) 2000    Left breast   • Drug therapy 2001    breast cancer   • Drug therapy 2017    right femur/associated with breast cancer   • DVT (deep venous thrombosis) (CMS/HCC)    • H/O Heart murmur    • History of colon polyps    • Hx of radiation therapy 2000    breast cancer   • Hx of radiation therapy 2015    bone cancer right femur/ associated with breast cancer   • Hypercholesterolemia    •  Hyperlipidemia    • Hypertension    • Multiple benign polyps of large intestine    • Reflux esophagitis    • Shingles          Past Surgical History:   Procedure Laterality Date   • BREAST BIOPSY Left 2000    breast cancer   • BREAST SURGERY  2000    lumpectomy, mastectomy, revision   • COLONOSCOPY  2012   • FEMUR FRACTURE SURGERY      secondary to metastatic breast cancer 7/2014, with revision in 9/2015   • MASTECTOMY Left 2000    transflap in 2003         Current Outpatient Medications on File Prior to Visit   Medication Sig Dispense Refill   • Cyanocobalamin (B-12 PO) Take 1,000 mcg by mouth 2 (Two) Times a Week.     • denosumab (XGEVA) 120 MG/1.7ML solution injection Inject  under the skin 1 (one) time.     • dexamethasone (DECADRON) 4 MG tablet Take 1 tablet by mouth Daily. 30 tablet 0   • dexamethasone 0.5 MG/5ML solution Take 5 mL by mouth 4 (Four) Times a Day. Swish for 2 minutes 4 times per day, do not eat or drink for 1 hour after. 500 mL 3   • Diclofenac Sodium (VOLTAREN) 1 % gel gel APPLY 2 TO 4 GRAMS TOPICALLY TWICE DAILY TO AFFECTED AREA 100 g 5   • HYDROcodone-acetaminophen (NORCO) 5-325 MG per tablet Take 1 tablet by mouth Every 6 (Six) Hours As Needed for Moderate Pain . 60 tablet 0   • lisinopril-hydrochlorothiazide (PRINZIDE,ZESTORETIC) 10-12.5 MG per tablet Take 1 tablet by mouth twice daily 180 tablet 3   • magnesium chloride ER (Mag64) 64 MG DR tablet Take 1 tablet by mouth 2 (two) times a day. 180 tablet 3   • Multiple Vitamins-Minerals (OCUVITE ADULT FORMULA PO) Take 1 tablet by mouth Daily.     • Naproxen Sod-Diphenhydramine (ALEVE PM PO) Take  by mouth as needed.     • omeprazole (priLOSEC) 40 MG capsule Take 1 capsule by mouth Daily. 30 capsule 3   • ondansetron (ZOFRAN) 8 MG tablet Take 1 tablet by mouth 3 (Three) Times a Day As Needed for Nausea or Vomiting. 30 tablet 5   • Prenatal MV-Min-Fe Fum-FA-DHA (PRENATAL 1 PO) Take 1 tablet by mouth Daily.     • triamcinolone (KENALOG) 0.025 %  cream Apply 0.25 application topically to the appropriate area as directed 2 (Two) Times a Day.       Current Facility-Administered Medications on File Prior to Visit   Medication Dose Route Frequency Provider Last Rate Last Admin   • alteplase (CATHFLO/ACTIVASE) injection 2 mg  2 mg Intravenous Once Nader Saez MD           Allergies   Allergen Reactions   • Codeine Unknown - Low Severity   • Docetaxel Unknown - Low Severity   • Paclitaxel Unknown - Low Severity         Primary care MD:    Chetna Whalen MD    Oncologist:  Patient Care Team:  Nader Saez MD as Consulting Physician (Hematology and Oncology)  Ema Aguila MD as Referring Physician (General Surgery)  Omar Jesus MD as Consulting Physician (Radiation Oncology)       Seen and approved by:  Omar Jesus MD  09/28/2021

## 2021-09-29 ENCOUNTER — DOCUMENTATION (OUTPATIENT)
Dept: RADIATION ONCOLOGY | Facility: HOSPITAL | Age: 66
End: 2021-09-29

## 2021-09-29 PROCEDURE — 77412 RADIATION TX DELIVERY LVL 3: CPT | Performed by: RADIOLOGY

## 2021-10-05 ENCOUNTER — APPOINTMENT (OUTPATIENT)
Dept: ONCOLOGY | Facility: HOSPITAL | Age: 66
End: 2021-10-05

## 2021-10-05 ENCOUNTER — INFUSION (OUTPATIENT)
Dept: ONCOLOGY | Facility: HOSPITAL | Age: 66
End: 2021-10-05

## 2021-10-05 ENCOUNTER — OFFICE VISIT (OUTPATIENT)
Dept: ONCOLOGY | Facility: CLINIC | Age: 66
End: 2021-10-05

## 2021-10-05 VITALS — SYSTOLIC BLOOD PRESSURE: 112 MMHG | HEART RATE: 84 BPM | DIASTOLIC BLOOD PRESSURE: 71 MMHG

## 2021-10-05 VITALS
RESPIRATION RATE: 20 BRPM | HEART RATE: 105 BPM | BODY MASS INDEX: 31.02 KG/M2 | HEIGHT: 64 IN | OXYGEN SATURATION: 97 % | DIASTOLIC BLOOD PRESSURE: 67 MMHG | WEIGHT: 181.7 LBS | TEMPERATURE: 97.3 F | SYSTOLIC BLOOD PRESSURE: 97 MMHG

## 2021-10-05 DIAGNOSIS — C79.51 BONE METASTASIS: ICD-10-CM

## 2021-10-05 DIAGNOSIS — Z45.2 FITTING AND ADJUSTMENT OF VASCULAR CATHETER: ICD-10-CM

## 2021-10-05 DIAGNOSIS — C50.912 PRIMARY MALIGNANT NEOPLASM OF LEFT BREAST (HCC): Primary | ICD-10-CM

## 2021-10-05 DIAGNOSIS — Z45.2 FITTING AND ADJUSTMENT OF VASCULAR CATHETER: Primary | ICD-10-CM

## 2021-10-05 DIAGNOSIS — D63.0 ANEMIA IN NEOPLASTIC DISEASE: ICD-10-CM

## 2021-10-05 DIAGNOSIS — C50.912 PRIMARY MALIGNANT NEOPLASM OF LEFT BREAST (HCC): ICD-10-CM

## 2021-10-05 DIAGNOSIS — C78.7 LIVER METASTASIS: ICD-10-CM

## 2021-10-05 DIAGNOSIS — R01.1 HEART MURMUR, SYSTOLIC: ICD-10-CM

## 2021-10-05 DIAGNOSIS — D70.1 LEUKOPENIA DUE TO ANTINEOPLASTIC CHEMOTHERAPY (HCC): ICD-10-CM

## 2021-10-05 DIAGNOSIS — T45.1X5A LEUKOPENIA DUE TO ANTINEOPLASTIC CHEMOTHERAPY (HCC): ICD-10-CM

## 2021-10-05 LAB
ALBUMIN SERPL-MCNC: 3.7 G/DL (ref 3.5–5.2)
ALBUMIN/GLOB SERPL: 1.4 G/DL (ref 1.1–2.4)
ALP SERPL-CCNC: 254 U/L (ref 38–116)
ALT SERPL W P-5'-P-CCNC: 76 U/L (ref 0–33)
ANION GAP SERPL CALCULATED.3IONS-SCNC: 14 MMOL/L (ref 5–15)
AST SERPL-CCNC: 55 U/L (ref 0–32)
BASOPHILS # BLD AUTO: 0.01 10*3/MM3 (ref 0–0.2)
BASOPHILS NFR BLD AUTO: 0.1 % (ref 0–1.5)
BILIRUB SERPL-MCNC: 0.6 MG/DL (ref 0.2–1.2)
BUN SERPL-MCNC: 56 MG/DL (ref 6–20)
BUN/CREAT SERPL: 30.9 (ref 7.3–30)
CALCIUM SPEC-SCNC: 9 MG/DL (ref 8.5–10.2)
CANCER AG15-3 SERPL-ACNC: 427 U/ML
CHLORIDE SERPL-SCNC: 98 MMOL/L (ref 98–107)
CO2 SERPL-SCNC: 20 MMOL/L (ref 22–29)
CREAT SERPL-MCNC: 1.81 MG/DL (ref 0.6–1.1)
DEPRECATED RDW RBC AUTO: 53.5 FL (ref 37–54)
EOSINOPHIL # BLD AUTO: 0.02 10*3/MM3 (ref 0–0.4)
EOSINOPHIL NFR BLD AUTO: 0.3 % (ref 0.3–6.2)
ERYTHROCYTE [DISTWIDTH] IN BLOOD BY AUTOMATED COUNT: 18.3 % (ref 12.3–15.4)
GFR SERPL CREATININE-BSD FRML MDRD: 28 ML/MIN/1.73
GLOBULIN UR ELPH-MCNC: 2.7 GM/DL (ref 1.8–3.5)
GLUCOSE SERPL-MCNC: 115 MG/DL (ref 74–124)
HCT VFR BLD AUTO: 33.5 % (ref 34–46.6)
HGB BLD-MCNC: 11.1 G/DL (ref 12–15.9)
IMM GRANULOCYTES # BLD AUTO: 0.14 10*3/MM3 (ref 0–0.05)
IMM GRANULOCYTES NFR BLD AUTO: 2.1 % (ref 0–0.5)
LYMPHOCYTES # BLD AUTO: 0.39 10*3/MM3 (ref 0.7–3.1)
LYMPHOCYTES NFR BLD AUTO: 5.7 % (ref 19.6–45.3)
MAGNESIUM SERPL-MCNC: 2 MG/DL (ref 1.8–2.5)
MCH RBC QN AUTO: 28.2 PG (ref 26.6–33)
MCHC RBC AUTO-ENTMCNC: 33.1 G/DL (ref 31.5–35.7)
MCV RBC AUTO: 85 FL (ref 79–97)
MONOCYTES # BLD AUTO: 0.43 10*3/MM3 (ref 0.1–0.9)
MONOCYTES NFR BLD AUTO: 6.3 % (ref 5–12)
NEUTROPHILS NFR BLD AUTO: 5.82 10*3/MM3 (ref 1.7–7)
NEUTROPHILS NFR BLD AUTO: 85.5 % (ref 42.7–76)
NRBC BLD AUTO-RTO: 1 /100 WBC (ref 0–0.2)
PHOSPHATE SERPL-MCNC: 3.3 MG/DL (ref 2.5–4.5)
PLATELET # BLD AUTO: 150 10*3/MM3 (ref 140–450)
PMV BLD AUTO: 10.1 FL (ref 6–12)
POTASSIUM SERPL-SCNC: 4.9 MMOL/L (ref 3.5–4.7)
PROT SERPL-MCNC: 6.4 G/DL (ref 6.3–8)
RBC # BLD AUTO: 3.94 10*6/MM3 (ref 3.77–5.28)
SODIUM SERPL-SCNC: 132 MMOL/L (ref 134–145)
WBC # BLD AUTO: 6.81 10*3/MM3 (ref 3.4–10.8)

## 2021-10-05 PROCEDURE — 25010000002 HEPARIN LOCK FLUSH PER 10 UNITS: Performed by: NURSE PRACTITIONER

## 2021-10-05 PROCEDURE — 84100 ASSAY OF PHOSPHORUS: CPT

## 2021-10-05 PROCEDURE — 86300 IMMUNOASSAY TUMOR CA 15-3: CPT | Performed by: INTERNAL MEDICINE

## 2021-10-05 PROCEDURE — 85025 COMPLETE CBC W/AUTO DIFF WBC: CPT

## 2021-10-05 PROCEDURE — 99215 OFFICE O/P EST HI 40 MIN: CPT | Performed by: INTERNAL MEDICINE

## 2021-10-05 PROCEDURE — 80053 COMPREHEN METABOLIC PANEL: CPT

## 2021-10-05 PROCEDURE — 83735 ASSAY OF MAGNESIUM: CPT

## 2021-10-05 PROCEDURE — 96360 HYDRATION IV INFUSION INIT: CPT

## 2021-10-05 PROCEDURE — 96361 HYDRATE IV INFUSION ADD-ON: CPT

## 2021-10-05 RX ORDER — SODIUM CHLORIDE 9 MG/ML
500 INJECTION, SOLUTION INTRAVENOUS ONCE
Status: COMPLETED | OUTPATIENT
Start: 2021-10-05 | End: 2021-10-05

## 2021-10-05 RX ORDER — SODIUM CHLORIDE 0.9 % (FLUSH) 0.9 %
10 SYRINGE (ML) INJECTION AS NEEDED
OUTPATIENT
Start: 2021-10-05

## 2021-10-05 RX ORDER — HEPARIN SODIUM (PORCINE) LOCK FLUSH IV SOLN 100 UNIT/ML 100 UNIT/ML
500 SOLUTION INTRAVENOUS AS NEEDED
Status: DISCONTINUED | OUTPATIENT
Start: 2021-10-05 | End: 2021-10-05 | Stop reason: HOSPADM

## 2021-10-05 RX ORDER — SODIUM CHLORIDE 0.9 % (FLUSH) 0.9 %
10 SYRINGE (ML) INJECTION AS NEEDED
Status: DISCONTINUED | OUTPATIENT
Start: 2021-10-05 | End: 2021-10-05 | Stop reason: HOSPADM

## 2021-10-05 RX ORDER — SODIUM CHLORIDE 0.9 % (FLUSH) 0.9 %
10 SYRINGE (ML) INJECTION AS NEEDED
Status: CANCELLED | OUTPATIENT
Start: 2021-10-05

## 2021-10-05 RX ORDER — HEPARIN SODIUM (PORCINE) LOCK FLUSH IV SOLN 100 UNIT/ML 100 UNIT/ML
500 SOLUTION INTRAVENOUS AS NEEDED
OUTPATIENT
Start: 2021-10-05

## 2021-10-05 RX ORDER — HEPARIN SODIUM (PORCINE) LOCK FLUSH IV SOLN 100 UNIT/ML 100 UNIT/ML
500 SOLUTION INTRAVENOUS AS NEEDED
Status: CANCELLED | OUTPATIENT
Start: 2021-10-05

## 2021-10-05 RX ADMIN — SODIUM CHLORIDE 500 ML: 9 INJECTION, SOLUTION INTRAVENOUS at 11:55

## 2021-10-05 RX ADMIN — Medication 500 UNITS: at 14:10

## 2021-10-05 RX ADMIN — Medication 500 UNITS: at 10:29

## 2021-10-05 RX ADMIN — SODIUM CHLORIDE 500 ML: 900 INJECTION, SOLUTION INTRAVENOUS at 13:27

## 2021-10-05 RX ADMIN — SODIUM CHLORIDE, PRESERVATIVE FREE 10 ML: 5 INJECTION INTRAVENOUS at 14:10

## 2021-10-05 RX ADMIN — SODIUM CHLORIDE, PRESERVATIVE FREE 10 ML: 5 INJECTION INTRAVENOUS at 10:29

## 2021-10-05 NOTE — PROGRESS NOTES
Subjective    REASONS FOR FOLLOWUP:   1. Metastatic breast cancer to multiple skeletal sites including cervical spine, sternum, lumbar spine and right femur. Previously treated with Abraxane, Kisqali Femara, Halaven, Adriamycin, now Afinitor and Aromasin  2.Iron deficiency anemia du to GI blood loss, Xarelto stopped months ago, Iron initiated, Pepcid along with Aleve for gastric protection.      History of Present Illness   DURING THE VISIT WITH THE PATIENT TODAY , PATIENT HAD FACE MASK, I HAD PROPER PROTECTIVE EQUIPMENT, AND I DID HAND HYGIENE WITH SOAP AND WATER BEFORE AND AFTER THE VISIT.    This patient returns today to the office for followup after she has been seen by nurse practitioner a few days ago. She complained of numbness in the right side of the face, numb chin syndrome as well and we advised the patient to proceed with an MRI of the brain. This documented bone metastasis in the basal area of the cranial anatomy that was triggering alteration in cranial nerves. No brain metastases per se were seen. No typical meningeal carcinomatosis was found. The patient was scheduled to be seen by Radiation Oncology. She has started her 1st treatment yesterday. She was placed on a low-dose dexamethasone 4 mg a day. So far the patient has been doing relatively well. Issues pertinent to her neurological dysfunction are still ongoing but we expect that this will get better. She has an appointment to follow up with her ophthalmologist in a couple of weeks. She has not had any diplopia or paralysis of the eyes. Her appetite is acceptable. Because of modification in her pain regimen, the  narcotic medication does not agree with her and is making her goofy. She is not taking any Aleve because afraid of interactions between Aleve and dexamethasone in regard to gastric irritation. She has not had any nausea or vomiting, neither insomnia. Bowel activity with minimal sluggishness. Urination remains normal. No hematuria. No swelling in her lower extremities. She remains active, walking, moving and taking care of things. On the other hand she believes that work is becoming too complex for her and she would like for us to help her apply for disability.    The patient returned to the office on 10/05/2021 with profound fatigue to the point that she barely was able to walk through the office and be able to drive her car. She has not had too much of an appetite, she has lost substantial amount of weight and she feels just terrible. She has lost taste in the right half of the mouth, she is able to have taste on the left side. She has no difficulty swallowing. There is no difficulty with choking. She has not been hungry and she is not drinking enough. Urinary output has been dropping. She has not had any sores in her mouth. She denies any cough or shortness of breath. Her pain actually is under better control now that she is taking her Aleve. She no longer is taking narcotic medication. Urinary output as stated being sluggish, bowel activity is sluggish because of the lack of food. She remains with numb chin syndrome on the right side. She has not had any obvious headache. No seizure activity.     ·       Past Medical History:   Diagnosis Date   • Abnormal mammogram    • Abnormal menstrual cycle    • Arthritis    • Bone metastasis (HCC)    • Breast cancer (HCC) 2000    Left breast   • Drug therapy 2001    breast cancer   • Drug therapy 2017    right femur/associated with breast cancer   • DVT (deep venous thrombosis) (HCC)    • H/O Heart murmur    • History of colon polyps    • Hx of radiation therapy 2000     breast cancer   • Hx of radiation therapy 2015    bone cancer right femur/ associated with breast cancer   • Hypercholesterolemia    • Hyperlipidemia    • Hypertension    • Multiple benign polyps of large intestine    • Reflux esophagitis    • Shingles      Past Surgical History:   Procedure Laterality Date   • BREAST BIOPSY Left 2000    breast cancer   • BREAST SURGERY  2000    lumpectomy, mastectomy, revision   • COLONOSCOPY  2012   • FEMUR FRACTURE SURGERY      secondary to metastatic breast cancer 7/2014, with revision in 9/2015   • MASTECTOMY Left 2000    transflap in 2003     Family History   Problem Relation Age of Onset   • Hypertension Mother    • Diabetes Mother    • Macular degeneration Mother    • Thyroid disease Mother    • Heart disease Father    • Stroke Father    • Kidney disease Father    • Glaucoma Brother    • Diabetes Brother    • Hypertension Brother    • Colon cancer Paternal Grandmother    • Thyroid disease Other    • Kidney disease Other    • Glaucoma Other    • Cancer Other    • Diabetes Other      Social History     Socioeconomic History   • Marital status: Single     Spouse name: Not on file   • Number of children: Not on file   • Years of education: Not on file   • Highest education level: Not on file   Tobacco Use   • Smoking status: Never Smoker   • Smokeless tobacco: Never Used   Substance and Sexual Activity   • Alcohol use: No   • Drug use: No   • Sexual activity: Defer     Current Outpatient Medications on File Prior to Visit   Medication Sig Dispense Refill   • Cyanocobalamin (B-12 PO) Take 1,000 mcg by mouth 2 (Two) Times a Week.     • denosumab (XGEVA) 120 MG/1.7ML solution injection Inject  under the skin 1 (one) time.     • dexamethasone (DECADRON) 4 MG tablet Take 1 tablet by mouth Daily. 30 tablet 0   • dexamethasone 0.5 MG/5ML solution Take 5 mL by mouth 4 (Four) Times a Day. Swish for 2 minutes 4 times per day, do not eat or drink for 1 hour after. 500 mL 3   • Diclofenac  "Sodium (VOLTAREN) 1 % gel gel APPLY 2 TO 4 GRAMS TOPICALLY TWICE DAILY TO AFFECTED AREA 100 g 5   • HYDROcodone-acetaminophen (NORCO) 5-325 MG per tablet Take 1 tablet by mouth Every 6 (Six) Hours As Needed for Moderate Pain . 60 tablet 0   • lisinopril-hydrochlorothiazide (PRINZIDE,ZESTORETIC) 10-12.5 MG per tablet Take 1 tablet by mouth twice daily 180 tablet 3   • magnesium chloride ER (Mag64) 64 MG DR tablet Take 1 tablet by mouth 2 (two) times a day. 180 tablet 3   • Multiple Vitamins-Minerals (OCUVITE ADULT FORMULA PO) Take 1 tablet by mouth Daily.     • Naproxen Sod-Diphenhydramine (ALEVE PM PO) Take  by mouth as needed.     • omeprazole (priLOSEC) 40 MG capsule Take 1 capsule by mouth Daily. 30 capsule 3   • ondansetron (ZOFRAN) 8 MG tablet Take 1 tablet by mouth 3 (Three) Times a Day As Needed for Nausea or Vomiting. 30 tablet 5   • Prenatal MV-Min-Fe Fum-FA-DHA (PRENATAL 1 PO) Take 1 tablet by mouth Daily.     • triamcinolone (KENALOG) 0.025 % cream Apply 0.25 application topically to the appropriate area as directed 2 (Two) Times a Day.       Current Facility-Administered Medications on File Prior to Visit   Medication Dose Route Frequency Provider Last Rate Last Admin   • alteplase (CATHFLO/ACTIVASE) injection 2 mg  2 mg Intravenous Once Nader Saez MD         Allergies   Allergen Reactions   • Codeine Unknown - Low Severity   • Docetaxel Unknown - Low Severity   • Paclitaxel Unknown - Low Severity     Objective      Vitals:    10/05/21 1114   BP: 97/67   Pulse: 105   Resp: 20   Temp: 97.3 °F (36.3 °C)   SpO2: 97%   Weight: 82.4 kg (181 lb 11.2 oz)   Height: 162.6 cm (64\")   PainSc:   3     Current Status 9/2/2021   ECOG score 2     EXAM    I HAVE PERSONALLY REVIEWED THE HISTORY OF THE PRESENT ILLNESS, PAST MEDICAL HISTORY, FAMILY HISTORY, SOCIAL HISTORY, ALLERGIES, MEDICATIONS STATED ABOVE IN THE  NOTE FROM TODAY.        GENERAL:  Well-developed, well-nourished  Patient  in  acute distress. " PROFOUND WEAKNESS  SKIN:  Warm, dry ,NO rashes,NO purpura ,NO petechiae.  HEENT:  Pupils were equal and reactive to light and accomodation, conjunctivae noninjected, no pterygium, normal extraocular movements, normal visual acuity.   NECK:  Supple with good range of motion; no thyromegaly , no other masses, no JVD or bruits, no cervical adenopathies.No carotid artery pain, no carotid abnormal pulsation , NO arterial dance.  LYMPHATICS:  No cervical, NO supraclavicular, NO axillary,NO epitrochlear , NO inguinal adenopathy.  CARDIAC   normal rate and regular rhythm, with SYSTOLIC G 2/6 AORTIC murmur,NO rubs NO S3 NO S4 right or left .  LUNGS: normal breath sounds bilateral, no wheezing, rhonchi, crackles or rubs.  VASCULAR VENOUS: no cyanosis, collateral circulation, varicosities, edema, palpable cords, pain, erythema.  ABDOMEN:  Soft, nontender with no hepatomegaly, no splenomegaly,no masses, no ascites, no collateral circulation,no distention,no Randall sign.  EXTREMITIES  AND SPINE:  No clubbing, cyanosis or edema, no deformities , no pain .No kyphosis, scoliosis, no other deformities, no pain in spine, no pain in ribs , no pain inpelvic bone.  NEUROLOGICAL:  Patient was awake, alert, oriented to time, person and place.          RESULTS REVIEWED:  Results from last 7 days   Lab Units 10/05/21  1030   WBC 10*3/mm3 6.81   NEUTROS ABS 10*3/mm3 5.82   HEMOGLOBIN g/dL 11.1*   HEMATOCRIT % 33.5*   PLATELETS 10*3/mm3 150                 Assessment/Plan    1. Metastatic left breast cancer to multiple skeletal sites including cervical spine, sternum, lumbar spine and right femur.  · Previously treated with Abraxane Xgeva  · Documented radiographic progression by bone scan 3/15/2017, treated with Femara Kisqali, Xgeva every 3 months  · Progression of bony disease June 2020.  Femara Kisqali discontinued.  [facility]  · Palliative skeletal radiation to the right shoulder and pelvis September 2020  · Treatment holiday  from Halaven 11/4/2020 due to progressive fatigue  · CA 15-3 has been declining while on Halaven, most recently 50.9.   · 12/2/2020, CA 15-3 55.8, increased to 69.1 2/22/2021  · Bone scan 10/28/2020 with increased uptake in the right scapula, no other significant change  · CT scans 12/28/2020 with extensive bony disease, new hypodense left hepatic lesion in the liver  · Radiation to the hip complete 1/26/2021  · Single agent Adriamycin initiated 2/5/2021 to be given 3 to 4 weeks  · CT scans and bone scan 4/6/2021 where multiple hepatic lesions developing consistent with hepatic metastasis.  Diffuse bone lesions without change  · Plans for liver biopsy to further evaluate Caris Target   · CT guided biopsy 4/14/2021.  Pathology consistent with metastatic adenocarcinoma of the breast  · Caris Target documents ER positive, AL positive, HER-2 negative.  MSI stable, low mutation burden.  Androgen receptor positivity and no actionable mutations otherwise.  · Transition to Aromasin 25 mg daily along with Afinitor 5 mg daily.  Afinitor initiated 6/8/2021  · At the initiation of Aromasin and Afinitor, baseline CA 15-3 6/4/2021 87.9.  CA 15-3 decreased to 75 7/1/2021.  · Review of ophthalmology notes.  The patient was noted to have a lesion in the right eye, questionable metastatic deposit.  However, this has responded to Afinitor.  She will continue to follow-up with ophthalmology every 3 months  · Continue Aromasin 25 mg and Afinitor 5 mg daily  The patient was reviewed on 08/26/2021. In spite of [facility], the patient’s clinical status looks relatively stable. She has not developed any new sites of bone pain and the pain intensity in the right femur is no different than before requiring pain medicine once or twice a day. She is not requiring any narcotic medication. I advised her to continue her medicines, Afinitor and Aromasin at the same dosing and I advised her to proceed with radiological  assessment including a bone scan and a CT scan of the abdomen and pelvis. This will be performed in 2 weeks and we will review her back in 3 weeks.  The patient was reviewed on 09/18/2021 and the radiological analysis of the CT scan of the abdomen shows obvious progression of her liver metastasis. The CT scan of the chest disclosed no pulmonary metastasis. The MRI of the head shows obvious basal cranial metastasis. This explains her neurological picture. The bone scan difficult to observe still shows significant progressive bone metastasis. In other words her metastatic malignancy mostly to the bones and now into the liver is progressive and I advised her to discontinue her Aromasin and her everolimus. The patient will continue with her radiation therapy to the bone and cranium and this will take a total of 10 sessions. I would like to review her back in a couple of weeks in order to decide how to proceed thereafter. The patient has taken multiple chemotherapy medicines before including taxane, Adriamycin, Xeloda, all sorts of hormonal therapies including Kisqali, Aromasin, Femara and now Aromasin along with everolimus. She has failed all these regimens but remind ourselves that she has been treated for metastatic bone cancer from the breast a total of 10 years. This is already an achievement.     I raised the question to the research team if the patient could still qualify for a clinical trial given the mutations found in her liver biopsy in 04/2021. They will give me the answer at some point in the next few days. They will see the qualifiers and the unqualifiers from the clinical study.     The patient never has received Gemzar or Navelbine. Those will be the next medicines in her treatment. She already has received Halaven as well.     I pointed out all these things to the patient.  The patient was further reviewed on 10/05/2021. She has profound prostration today, she is hypotensive, she has no fever, she has  not been eating too much, she has lost a substantial amount of weight and she is probably volume contracted. On top of that she has continued her blood pressure medication utilization.     Given the present circumstances I took her to the infusion area and she received a total of 1500 cc of IV saline solution. Upon completion of the first 1000 cc the patient had a remarkable come back. Her blood pressure went to 112/76, her pulse was 80 and she felt substantially better.     At this point I pointed out to the patient that her blood pressure was very low, that her kidneys were far behind, that her kidney function has bumped up creatinine to 1.81 and I asked her to discontinue her blood pressure medication hydrochlorothiazide along with ACE inhibitor lisinopril.     I asked her to have a Powerade drink at home and we gave her the recipe to have a homemade version of this and that way she does not need to go back and buy anything at this point under the present circumstances. She would like to come back and see nurse practitioner next week. If everything is okay she will proceed with Xgeva at that time. Also we discontinued her Lortab and discontinued her liquid rinse of dexamethasone.     Therefore we will see how things evolve from this point of view in regard to her blood pressure. I asked her to continue eating a diet that is soft to her palate, that has some taste to it and do frequent meals.     The patient will require to be seen by nurse practitioner next week.         ·   2.  Bony metastasis  · Now receiving Xgeva every 3 months, last given 6/4/2021.  She will receive Xgeva at scheduled follow-up with Dr. Saez in August  As stated above the bone pain is not changing too much in intensity. This will be watched. She will continue the same dosing of medicines and she will proceed with her Xgeva today, 08/26/2021. Next dose on November 26th.  The patient will require the continuation of Xgeva once a month from  now on.  On 10/05/47534 we held the Xgeva until she improves from the overall condition and see how she does in a week or so.         ·   3.  Anemia, multifactorial  · Previously with iron deficiency due to GI blood loss.  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  The patient is symptomatic with fatigue and dyspnea.  I suspect there is an element of hemoconcentration as well as she is clinically dehydrated.  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 She was transfused a few weeks ago and her hemoglobin is 9.6 today. There is nothing else I can do to stimulate production of red cells at this point. This will be watched and I would not be surprised if she needs to be transfused again in a few weeks. She has no notion of blood loss. We have checked ferritin, iron, TIBC, B12 and folic acid levels, all of then normal.  I think a lot of her anemia is myelophthisis more than effect of chemotherapy. She has been transfused. The hemoglobin today is stable and she has no symptomatology pertinent to the anemia.  On 10/05/2021 the patient got transfused the week before, the hemoglobin up to 11 and again we believe that this is a myelophthisic process associated with extensive bone marrow replacement by metastatic breast cancer.        ·   4.  Hypomagnesemia  · Continuing on magnesium replacement twice daily  She will continue her oral magnesium supplementation.  She remains on magnesium supplementation.  On 10/05/2021 we put her magnesium supplement on hold to minimize diarrhea associated with her volume contraction.         ·   5.  Venous access  · The patient does have a right chest Mediport which  we will continue to flush monthly.  Activase currently instilled  Her port remains functional and it has been flushed and maintained appropriately.  Her port has been maintained and flushed.    Her port remains functional and we will continue utilizing for any needs at anytime.       ·   6.  Cancer related pain  · Hydrocodone/acetaminophen 5/325 every 6 hours as needed.  Patient does not like taking narcotics.  She does utilize Voltaren cream and Aleve which provides good control of her pain.  Her pain medicine, anti-inflammatory and naproxen will remain ongoing for the time being. We need to be sure that she is not going to  toxicity from the point of view kidney dysfunction or GI toxicity. She remains on proton pump inhibitor for gastric protection.  The patient does not get along with narcotic medication. On 09/17/2021, I advised her to use the Lortab as the last resource and I advised her to go back into the Aleve that is her best friend in regard to pain control. The patient is taking proton pump inhibitor. She is taking a single dose of dexamethasone 4 mg a day and I think she will be able to handle the combination of dexamethasone that will be a transitory medicine for 2 weeks and the Aleve. Hopefully in 2 weeks we will be able to discontinue the dexamethasone altogether.     She will continue utilizing the Voltaren topical medicine that is of great benefit.    On 10/05/2021 the patient continues Aleve to control her pain much better than narcotic medication and topical medication including Voltaren cream.       ·   7.  Muscle cramping  · With the patient's borderline hypotension and tachycardia I suspect this is related to dehydration.  The patient was encouraged to increase her oral intake.   · Creatinine reviewed elevated at 1.4 which confirms dehydration.  The patient will increase oral intake  This issue is much better when she drinks more liquids.  She has not had any muscle cramping since she  has been on magnesium supplementation.  This is not an issue anymore on 10/05/2021.      8.The patient has gotten to the point that she is barely able to get around on her own anymore given the aches and pains, the somnolence associated with narcotic use, the multiple testing requiring the back and forth to radiation therapy. I have advised her to be seen by our  today and this has happened. The patient will apply for disability at this time. She is completely and permanently disabled for any activity. This is my personal opinion based on her diagnosis and her overall prognosis.      The patient received 1500 cc of saline IV on 10/05/2021. She was advised to go home to drink more Gatorade homemade and we advised her to return in a week to be seen by nurse practitioner. Theoretically the patient should be advised in regard continuation of chemotherapy in a palliative setting. She never has had Gemzar and that will be the next recommendation for her to receive every 2 weeks. If the patient is able to take this or not remains to be seen.    10. The patient has been advised by our  to proceed with disability and Social Security Administration issues. She was instructed by Ema Sanders in regard to all of these facts a few days ago. Note has been reviewed available in Epic.  Now that the patient is feeling better we will proceed with this.     11. We have advised our research nurses to look for a clinical trial. The patient theoretically could be amenable to receive this. She will need to travel to [place], [place]. She was so weak and puny today that I did not want for her to have another issue in her mind in regard to this other possibility about therapy. I think for now we just need to help her with disability, help her with the pain control, be sure the volume status is normal, be sure that blood pressure is back up, be sure that she remains functional and we will decide how to proceed  thereafter.               Nader Saez MD  10/05/2021

## 2021-10-05 NOTE — NURSING NOTE
Pt to receive NS 1500ml total today IV.  Dr. Saez at bedside to paty. He wants her to stop her BP meds and pain meds until she is seen next week. He encouraged increased hydration tomorrow with gatorade and he wants a clinic RN to call the pt tomorrow at home to f/u with how she is doing.  Pt has a copy of the meds that he wants her to hold for now and she v/u of increased fluid intake.  Message sent to oncology nurse pool requesting they call her for f/u tomorrow.

## 2021-10-06 ENCOUNTER — TELEPHONE (OUTPATIENT)
Dept: ONCOLOGY | Facility: CLINIC | Age: 66
End: 2021-10-06

## 2021-10-06 NOTE — TELEPHONE ENCOUNTER
Called patient to see how she is feeling today per Dr. Saez' request. Patient stated she still feels a lack of energy, but does feel better after receiving fluids in the office yesterday. Patient stated she is drinking plenty of gatorade and water, and she is not taking her BP meds. Patient is taking Aleve to help with pain, but denies taking any hydrocodone. Patient verbalized that she will continue to follow Dr. Saez' recommendations. Patient to follow up in office next week with NP, will ask scheduling to coordinate appointment with patient.

## 2021-10-13 ENCOUNTER — APPOINTMENT (OUTPATIENT)
Dept: ONCOLOGY | Facility: HOSPITAL | Age: 66
End: 2021-10-13

## 2021-10-13 ENCOUNTER — OFFICE VISIT (OUTPATIENT)
Dept: ONCOLOGY | Facility: CLINIC | Age: 66
End: 2021-10-13

## 2021-10-13 ENCOUNTER — LAB (OUTPATIENT)
Dept: LAB | Facility: HOSPITAL | Age: 66
End: 2021-10-13

## 2021-10-13 ENCOUNTER — DOCUMENTATION (OUTPATIENT)
Dept: PHARMACY | Facility: HOSPITAL | Age: 66
End: 2021-10-13

## 2021-10-13 ENCOUNTER — HOSPITAL ENCOUNTER (OUTPATIENT)
Dept: CARDIOLOGY | Facility: HOSPITAL | Age: 66
Discharge: HOME | End: 2021-10-13
Admitting: NURSE PRACTITIONER

## 2021-10-13 VITALS
OXYGEN SATURATION: 97 % | HEART RATE: 106 BPM | DIASTOLIC BLOOD PRESSURE: 81 MMHG | HEIGHT: 64 IN | WEIGHT: 193.9 LBS | SYSTOLIC BLOOD PRESSURE: 127 MMHG | BODY MASS INDEX: 33.1 KG/M2 | RESPIRATION RATE: 18 BRPM | TEMPERATURE: 97.3 F

## 2021-10-13 DIAGNOSIS — Z45.2 FITTING AND ADJUSTMENT OF VASCULAR CATHETER: ICD-10-CM

## 2021-10-13 DIAGNOSIS — D63.0 ANEMIA IN NEOPLASTIC DISEASE: ICD-10-CM

## 2021-10-13 DIAGNOSIS — D70.1 LEUKOPENIA DUE TO ANTINEOPLASTIC CHEMOTHERAPY (HCC): ICD-10-CM

## 2021-10-13 DIAGNOSIS — C50.919 METASTATIC BREAST CANCER: ICD-10-CM

## 2021-10-13 DIAGNOSIS — I82.431 ACUTE DEEP VEIN THROMBOSIS (DVT) OF RIGHT POPLITEAL VEIN (HCC): ICD-10-CM

## 2021-10-13 DIAGNOSIS — C50.912 PRIMARY MALIGNANT NEOPLASM OF LEFT BREAST (HCC): ICD-10-CM

## 2021-10-13 DIAGNOSIS — C79.51 BONE METASTASIS: ICD-10-CM

## 2021-10-13 DIAGNOSIS — R01.1 HEART MURMUR, SYSTOLIC: ICD-10-CM

## 2021-10-13 DIAGNOSIS — T45.1X5A LEUKOPENIA DUE TO ANTINEOPLASTIC CHEMOTHERAPY (HCC): ICD-10-CM

## 2021-10-13 DIAGNOSIS — M79.89 RIGHT LEG SWELLING: Primary | ICD-10-CM

## 2021-10-13 DIAGNOSIS — C78.7 LIVER METASTASIS: ICD-10-CM

## 2021-10-13 LAB
ALBUMIN SERPL-MCNC: 3.4 G/DL (ref 3.5–5.2)
ALBUMIN/GLOB SERPL: 1.4 G/DL (ref 1.1–2.4)
ALP SERPL-CCNC: 318 U/L (ref 38–116)
ALT SERPL W P-5'-P-CCNC: 97 U/L (ref 0–33)
ANION GAP SERPL CALCULATED.3IONS-SCNC: 12.8 MMOL/L (ref 5–15)
AST SERPL-CCNC: 69 U/L (ref 0–32)
BASOPHILS # BLD AUTO: 0.02 10*3/MM3 (ref 0–0.2)
BASOPHILS NFR BLD AUTO: 0.4 % (ref 0–1.5)
BH CV LOW VAS RIGHT POSTERIOR TIBIAL VESSEL: 1
BH CV LOWER VASCULAR LEFT COMMON FEMORAL AUGMENT: NORMAL
BH CV LOWER VASCULAR LEFT COMMON FEMORAL COMPETENT: NORMAL
BH CV LOWER VASCULAR LEFT COMMON FEMORAL COMPRESS: NORMAL
BH CV LOWER VASCULAR LEFT COMMON FEMORAL PHASIC: NORMAL
BH CV LOWER VASCULAR LEFT COMMON FEMORAL SPONT: NORMAL
BH CV LOWER VASCULAR RIGHT COMMON FEMORAL AUGMENT: NORMAL
BH CV LOWER VASCULAR RIGHT COMMON FEMORAL COMPETENT: NORMAL
BH CV LOWER VASCULAR RIGHT COMMON FEMORAL COMPRESS: NORMAL
BH CV LOWER VASCULAR RIGHT COMMON FEMORAL PHASIC: NORMAL
BH CV LOWER VASCULAR RIGHT COMMON FEMORAL SPONT: NORMAL
BH CV LOWER VASCULAR RIGHT DISTAL FEMORAL COMPRESS: NORMAL
BH CV LOWER VASCULAR RIGHT GASTRONEMIUS COMPRESS: NORMAL
BH CV LOWER VASCULAR RIGHT GREATER SAPH AK COMPRESS: NORMAL
BH CV LOWER VASCULAR RIGHT GREATER SAPH BK COMPRESS: NORMAL
BH CV LOWER VASCULAR RIGHT LESSER SAPH COMPRESS: NORMAL
BH CV LOWER VASCULAR RIGHT MID FEMORAL AUGMENT: NORMAL
BH CV LOWER VASCULAR RIGHT MID FEMORAL COMPETENT: NORMAL
BH CV LOWER VASCULAR RIGHT MID FEMORAL COMPRESS: NORMAL
BH CV LOWER VASCULAR RIGHT MID FEMORAL PHASIC: NORMAL
BH CV LOWER VASCULAR RIGHT MID FEMORAL SPONT: NORMAL
BH CV LOWER VASCULAR RIGHT PERONEAL COMPRESS: NORMAL
BH CV LOWER VASCULAR RIGHT POPLITEAL AUGMENT: NORMAL
BH CV LOWER VASCULAR RIGHT POPLITEAL COMPETENT: NORMAL
BH CV LOWER VASCULAR RIGHT POPLITEAL COMPRESS: NORMAL
BH CV LOWER VASCULAR RIGHT POPLITEAL PHASIC: NORMAL
BH CV LOWER VASCULAR RIGHT POPLITEAL SPONT: NORMAL
BH CV LOWER VASCULAR RIGHT POSTERIOR TIBIAL COMPRESS: NORMAL
BH CV LOWER VASCULAR RIGHT POSTERIOR TIBIAL THROMBUS: NORMAL
BH CV LOWER VASCULAR RIGHT PROFUNDA FEMORAL COMPRESS: NORMAL
BH CV LOWER VASCULAR RIGHT PROXIMAL FEMORAL COMPRESS: NORMAL
BH CV LOWER VASCULAR RIGHT SAPHENOFEMORAL JUNCTION COMPRESS: NORMAL
BILIRUB SERPL-MCNC: 0.5 MG/DL (ref 0.2–1.2)
BUN SERPL-MCNC: 42 MG/DL (ref 6–20)
BUN/CREAT SERPL: 25.6 (ref 7.3–30)
CALCIUM SPEC-SCNC: 9.4 MG/DL (ref 8.5–10.2)
CHLORIDE SERPL-SCNC: 100 MMOL/L (ref 98–107)
CO2 SERPL-SCNC: 19.2 MMOL/L (ref 22–29)
CREAT SERPL-MCNC: 1.64 MG/DL (ref 0.6–1.1)
DEPRECATED RDW RBC AUTO: 57.7 FL (ref 37–54)
EOSINOPHIL # BLD AUTO: 0.06 10*3/MM3 (ref 0–0.4)
EOSINOPHIL NFR BLD AUTO: 1.1 % (ref 0.3–6.2)
ERYTHROCYTE [DISTWIDTH] IN BLOOD BY AUTOMATED COUNT: 20.3 % (ref 12.3–15.4)
GFR SERPL CREATININE-BSD FRML MDRD: 31 ML/MIN/1.73
GLOBULIN UR ELPH-MCNC: 2.4 GM/DL (ref 1.8–3.5)
GLUCOSE SERPL-MCNC: 142 MG/DL (ref 74–124)
HCT VFR BLD AUTO: 26.7 % (ref 34–46.6)
HGB BLD-MCNC: 8.9 G/DL (ref 12–15.9)
IMM GRANULOCYTES # BLD AUTO: 0.2 10*3/MM3 (ref 0–0.05)
IMM GRANULOCYTES NFR BLD AUTO: 3.6 % (ref 0–0.5)
LYMPHOCYTES # BLD AUTO: 0.2 10*3/MM3 (ref 0.7–3.1)
LYMPHOCYTES NFR BLD AUTO: 3.6 % (ref 19.6–45.3)
MAGNESIUM SERPL-MCNC: 1.5 MG/DL (ref 1.8–2.5)
MCH RBC QN AUTO: 28.9 PG (ref 26.6–33)
MCHC RBC AUTO-ENTMCNC: 33.3 G/DL (ref 31.5–35.7)
MCV RBC AUTO: 86.7 FL (ref 79–97)
MONOCYTES # BLD AUTO: 0.25 10*3/MM3 (ref 0.1–0.9)
MONOCYTES NFR BLD AUTO: 4.6 % (ref 5–12)
NEUTROPHILS NFR BLD AUTO: 4.76 10*3/MM3 (ref 1.7–7)
NEUTROPHILS NFR BLD AUTO: 86.7 % (ref 42.7–76)
NRBC BLD AUTO-RTO: 3.1 /100 WBC (ref 0–0.2)
PHOSPHATE SERPL-MCNC: 3.8 MG/DL (ref 2.5–4.5)
PLATELET # BLD AUTO: 98 10*3/MM3 (ref 140–450)
PMV BLD AUTO: 10 FL (ref 6–12)
POTASSIUM SERPL-SCNC: 4.1 MMOL/L (ref 3.5–4.7)
PROT SERPL-MCNC: 5.8 G/DL (ref 6.3–8)
RBC # BLD AUTO: 3.08 10*6/MM3 (ref 3.77–5.28)
SODIUM SERPL-SCNC: 132 MMOL/L (ref 134–145)
WBC # BLD AUTO: 5.49 10*3/MM3 (ref 3.4–10.8)

## 2021-10-13 PROCEDURE — 80053 COMPREHEN METABOLIC PANEL: CPT

## 2021-10-13 PROCEDURE — 84100 ASSAY OF PHOSPHORUS: CPT

## 2021-10-13 PROCEDURE — 85025 COMPLETE CBC W/AUTO DIFF WBC: CPT

## 2021-10-13 PROCEDURE — 83735 ASSAY OF MAGNESIUM: CPT

## 2021-10-13 PROCEDURE — 99215 OFFICE O/P EST HI 40 MIN: CPT | Performed by: NURSE PRACTITIONER

## 2021-10-13 PROCEDURE — 93971 EXTREMITY STUDY: CPT

## 2021-10-13 PROCEDURE — 36415 COLL VENOUS BLD VENIPUNCTURE: CPT

## 2021-10-13 NOTE — PROGRESS NOTES
Site of Appt/Pickup: (n) [place]; (y) [place]; (n) [place];    Prescriber (kinza) contacted pharmacy to obtain the medication below.    NDC:  09669-660-62 & 10010-159-66  Drug name: xarelto  Strength: 15 mg & 20 mg  Total Quantity:  35 (Containers- 5 X Units per Containers- 7)  Lot#:  [id number] (15 mg) & [id number] (20 mg)  Expiration: 2/22 ( 15 mg ) & 2/23 ( 20 mg )    Prescriber or staff member who picked up medication Hamida Sabillon

## 2021-10-13 NOTE — PROGRESS NOTES
Subjective    REASONS FOR FOLLOWUP:   1. Metastatic breast cancer to multiple skeletal sites including cervical spine, sternum, lumbar spine and right femur. Previously treated with Abraxane, Kisqali Femara, Halaven, Adriamycin, now Afinitor and Aromasin  2.  Iron deficiency anemia due to GI blood loss, Xarelto stopped months ago, Iron initiated, Pepcid along with Aleve for gastric protection.  3.        History of Present Illness   The patient is a 66 y.o. female with the above-mentioned history who is here today for lab review and follow-up.  She comes in today reporting that she feels lightheaded and weak every day.  She does continue to feel short of breath which is not changed.  She also states that she feels exhausted all the time.  New this week, is right lower extremity swelling which started on Sunday.  She does states she has some pain in her right calf, which has been present for a while.  She has been using Voltaren cream on this area.  She does report some bruising on her legs, but denies bleeding issues.  No fevers or chills.    This past week, she has been trying to eat and drink regularly, as prior to that she states that she simply had forgotten.      Past Medical History:   Diagnosis Date   • Abnormal mammogram    • Abnormal menstrual cycle    • Arthritis    • Bone metastasis (HCC)    • Breast cancer (HCC) 2000    Left breast   • Drug therapy 2001    breast cancer   • Drug therapy 2017    right femur/associated with breast cancer   • DVT (deep venous thrombosis) (HCC)    • H/O Heart murmur    • History of colon polyps    • Hx of radiation therapy 2000    breast cancer   • Hx of radiation therapy 2015    bone cancer right femur/ associated with breast cancer   • Hypercholesterolemia    • Hyperlipidemia    • Hypertension    • Multiple benign polyps of large intestine    • Reflux esophagitis    • Shingles      Past Surgical History:   Procedure Laterality Date   • BREAST  BIOPSY Left 2000    breast cancer   • BREAST SURGERY  2000    lumpectomy, mastectomy, revision   • COLONOSCOPY  2012   • FEMUR FRACTURE SURGERY      secondary to metastatic breast cancer 7/2014, with revision in 9/2015   • MASTECTOMY Left 2000    transflap in 2003     Family History   Problem Relation Age of Onset   • Hypertension Mother    • Diabetes Mother    • Macular degeneration Mother    • Thyroid disease Mother    • Heart disease Father    • Stroke Father    • Kidney disease Father    • Glaucoma Brother    • Diabetes Brother    • Hypertension Brother    • Colon cancer Paternal Grandmother    • Thyroid disease Other    • Kidney disease Other    • Glaucoma Other    • Cancer Other    • Diabetes Other      Social History     Socioeconomic History   • Marital status: Single   Tobacco Use   • Smoking status: Never Smoker   • Smokeless tobacco: Never Used   Substance and Sexual Activity   • Alcohol use: No   • Drug use: No   • Sexual activity: Defer     Current Outpatient Medications on File Prior to Visit   Medication Sig Dispense Refill   • Cyanocobalamin (B-12 PO) Take 1,000 mcg by mouth 2 (Two) Times a Week.     • denosumab (XGEVA) 120 MG/1.7ML solution injection Inject  under the skin 1 (one) time.     • dexamethasone (DECADRON) 4 MG tablet Take 1 tablet by mouth Daily. 30 tablet 0   • dexamethasone 0.5 MG/5ML solution Take 5 mL by mouth 4 (Four) Times a Day. Swish for 2 minutes 4 times per day, do not eat or drink for 1 hour after. 500 mL 3   • Diclofenac Sodium (VOLTAREN) 1 % gel gel APPLY 2 TO 4 GRAMS TOPICALLY TWICE DAILY TO AFFECTED AREA 100 g 5   • HYDROcodone-acetaminophen (NORCO) 5-325 MG per tablet Take 1 tablet by mouth Every 6 (Six) Hours As Needed for Moderate Pain . 60 tablet 0   • magnesium chloride ER (Mag64) 64 MG DR tablet Take 1 tablet by mouth 2 (two) times a day. 180 tablet 3   • Multiple Vitamins-Minerals (OCUVITE ADULT FORMULA PO) Take 1 tablet by mouth Daily.     • Naproxen  "Sod-Diphenhydramine (ALEVE PM PO) Take  by mouth as needed.     • omeprazole (priLOSEC) 40 MG capsule Take 1 capsule by mouth Daily. 30 capsule 3   • ondansetron (ZOFRAN) 8 MG tablet Take 1 tablet by mouth 3 (Three) Times a Day As Needed for Nausea or Vomiting. 30 tablet 5   • Prenatal MV-Min-Fe Fum-FA-DHA (PRENATAL 1 PO) Take 1 tablet by mouth Daily.     • triamcinolone (KENALOG) 0.025 % cream Apply 0.25 application topically to the appropriate area as directed 2 (Two) Times a Day.       Current Facility-Administered Medications on File Prior to Visit   Medication Dose Route Frequency Provider Last Rate Last Admin   • alteplase (CATHFLO/ACTIVASE) injection 2 mg  2 mg Intravenous Once Nader Saez MD         Allergies   Allergen Reactions   • Codeine Unknown - Low Severity   • Docetaxel Unknown - Low Severity   • Paclitaxel Unknown - Low Severity     Objective      Vitals:    10/13/21 1140   BP: 127/81   Pulse: 106   Resp: 18   Temp: 97.3 °F (36.3 °C)   TempSrc: Temporal   SpO2: 97%   Weight: 88 kg (193 lb 14.4 oz)   Height: 162.6 cm (64.02\")   PainSc:   3   PainLoc: Ankle  Comment: pt stated swollen ankle, foot and leg right side     Current Status 10/13/2021   ECOG score 0     Physical Exam  Constitutional:       General: She is not in acute distress.     Appearance: She is not ill-appearing.   HENT:      Head: Normocephalic.      Nose: Nose normal. No congestion.      Mouth/Throat:      Mouth: Mucous membranes are moist.      Pharynx: Oropharynx is clear. No oropharyngeal exudate.   Eyes:      Extraocular Movements: Extraocular movements intact.      Conjunctiva/sclera: Conjunctivae normal.      Pupils: Pupils are equal, round, and reactive to light.   Cardiovascular:      Rate and Rhythm: Normal rate and regular rhythm.      Heart sounds: Normal heart sounds. No murmur heard.   No gallop.    Pulmonary:      Effort: Pulmonary effort is normal. No respiratory distress.      Breath sounds: Normal breath sounds. " No wheezing or rales.   Abdominal:      General: Abdomen is flat. Bowel sounds are normal. There is no distension.      Tenderness: There is no abdominal tenderness.   Musculoskeletal:         General: Normal range of motion.      Cervical back: Normal range of motion and neck supple.   Lymphadenopathy:      Cervical: No cervical adenopathy.   Skin:     General: Skin is warm and dry.      Findings: No rash.   Neurological:      Mental Status: She is alert and oriented to person, place, and time.      Motor: Weakness present.   Psychiatric:         Behavior: Behavior normal.           RESULTS REVIEWED:  Results from last 7 days   Lab Units 10/13/21  1130   WBC 10*3/mm3 5.49   NEUTROS ABS 10*3/mm3 4.76   HEMOGLOBIN g/dL 8.9*   HEMATOCRIT % 26.7*   PLATELETS 10*3/mm3 98*     Results from last 7 days   Lab Units 10/13/21  1130   SODIUM mmol/L 132*   POTASSIUM mmol/L 4.1   CHLORIDE mmol/L 100   CO2 mmol/L 19.2*   BUN mg/dL 42*   CREATININE mg/dL 1.64*   CALCIUM mg/dL 9.4   ALBUMIN g/dL 3.40*   BILIRUBIN mg/dL 0.5   ALK PHOS U/L 318*   ALT (SGPT) U/L 97*   AST (SGOT) U/L 69*   GLUCOSE mg/dL 142*   MAGNESIUM mg/dL 1.5*           Assessment/Plan    1. Metastatic left breast cancer to multiple skeletal sites including cervical spine, sternum, lumbar spine and right femur.  · Previously treated with Abraxane Xgeva  · Documented radiographic progression by bone scan 3/15/2017, treated with Femara Kisqali, Xgeva every 3 months  · Progression of bony disease June 2020.  Femara Kisqali discontinued.  Transition to Halaven  · Palliative skeletal radiation to the right shoulder and pelvis September 2020  · Treatment holiday from Halaven 11/4/2020 due to progressive fatigue  · CA 15-3 has been declining while on Halaven, most recently 50.9.   · 12/2/2020, CA 15-3 55.8, increased to 69.1 2/22/2021  · Bone scan 10/28/2020 with increased uptake in the right scapula, no other significant change  · CT scans 12/28/2020 with extensive bony  disease, new hypodense left hepatic lesion in the liver  · Radiation to the hip complete 1/26/2021  · Single agent Adriamycin initiated 2/5/2021 to be given 3 to 4 weeks  · CT scans and bone scan 4/6/2021 where multiple hepatic lesions developing consistent with hepatic metastasis.  Diffuse bone lesions without change  · Plans for liver biopsy to further evaluate Caris Target   · CT guided biopsy 4/14/2021.  Pathology consistent with metastatic adenocarcinoma of the breast  · Caris Target documents ER positive, AL positive, HER-2 negative.  MSI stable, low mutation burden.  Androgen receptor positivity and no actionable mutations otherwise.  · Transition to Aromasin 25 mg daily along with Afinitor 5 mg daily.  Afinitor initiated 6/8/2021  · At the initiation of Aromasin and Afinitor, baseline CA 15-3 6/4/2021 87.9.  CA 15-3 decreased to 75 7/1/2021.  · Review of ophthalmology notes.  The patient was noted to have a lesion in the right eye, questionable metastatic deposit.  However, this has responded to Afinitor.  She will continue to follow-up with ophthalmology every 3 months  · Continue Aromasin 25 mg and Afinitor 5 mg daily  The patient was reviewed on 08/26/2021. [facility], the patient’s clinical status looks relatively stable. She has not developed any new sites of bone pain and the pain intensity in the right femur is no different than before requiring pain medicine once or twice a day. She is not requiring any narcotic medication. Dr. Saez advised her to continue her medicines, Afinitor and Aromasin at the same dosing and advised her to proceed with radiological assessment including a bone scan and a CT scan of the abdomen and pelvis. This will be performed in 2 weeks and we will review her back in 3 weeks.  9/9/2021, the patient is seen today for triage visit.  She has been experiencing significant weakness and fatigue, stating on Wednesday she had to leave the grocery  store as she almost passed out.  Since then, she has been experiencing weakness and fatigue causing her to not be able to get out of bed.  She continues on Aromasin and Afinitor.  She reports not drinking adequately, drinking approximately 3 16 ounce bottles of water daily.  During her visit today, she was slow to respond, though responding appropriately.  She was also having difficulty finding words.  This was discussed with Dr. Saez, patient will receive 1 L IV normal saline in clinic today along with 2 units packed red blood cells (hemoglobin 8.3 but patient is symptomatic).  We will also set the patient up for MRI of the brain with her upcoming imaging on Wednesday.  Patient reviewed 09/18/2021 and the radiological analysis of the CT scan of the abdomen shows obvious progression of her liver metastasis. The CT scan of the chest disclosed no pulmonary metastasis. The MRI of the head shows obvious basal cranial metastasis. This explains her neurological picture. The bone scan difficult to observe still shows significant progressive bone metastasis. In other words her metastatic malignancy mostly to the bones and now into the liver is progressive and I advised her to discontinue her Aromasin and her everolimus. The patient will continue with her radiation therapy to the bone and cranium and this will take a total of 10 sessions. I would like to review her back in a couple of weeks in order to decide how to proceed thereafter. The patient has taken multiple chemotherapy medicines before including taxane, Adriamycin, Xeloda, all sorts of hormonal therapies including Kisqali, Aromasin, Femara and now Aromasin along with everolimus. She has failed all these regimens but remind ourselves that she has been treated for metastatic bone cancer from the breast a total of 10 years. This is already an achievement  Patient referred for whole brain radiation which was completed 9/16/2021-9/29/2021 to a total of 10 fractions in  3000 Gy under the care of Dr. Jesus.  10/5/2021 patient presents with profound weakness, poor appetite low blood pressure and increased kidney function.  We will proceed with IV hydration in the office today and will reevaluate her next week.  Patient returns 10/13/2021 continuing to complain of progressive weakness and fatigue.  She is now reporting swelling in her right lower extremity and likely has a new DVT.  Her hemoglobin has further declines, and her kidney function although improved is still declined as well.  After further discussion with Dr. Saez, he feels that there is not much that we can further offer the patient and recommend she pursue hospice care.  Dr. Saez did discuss this with the patient's, and the patient agreed.  We will go ahead and pursue a Doppler of the right lower extremity to evaluate for clot and start her on anticoagulation if needed to help reduce the swelling and pain in that leg.  She is going to discuss with her family this evening and make a decision if she will need to stay in town locally versus moving closer to family in [place].    2.  Bony metastasis  · Now receiving Xgeva every 3 months, last given 6/4/2021.  She will receive Xgeva at scheduled follow-up with Dr. Saez in August  As stated above the bone pain is not changing too much in intensity. This will be watched. She will continue the same dosing of medicines and she will proceed with her Xgeva  08/26/2021.   Xgeva changed to monthly dosing.  10/5/2021 Xgeva held due to poor for performance status and dehydration.    3.  Anemia, multifactorial  · Previously with iron deficiency due to GI blood loss.  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  The patient is symptomatic with fatigue and  dyspnea.  I suspect there is an element of hemoconcentration as well as she is clinically dehydrated.  We will proceed with transfusion of 2 units packed red blood cells  Her anemia remains multifactorial but most of it is related myelophthisis. She was transfused a few weeks ago and her hemoglobin is 9.6 8/26/2021. There is nothing else we can do to stimulate production of red cells at this point.  We have checked ferritin, iron, TIBC, B12 and folic acid levels, all of then normal.  9/9/2021, hemoglobin today 8.3.  Patient is experiencing significant weakness and fatigue.  As the patient is symptomatic, we will proceed with 2 unit packed red blood cells.  anemia remains multifactorial but I think most of it is related myelophthisis. She was transfused a few weeks ago and her hemoglobin is 9.6 today. There is nothing else I can do to stimulate production of red cells at this point. This will be watched and I would not be surprised if she needs to be transfused again in a few weeks. She has no notion of blood loss. We have checked ferritin, iron, TIBC, B12 and folic acid levels, all of then normal  10/13/2021 hemoglobin has now declined to 8.9.  Patient denies bleeding issues.    4.  Hypomagnesemia  · Continuing on magnesium replacement twice daily  Was previously on oral magnesium supplement put on hold due to issues with diarrhea.      5.  Venous access  · The patient does have a right chest Mediport which we will continue to flush monthly.  Activase currently instilled  Her port remains functional and it has been flushed and maintained appropriately.    6.  Cancer related pain  · Hydrocodone/acetaminophen 5/325 every 6 hours as needed.  Patient does not like taking narcotics.  She does utilize Voltaren cream and Aleve which provides good control of her pain.  Her pain medicine, anti-inflammatory and naproxen will remain ongoing for the time being. We need to be sure that she is not going to  toxicity from  the point of view kidney dysfunction or GI toxicity. She remains on proton pump inhibitor for gastric protection.  Due to elevated creatinine, patient was asked to discontinue anti-inflammatories.  9/9/2021, patient was taking narcotic pain medicine, however after experiencing a syncopal episode she switched to taking 1000 mg of Tylenol every 6 hours.  She feels this is controlling her pain.  Her liver function studies have began to rise, AST 50, ALT 53 today.  Discussed with the patient that syncopal episodes could be related to dehydration or anemia.  We will replace her with IV fluids and 2 units packed red blood cells.  I have asked her to try restarting Norco 5/325 and see if she is able to tolerate since she was tolerating it fine before.  The patient does not get along with narcotic medication. On 09/17/2021, I advised her to use the Lortab as the last resource and I advised her to go back into the Aleve that is her best friend in regard to pain control. The patient is taking proton pump inhibitor. She is taking a single dose of dexamethasone 4 mg a day and I think she will be able to handle the combination of dexamethasone that will be a transitory medicine for 2 weeks and the Aleve. Hopefully in 2 weeks we will be able to discontinue the dexamethasone altogether.   She will continue utilizing the Voltaren topical medicine that is of great benefit.   On 10/05/2021 the patient continues Aleve to control her pain much better than narcotic medication and topical medication including Voltaren cream.       7.  Muscle cramping  · With the patient's borderline hypotension and tachycardia I suspect this is related to dehydration.  The patient was encouraged to increase her oral intake.   · Creatinine reviewed elevated at 1.4 which confirms dehydration.  The patient will increase oral intake  This issue is much better when she drinks more liquids.  No complaints today.      8.  Right lower extremity swelling: Present  since Sunday.  Patient was sent for a stat right lower extremity Doppler which was positive for popliteal DVT.  She was started on Xarelto 15 mg twice daily x5 days per Dr. Saldaña's recommendation then followed by 20 mg daily.  The patient was provided with Xarelto samples and appropriate instructions.      PLAN:  · Patient will start Xarelto 15 mg twice daily x5 days then 20 mg once daily.  · We will make a referral to hospice.      I spent 60 minutes caring for Maggie Suero on this date of service. This time includes time spent by me in the following activities:preparing for the visit, reviewing tests, obtaining and/or reviewing a separately obtained history, performing a medically appropriate examination and/or evaluation , counseling and educating the patient/family/caregiver, ordering medications, tests, or procedures, referring and communicating with other health care professionals , documenting information in the medical record, independently interpreting results and communicating that information with the patient/family/caregiver and care coordination.        Hamida Sabillon, APRN  10/13/2021

## 2021-10-14 ENCOUNTER — TELEPHONE (OUTPATIENT)
Dept: ONCOLOGY | Facility: CLINIC | Age: 66
End: 2021-10-14

## 2021-10-14 NOTE — TELEPHONE ENCOUNTER
Caller: Maggie Suero    Relationship: Self    Best call back number: [phone]    What is the best time to reach you: ASAP    Who are you requesting to speak with (clinical staff, provider,  specific staff member): LIV PRIEST    Do you know the name of the person who called:     What was the call regarding: PT WOULD LIKE TO SPEAK WITH LIV PRIEST    Do you require a callback: YES

## 2021-10-15 ENCOUNTER — TELEPHONE (OUTPATIENT)
Dept: ONCOLOGY | Facility: CLINIC | Age: 66
End: 2021-10-15

## 2021-10-15 NOTE — TELEPHONE ENCOUNTER
Caller: Timur Suero    Relationship: Self    Best call back number: [phone]    What is the best time to reach you: ANYTIME    Who are you requesting to speak with (clinical staff, provider,  specific staff member):   DR MIRANDA NURSE        Do you know the name of the person who called: TIMUR    What was the call regarding:   HAVE GOTTEN A REQUEST FROM A [employer] TO APPLY FOR FINANCIAL AID, AND IT ASK TO PUT PHYSICIANS E-MAIL ADDRESS, WANTED TO KNOW WHAT E-MAIL ADDRESS SHOULD PUT FOR DR MIRANDA    Do you require a callback: YES    PER PT STATES CAN ALSO SEND A TEXT MESSAGE AS WELL WITH INFORMATION.     CAN LEAVE VOICEMAIL WITH INFORMATION AS WELL IF MISSES THE CALL.

## 2021-10-15 NOTE — TELEPHONE ENCOUNTER
Call back to Ms. Suero and gave her Dr. Saez' e-mail address, as he gave this RN permission. Ms. Suero was very appreciative.

## 2021-10-19 ENCOUNTER — TELEPHONE (OUTPATIENT)
Dept: ONCOLOGY | Facility: CLINIC | Age: 66
End: 2021-10-19

## 2021-10-19 NOTE — TELEPHONE ENCOUNTER
Caller: Maggie Suero    Relationship: Self    Best call back number: [phone]    What form or medical record are you requesting: PROOF OF HER BEING SENT TO HOSPICE IN [place].    Who is requesting this form or medical record from you: YUSUF (WAIVING RENT FOR November)    How would you like to receive the form or medical records (pick-up, mail, fax): EMAIL  EMAIL: [email or web address]    Timeframe paperwork needed: ASAP

## 2021-10-19 NOTE — TELEPHONE ENCOUNTER
Caller: Maggie Suero    Relationship: Self      Medication requested (name and dosage):     Requested Prescriptions:   Requested Prescriptions     Pending Prescriptions Disp Refills   • Diclofenac Sodium (VOLTAREN) 1 % gel gel 100 g 5     Sig: APPLY 2 TO 4 GRAMS TOPICALLY TWICE DAILY TO AFFECTED AREA        Pharmacy where request should be sent: [address] ([facility] [place] - [address] [facility] [phone] [facility] [phone] FX    Additional details provided by patient: PATIENT NEEDS BEFORE Saturday.    SHE WOULD LIKE TO GET 3-4 BOXES INSTEAD OF JUST ONE.    Best call back number: [phone]    Does the patient have less than a 3 day supply:  [x] Yes  [] No    Compa BRYANT Rep   10/19/21 10:59 EDT

## 2021-10-20 NOTE — TELEPHONE ENCOUNTER
Pt has requested a refill of her Voltaren Gel through the [facility]. Pt would like to get 3-4 boxes instead of just one. Per last office note from Hamida CARRIZALES NP-pt is to continue.    · On 10/05/2021 the patient continues Aleve to control her pain much better than narcotic medication and topical medication including Voltaren cream.     I have updated the Qty and routed the rx to Dr Saez for signature. Once signed it will be escribed to [facility].

## 2021-10-25 RX ORDER — MAGNESIUM 64 MG (MAGNESIUM CHLORIDE) TABLET,DELAYED RELEASE
Qty: 180 TABLET | Refills: 0 | OUTPATIENT
Start: 2021-10-25

## 2021-10-28 ENCOUNTER — TELEPHONE (OUTPATIENT)
Dept: ONCOLOGY | Facility: CLINIC | Age: 66
End: 2021-10-28

## 2021-10-28 NOTE — TELEPHONE ENCOUNTER
Caller: Maggie Suero    Relationship: Self    Best call back number: [phone]    What is the best time to reach you: ANYTIME    Who are you requesting to speak with (clinical staff, provider,  specific staff member): DR. MIRANDA/NURSE    What was the call regarding: PATIENT IS CURRENTLY IN HOSPICE CARE. SHE WOULD LIKE CLARIFICATION ON HER NEED TO CONTINUE ON XARELTO AS IT WAS HER IMPRESSION THAT SHE WAS TO DISCONTINUE AFTER HER LAST RX, OR IF SHE NEEDS TO CONTINUE TAKING. IF SO, SHE WILL NEED NEW RX FROM HOSPICE NURSE.      Do you require a callback: YES.

## 2021-10-29 ENCOUNTER — TELEPHONE (OUTPATIENT)
Dept: ONCOLOGY | Facility: CLINIC | Age: 66
End: 2021-10-29

## 2021-10-29 NOTE — TELEPHONE ENCOUNTER
Returned pt's call re: her question about continuing her xarelto. Per Dr. Saez, pt can stop this medication. Message left on her voicemail re: this.

## 2021-11-02 ENCOUNTER — DOCUMENTATION (OUTPATIENT)
Dept: RADIATION ONCOLOGY | Facility: HOSPITAL | Age: 66
End: 2021-11-02

## 2021-11-02 NOTE — PROGRESS NOTES
Maggie Suero called office to inform us that she has moved to [place] and that she has been admitted to [facility]

## 2023-08-30 NOTE — PROGRESS NOTES
Reviewed labs with patient today. She is feeling well with no new questions or concerns. She will return as already scheduled in 2 weeks. Her CBC is stable.  I have instructed her to call the office if she has questions or new symptoms.    Lab Results   Component Value Date    WBC 3.23 (L) 09/20/2017    HGB 10.0 (L) 09/20/2017    HCT 28.6 (L) 09/20/2017    MCV 96.3 09/20/2017     09/20/2017        PAST MEDICAL HISTORY:  No pertinent past medical history

## 2025-06-15 NOTE — TELEPHONE ENCOUNTER
Problem: Pain  Goal: Acceptable pain level achieved/maintained at rest using appropriate pain scale for the patient  Outcome: Monitoring/Evaluating progress  Goal: Acceptable pain level achieved/maintained with activity using appropriate pain scale for the patient  Outcome: Monitoring/Evaluating progress  Goal: Acceptable pain level achieved/maintained without oversedation  Outcome: Monitoring/Evaluating progress     Problem: At Risk for Falls  Goal: Patient does not fall  Outcome: Monitoring/Evaluating progress  Goal: Patient takes action to control fall-related risks  Outcome: Monitoring/Evaluating progress     Problem: At Risk for Injury Due to Fall  Goal: Patient does not fall  Outcome: Monitoring/Evaluating progress  Goal: Takes action to control condition specific risks  Outcome: Monitoring/Evaluating progress  Goal: Verbalizes understanding of fall-related injury personal risks  Description: Document education using the patient education activity  Outcome: Monitoring/Evaluating progress      Pt called needing to know if she needs a Port Flush before her appt on 04/10    Please call pt asap @ [phone]    Thanks